# Patient Record
Sex: FEMALE | Race: BLACK OR AFRICAN AMERICAN | NOT HISPANIC OR LATINO
[De-identification: names, ages, dates, MRNs, and addresses within clinical notes are randomized per-mention and may not be internally consistent; named-entity substitution may affect disease eponyms.]

---

## 2017-01-24 ENCOUNTER — APPOINTMENT (OUTPATIENT)
Dept: INFECTIOUS DISEASE | Facility: CLINIC | Age: 47
End: 2017-01-24

## 2017-01-24 ENCOUNTER — EMERGENCY (EMERGENCY)
Facility: HOSPITAL | Age: 47
LOS: 1 days | Discharge: LEFT BEFORE DISCHARGE | End: 2017-01-24
Admitting: EMERGENCY MEDICINE

## 2017-01-24 ENCOUNTER — LABORATORY RESULT (OUTPATIENT)
Age: 47
End: 2017-01-24

## 2017-01-24 VITALS
HEIGHT: 71 IN | WEIGHT: 199 LBS | BODY MASS INDEX: 27.86 KG/M2 | HEART RATE: 116 BPM | TEMPERATURE: 98.1 F | SYSTOLIC BLOOD PRESSURE: 121 MMHG | DIASTOLIC BLOOD PRESSURE: 74 MMHG | OXYGEN SATURATION: 98 % | RESPIRATION RATE: 16 BRPM

## 2017-01-24 VITALS
RESPIRATION RATE: 18 BRPM | SYSTOLIC BLOOD PRESSURE: 136 MMHG | OXYGEN SATURATION: 99 % | HEART RATE: 92 BPM | DIASTOLIC BLOOD PRESSURE: 77 MMHG | TEMPERATURE: 99 F

## 2017-01-24 VITALS
RESPIRATION RATE: 16 BRPM | SYSTOLIC BLOOD PRESSURE: 143 MMHG | OXYGEN SATURATION: 98 % | HEART RATE: 91 BPM | DIASTOLIC BLOOD PRESSURE: 91 MMHG | TEMPERATURE: 98 F

## 2017-01-26 LAB
25(OH)D3 SERPL-MCNC: 15.2 NG/ML
ADJUSTED MITOGEN: 6.63 IU/ML
ADJUSTED TB AG: 0 IU/ML
ALBUMIN SERPL ELPH-MCNC: 3.5 G/DL
ALP BLD-CCNC: 327 U/L
ALT SERPL-CCNC: 11 U/L
ANION GAP SERPL CALC-SCNC: 13 MMOL/L
APPEARANCE: ABNORMAL
AST SERPL-CCNC: 14 U/L
BACTERIA: NEGATIVE
BASOPHILS # BLD AUTO: 0.04 K/UL
BASOPHILS NFR BLD AUTO: 0.8 %
BILIRUB SERPL-MCNC: 0.3 MG/DL
BILIRUBIN URINE: NEGATIVE
BLOOD URINE: ABNORMAL
BUN SERPL-MCNC: 12 MG/DL
C TRACH DNA SPEC QL NAA+PROBE: NORMAL
C TRACH RRNA SPEC QL NAA+PROBE: NORMAL
CALCIUM SERPL-MCNC: 9.2 MG/DL
CD3 CELLS # BLD: 1122 /UL
CD3 CELLS NFR BLD: 90 %
CD3+CD4+ CELLS # BLD: 174 /UL
CD3+CD4+ CELLS NFR BLD: 14 %
CD3+CD4+ CELLS/CD3+CD8+ CLL SPEC: 0.2 RATIO
CD3+CD8+ CELLS # SPEC: 863 /UL
CD3+CD8+ CELLS NFR BLD: 69 %
CHLORIDE SERPL-SCNC: 94 MMOL/L
CHOLEST SERPL-MCNC: 246 MG/DL
CHOLEST/HDLC SERPL: 6.3 RATIO
CO2 SERPL-SCNC: 22 MMOL/L
COLOR: YELLOW
CREAT SERPL-MCNC: 1.35 MG/DL
EOSINOPHIL # BLD AUTO: 0.39 K/UL
EOSINOPHIL NFR BLD AUTO: 8 %
GLUCOSE QUALITATIVE U: 1000 MG/DL
GLUCOSE SERPL-MCNC: 435 MG/DL
HAV IGG+IGM SER QL: NONREACTIVE
HBA1C MFR BLD HPLC: 11.5 %
HBV SURFACE AB SER QL: REACTIVE
HBV SURFACE AG SER QL: NONREACTIVE
HCT VFR BLD CALC: 32 %
HDLC SERPL-MCNC: 39 MG/DL
HGB BLD-MCNC: 9.2 G/DL
HIV1 RNA # SERPL NAA+PROBE: 892 COPIES/ML
HYALINE CASTS: 4 /LPF
IMM GRANULOCYTES NFR BLD AUTO: 0.2 %
KETONES URINE: NEGATIVE
LDLC SERPL CALC-MCNC: 150 MG/DL
LEUKOCYTE ESTERASE URINE: ABNORMAL
LYMPHOCYTES # BLD AUTO: 1.28 K/UL
LYMPHOCYTES NFR BLD AUTO: 26.4 %
M TB IFN-G BLD-IMP: NEGATIVE
MAN DIFF?: NORMAL
MCHC RBC-ENTMCNC: 20.9 PG
MCHC RBC-ENTMCNC: 28.8 GM/DL
MCV RBC AUTO: 72.7 FL
MICROSCOPIC-UA: NORMAL
MONOCYTES # BLD AUTO: 0.34 K/UL
MONOCYTES NFR BLD AUTO: 7 %
N GONORRHOEA DNA SPEC QL NAA+PROBE: NORMAL
N GONORRHOEA RRNA SPEC QL NAA+PROBE: NORMAL
NEUTROPHILS # BLD AUTO: 2.79 K/UL
NEUTROPHILS NFR BLD AUTO: 57.6 %
NITRITE URINE: NEGATIVE
PH URINE: 5.5
PLATELET # BLD AUTO: 307 K/UL
POTASSIUM SERPL-SCNC: 4.1 MMOL/L
PROT SERPL-MCNC: 8.7 G/DL
PROTEIN URINE: 30 MG/DL
QUANTIFERON GOLD NIL: 0.06 IU/ML
RBC # BLD: 4.4 M/UL
RBC # FLD: 18 %
RED BLOOD CELLS URINE: 3 /HPF
SODIUM SERPL-SCNC: 129 MMOL/L
SOURCE AMPLIFICATION: NORMAL
SPECIFIC GRAVITY URINE: 1.03
SQUAMOUS EPITHELIAL CELLS: 4 /HPF
T PALLIDUM AB SER QL IA: NEGATIVE
TRIGL SERPL-MCNC: 284 MG/DL
UROBILINOGEN URINE: NORMAL MG/DL
VIRAL LOAD LOG: 2.95 LG10COP/ML
WBC # FLD AUTO: 4.85 K/UL
WHITE BLOOD CELLS URINE: 82 /HPF

## 2017-01-27 ENCOUNTER — RX RENEWAL (OUTPATIENT)
Age: 47
End: 2017-01-27

## 2017-01-27 LAB
ABACAVIR ISLT GENOTYP: NORMAL
ATAZANAVIR+RITONAVIR ISLT GENOTYP: NORMAL
DARUNAVIR+RITONAVIR ISLT GENOTYP: NORMAL
DIDANOSINE ISLT GENOTYP: NORMAL
EFAVIRENZ ISLT GENOTYP: NORMAL
EMTRICITABINE ISLT GENOTYP: NORMAL
ETRAVIRINE ISLT GENOTYP: NORMAL
FOSAMPRENAVIR+RITONAVIR ISLT GENOTYP: NORMAL
HIV NNRTI GENE MUT DET ISLT: NORMAL
HIV NRTI GENE MUT DET ISLT: NORMAL
HIV PI GENE MUT DET ISLT: NORMAL
HIV RT GENE MUT DET ISLT: NORMAL
INDINAVIR+RITONAVIR ISLT GENOTYP: NORMAL
LAMIVUDINE ISLT GENOTYP: NORMAL
LOPINAVIR+RITONAVIR ISLT GENOTYP: NORMAL
NELFINAVIR ISLT GENOTYP: NORMAL
NEVIRAPINE ISLT GENOTYP: NORMAL
RILPIVIRINE ISLT GENOTYP: NORMAL
SAQUINAVIR+RITONAVIR ISLT GENOTYP: NORMAL
STAVUDINE ISLT GENOTYP: NORMAL
TENOFOVIR ISLT GENOTYP: NORMAL
TIPRANAVIR+RITONAVIR ISLT GENOTYP: NORMAL
ZIDOVUDINE ISLT GENOTYP: NORMAL

## 2017-01-29 ENCOUNTER — EMERGENCY (EMERGENCY)
Facility: HOSPITAL | Age: 47
LOS: 1 days | Discharge: HOME | End: 2017-01-29
Attending: EMERGENCY MEDICINE | Admitting: EMERGENCY MEDICINE
Payer: COMMERCIAL

## 2017-01-29 VITALS
SYSTOLIC BLOOD PRESSURE: 134 MMHG | HEART RATE: 81 BPM | OXYGEN SATURATION: 98 % | DIASTOLIC BLOOD PRESSURE: 88 MMHG | RESPIRATION RATE: 16 BRPM

## 2017-01-29 VITALS
SYSTOLIC BLOOD PRESSURE: 143 MMHG | RESPIRATION RATE: 16 BRPM | DIASTOLIC BLOOD PRESSURE: 89 MMHG | HEIGHT: 71 IN | TEMPERATURE: 98 F | OXYGEN SATURATION: 100 % | WEIGHT: 199.96 LBS | HEART RATE: 104 BPM

## 2017-01-29 LAB
ALBUMIN SERPL ELPH-MCNC: 3.8 G/DL — SIGNIFICANT CHANGE UP (ref 3.3–5)
ALP SERPL-CCNC: 339 U/L — HIGH (ref 40–120)
ALT FLD-CCNC: 9 U/L — SIGNIFICANT CHANGE UP (ref 4–33)
APPEARANCE UR: CLEAR — SIGNIFICANT CHANGE UP
AST SERPL-CCNC: 18 U/L — SIGNIFICANT CHANGE UP (ref 4–32)
B-OH-BUTYR SERPL-SCNC: 0.2 MMOL/L — SIGNIFICANT CHANGE UP (ref 0–0.4)
BACTERIA # UR AUTO: SIGNIFICANT CHANGE UP
BASE EXCESS BLDV CALC-SCNC: -0.5 MMOL/L — SIGNIFICANT CHANGE UP
BASOPHILS # BLD AUTO: 0.04 K/UL — SIGNIFICANT CHANGE UP (ref 0–0.2)
BASOPHILS NFR BLD AUTO: 0.7 % — SIGNIFICANT CHANGE UP (ref 0–2)
BILIRUB SERPL-MCNC: 0.3 MG/DL — SIGNIFICANT CHANGE UP (ref 0.2–1.2)
BILIRUB UR-MCNC: NEGATIVE — SIGNIFICANT CHANGE UP
BLOOD GAS VENOUS - CREATININE: 1.18 MG/DL — SIGNIFICANT CHANGE UP (ref 0.5–1.3)
BLOOD UR QL VISUAL: NEGATIVE — SIGNIFICANT CHANGE UP
BUN SERPL-MCNC: 12 MG/DL — SIGNIFICANT CHANGE UP (ref 7–23)
CALCIUM SERPL-MCNC: 9.6 MG/DL — SIGNIFICANT CHANGE UP (ref 8.4–10.5)
CHLORIDE BLDV-SCNC: 97 MMOL/L — SIGNIFICANT CHANGE UP (ref 96–108)
CHLORIDE SERPL-SCNC: 92 MMOL/L — LOW (ref 98–107)
CO2 SERPL-SCNC: 20 MMOL/L — LOW (ref 22–31)
COLOR SPEC: SIGNIFICANT CHANGE UP
CREAT SERPL-MCNC: 1.31 MG/DL — HIGH (ref 0.5–1.3)
EOSINOPHIL # BLD AUTO: 0.48 K/UL — SIGNIFICANT CHANGE UP (ref 0–0.5)
EOSINOPHIL NFR BLD AUTO: 8.9 % — HIGH (ref 0–6)
GAS PNL BLDV: 130 MMOL/L — LOW (ref 136–146)
GLUCOSE BLDV-MCNC: 484 — CRITICAL HIGH (ref 70–99)
GLUCOSE SERPL-MCNC: 517 MG/DL — CRITICAL HIGH (ref 70–99)
GLUCOSE UR-MCNC: >1000 — SIGNIFICANT CHANGE UP
HBA1C BLD-MCNC: 10.9 % — HIGH (ref 4–5.6)
HCO3 BLDV-SCNC: 23 MMOL/L — SIGNIFICANT CHANGE UP (ref 20–27)
HCT VFR BLD CALC: 31.2 % — LOW (ref 34.5–45)
HCT VFR BLDV CALC: 29.2 % — LOW (ref 34.5–45)
HGB BLD-MCNC: 9.3 G/DL — LOW (ref 11.5–15.5)
HGB BLDV-MCNC: 9.4 G/DL — LOW (ref 11.5–15.5)
IMM GRANULOCYTES NFR BLD AUTO: 0.2 % — SIGNIFICANT CHANGE UP (ref 0–1.5)
KETONES UR-MCNC: NEGATIVE — SIGNIFICANT CHANGE UP
LACTATE BLDV-MCNC: 2.5 MMOL/L — HIGH (ref 0.5–2)
LEUKOCYTE ESTERASE UR-ACNC: HIGH
LYMPHOCYTES # BLD AUTO: 1.96 K/UL — SIGNIFICANT CHANGE UP (ref 1–3.3)
LYMPHOCYTES # BLD AUTO: 36.2 % — SIGNIFICANT CHANGE UP (ref 13–44)
MCHC RBC-ENTMCNC: 21.1 PG — LOW (ref 27–34)
MCHC RBC-ENTMCNC: 29.8 % — LOW (ref 32–36)
MCV RBC AUTO: 70.9 FL — LOW (ref 80–100)
MONOCYTES # BLD AUTO: 0.47 K/UL — SIGNIFICANT CHANGE UP (ref 0–0.9)
MONOCYTES NFR BLD AUTO: 8.7 % — SIGNIFICANT CHANGE UP (ref 2–14)
MUCOUS THREADS # UR AUTO: SIGNIFICANT CHANGE UP
NEUTROPHILS # BLD AUTO: 2.46 K/UL — SIGNIFICANT CHANGE UP (ref 1.8–7.4)
NEUTROPHILS NFR BLD AUTO: 45.3 % — SIGNIFICANT CHANGE UP (ref 43–77)
NITRITE UR-MCNC: NEGATIVE — SIGNIFICANT CHANGE UP
PCO2 BLDV: 45 MMHG — SIGNIFICANT CHANGE UP (ref 41–51)
PH BLDV: 7.35 PH — SIGNIFICANT CHANGE UP (ref 7.32–7.43)
PH UR: 5.5 — SIGNIFICANT CHANGE UP (ref 4.6–8)
PLATELET # BLD AUTO: 275 K/UL — SIGNIFICANT CHANGE UP (ref 150–400)
PMV BLD: SIGNIFICANT CHANGE UP FL (ref 7–13)
PO2 BLDV: 36 MMHG — SIGNIFICANT CHANGE UP (ref 35–40)
POTASSIUM BLDV-SCNC: 4.2 MMOL/L — SIGNIFICANT CHANGE UP (ref 3.4–4.5)
POTASSIUM SERPL-MCNC: 4.2 MMOL/L — SIGNIFICANT CHANGE UP (ref 3.5–5.3)
POTASSIUM SERPL-SCNC: 4.2 MMOL/L — SIGNIFICANT CHANGE UP (ref 3.5–5.3)
PROT SERPL-MCNC: 9.3 G/DL — HIGH (ref 6–8.3)
PROT UR-MCNC: 30 — HIGH
RBC # BLD: 4.4 M/UL — SIGNIFICANT CHANGE UP (ref 3.8–5.2)
RBC # FLD: 17.6 % — HIGH (ref 10.3–14.5)
RBC CASTS # UR COMP ASSIST: SIGNIFICANT CHANGE UP (ref 0–?)
SAO2 % BLDV: 57.8 % — LOW (ref 60–85)
SODIUM SERPL-SCNC: 130 MMOL/L — LOW (ref 135–145)
SP GR SPEC: 1.02 — SIGNIFICANT CHANGE UP (ref 1–1.03)
SQUAMOUS # UR AUTO: SIGNIFICANT CHANGE UP
UROBILINOGEN FLD QL: NORMAL E.U. — SIGNIFICANT CHANGE UP (ref 0.1–0.2)
WBC # BLD: 5.42 K/UL — SIGNIFICANT CHANGE UP (ref 3.8–10.5)
WBC # FLD AUTO: 5.42 K/UL — SIGNIFICANT CHANGE UP (ref 3.8–10.5)
WBC CLUMPS #/AREA URNS HPF: PRESENT — HIGH (ref 0–?)
WBC UR QL: SIGNIFICANT CHANGE UP (ref 0–?)

## 2017-01-29 PROCEDURE — 99284 EMERGENCY DEPT VISIT MOD MDM: CPT | Mod: 25

## 2017-01-29 RX ORDER — NITROFURANTOIN MACROCRYSTAL 50 MG
1 CAPSULE ORAL
Qty: 14 | Refills: 0
Start: 2017-01-29 | End: 2017-02-05

## 2017-01-29 RX ORDER — NITROFURANTOIN MACROCRYSTAL 50 MG
100 CAPSULE ORAL ONCE
Qty: 0 | Refills: 0 | Status: COMPLETED | OUTPATIENT
Start: 2017-01-29 | End: 2017-01-29

## 2017-01-29 RX ORDER — INSULIN LISPRO 100/ML
8 VIAL (ML) SUBCUTANEOUS ONCE
Qty: 0 | Refills: 0 | Status: COMPLETED | OUTPATIENT
Start: 2017-01-29 | End: 2017-01-29

## 2017-01-29 RX ORDER — SODIUM CHLORIDE 9 MG/ML
1000 INJECTION INTRAMUSCULAR; INTRAVENOUS; SUBCUTANEOUS ONCE
Qty: 0 | Refills: 0 | Status: COMPLETED | OUTPATIENT
Start: 2017-01-29 | End: 2017-01-29

## 2017-01-29 RX ADMIN — Medication 100 MILLIGRAM(S): at 07:44

## 2017-01-29 RX ADMIN — SODIUM CHLORIDE 1000 MILLILITER(S): 9 INJECTION INTRAMUSCULAR; INTRAVENOUS; SUBCUTANEOUS at 06:01

## 2017-01-29 RX ADMIN — Medication 8 UNIT(S): at 07:04

## 2017-01-29 RX ADMIN — SODIUM CHLORIDE 1000 MILLILITER(S): 9 INJECTION INTRAMUSCULAR; INTRAVENOUS; SUBCUTANEOUS at 05:28

## 2017-01-29 NOTE — ED PROVIDER NOTE - PROGRESS NOTE DETAILS
Called [facility] to set up f/u appt for pt, they will call her during the week with an appointment. Hyperglycemia treated with fluids, insulin. Called [facility] to set up f/u appt for pt, they will call her during the week with an appointment. Improved FS after IVF. Discussed results, need for f/u with patient who expresses understanding and is amenable to plan.

## 2017-01-29 NOTE — ED ADULT TRIAGE NOTE - CHIEF COMPLAINT QUOTE
Pt walk in c/o Frontal Headache,pressure Blurry Vision Frequent Urination Dry mouth, Thirsty, Always Hungry. Pt reports Dx with DM in 2012 on Insulin, Stopped it in 2013 without MD advised when feeling better.

## 2017-01-29 NOTE — ED PROVIDER NOTE - ATTENDING CONTRIBUTION TO CARE
agree with resident note  47 yr old female with hx of IDDM who self stopped her insulin years ago presents with polyuria and polydipsia. Denies fever, abdominal pain, cough, infectious symptoms.  Tried to make appt with PMD but was told next appt was in April.    PE: well appearing; VSS: CTAB/L; s1 s 2 no m/r/g abd soft/NT/ND ext: no edema

## 2017-01-29 NOTE — ED PROVIDER NOTE - OBJECTIVE STATEMENT
47F h/o DM presenting with polyuria. Notes polyuria, polydipsia, blurry vision, persistent hunger for past 3 months. Pt had similar symptoms in 2012 and was diagnosed with diabetes, prescribed insulin but stopped taking it after one year because she felt better. Denies pain, F, CP/SOB, N/V/D.

## 2017-01-29 NOTE — ED ADULT NURSE NOTE - OBJECTIVE STATEMENT
Pt received in #18, aaox3 with c/o blurry vision, increased thirst, increased urinary frequency and "it feels like when I was first diagnosed with diabetes". Pt states that she was formally dx'ed in 2012 but weaned herself off of her medications in 2013. No known complications or symptoms until "a few months ago". Pt unable to follow up with her MD as next available appt is in April.

## 2017-02-13 ENCOUNTER — APPOINTMENT (OUTPATIENT)
Dept: ENDOCRINOLOGY | Facility: CLINIC | Age: 47
End: 2017-02-13

## 2017-02-13 RX ORDER — METFORMIN HYDROCHLORIDE 500 MG/1
500 TABLET, COATED ORAL
Qty: 30 | Refills: 0 | Status: DISCONTINUED | COMMUNITY
Start: 2017-01-26 | End: 2017-02-13

## 2017-02-15 ENCOUNTER — APPOINTMENT (OUTPATIENT)
Dept: OBGYN | Facility: CLINIC | Age: 47
End: 2017-02-15

## 2017-02-22 ENCOUNTER — MEDICATION RENEWAL (OUTPATIENT)
Age: 47
End: 2017-02-22

## 2017-02-24 ENCOUNTER — APPOINTMENT (OUTPATIENT)
Dept: INFECTIOUS DISEASE | Facility: CLINIC | Age: 47
End: 2017-02-24

## 2017-03-06 RX ORDER — PEN NEEDLE, DIABETIC 29 G X1/2"
32G X 4 MM NEEDLE, DISPOSABLE MISCELLANEOUS
Qty: 1 | Refills: 1 | Status: ACTIVE | COMMUNITY
Start: 2017-02-13

## 2017-03-22 ENCOUNTER — LABORATORY RESULT (OUTPATIENT)
Age: 47
End: 2017-03-22

## 2017-03-22 ENCOUNTER — APPOINTMENT (OUTPATIENT)
Dept: OBGYN | Facility: CLINIC | Age: 47
End: 2017-03-22

## 2017-03-22 VITALS
DIASTOLIC BLOOD PRESSURE: 70 MMHG | HEIGHT: 71 IN | SYSTOLIC BLOOD PRESSURE: 120 MMHG | BODY MASS INDEX: 27.86 KG/M2 | WEIGHT: 199 LBS

## 2017-03-22 DIAGNOSIS — N90.9 NONINFLAMMATORY DISORDER OF VULVA AND PERINEUM, UNSPECIFIED: ICD-10-CM

## 2017-03-23 LAB
CANDIDA VAG CYTO: NOT DETECTED
G VAGINALIS+PREV SP MTYP VAG QL MICRO: NOT DETECTED
T VAGINALIS VAG QL WET PREP: NOT DETECTED

## 2017-03-24 LAB — HPV HIGH+LOW RISK DNA PNL CVX: POSITIVE

## 2017-03-29 LAB — CYTOLOGY CVX/VAG DOC THIN PREP: NORMAL

## 2017-04-05 ENCOUNTER — APPOINTMENT (OUTPATIENT)
Dept: OBGYN | Facility: CLINIC | Age: 47
End: 2017-04-05

## 2017-04-05 VITALS
HEIGHT: 71 IN | WEIGHT: 199 LBS | SYSTOLIC BLOOD PRESSURE: 112 MMHG | DIASTOLIC BLOOD PRESSURE: 72 MMHG | BODY MASS INDEX: 27.86 KG/M2

## 2017-04-18 LAB — CORE LAB BIOPSY: NORMAL

## 2017-05-01 ENCOUNTER — APPOINTMENT (OUTPATIENT)
Dept: INFECTIOUS DISEASE | Facility: CLINIC | Age: 47
End: 2017-05-01

## 2017-05-01 ENCOUNTER — APPOINTMENT (OUTPATIENT)
Dept: ENDOCRINOLOGY | Facility: CLINIC | Age: 47
End: 2017-05-01

## 2017-05-01 VITALS
WEIGHT: 205 LBS | SYSTOLIC BLOOD PRESSURE: 120 MMHG | DIASTOLIC BLOOD PRESSURE: 80 MMHG | HEIGHT: 71 IN | HEART RATE: 90 BPM | OXYGEN SATURATION: 98 % | BODY MASS INDEX: 28.7 KG/M2

## 2017-05-01 VITALS
TEMPERATURE: 97.9 F | WEIGHT: 205 LBS | SYSTOLIC BLOOD PRESSURE: 128 MMHG | BODY MASS INDEX: 28.7 KG/M2 | HEART RATE: 90 BPM | DIASTOLIC BLOOD PRESSURE: 88 MMHG | HEIGHT: 71 IN | OXYGEN SATURATION: 97 %

## 2017-05-01 LAB
GLUCOSE BLDC GLUCOMTR-MCNC: 200
HBA1C MFR BLD HPLC: 7.8

## 2017-05-01 RX ORDER — GLIMEPIRIDE 2 MG/1
2 TABLET ORAL DAILY
Qty: 30 | Refills: 3 | Status: DISCONTINUED | COMMUNITY
Start: 2017-02-13 | End: 2017-05-01

## 2017-05-02 LAB
ALBUMIN SERPL ELPH-MCNC: 3.1 G/DL
ALP BLD-CCNC: 232 U/L
ALT SERPL-CCNC: 17 U/L
ANION GAP SERPL CALC-SCNC: 13 MMOL/L
AST SERPL-CCNC: 27 U/L
BASOPHILS # BLD AUTO: 0.02 K/UL
BASOPHILS NFR BLD AUTO: 0.5 %
BILIRUB SERPL-MCNC: 0.2 MG/DL
BUN SERPL-MCNC: 14 MG/DL
CALCIUM SERPL-MCNC: 8.8 MG/DL
CD3 CELLS # BLD: 1030 /UL
CD3 CELLS NFR BLD: 88 %
CD3+CD4+ CELLS # BLD: 126 /UL
CD3+CD4+ CELLS NFR BLD: 11 %
CD3+CD4+ CELLS/CD3+CD8+ CLL SPEC: 0.15 RATIO
CD3+CD8+ CELLS # SPEC: 815 /UL
CD3+CD8+ CELLS NFR BLD: 69 %
CHLORIDE SERPL-SCNC: 102 MMOL/L
CHOLEST SERPL-MCNC: 211 MG/DL
CHOLEST/HDLC SERPL: 6.4 RATIO
CO2 SERPL-SCNC: 21 MMOL/L
CREAT SERPL-MCNC: 1.13 MG/DL
EOSINOPHIL # BLD AUTO: 0.59 K/UL
EOSINOPHIL NFR BLD AUTO: 15.1 %
GLUCOSE SERPL-MCNC: 174 MG/DL
HBA1C MFR BLD HPLC: 8.1 %
HCT VFR BLD CALC: 28.5 %
HDLC SERPL-MCNC: 33 MG/DL
HGB BLD-MCNC: 8.4 G/DL
IMM GRANULOCYTES NFR BLD AUTO: 0 %
LDLC SERPL CALC-MCNC: 110 MG/DL
LYMPHOCYTES # BLD AUTO: 1.34 K/UL
LYMPHOCYTES NFR BLD AUTO: 34.4 %
MAN DIFF?: NORMAL
MCHC RBC-ENTMCNC: 22.3 PG
MCHC RBC-ENTMCNC: 29.5 GM/DL
MCV RBC AUTO: 75.8 FL
MONOCYTES # BLD AUTO: 0.32 K/UL
MONOCYTES NFR BLD AUTO: 8.2 %
NEUTROPHILS # BLD AUTO: 1.63 K/UL
NEUTROPHILS NFR BLD AUTO: 41.8 %
PLATELET # BLD AUTO: 281 K/UL
POTASSIUM SERPL-SCNC: 4.7 MMOL/L
PROT SERPL-MCNC: 8.9 G/DL
RBC # BLD: 3.76 M/UL
RBC # FLD: 19.4 %
SODIUM SERPL-SCNC: 136 MMOL/L
TRIGL SERPL-MCNC: 341 MG/DL
WBC # FLD AUTO: 3.9 K/UL

## 2017-05-04 LAB
HIV1 RNA # SERPL NAA+PROBE: 1848 COPIES/ML
VIRAL LOAD LOG: 3.27 LG10COP/ML

## 2017-07-21 ENCOUNTER — APPOINTMENT (OUTPATIENT)
Dept: INFECTIOUS DISEASE | Facility: CLINIC | Age: 47
End: 2017-07-21

## 2017-07-21 ENCOUNTER — LABORATORY RESULT (OUTPATIENT)
Age: 47
End: 2017-07-21

## 2017-07-21 VITALS
HEART RATE: 95 BPM | WEIGHT: 209 LBS | OXYGEN SATURATION: 96 % | SYSTOLIC BLOOD PRESSURE: 123 MMHG | RESPIRATION RATE: 16 BRPM | TEMPERATURE: 98.2 F | BODY MASS INDEX: 29.26 KG/M2 | DIASTOLIC BLOOD PRESSURE: 80 MMHG | HEIGHT: 71 IN

## 2017-07-21 DIAGNOSIS — R87.9 UNSPECIFIED ABNORMAL FINDING IN SPECIMENS FROM FEMALE GENITAL ORGANS: ICD-10-CM

## 2017-07-21 LAB
ALBUMIN SERPL ELPH-MCNC: 3.5 G/DL
ALP BLD-CCNC: 300 U/L
ALT SERPL-CCNC: 9 U/L
ANION GAP SERPL CALC-SCNC: 15 MMOL/L
APPEARANCE: CLEAR
AST SERPL-CCNC: 18 U/L
BACTERIA: NEGATIVE
BILIRUB SERPL-MCNC: 0.2 MG/DL
BILIRUBIN URINE: NEGATIVE
BLOOD URINE: ABNORMAL
BUN SERPL-MCNC: 19 MG/DL
CALCIUM SERPL-MCNC: 9.3 MG/DL
CHLORIDE SERPL-SCNC: 98 MMOL/L
CO2 SERPL-SCNC: 20 MMOL/L
COLOR: YELLOW
CREAT SERPL-MCNC: 1.66 MG/DL
GLUCOSE QUALITATIVE U: 1000 MG/DL
GLUCOSE SERPL-MCNC: 305 MG/DL
HBA1C MFR BLD HPLC: 9.1 %
KETONES URINE: NEGATIVE
LEUKOCYTE ESTERASE URINE: NEGATIVE
MICROSCOPIC-UA: NORMAL
NITRITE URINE: NEGATIVE
PH URINE: 6
POTASSIUM SERPL-SCNC: 4.6 MMOL/L
PROT SERPL-MCNC: 9.6 G/DL
PROTEIN URINE: 30 MG/DL
RED BLOOD CELLS URINE: 0 /HPF
SODIUM SERPL-SCNC: 133 MMOL/L
SPECIFIC GRAVITY URINE: 1.02
SQUAMOUS EPITHELIAL CELLS: 2 /HPF
UROBILINOGEN URINE: NORMAL MG/DL
WHITE BLOOD CELLS URINE: 2 /HPF

## 2017-07-21 RX ORDER — ADHESIVE TAPE 3"X 2.3 YD
50 MCG TAPE, NON-MEDICATED TOPICAL
Qty: 30 | Refills: 6 | Status: COMPLETED | COMMUNITY
Start: 2017-05-01 | End: 2017-07-21

## 2017-07-21 RX ORDER — TERCONAZOLE 4 MG/G
0.4 CREAM VAGINAL
Qty: 1 | Refills: 3 | Status: COMPLETED | COMMUNITY
Start: 2017-03-22 | End: 2017-07-21

## 2017-07-24 LAB
25(OH)D3 SERPL-MCNC: 16.6 NG/ML
BASOPHILS # BLD AUTO: 0 K/UL
BASOPHILS NFR BLD AUTO: 0 %
C TRACH RRNA SPEC QL NAA+PROBE: NORMAL
CD3 CELLS # BLD: 1119 /UL
CD3 CELLS NFR BLD: 82 %
CD3+CD4+ CELLS # BLD: 161 /UL
CD3+CD4+ CELLS NFR BLD: 12 %
CD3+CD4+ CELLS/CD3+CD8+ CLL SPEC: 0.18 RATIO
CD3+CD8+ CELLS # SPEC: 882 /UL
CD3+CD8+ CELLS NFR BLD: 64 %
EOSINOPHIL # BLD AUTO: 1.92 K/UL
EOSINOPHIL NFR BLD AUTO: 35.3 %
HBV SURFACE AB SER QL: REACTIVE
HCT VFR BLD CALC: 29.7 %
HGB BLD-MCNC: 9 G/DL
HIV1 RNA # SERPL NAA+PROBE: <30 COPIES/ML
HIV1 RNA # SERPL NAA+PROBE: ABNORMAL
LYMPHOCYTES # BLD AUTO: 1.01 K/UL
LYMPHOCYTES NFR BLD AUTO: 18.6 %
MAN DIFF?: NORMAL
MCHC RBC-ENTMCNC: 22.6 PG
MCHC RBC-ENTMCNC: 30.3 GM/DL
MCV RBC AUTO: 74.4 FL
MONOCYTES # BLD AUTO: 0.16 K/UL
MONOCYTES NFR BLD AUTO: 2.9 %
N GONORRHOEA RRNA SPEC QL NAA+PROBE: NORMAL
NEUTROPHILS # BLD AUTO: 2.35 K/UL
NEUTROPHILS NFR BLD AUTO: 43.2 %
PLATELET # BLD AUTO: 321 K/UL
RBC # BLD: 3.99 M/UL
RBC # FLD: 19.3 %
SOURCE AMPLIFICATION: NORMAL
T PALLIDUM AB SER QL IA: NEGATIVE
VIRAL LOAD INTERP: NORMAL
VIRAL LOAD LOG: <1.47 LG COP/ML
WBC # FLD AUTO: 5.44 K/UL

## 2017-07-26 LAB
ADJUSTED MITOGEN: 4.02 IU/ML
ADJUSTED TB AG: 0 IU/ML
M TB IFN-G BLD-IMP: NEGATIVE
QUANTIFERON GOLD NIL: 0.02 IU/ML

## 2017-08-14 ENCOUNTER — APPOINTMENT (OUTPATIENT)
Dept: ENDOCRINOLOGY | Facility: CLINIC | Age: 47
End: 2017-08-14

## 2017-08-15 ENCOUNTER — RX RENEWAL (OUTPATIENT)
Age: 47
End: 2017-08-15

## 2017-09-11 ENCOUNTER — APPOINTMENT (OUTPATIENT)
Dept: ENDOCRINOLOGY | Facility: CLINIC | Age: 47
End: 2017-09-11
Payer: COMMERCIAL

## 2017-09-11 VITALS
BODY MASS INDEX: 29.26 KG/M2 | SYSTOLIC BLOOD PRESSURE: 120 MMHG | HEART RATE: 89 BPM | DIASTOLIC BLOOD PRESSURE: 80 MMHG | WEIGHT: 209 LBS | OXYGEN SATURATION: 95 % | HEIGHT: 71 IN

## 2017-09-11 PROCEDURE — 99215 OFFICE O/P EST HI 40 MIN: CPT

## 2017-09-13 LAB
25(OH)D3 SERPL-MCNC: 19.5 NG/ML
ALBUMIN SERPL ELPH-MCNC: 3.8 G/DL
ALP BLD-CCNC: 307 U/L
ALT SERPL-CCNC: 6 U/L
ANION GAP SERPL CALC-SCNC: 15 MMOL/L
AST SERPL-CCNC: 12 U/L
BILIRUB SERPL-MCNC: 0.3 MG/DL
BUN SERPL-MCNC: 16 MG/DL
CALCIUM SERPL-MCNC: 9.5 MG/DL
CHLORIDE SERPL-SCNC: 100 MMOL/L
CHOLEST SERPL-MCNC: 260 MG/DL
CHOLEST/HDLC SERPL: 5.7 RATIO
CO2 SERPL-SCNC: 22 MMOL/L
CREAT SERPL-MCNC: 1.4 MG/DL
CREAT SPEC-SCNC: 124 MG/DL
GLUCOSE SERPL-MCNC: 167 MG/DL
HBA1C MFR BLD HPLC: 8.2 %
HDLC SERPL-MCNC: 46 MG/DL
LDLC SERPL CALC-MCNC: 172 MG/DL
MICROALBUMIN 24H UR DL<=1MG/L-MCNC: 75.7 MG/DL
MICROALBUMIN/CREAT 24H UR-RTO: 610 MG/G
POTASSIUM SERPL-SCNC: 4.7 MMOL/L
PROT SERPL-MCNC: 9 G/DL
SODIUM SERPL-SCNC: 137 MMOL/L
T4 FREE SERPL-MCNC: 1 NG/DL
TRIGL SERPL-MCNC: 211 MG/DL
TSH SERPL-ACNC: 1.08 UIU/ML

## 2017-09-21 ENCOUNTER — APPOINTMENT (OUTPATIENT)
Dept: ENDOCRINOLOGY | Facility: CLINIC | Age: 47
End: 2017-09-21

## 2017-10-24 ENCOUNTER — LABORATORY RESULT (OUTPATIENT)
Age: 47
End: 2017-10-24

## 2017-10-24 ENCOUNTER — APPOINTMENT (OUTPATIENT)
Dept: INFECTIOUS DISEASE | Facility: CLINIC | Age: 47
End: 2017-10-24
Payer: COMMERCIAL

## 2017-10-24 VITALS
OXYGEN SATURATION: 98 % | WEIGHT: 212 LBS | TEMPERATURE: 97.5 F | HEIGHT: 71 IN | DIASTOLIC BLOOD PRESSURE: 83 MMHG | HEART RATE: 89 BPM | BODY MASS INDEX: 29.68 KG/M2 | SYSTOLIC BLOOD PRESSURE: 120 MMHG | RESPIRATION RATE: 18 BRPM

## 2017-10-24 PROCEDURE — 99214 OFFICE O/P EST MOD 30 MIN: CPT

## 2017-10-25 LAB
25(OH)D3 SERPL-MCNC: 15.6 NG/ML
AFP-TM SERPL-MCNC: 4.1 NG/ML
ALBUMIN SERPL ELPH-MCNC: 3.7 G/DL
ALP BLD-CCNC: 276 U/L
ALT SERPL-CCNC: 8 U/L
ANION GAP SERPL CALC-SCNC: 14 MMOL/L
APPEARANCE: CLEAR
AST SERPL-CCNC: 14 U/L
BACTERIA: NEGATIVE
BASOPHILS # BLD AUTO: 0.06 K/UL
BASOPHILS NFR BLD AUTO: 1.1 %
BILIRUB SERPL-MCNC: 0.4 MG/DL
BILIRUBIN URINE: NEGATIVE
BLOOD URINE: ABNORMAL
BUN SERPL-MCNC: 17 MG/DL
C TRACH RRNA SPEC QL NAA+PROBE: NOT DETECTED
CALCIUM SERPL-MCNC: 9.7 MG/DL
CD3 CELLS # BLD: 1156 /UL
CD3 CELLS NFR BLD: 85 %
CD3+CD4+ CELLS # BLD: 166 /UL
CD3+CD4+ CELLS NFR BLD: 12 %
CD3+CD4+ CELLS/CD3+CD8+ CLL SPEC: 0.18 RATIO
CD3+CD8+ CELLS # SPEC: 936 /UL
CD3+CD8+ CELLS NFR BLD: 69 %
CHLORIDE SERPL-SCNC: 101 MMOL/L
CO2 SERPL-SCNC: 21 MMOL/L
COLOR: YELLOW
CREAT SERPL-MCNC: 1.52 MG/DL
EOSINOPHIL # BLD AUTO: 0.63 K/UL
EOSINOPHIL NFR BLD AUTO: 11.8 %
GLUCOSE QUALITATIVE U: NEGATIVE MG/DL
GLUCOSE SERPL-MCNC: 207 MG/DL
HAV IGG+IGM SER QL: NONREACTIVE
HBV SURFACE AG SER QL: NONREACTIVE
HCT VFR BLD CALC: 31.6 %
HCV AB SER QL: NONREACTIVE
HCV S/CO RATIO: 0.23 S/CO
HGB BLD-MCNC: 9.2 G/DL
HIV1 RNA # SERPL NAA+PROBE: ABNORMAL
HIV1 RNA # SERPL NAA+PROBE: ABNORMAL COPIES/ML
IMM GRANULOCYTES NFR BLD AUTO: 0 %
KETONES URINE: NEGATIVE
LEUKOCYTE ESTERASE URINE: ABNORMAL
LYMPHOCYTES # BLD AUTO: 1.25 K/UL
LYMPHOCYTES NFR BLD AUTO: 23.4 %
MAN DIFF?: NORMAL
MCHC RBC-ENTMCNC: 21.3 PG
MCHC RBC-ENTMCNC: 29.1 GM/DL
MCV RBC AUTO: 73.3 FL
MICROSCOPIC-UA: NORMAL
MONOCYTES # BLD AUTO: 0.28 K/UL
MONOCYTES NFR BLD AUTO: 5.2 %
N GONORRHOEA RRNA SPEC QL NAA+PROBE: NOT DETECTED
NEUTROPHILS # BLD AUTO: 3.12 K/UL
NEUTROPHILS NFR BLD AUTO: 58.5 %
NITRITE URINE: NEGATIVE
PH URINE: 5.5
PLATELET # BLD AUTO: 333 K/UL
POTASSIUM SERPL-SCNC: 4.8 MMOL/L
PROT SERPL-MCNC: 8.9 G/DL
PROTEIN URINE: 100 MG/DL
RBC # BLD: 4.31 M/UL
RBC # FLD: 18.8 %
RED BLOOD CELLS URINE: 192 /HPF
SODIUM SERPL-SCNC: 136 MMOL/L
SOURCE AMPLIFICATION: NORMAL
SPECIFIC GRAVITY URINE: 1.02
SQUAMOUS EPITHELIAL CELLS: 3 /HPF
T PALLIDUM AB SER QL IA: NEGATIVE
UROBILINOGEN URINE: NEGATIVE MG/DL
VIRAL LOAD INTERP: NORMAL
VIRAL LOAD LOG: ABNORMAL LG COP/ML
WBC # FLD AUTO: 5.34 K/UL
WHITE BLOOD CELLS URINE: 8 /HPF

## 2017-10-26 LAB
ADJUSTED MITOGEN: 8.42 IU/ML
ADJUSTED TB AG: 0 IU/ML
M TB IFN-G BLD-IMP: NEGATIVE
QUANTIFERON GOLD NIL: 0.02 IU/ML

## 2017-11-06 ENCOUNTER — RX RENEWAL (OUTPATIENT)
Age: 47
End: 2017-11-06

## 2018-01-08 ENCOUNTER — APPOINTMENT (OUTPATIENT)
Dept: ENDOCRINOLOGY | Facility: CLINIC | Age: 48
End: 2018-01-08
Payer: COMMERCIAL

## 2018-01-08 ENCOUNTER — RESULT REVIEW (OUTPATIENT)
Age: 48
End: 2018-01-08

## 2018-01-08 VITALS
DIASTOLIC BLOOD PRESSURE: 84 MMHG | BODY MASS INDEX: 29.4 KG/M2 | WEIGHT: 210 LBS | SYSTOLIC BLOOD PRESSURE: 132 MMHG | OXYGEN SATURATION: 96 % | HEART RATE: 113 BPM | HEIGHT: 71 IN

## 2018-01-08 LAB
GLUCOSE BLDC GLUCOMTR-MCNC: 348
HBA1C MFR BLD HPLC: 10.4

## 2018-01-08 PROCEDURE — 99215 OFFICE O/P EST HI 40 MIN: CPT

## 2018-01-08 RX ORDER — GLIMEPIRIDE 2 MG/1
2 TABLET ORAL DAILY
Qty: 30 | Refills: 6 | Status: DISCONTINUED | COMMUNITY
Start: 2017-05-01 | End: 2018-01-08

## 2018-01-22 ENCOUNTER — APPOINTMENT (OUTPATIENT)
Dept: INFECTIOUS DISEASE | Facility: CLINIC | Age: 48
End: 2018-01-22
Payer: COMMERCIAL

## 2018-01-22 ENCOUNTER — LABORATORY RESULT (OUTPATIENT)
Age: 48
End: 2018-01-22

## 2018-01-22 VITALS
BODY MASS INDEX: 30.38 KG/M2 | WEIGHT: 217 LBS | OXYGEN SATURATION: 99 % | HEART RATE: 98 BPM | DIASTOLIC BLOOD PRESSURE: 85 MMHG | HEIGHT: 71 IN | SYSTOLIC BLOOD PRESSURE: 137 MMHG | TEMPERATURE: 97.6 F

## 2018-01-22 DIAGNOSIS — Z86.19 PERSONAL HISTORY OF OTHER INFECTIOUS AND PARASITIC DISEASES: ICD-10-CM

## 2018-01-22 PROCEDURE — 99214 OFFICE O/P EST MOD 30 MIN: CPT

## 2018-01-24 LAB
25(OH)D3 SERPL-MCNC: 18.2 NG/ML
ALBUMIN SERPL ELPH-MCNC: 3.6 G/DL
ALP BLD-CCNC: 265 U/L
ALT SERPL-CCNC: 5 U/L
ANION GAP SERPL CALC-SCNC: 14 MMOL/L
APPEARANCE: ABNORMAL
AST SERPL-CCNC: 17 U/L
BACTERIA: NEGATIVE
BASOPHILS # BLD AUTO: 0.04 K/UL
BASOPHILS NFR BLD AUTO: 0.9 %
BILIRUB SERPL-MCNC: 0.2 MG/DL
BILIRUBIN URINE: NEGATIVE
BLOOD URINE: ABNORMAL
BUN SERPL-MCNC: 14 MG/DL
C TRACH RRNA SPEC QL NAA+PROBE: NOT DETECTED
CALCIUM SERPL-MCNC: 9.7 MG/DL
CD3 CELLS # BLD: 1487 /UL
CD3 CELLS NFR BLD: 84 %
CD3+CD4+ CELLS # BLD: 285 /UL
CD3+CD4+ CELLS NFR BLD: 16 %
CD3+CD4+ CELLS/CD3+CD8+ CLL SPEC: 0.25 RATIO
CD3+CD8+ CELLS # SPEC: 1119 /UL
CD3+CD8+ CELLS NFR BLD: 63 %
CHLORIDE SERPL-SCNC: 101 MMOL/L
CO2 SERPL-SCNC: 21 MMOL/L
COLOR: YELLOW
CREAT SERPL-MCNC: 1.47 MG/DL
EOSINOPHIL # BLD AUTO: 0.86 K/UL
EOSINOPHIL NFR BLD AUTO: 18.4
GLUCOSE QUALITATIVE U: 1000 MG/DL
GLUCOSE SERPL-MCNC: 281 MG/DL
HCT VFR BLD CALC: 32 %
HGB BLD-MCNC: 8.9 G/DL
HIV1 RNA # SERPL NAA+PROBE: ABNORMAL
HIV1 RNA # SERPL NAA+PROBE: ABNORMAL COPIES/ML
HYALINE CASTS: 0 /LPF
KETONES URINE: NEGATIVE
LEUKOCYTE ESTERASE URINE: ABNORMAL
LYMPHOCYTES # BLD AUTO: 1.34 K/UL
LYMPHOCYTES NFR BLD AUTO: 28.9 %
MAN DIFF?: NORMAL
MCHC RBC-ENTMCNC: 21.9 PG
MCHC RBC-ENTMCNC: 27.8 GM/DL
MCV RBC AUTO: 78.6 FL
MICROSCOPIC-UA: NORMAL
MONOCYTES # BLD AUTO: 0.12 K/UL
MONOCYTES NFR BLD AUTO: 2.6 %
N GONORRHOEA RRNA SPEC QL NAA+PROBE: NOT DETECTED
NEUTROPHILS # BLD AUTO: 2.2 K/UL
NEUTROPHILS NFR BLD AUTO: 47.4 %
NITRITE URINE: NEGATIVE
PH URINE: 5
PLATELET # BLD AUTO: 338 K/UL
POTASSIUM SERPL-SCNC: 4.5 MMOL/L
PROT SERPL-MCNC: 8.6 G/DL
PROTEIN URINE: 100 MG/DL
RBC # BLD: 4.07 M/UL
RBC # FLD: 20.9 %
RED BLOOD CELLS URINE: 20 /HPF
SODIUM SERPL-SCNC: 136 MMOL/L
SOURCE AMPLIFICATION: NORMAL
SPECIFIC GRAVITY URINE: 1.02
SQUAMOUS EPITHELIAL CELLS: 1 /HPF
T PALLIDUM AB SER QL IA: NEGATIVE
UROBILINOGEN URINE: NEGATIVE MG/DL
VIRAL LOAD INTERP: NORMAL
VIRAL LOAD LOG: ABNORMAL LG COP/ML
WBC # FLD AUTO: 4.65 K/UL
WHITE BLOOD CELLS URINE: 304 /HPF

## 2018-02-02 ENCOUNTER — APPOINTMENT (OUTPATIENT)
Dept: ENDOCRINOLOGY | Facility: CLINIC | Age: 48
End: 2018-02-02

## 2018-03-27 ENCOUNTER — RX RENEWAL (OUTPATIENT)
Age: 48
End: 2018-03-27

## 2018-04-11 ENCOUNTER — APPOINTMENT (OUTPATIENT)
Dept: OBGYN | Facility: CLINIC | Age: 48
End: 2018-04-11
Payer: COMMERCIAL

## 2018-04-11 VITALS
HEIGHT: 71 IN | WEIGHT: 214 LBS | DIASTOLIC BLOOD PRESSURE: 84 MMHG | SYSTOLIC BLOOD PRESSURE: 133 MMHG | BODY MASS INDEX: 29.96 KG/M2

## 2018-04-11 DIAGNOSIS — Z01.419 ENCOUNTER FOR GYNECOLOGICAL EXAMINATION (GENERAL) (ROUTINE) W/OUT ABNORMAL FINDINGS: ICD-10-CM

## 2018-04-11 PROCEDURE — 99214 OFFICE O/P EST MOD 30 MIN: CPT

## 2018-04-11 RX ORDER — CEPHALEXIN 500 MG/1
500 CAPSULE ORAL
Qty: 14 | Refills: 0 | Status: COMPLETED | COMMUNITY
Start: 2018-01-22 | End: 2018-04-11

## 2018-04-12 LAB — HPV HIGH+LOW RISK DNA PNL CVX: NOT DETECTED

## 2018-04-13 LAB
CANDIDA VAG CYTO: NOT DETECTED
G VAGINALIS+PREV SP MTYP VAG QL MICRO: DETECTED
T VAGINALIS VAG QL WET PREP: NOT DETECTED

## 2018-04-16 ENCOUNTER — OTHER (OUTPATIENT)
Age: 48
End: 2018-04-16

## 2018-04-16 LAB — CYTOLOGY CVX/VAG DOC THIN PREP: NORMAL

## 2018-04-23 ENCOUNTER — RX RENEWAL (OUTPATIENT)
Age: 48
End: 2018-04-23

## 2018-04-24 ENCOUNTER — APPOINTMENT (OUTPATIENT)
Dept: INFECTIOUS DISEASE | Facility: CLINIC | Age: 48
End: 2018-04-24

## 2018-05-15 ENCOUNTER — APPOINTMENT (OUTPATIENT)
Dept: OBGYN | Facility: CLINIC | Age: 48
End: 2018-05-15
Payer: COMMERCIAL

## 2018-05-15 ENCOUNTER — LABORATORY RESULT (OUTPATIENT)
Age: 48
End: 2018-05-15

## 2018-05-15 VITALS — SYSTOLIC BLOOD PRESSURE: 110 MMHG | DIASTOLIC BLOOD PRESSURE: 70 MMHG | HEIGHT: 71 IN

## 2018-05-15 DIAGNOSIS — Z87.898 PERSONAL HISTORY OF OTHER SPECIFIED CONDITIONS: ICD-10-CM

## 2018-05-15 DIAGNOSIS — Z87.448 PERSONAL HISTORY OF OTHER DISEASES OF URINARY SYSTEM: ICD-10-CM

## 2018-05-15 DIAGNOSIS — N89.8 OTHER SPECIFIED NONINFLAMMATORY DISORDERS OF VAGINA: ICD-10-CM

## 2018-05-15 DIAGNOSIS — R39.198 OTHER DIFFICULTIES WITH MICTURITION: ICD-10-CM

## 2018-05-15 PROCEDURE — 99214 OFFICE O/P EST MOD 30 MIN: CPT

## 2018-05-15 RX ORDER — FLUCONAZOLE 150 MG/1
150 TABLET ORAL
Qty: 1 | Refills: 1 | Status: COMPLETED | COMMUNITY
Start: 2018-04-13 | End: 2018-05-15

## 2018-05-15 RX ORDER — DOCOSANOL 100 MG/G
10 CREAM TOPICAL
Qty: 2 | Refills: 5 | Status: COMPLETED | COMMUNITY
Start: 2018-04-11 | End: 2018-05-15

## 2018-05-17 ENCOUNTER — FORM ENCOUNTER (OUTPATIENT)
Age: 48
End: 2018-05-17

## 2018-05-18 ENCOUNTER — APPOINTMENT (OUTPATIENT)
Dept: MAMMOGRAPHY | Facility: IMAGING CENTER | Age: 48
End: 2018-05-18
Payer: COMMERCIAL

## 2018-05-18 ENCOUNTER — APPOINTMENT (OUTPATIENT)
Dept: ULTRASOUND IMAGING | Facility: IMAGING CENTER | Age: 48
End: 2018-05-18
Payer: COMMERCIAL

## 2018-05-18 ENCOUNTER — OUTPATIENT (OUTPATIENT)
Dept: OUTPATIENT SERVICES | Facility: HOSPITAL | Age: 48
LOS: 1 days | End: 2018-05-18
Payer: COMMERCIAL

## 2018-05-18 DIAGNOSIS — Z01.419 ENCOUNTER FOR GYNECOLOGICAL EXAMINATION (GENERAL) (ROUTINE) WITHOUT ABNORMAL FINDINGS: ICD-10-CM

## 2018-05-18 PROCEDURE — 77063 BREAST TOMOSYNTHESIS BI: CPT | Mod: 26

## 2018-05-18 PROCEDURE — 76642 ULTRASOUND BREAST LIMITED: CPT | Mod: 26,50

## 2018-05-18 PROCEDURE — 77067 SCR MAMMO BI INCL CAD: CPT | Mod: 26,59

## 2018-05-18 PROCEDURE — 77066 DX MAMMO INCL CAD BI: CPT | Mod: 26,GG

## 2018-05-18 PROCEDURE — 77066 DX MAMMO INCL CAD BI: CPT

## 2018-05-18 PROCEDURE — 76642 ULTRASOUND BREAST LIMITED: CPT

## 2018-05-18 PROCEDURE — 77067 SCR MAMMO BI INCL CAD: CPT

## 2018-05-18 PROCEDURE — 77063 BREAST TOMOSYNTHESIS BI: CPT

## 2018-05-22 LAB — HHV SPEC CULT: ABNORMAL

## 2018-06-12 ENCOUNTER — APPOINTMENT (OUTPATIENT)
Dept: ENDOCRINOLOGY | Facility: CLINIC | Age: 48
End: 2018-06-12
Payer: COMMERCIAL

## 2018-06-12 VITALS
WEIGHT: 218 LBS | BODY MASS INDEX: 30.52 KG/M2 | OXYGEN SATURATION: 97 % | HEART RATE: 110 BPM | DIASTOLIC BLOOD PRESSURE: 70 MMHG | HEIGHT: 71 IN | SYSTOLIC BLOOD PRESSURE: 110 MMHG

## 2018-06-12 LAB
GLUCOSE BLDC GLUCOMTR-MCNC: 188
HBA1C MFR BLD HPLC: 8

## 2018-06-12 PROCEDURE — 82962 GLUCOSE BLOOD TEST: CPT

## 2018-06-12 PROCEDURE — 99214 OFFICE O/P EST MOD 30 MIN: CPT | Mod: 25

## 2018-06-12 PROCEDURE — 83036 HEMOGLOBIN GLYCOSYLATED A1C: CPT | Mod: QW

## 2018-06-13 LAB
25(OH)D3 SERPL-MCNC: 17.5 NG/ML
ALBUMIN SERPL ELPH-MCNC: 3.7 G/DL
ALP BLD-CCNC: 304 U/L
ALT SERPL-CCNC: 10 U/L
ANION GAP SERPL CALC-SCNC: 17 MMOL/L
AST SERPL-CCNC: 14 U/L
BILIRUB SERPL-MCNC: 1.7 MG/DL
BUN SERPL-MCNC: 12 MG/DL
CALCIUM SERPL-MCNC: 9.6 MG/DL
CHLORIDE SERPL-SCNC: 98 MMOL/L
CHOLEST SERPL-MCNC: 256 MG/DL
CHOLEST/HDLC SERPL: 5.7 RATIO
CO2 SERPL-SCNC: 22 MMOL/L
CREAT SERPL-MCNC: 1.41 MG/DL
CREAT SPEC-SCNC: 170 MG/DL
GLUCOSE SERPL-MCNC: 189 MG/DL
HBA1C MFR BLD HPLC: 8.3 %
HDLC SERPL-MCNC: 45 MG/DL
LDLC SERPL CALC-MCNC: 153 MG/DL
MICROALBUMIN 24H UR DL<=1MG/L-MCNC: 69.7 MG/DL
MICROALBUMIN/CREAT 24H UR-RTO: 410 MG/G
POTASSIUM SERPL-SCNC: 4.2 MMOL/L
PROT SERPL-MCNC: 8.8 G/DL
SODIUM SERPL-SCNC: 137 MMOL/L
TRIGL SERPL-MCNC: 289 MG/DL

## 2018-06-15 ENCOUNTER — APPOINTMENT (OUTPATIENT)
Dept: INFECTIOUS DISEASE | Facility: CLINIC | Age: 48
End: 2018-06-15
Payer: COMMERCIAL

## 2018-06-15 VITALS
HEART RATE: 102 BPM | HEIGHT: 71 IN | DIASTOLIC BLOOD PRESSURE: 83 MMHG | WEIGHT: 216 LBS | OXYGEN SATURATION: 99 % | SYSTOLIC BLOOD PRESSURE: 135 MMHG | TEMPERATURE: 97.5 F | BODY MASS INDEX: 30.24 KG/M2

## 2018-06-15 LAB
ALBUMIN SERPL ELPH-MCNC: 3.7 G/DL
ALP BLD-CCNC: 265 U/L
ALT SERPL-CCNC: 7 U/L
ANION GAP SERPL CALC-SCNC: 14 MMOL/L
AST SERPL-CCNC: 15 U/L
BASOPHILS # BLD AUTO: 0.08 K/UL
BASOPHILS NFR BLD AUTO: 1.7 %
BILIRUB SERPL-MCNC: 0.7 MG/DL
BUN SERPL-MCNC: 13 MG/DL
CALCIUM SERPL-MCNC: 9.4 MG/DL
CHLORIDE SERPL-SCNC: 100 MMOL/L
CHOLEST SERPL-MCNC: 259 MG/DL
CHOLEST/HDLC SERPL: 5.1 RATIO
CO2 SERPL-SCNC: 22 MMOL/L
CREAT SERPL-MCNC: 1.57 MG/DL
EOSINOPHIL # BLD AUTO: 0.58 K/UL
EOSINOPHIL NFR BLD AUTO: 12.6 %
GLUCOSE SERPL-MCNC: 216 MG/DL
HCT VFR BLD CALC: 30.3 %
HDLC SERPL-MCNC: 51 MG/DL
HGB BLD-MCNC: 8.9 G/DL
IMM GRANULOCYTES NFR BLD AUTO: 0 %
LDLC SERPL CALC-MCNC: 166 MG/DL
LYMPHOCYTES # BLD AUTO: 1.54 K/UL
LYMPHOCYTES NFR BLD AUTO: 33.6 %
MAN DIFF?: NORMAL
MCHC RBC-ENTMCNC: 22.4 PG
MCHC RBC-ENTMCNC: 29.4 GM/DL
MCV RBC AUTO: 76.3 FL
MONOCYTES # BLD AUTO: 0.34 K/UL
MONOCYTES NFR BLD AUTO: 7.4 %
NEUTROPHILS # BLD AUTO: 2.05 K/UL
NEUTROPHILS NFR BLD AUTO: 44.7 %
PLATELET # BLD AUTO: 297 K/UL
POTASSIUM SERPL-SCNC: 4.5 MMOL/L
PROT SERPL-MCNC: 9 G/DL
RBC # BLD: 3.97 M/UL
RBC # FLD: 18.8 %
SODIUM SERPL-SCNC: 136 MMOL/L
TRIGL SERPL-MCNC: 209 MG/DL
WBC # FLD AUTO: 4.59 K/UL

## 2018-06-15 PROCEDURE — 99214 OFFICE O/P EST MOD 30 MIN: CPT

## 2018-06-18 LAB
25(OH)D3 SERPL-MCNC: 15.2 NG/ML
APPEARANCE: CLEAR
BACTERIA: NEGATIVE
BILIRUBIN URINE: NEGATIVE
BLOOD URINE: NEGATIVE
C TRACH RRNA SPEC QL NAA+PROBE: NOT DETECTED
CD3 CELLS # BLD: 1254 /UL
CD3 CELLS NFR BLD: 83 %
CD3+CD4+ CELLS # BLD: 206 /UL
CD3+CD4+ CELLS NFR BLD: 14 %
CD3+CD4+ CELLS/CD3+CD8+ CLL SPEC: 0.21 RATIO
CD3+CD8+ CELLS # SPEC: 979 /UL
CD3+CD8+ CELLS NFR BLD: 65 %
COLOR: YELLOW
GLUCOSE QUALITATIVE U: 100 MG/DL
HBV SURFACE AG SER QL: NONREACTIVE
HCV AB SER QL: NONREACTIVE
HCV S/CO RATIO: 0.23 S/CO
HIV1 RNA # SERPL NAA+PROBE: NORMAL
HIV1 RNA # SERPL NAA+PROBE: NORMAL COPIES/ML
HYALINE CASTS: 4 /LPF
KETONES URINE: NEGATIVE
LEUKOCYTE ESTERASE URINE: ABNORMAL
MICROSCOPIC-UA: NORMAL
N GONORRHOEA RRNA SPEC QL NAA+PROBE: NOT DETECTED
NITRITE URINE: NEGATIVE
PH URINE: 6
PROTEIN URINE: 100 MG/DL
RED BLOOD CELLS URINE: 2 /HPF
SOURCE AMPLIFICATION: NORMAL
SPECIFIC GRAVITY URINE: 1.02
SQUAMOUS EPITHELIAL CELLS: 1 /HPF
T PALLIDUM AB SER QL IA: NEGATIVE
TSH SERPL-ACNC: 1.19 UIU/ML
UROBILINOGEN URINE: NEGATIVE MG/DL
VIRAL LOAD INTERP: NORMAL
VIRAL LOAD LOG: NORMAL LG COP/ML
WHITE BLOOD CELLS URINE: 223 /HPF

## 2018-06-20 ENCOUNTER — RESULT REVIEW (OUTPATIENT)
Age: 48
End: 2018-06-20

## 2018-06-20 DIAGNOSIS — R92.1 MAMMOGRAPHIC CALCIFICATION FOUND ON DIAGNOSTIC IMAGING OF BREAST: ICD-10-CM

## 2018-07-02 ENCOUNTER — APPOINTMENT (OUTPATIENT)
Dept: INTERNAL MEDICINE | Facility: CLINIC | Age: 48
End: 2018-07-02

## 2018-07-06 ENCOUNTER — APPOINTMENT (OUTPATIENT)
Dept: UROGYNECOLOGY | Facility: CLINIC | Age: 48
End: 2018-07-06
Payer: COMMERCIAL

## 2018-07-06 ENCOUNTER — RESULT REVIEW (OUTPATIENT)
Age: 48
End: 2018-07-06

## 2018-07-06 VITALS
BODY MASS INDEX: 30.52 KG/M2 | HEIGHT: 71 IN | DIASTOLIC BLOOD PRESSURE: 80 MMHG | WEIGHT: 218 LBS | HEART RATE: 72 BPM | SYSTOLIC BLOOD PRESSURE: 117 MMHG

## 2018-07-06 DIAGNOSIS — N39.46 MIXED INCONTINENCE: ICD-10-CM

## 2018-07-06 DIAGNOSIS — R35.0 FREQUENCY OF MICTURITION: ICD-10-CM

## 2018-07-06 DIAGNOSIS — N32.81 OVERACTIVE BLADDER: ICD-10-CM

## 2018-07-06 LAB
BILIRUB UR QL STRIP: NORMAL
CLARITY UR: NORMAL
COLLECTION METHOD: NORMAL
GLUCOSE UR-MCNC: NORMAL
HCG UR QL: 0.2 EU/DL
HGB UR QL STRIP.AUTO: NORMAL
KETONES UR-MCNC: NORMAL
LEUKOCYTE ESTERASE UR QL STRIP: NORMAL
NITRITE UR QL STRIP: NORMAL
PH UR STRIP: 5.5
PROT UR STRIP-MCNC: 300
SP GR UR STRIP: 1.02

## 2018-07-06 PROCEDURE — 99214 OFFICE O/P EST MOD 30 MIN: CPT | Mod: 25

## 2018-07-06 PROCEDURE — 81003 URINALYSIS AUTO W/O SCOPE: CPT | Mod: QW

## 2018-07-06 PROCEDURE — 51701 INSERT BLADDER CATHETER: CPT

## 2018-07-06 PROCEDURE — 99204 OFFICE O/P NEW MOD 45 MIN: CPT | Mod: 25

## 2018-07-06 RX ORDER — ERGOCALCIFEROL 1.25 MG/1
1.25 MG CAPSULE, LIQUID FILLED ORAL
Qty: 6 | Refills: 0 | Status: DISCONTINUED | COMMUNITY
Start: 2017-09-13 | End: 2018-07-06

## 2018-07-06 RX ORDER — PRAVASTATIN SODIUM 20 MG/1
20 TABLET ORAL
Qty: 30 | Refills: 6 | Status: DISCONTINUED | COMMUNITY
Start: 2017-09-13 | End: 2018-07-06

## 2018-07-09 ENCOUNTER — RESULT REVIEW (OUTPATIENT)
Age: 48
End: 2018-07-09

## 2018-07-09 DIAGNOSIS — R82.99 OTHER ABNORMAL FINDINGS IN URINE: ICD-10-CM

## 2018-07-09 LAB — BACTERIA UR CULT: NORMAL

## 2018-07-10 LAB
APPEARANCE: CLEAR
BACTERIA: NEGATIVE
BILIRUBIN URINE: NEGATIVE
BLOOD URINE: ABNORMAL
COLOR: YELLOW
GLUCOSE QUALITATIVE U: NEGATIVE MG/DL
HHV SPEC CULT: NORMAL
HYALINE CASTS: 6 /LPF
KETONES URINE: NEGATIVE
LEUKOCYTE ESTERASE URINE: ABNORMAL
MICROSCOPIC-UA: NORMAL
NITRITE URINE: NEGATIVE
PH URINE: 5.5
PROTEIN URINE: 300 MG/DL
RED BLOOD CELLS URINE: 12 /HPF
SPECIFIC GRAVITY URINE: 1.03
SQUAMOUS EPITHELIAL CELLS: 0 /HPF
UROBILINOGEN URINE: NEGATIVE MG/DL
WHITE BLOOD CELLS URINE: 109 /HPF

## 2018-08-03 ENCOUNTER — APPOINTMENT (OUTPATIENT)
Dept: UROGYNECOLOGY | Facility: CLINIC | Age: 48
End: 2018-08-03

## 2018-08-23 ENCOUNTER — APPOINTMENT (OUTPATIENT)
Dept: NEUROLOGY | Facility: CLINIC | Age: 48
End: 2018-08-23
Payer: COMMERCIAL

## 2018-08-23 VITALS
HEIGHT: 71 IN | SYSTOLIC BLOOD PRESSURE: 123 MMHG | BODY MASS INDEX: 29.96 KG/M2 | DIASTOLIC BLOOD PRESSURE: 81 MMHG | HEART RATE: 91 BPM | WEIGHT: 214 LBS

## 2018-08-23 PROCEDURE — 99244 OFF/OP CNSLTJ NEW/EST MOD 40: CPT

## 2018-08-27 ENCOUNTER — RX RENEWAL (OUTPATIENT)
Age: 48
End: 2018-08-27

## 2018-09-06 ENCOUNTER — OTHER (OUTPATIENT)
Age: 48
End: 2018-09-06

## 2018-09-06 ENCOUNTER — APPOINTMENT (OUTPATIENT)
Dept: OBGYN | Facility: CLINIC | Age: 48
End: 2018-09-06
Payer: COMMERCIAL

## 2018-09-06 VITALS
BODY MASS INDEX: 29.4 KG/M2 | WEIGHT: 210 LBS | DIASTOLIC BLOOD PRESSURE: 87 MMHG | SYSTOLIC BLOOD PRESSURE: 132 MMHG | HEIGHT: 71 IN | HEART RATE: 103 BPM

## 2018-09-06 DIAGNOSIS — N39.45 CONTINUOUS LEAKAGE: ICD-10-CM

## 2018-09-06 DIAGNOSIS — Z16.35 RESISTANCE TO MULTIPLE ANTIMICROBIAL DRUGS: ICD-10-CM

## 2018-09-06 PROCEDURE — 99213 OFFICE O/P EST LOW 20 MIN: CPT

## 2018-09-12 PROBLEM — Z16.35: Status: ACTIVE | Noted: 2018-09-06

## 2018-09-12 PROBLEM — N39.45 CONTINUOUS LEAKAGE OF URINE: Status: RESOLVED | Noted: 2018-06-15 | Resolved: 2018-09-12

## 2018-09-13 ENCOUNTER — RX RENEWAL (OUTPATIENT)
Age: 48
End: 2018-09-13

## 2018-09-17 ENCOUNTER — RX RENEWAL (OUTPATIENT)
Age: 48
End: 2018-09-17

## 2018-09-19 ENCOUNTER — APPOINTMENT (OUTPATIENT)
Dept: INFECTIOUS DISEASE | Facility: CLINIC | Age: 48
End: 2018-09-19
Payer: COMMERCIAL

## 2018-09-19 VITALS
WEIGHT: 212 LBS | TEMPERATURE: 97.9 F | SYSTOLIC BLOOD PRESSURE: 128 MMHG | HEART RATE: 82 BPM | RESPIRATION RATE: 18 BRPM | OXYGEN SATURATION: 99 % | BODY MASS INDEX: 29.57 KG/M2 | DIASTOLIC BLOOD PRESSURE: 82 MMHG

## 2018-09-19 LAB
BASOPHILS # BLD AUTO: 0.03 K/UL
BASOPHILS NFR BLD AUTO: 0.7 %
EOSINOPHIL # BLD AUTO: 0.3 K/UL
EOSINOPHIL NFR BLD AUTO: 7.4 %
HCT VFR BLD CALC: 32.2 %
HGB BLD-MCNC: 9.7 G/DL
IMM GRANULOCYTES NFR BLD AUTO: 0 %
LYMPHOCYTES # BLD AUTO: 1.6 K/UL
LYMPHOCYTES NFR BLD AUTO: 39.6 %
MAN DIFF?: NORMAL
MCHC RBC-ENTMCNC: 22.1 PG
MCHC RBC-ENTMCNC: 30.1 GM/DL
MCV RBC AUTO: 73.5 FL
MONOCYTES # BLD AUTO: 0.21 K/UL
MONOCYTES NFR BLD AUTO: 5.2 %
NEUTROPHILS # BLD AUTO: 1.9 K/UL
NEUTROPHILS NFR BLD AUTO: 47.1 %
PLATELET # BLD AUTO: 282 K/UL
RBC # BLD: 4.38 M/UL
RBC # FLD: 19.9 %
WBC # FLD AUTO: 4.04 K/UL

## 2018-09-19 PROCEDURE — 99214 OFFICE O/P EST MOD 30 MIN: CPT

## 2018-09-19 RX ORDER — DOLUTEGRAVIR SODIUM 50 MG/1
50 TABLET, FILM COATED ORAL
Qty: 30 | Refills: 1 | Status: COMPLETED | COMMUNITY
Start: 2017-05-01 | End: 2018-09-19

## 2018-09-19 RX ORDER — EMTRICITABINE AND TENOFOVIR DISOPROXIL FUMARATE 200; 300 MG/1; MG/1
200-300 TABLET, FILM COATED ORAL
Refills: 0 | Status: COMPLETED | COMMUNITY
End: 2018-09-19

## 2018-09-20 LAB
25(OH)D3 SERPL-MCNC: 15.9 NG/ML
25(OH)D3 SERPL-MCNC: 16.2 NG/ML
ALBUMIN SERPL ELPH-MCNC: 3.7 G/DL
ALP BLD-CCNC: 306 U/L
ALT SERPL-CCNC: 10 U/L
ANION GAP SERPL CALC-SCNC: 15 MMOL/L
APPEARANCE: ABNORMAL
AST SERPL-CCNC: 20 U/L
BACTERIA: NEGATIVE
BILIRUB SERPL-MCNC: 0.4 MG/DL
BILIRUBIN URINE: NEGATIVE
BLOOD URINE: ABNORMAL
BUN SERPL-MCNC: 13 MG/DL
CALCIUM SERPL-MCNC: 9.3 MG/DL
CD3 CELLS # BLD: 1237 /UL
CD3 CELLS NFR BLD: 87 %
CD3+CD4+ CELLS # BLD: 219 /UL
CD3+CD4+ CELLS NFR BLD: 15 %
CD3+CD4+ CELLS/CD3+CD8+ CLL SPEC: 0.24 RATIO
CD3+CD8+ CELLS # SPEC: 898 /UL
CD3+CD8+ CELLS NFR BLD: 63 %
CHLORIDE SERPL-SCNC: 100 MMOL/L
CHOLEST SERPL-MCNC: 235 MG/DL
CHOLEST/HDLC SERPL: 6.7 RATIO
CO2 SERPL-SCNC: 22 MMOL/L
COLOR: YELLOW
CREAT SERPL-MCNC: 1.19 MG/DL
GLUCOSE QUALITATIVE U: 100 MG/DL
GLUCOSE SERPL-MCNC: 156 MG/DL
HBA1C MFR BLD HPLC: 13.5 %
HDLC SERPL-MCNC: 35 MG/DL
HIV1 RNA # SERPL NAA+PROBE: ABNORMAL
HIV1 RNA # SERPL NAA+PROBE: NORMAL
HYALINE CASTS: 2 /LPF
KETONES URINE: NEGATIVE
LDLC SERPL CALC-MCNC: 163 MG/DL
LEUKOCYTE ESTERASE URINE: ABNORMAL
MICROSCOPIC-UA: NORMAL
NITRITE URINE: NEGATIVE
PH URINE: 5.5
POTASSIUM SERPL-SCNC: 3.7 MMOL/L
PROT SERPL-MCNC: 9.3 G/DL
PROTEIN URINE: 300 MG/DL
RED BLOOD CELLS URINE: 8 /HPF
SODIUM SERPL-SCNC: 137 MMOL/L
SPECIFIC GRAVITY URINE: 1.02
SQUAMOUS EPITHELIAL CELLS: 1 /HPF
T PALLIDUM AB SER QL IA: NEGATIVE
TRIGL SERPL-MCNC: 186 MG/DL
UROBILINOGEN URINE: NEGATIVE MG/DL
VIRAL LOAD INTERP: NORMAL
VIRAL LOAD LOG: 3.81
VIT B12 SERPL-MCNC: 824 PG/ML
WHITE BLOOD CELLS URINE: 202 /HPF

## 2018-09-26 ENCOUNTER — RX RENEWAL (OUTPATIENT)
Age: 48
End: 2018-09-26

## 2018-10-06 ENCOUNTER — FORM ENCOUNTER (OUTPATIENT)
Age: 48
End: 2018-10-06

## 2018-10-07 ENCOUNTER — OUTPATIENT (OUTPATIENT)
Dept: OUTPATIENT SERVICES | Facility: HOSPITAL | Age: 48
LOS: 1 days | End: 2018-10-07
Payer: COMMERCIAL

## 2018-10-07 ENCOUNTER — APPOINTMENT (OUTPATIENT)
Dept: CT IMAGING | Facility: IMAGING CENTER | Age: 48
End: 2018-10-07
Payer: COMMERCIAL

## 2018-10-07 DIAGNOSIS — M79.652 PAIN IN LEFT THIGH: ICD-10-CM

## 2018-10-07 PROCEDURE — 72193 CT PELVIS W/DYE: CPT | Mod: 26

## 2018-10-07 PROCEDURE — 72193 CT PELVIS W/DYE: CPT

## 2018-10-15 ENCOUNTER — APPOINTMENT (OUTPATIENT)
Dept: ENDOCRINOLOGY | Facility: CLINIC | Age: 48
End: 2018-10-15
Payer: COMMERCIAL

## 2018-10-15 VITALS
BODY MASS INDEX: 29.4 KG/M2 | HEART RATE: 80 BPM | SYSTOLIC BLOOD PRESSURE: 110 MMHG | DIASTOLIC BLOOD PRESSURE: 80 MMHG | OXYGEN SATURATION: 99 % | WEIGHT: 210 LBS | HEIGHT: 71 IN

## 2018-10-15 PROCEDURE — 99214 OFFICE O/P EST MOD 30 MIN: CPT

## 2018-11-28 ENCOUNTER — APPOINTMENT (OUTPATIENT)
Dept: ENDOCRINOLOGY | Facility: CLINIC | Age: 48
End: 2018-11-28

## 2018-12-11 RX ORDER — LANCETS
EACH MISCELLANEOUS
Qty: 4 | Refills: 3 | Status: DISCONTINUED | COMMUNITY
Start: 2017-02-13 | End: 2018-12-11

## 2018-12-11 RX ORDER — BLOOD SUGAR DIAGNOSTIC
STRIP MISCELLANEOUS 4 TIMES DAILY
Qty: 4 | Refills: 3 | Status: DISCONTINUED | COMMUNITY
Start: 2017-02-13 | End: 2018-12-11

## 2019-01-22 RX ORDER — INSULIN DETEMIR 100 [IU]/ML
100 INJECTION, SOLUTION SUBCUTANEOUS
Qty: 2 | Refills: 3 | Status: DISCONTINUED | COMMUNITY
Start: 2017-02-13 | End: 2019-01-22

## 2019-01-25 ENCOUNTER — APPOINTMENT (OUTPATIENT)
Dept: INFECTIOUS DISEASE | Facility: CLINIC | Age: 49
End: 2019-01-25
Payer: COMMERCIAL

## 2019-01-25 VITALS
BODY MASS INDEX: 30.8 KG/M2 | DIASTOLIC BLOOD PRESSURE: 85 MMHG | OXYGEN SATURATION: 100 % | TEMPERATURE: 97 F | SYSTOLIC BLOOD PRESSURE: 134 MMHG | HEART RATE: 77 BPM | HEIGHT: 71 IN | WEIGHT: 220 LBS

## 2019-01-25 DIAGNOSIS — D72.828 OTHER ELEVATED WHITE BLOOD CELL COUNT: ICD-10-CM

## 2019-01-25 PROCEDURE — 99214 OFFICE O/P EST MOD 30 MIN: CPT

## 2019-01-25 RX ORDER — DOLUTEGRAVIR SODIUM AND RILPIVIRINE HYDROCHLORIDE 50; 25 MG/1; MG/1
50-25 TABLET, FILM COATED ORAL
Qty: 30 | Refills: 3 | Status: COMPLETED | COMMUNITY
Start: 2018-09-19 | End: 2019-01-25

## 2019-01-28 PROBLEM — D72.828 OTHER ELEVATED WHITE BLOOD CELL (WBC) COUNT: Status: ACTIVE | Noted: 2019-01-28

## 2019-01-28 LAB
25(OH)D3 SERPL-MCNC: 10.9 NG/ML
APPEARANCE: ABNORMAL
BACTERIA: NEGATIVE
BILIRUBIN URINE: NEGATIVE
BLOOD URINE: ABNORMAL
C TRACH RRNA SPEC QL NAA+PROBE: NOT DETECTED
CD3 CELLS # BLD: 1493 /UL
CD3 CELLS NFR BLD: 82 %
CD3+CD4+ CELLS # BLD: 304 /UL
CD3+CD4+ CELLS NFR BLD: 17 %
CD3+CD4+ CELLS/CD3+CD8+ CLL SPEC: 0.28 RATIO
CD3+CD8+ CELLS # SPEC: 1080 /UL
CD3+CD8+ CELLS NFR BLD: 59 %
CHOLEST SERPL-MCNC: 286 MG/DL
CHOLEST/HDLC SERPL: 6.5 RATIO
COLOR: YELLOW
GLUCOSE QUALITATIVE U: NEGATIVE MG/DL
HBA1C MFR BLD HPLC: 8.6 %
HDLC SERPL-MCNC: 44 MG/DL
HIV1 RNA # SERPL NAA+PROBE: ABNORMAL
HIV1 RNA # SERPL NAA+PROBE: ABNORMAL COPIES/ML
HYALINE CASTS: 8 /LPF
KETONES URINE: NEGATIVE
LDLC SERPL CALC-MCNC: 191 MG/DL
LEUKOCYTE ESTERASE URINE: ABNORMAL
MICROSCOPIC-UA: NORMAL
N GONORRHOEA RRNA SPEC QL NAA+PROBE: NOT DETECTED
NITRITE URINE: NEGATIVE
PH URINE: 6
PROTEIN URINE: 300 MG/DL
RED BLOOD CELLS URINE: 11 /HPF
SOURCE AMPLIFICATION: NORMAL
SPECIFIC GRAVITY URINE: 1.02
SQUAMOUS EPITHELIAL CELLS: 0 /HPF
T PALLIDUM AB SER QL IA: NEGATIVE
TRIGL SERPL-MCNC: 256 MG/DL
TSH SERPL-ACNC: 0.87 UIU/ML
UROBILINOGEN URINE: NEGATIVE MG/DL
VIRAL LOAD INTERP: NORMAL
VIRAL LOAD LOG: ABNORMAL LG COP/ML
WHITE BLOOD CELLS URINE: 138 /HPF

## 2019-01-28 NOTE — HISTORY OF PRESENT ILLNESS
[FreeTextEntry1] : History of Present Illness\par Reason For Visit\par TIEN SHARPE is a 48 year old female being seen for a follow-up visit. Continue  Biktarvy, All labs today see in three months. Needs pain management for left quad pain. \par  Pt stopped tivicay and evotaz we thenl started on Biktarvy . Recent mammogram going back in October, 2018. . \par  \par History of Present Illness\par Reason For Visit\par . Pt recently [facility] by [facility] on [address] for burning left thigh pain. . needs liver ultrasound andf all liver tests...see in 3 months. Pt was off insulin a year ago and was only diet controlled. Then seen in ER for elevated glucose...now once again under endo care and taking levemir 30 daily and amaryl 2mg daily. also taking levemir 30 units daily, and glimepiride 2 mg daily. and bactrim . and vit D3 2000units daily and atorvastatin and enalapril 5. \par \par \par Sexual History: The patient is sexually active. \par  \par Active Problems\par Abnormal vaginal fluids (792.9) (R87.9)\par AIDS (042) (B20)\par Diabetes mellitus (250.00) (E11.9)\par G6PD deficiency (282.2) (D55.0)\par Genital herpes (054.10) (A60.00)\par Herpes simplex type 2 infection (054.9) (B00.9)\par Hypercholesterolemia (272.0) (E78.0)\par Increased frequency of urination (788.41) (R35.0)\par Influenza vaccine refused (V64.06) (Z28.21)\par Microscopic hematuria (599.72) (R31.2)\par Nephropathy (583.9) (N28.9)\par Proteinuria (791.0) (R80.9)\par Skin tag (701.9) (L91.8)\par Unaware of passing urine (788.69) (R39.19)\par Urgency of urination (788.63) (R39.15)\par Urinary incontinence (788.30) (R32)\par Urine leukocytes (791.7) (R82.99)\par Vaginal odor (625.8) (N89.8)\par Visit for gynecologic examination (V72.31) (Z01.419)\par Vitamin d deficiency (268.9) (E55.9)\par Vulvar mass (625.8) (N90.9)\par \par Past Medical History\par History of Essential hypertriglyceridemia (272.1) (E78.1)\par History of diabetes with ketoacidosis (V12.29) (Z86.39)\par History of diarrhea (V12.79) (Z87.898)\par History of fracture of radius (V15.51) (Z87.81)\par History of upper respiratory infection (V12.09) (Z87.09)\par History of urinary tract infection (V13.02) (Z87.440)\par History of HPV in female (079.4) (A63.0)\par History of Increased blood pressure (not hypertension) (796.2) (R03.0)\par History of Lesion of vulva (624.8) (N90.89)\par History of Visit for dental examination (V72.2) (Z01.20)\par History of Visit for eye and vision exam (V72.0) (Z01.00)\par History of Yeast vaginitis (112.1) (B37.3)\par \par Surgical History\par History of Abdominoplasty\par History of Breast Surgery Enlargement Procedure\par History of Oviductal Surgery Tubal Occlusion By Device\par History of Tubal Ligation After Vaginal Delivery (Same Hospitalization)\par \par Family History\par Family history of diabetes mellitus (V18.0) (Z83.3) : Grandmother\par No pertinent family history : Mother, Father\par \par Social History\par  ancestry\par Denied: History of Currently sexually active\par Currently working\par HIV Risk Factors: Heterosexual contact\par Housing Details: House\par Never a smoker\par Never smoker\par No alcohol use\par No alcohol use\par No drug use\par No drug use\par Parent\par Single\par Travels by personal car\par \par Current Meds\par \par Vitamin D (Ergocalciferol) 67869 UNIT Oral Capsule; TAKE 1 CAPSULE PO ONCE A\par WEEK;\par \par NKA\par \par

## 2019-01-28 NOTE — ASSESSMENT
[Treatment Education] : treatment education [Treatment Adherence] : treatment adherence [Drug Interactions / Side Effects] : drug interactions/side effects [HIV Education] : HIV Education [Anticipatory Guidance] : anticipatory guidance [FreeTextEntry1] : TIEN SHARPE is a 48 year old female being seen for a follow-up visit. Continue  Biktarvy, All labs today see in three months. Needs pain management for left quad pain.

## 2019-01-29 LAB
M TB IFN-G BLD-IMP: NEGATIVE
QUANTIFERON TB PLUS MITOGEN MINUS NIL: 5.79 IU/ML
QUANTIFERON TB PLUS NIL: 0.02 IU/ML
QUANTIFERON TB PLUS TB1 MINUS NIL: 0 IU/ML
QUANTIFERON TB PLUS TB2 MINUS NIL: 0 IU/ML

## 2019-01-30 ENCOUNTER — APPOINTMENT (OUTPATIENT)
Dept: INFECTIOUS DISEASE | Facility: CLINIC | Age: 49
End: 2019-01-30
Payer: COMMERCIAL

## 2019-01-30 VITALS
OXYGEN SATURATION: 99 % | TEMPERATURE: 98 F | SYSTOLIC BLOOD PRESSURE: 127 MMHG | DIASTOLIC BLOOD PRESSURE: 88 MMHG | HEART RATE: 85 BPM | HEIGHT: 71 IN | WEIGHT: 224 LBS | BODY MASS INDEX: 31.36 KG/M2

## 2019-01-30 PROCEDURE — 99205 OFFICE O/P NEW HI 60 MIN: CPT

## 2019-01-30 NOTE — ASSESSMENT
[FreeTextEntry1] : 49F with HIV (dx 7-8 years ago) and DM (dx 2017) here with L thigh pain\par \par #LEFT THIGH PAIN\par - etiology is unclear at this time; prior neurological workup negative\par - need to touch base with rheumatogist at [address] [facility] to discuss prior workup\par - advised her to use less tylenol (goal < 4g of extra strength tylenol/day)-\par - will try trial of diclofenac 50mg TID x 7 days, alternating between 8 hours with tylenol as above if needed\par - can also try flexeril for muscle cramps if no relief with diclofenax\par - check SCr with next blood draw (2/15 at endocrine clinic)\par - PT referral made\par - consider acupuncture\par \par #DM\par - patient to see Dr. Gerardo in mid-February; ? if trial off insulin for few days would be warranted to assess if pain improves; patient believes pain is linked to insulin, which would be atypical\par \par #ENCOUNTER FOR PALLIATIVE CARE\par - f/u in 1 month\par \par 50 minutes spent in patient encounter

## 2019-01-30 NOTE — PHYSICAL EXAM
[General Appearance - Alert] : alert [Sclera] : the sclera and conjunctiva were normal [Outer Ear] : the ears and nose were normal in appearance [Examination Of The Oral Cavity] : the lips and gums were normal [] : no respiratory distress [Auscultation Breath Sounds / Voice Sounds] : lungs were clear to auscultation bilaterally [Heart Sounds] : normal S1 and S2 [Bowel Sounds] : normal bowel sounds [Skin Color & Pigmentation] : normal skin color and pigmentation [FreeTextEntry1] : altered gait due to pain

## 2019-01-30 NOTE — HISTORY OF PRESENT ILLNESS
[FreeTextEntry1] : 49F with PMH of DM (diagnosed 2018) and HIV (diagnosed 7-8 years ago) presenting with L thigh pain. States pain started when she started insulin for aforementioned DM. Pain started as a soreness, which was mangeable with OTC NSAIDs (Aleve) and heating packs, and later with tylenol. To rule out medication effects, her HAART and insulin regimen was changed around 9/2018, and she states the pain worsened since that time. The pain ranges from soreness to sharp/piercing pain to a crampy pain that she described like a "melquiades horse" in nature. \par \par The pain is limiting her ability to ambulate, although she walks and functions as a  without issue, she cannot exercise or walk for long distances without exacerbating her pain. She has only been taking around 6g of tylenol daily (2000mg per dose), and is seeking further advice. \par \par She reports seeing a neurologist who ruled out any neurological causes, and a rheumatologist at [address] [place] [address]; no records for the latter on allscrips. CT report available does not show any etiology for her pain. It's unclear if this is long-term effects from prior HAART medication (evotaz).

## 2019-02-19 ENCOUNTER — APPOINTMENT (OUTPATIENT)
Dept: ENDOCRINOLOGY | Facility: CLINIC | Age: 49
End: 2019-02-19

## 2019-02-28 ENCOUNTER — APPOINTMENT (OUTPATIENT)
Dept: OBGYN | Facility: CLINIC | Age: 49
End: 2019-02-28

## 2019-03-06 ENCOUNTER — APPOINTMENT (OUTPATIENT)
Dept: INFECTIOUS DISEASE | Facility: CLINIC | Age: 49
End: 2019-03-06

## 2019-04-01 ENCOUNTER — RX RENEWAL (OUTPATIENT)
Age: 49
End: 2019-04-01

## 2019-04-22 ENCOUNTER — RX RENEWAL (OUTPATIENT)
Age: 49
End: 2019-04-22

## 2019-04-24 ENCOUNTER — RX RENEWAL (OUTPATIENT)
Age: 49
End: 2019-04-24

## 2019-04-26 ENCOUNTER — APPOINTMENT (OUTPATIENT)
Dept: INFECTIOUS DISEASE | Facility: CLINIC | Age: 49
End: 2019-04-26

## 2019-04-30 ENCOUNTER — APPOINTMENT (OUTPATIENT)
Dept: OBGYN | Facility: CLINIC | Age: 49
End: 2019-04-30
Payer: COMMERCIAL

## 2019-04-30 VITALS
DIASTOLIC BLOOD PRESSURE: 74 MMHG | HEIGHT: 71 IN | WEIGHT: 224 LBS | SYSTOLIC BLOOD PRESSURE: 130 MMHG | BODY MASS INDEX: 31.36 KG/M2

## 2019-04-30 DIAGNOSIS — B96.89 ACUTE VAGINITIS: ICD-10-CM

## 2019-04-30 DIAGNOSIS — N76.0 ACUTE VAGINITIS: ICD-10-CM

## 2019-04-30 PROCEDURE — 99214 OFFICE O/P EST MOD 30 MIN: CPT

## 2019-05-02 NOTE — PHYSICAL EXAM
[Awake] : awake [Alert] : alert [Acute Distress] : no acute distress [Oriented x3] : oriented to person, place, and time [Labia Majora Erythema] : no erythema of the labia majora [Normal] : uterus [No Bleeding] : there was no active vaginal bleeding [Motion Tenderness] : there was no cervical motion tenderness [Uterine Adnexae] : were not tender and not enlarged [FreeTextEntry5] : supficial errosion on cervix

## 2019-05-23 ENCOUNTER — RX RENEWAL (OUTPATIENT)
Age: 49
End: 2019-05-23

## 2019-05-29 ENCOUNTER — APPOINTMENT (OUTPATIENT)
Dept: INFECTIOUS DISEASE | Facility: CLINIC | Age: 49
End: 2019-05-29

## 2019-06-27 ENCOUNTER — RX RENEWAL (OUTPATIENT)
Age: 49
End: 2019-06-27

## 2019-07-10 ENCOUNTER — APPOINTMENT (OUTPATIENT)
Dept: INFECTIOUS DISEASE | Facility: CLINIC | Age: 49
End: 2019-07-10
Payer: COMMERCIAL

## 2019-07-10 ENCOUNTER — LABORATORY RESULT (OUTPATIENT)
Age: 49
End: 2019-07-10

## 2019-07-10 VITALS
TEMPERATURE: 98.3 F | DIASTOLIC BLOOD PRESSURE: 81 MMHG | OXYGEN SATURATION: 98 % | BODY MASS INDEX: 31.36 KG/M2 | HEIGHT: 71 IN | HEART RATE: 71 BPM | WEIGHT: 224 LBS | RESPIRATION RATE: 14 BRPM | SYSTOLIC BLOOD PRESSURE: 126 MMHG

## 2019-07-10 PROCEDURE — 99213 OFFICE O/P EST LOW 20 MIN: CPT

## 2019-07-10 PROCEDURE — 99214 OFFICE O/P EST MOD 30 MIN: CPT

## 2019-07-10 NOTE — ASSESSMENT
[FreeTextEntry1] : 49F with HIV (dx 7-8 years ago) and DM (dx 2017) here with L thigh pain\par \par #LEFT THIGH PAIN\par - continues to have pain, but diclofenac is helpful\par - will renew diclofenac\par - had LLE swelling over last month with prolonged standing, not painful\par - no significant relief with PT thus far, new referral given\par - no limit in overall functional ability at work\par - referral to rheumatology given new swelling, unclear etiology\par \par #DM\par - states glucose is under better control\par \par #ENCOUNTER FOR PALLIATIVE CARE\par - RTC in 3 months or sooner if needed\par \par 15 minutes on follow-up visit

## 2019-07-10 NOTE — PHYSICAL EXAM
[General Appearance - In No Acute Distress] : in no acute distress [General Appearance - Alert] : alert [Sclera] : the sclera and conjunctiva were normal [PERRL With Normal Accommodation] : pupils were equal in size, round, reactive to light [Outer Ear] : the ears and nose were normal in appearance [Extraocular Movements] : extraocular movements were intact [Oropharynx] : the oropharynx was normal with no thrush [Neck Appearance] : the appearance of the neck was normal [Jugular Venous Distention Increased] : there was no jugular-venous distention [Thyroid Diffuse Enlargement] : the thyroid was not enlarged [Neck Cervical Mass (___cm)] : no neck mass was observed [Auscultation Breath Sounds / Voice Sounds] : lungs were clear to auscultation bilaterally [Heart Sounds Gallop] : no gallops [Heart Rate And Rhythm] : heart rate was normal and rhythm regular [Heart Sounds] : normal S1 and S2 [Full Pulse] : the pedal pulses are present [Heart Sounds Pericardial Friction Rub] : no pericardial rub [Murmurs] : no murmurs [Edema] : there was no peripheral edema [Abdomen Soft] : soft [Bowel Sounds] : normal bowel sounds [Abdomen Mass (___ Cm)] : no abdominal mass palpated [Abdomen Tenderness] : non-tender [No Palpable Adenopathy] : no palpable adenopathy [Costovertebral Angle Tenderness] : no CVA tenderness [Musculoskeletal - Swelling] : no joint swelling [Nail Clubbing] : no clubbing  or cyanosis of the fingernails [Skin Color & Pigmentation] : normal skin color and pigmentation [Motor Tone] : muscle strength and tone were normal [] : no rash [Oriented To Time, Place, And Person] : oriented to person, place, and time [Affect] : the affect was normal

## 2019-07-10 NOTE — HISTORY OF PRESENT ILLNESS
[FreeTextEntry1] : 49F with HIV (dx 7-8 years ago) and DM (dx 2017) here with L thigh pain\par \par \par #LEFT THIGH PAIN\par - continues to have pain, but diclofenac is helpful\par - had LLE swelling over last month with prolonged standing, not painful\par - no significant relief with PT\par - no limit in overall functional ability at work\par \par #DM\par - states glucose is under better control\par \par ISTOP [id number]\par

## 2019-07-10 NOTE — PHYSICAL EXAM
[General Appearance - Alert] : alert [General Appearance - In No Acute Distress] : in no acute distress [General Appearance - Well Developed] : well developed [General Appearance - Well Nourished] : well nourished [Sclera] : the sclera and conjunctiva were normal [Examination Of The Oral Cavity] : the lips and gums were normal [Neck Appearance] : the appearance of the neck was normal [Respiration, Rhythm And Depth] : normal respiratory rhythm and effort [Auscultation Breath Sounds / Voice Sounds] : lungs were clear to auscultation bilaterally [Heart Rate And Rhythm] : heart rate was normal and rhythm regular [Heart Sounds] : normal S1 and S2 [Bowel Sounds] : normal bowel sounds [Abdomen Tenderness] : non-tender [Abdomen Soft] : soft [Abnormal Walk] : normal gait [Skin Color & Pigmentation] : normal skin color and pigmentation

## 2019-07-10 NOTE — HISTORY OF PRESENT ILLNESS
[FreeTextEntry1] : History of Present Illness\par Reason For Visit\par 7/10/2019----TIEN SHARPE is a 49 year old female being seen for a follow-up visit. Continue Biktarvy, All labs today see in three months. Needs pain management for left quad pain. \par  Pt stopped tivicay and evotaz we then started on Biktarvy . Recent mammogram going back in October, 2018. Seen by GYN in April 2019. \par  \par History of Present Illness\par Reason For Visit\par . Pt recently seen by [facility] on [address] for burning left thigh pain. . needs liver ultrasound andf all liver tests...see in 3 months. Pt was off insulin a year ago and was only diet controlled. Then seen in ER for elevated glucose...now once again under endo care and taking levemir 30 daily and amaryl 2mg daily. also taking levemir 30 units daily, and glimepiride 2 mg daily. and bactrim . and vit D3 2000units daily and atorvastatin and enalapril 5. \par \par \par Sexual History: The patient is sexually active. \par  \par Active Problems\par Abnormal vaginal fluids (792.9) (R87.9)\par AIDS (042) (B20)\par Diabetes mellitus (250.00) (E11.9)\par G6PD deficiency (282.2) (D55.0)\par Genital herpes (054.10) (A60.00)\par Herpes simplex type 2 infection (054.9) (B00.9)\par Hypercholesterolemia (272.0) (E78.0)\par Increased frequency of urination (788.41) (R35.0)\par Influenza vaccine refused (V64.06) (Z28.21)\par Microscopic hematuria (599.72) (R31.2)\par Nephropathy (583.9) (N28.9)\par Proteinuria (791.0) (R80.9)\par Skin tag (701.9) (L91.8)\par Unaware of passing urine (788.69) (R39.19)\par Urgency of urination (788.63) (R39.15)\par Urinary incontinence (788.30) (R32)\par Urine leukocytes (791.7) (R82.99)\par Vaginal odor (625.8) (N89.8)\par Visit for gynecologic examination (V72.31) (Z01.419)\par Vitamin d deficiency (268.9) (E55.9)\par Vulvar mass (625.8) (N90.9)\par \par Past Medical History\par History of Essential hypertriglyceridemia (272.1) (E78.1)\par History of diabetes with ketoacidosis (V12.29) (Z86.39)\par History of diarrhea (V12.79) (Z87.898)\par History of fracture of radius (V15.51) (Z87.81)\par History of upper respiratory infection (V12.09) (Z87.09)\par History of urinary tract infection (V13.02) (Z87.440)\par History of HPV in female (079.4) (A63.0)\par History of Increased blood pressure (not hypertension) (796.2) (R03.0)\par History of Lesion of vulva (624.8) (N90.89)\par History of Visit for dental examination (V72.2) (Z01.20)\par History of Visit for eye and vision exam (V72.0) (Z01.00)\par History of Yeast vaginitis (112.1) (B37.3)\par \par Surgical History\par History of Abdominoplasty\par History of Breast Surgery Enlargement Procedure\par History of Oviductal Surgery Tubal Occlusion By Device\par History of Tubal Ligation After Vaginal Delivery (Same Hospitalization)\par \par Family History\par Family history of diabetes mellitus (V18.0) (Z83.3) : Grandmother\par No pertinent family history : Mother, Father\par \par Social History\par  ancestry\par Denied: History of Currently sexually active\par Currently working\par HIV Risk Factors: Heterosexual contact\par Housing Details: House\par Never a smoker\par Never smoker\par No alcohol use\par No alcohol use\par No drug use\par No drug use\par Parent\par Single\par Travels by personal car\par \par Current Meds\par \par Vitamin D (Ergocalciferol) 61369 UNIT Oral Capsule; TAKE 1 CAPSULE PO ONCE A\par WEEK;\par \par NKA\par

## 2019-07-10 NOTE — ASSESSMENT
[FreeTextEntry1] : 7/10/2019----TIEN SHARPE is a 49 year old female being seen for a follow-up visit. Continue Biktarvy, All labs today see in three months. Needs pain management for left quad pain. \par  Pt stopped tivicay and evotaz we then started on Biktarvy . Recent mammogram going back in October, 2018. Seen by GYN in April 2019.  [Treatment Education] : treatment education [Treatment Adherence] : treatment adherence [Drug Interactions / Side Effects] : drug interactions/side effects [HIV Education] : HIV Education [Anticipatory Guidance] : anticipatory guidance

## 2019-07-11 ENCOUNTER — RX RENEWAL (OUTPATIENT)
Age: 49
End: 2019-07-11

## 2019-07-12 LAB
25(OH)D3 SERPL-MCNC: 10.5 NG/ML
ALBUMIN SERPL ELPH-MCNC: 3.9 G/DL
ALP BLD-CCNC: 303 U/L
ALT SERPL-CCNC: 12 U/L
ANION GAP SERPL CALC-SCNC: 13 MMOL/L
APPEARANCE: ABNORMAL
AST SERPL-CCNC: 15 U/L
BACTERIA: NEGATIVE
BASOPHILS # BLD AUTO: 0.09 K/UL
BASOPHILS NFR BLD AUTO: 1.8 %
BILIRUB SERPL-MCNC: 0.3 MG/DL
BILIRUBIN URINE: NEGATIVE
BLOOD URINE: ABNORMAL
BUN SERPL-MCNC: 16 MG/DL
C TRACH RRNA SPEC QL NAA+PROBE: NOT DETECTED
CALCIUM SERPL-MCNC: 9.4 MG/DL
CD3 CELLS # BLD: 1545 /UL
CD3 CELLS NFR BLD: 84 %
CD3+CD4+ CELLS # BLD: 291 /UL
CD3+CD4+ CELLS NFR BLD: 16 %
CD3+CD4+ CELLS/CD3+CD8+ CLL SPEC: 0.26 RATIO
CD3+CD8+ CELLS # SPEC: 1133 /UL
CD3+CD8+ CELLS NFR BLD: 62 %
CHLORIDE SERPL-SCNC: 105 MMOL/L
CHOLEST SERPL-MCNC: 257 MG/DL
CHOLEST/HDLC SERPL: 5.4 RATIO
CO2 SERPL-SCNC: 21 MMOL/L
COLOR: NORMAL
CREAT SERPL-MCNC: 1.37 MG/DL
EOSINOPHIL # BLD AUTO: 0.77 K/UL
EOSINOPHIL NFR BLD AUTO: 15.4 %
ESTIMATED AVERAGE GLUCOSE: 200 MG/DL
GLUCOSE QUALITATIVE U: NEGATIVE
GLUCOSE SERPL-MCNC: 158 MG/DL
HBA1C MFR BLD HPLC: 8.6 %
HCT VFR BLD CALC: 34.7 %
HDLC SERPL-MCNC: 48 MG/DL
HEPATITIS A IGG ANTIBODY: REACTIVE
HGB BLD-MCNC: 10 G/DL
HIV1 RNA # SERPL NAA+PROBE: 50
HIV1 RNA # SERPL NAA+PROBE: ABNORMAL
HYALINE CASTS: 0 /LPF
IMM GRANULOCYTES NFR BLD AUTO: 0.2 %
KETONES URINE: NEGATIVE
LDLC SERPL CALC-MCNC: 172 MG/DL
LEUKOCYTE ESTERASE URINE: ABNORMAL
LYMPHOCYTES # BLD AUTO: 2.05 K/UL
LYMPHOCYTES NFR BLD AUTO: 41.1 %
MAN DIFF?: NORMAL
MCHC RBC-ENTMCNC: 23.9 PG
MCHC RBC-ENTMCNC: 28.8 GM/DL
MCV RBC AUTO: 83 FL
MEV IGG FLD QL IA: 33.6 AU/ML
MEV IGG+IGM SER-IMP: POSITIVE
MICROSCOPIC-UA: NORMAL
MONOCYTES # BLD AUTO: 0.31 K/UL
MONOCYTES NFR BLD AUTO: 6.2 %
N GONORRHOEA RRNA SPEC QL NAA+PROBE: NOT DETECTED
NEUTROPHILS # BLD AUTO: 1.76 K/UL
NEUTROPHILS NFR BLD AUTO: 35.3 %
NITRITE URINE: NEGATIVE
PH URINE: 6
PLATELET # BLD AUTO: 332 K/UL
POTASSIUM SERPL-SCNC: 4.2 MMOL/L
PROT SERPL-MCNC: 8.9 G/DL
PROTEIN URINE: ABNORMAL
RBC # BLD: 4.18 M/UL
RBC # FLD: 19.3 %
RED BLOOD CELLS URINE: 4 /HPF
RUBV IGG FLD-ACNC: 1.4 INDEX
RUBV IGG SER-IMP: POSITIVE
SODIUM SERPL-SCNC: 139 MMOL/L
SOURCE AMPLIFICATION: NORMAL
SPECIFIC GRAVITY URINE: 1.02
SQUAMOUS EPITHELIAL CELLS: 1 /HPF
T PALLIDUM AB SER QL IA: NEGATIVE
TRIGL SERPL-MCNC: 184 MG/DL
UROBILINOGEN URINE: NORMAL
VIRAL LOAD INTERP: NORMAL
VIRAL LOAD LOG: 1.7
WBC # FLD AUTO: 4.99 K/UL
WHITE BLOOD CELLS URINE: 306 /HPF

## 2019-07-15 LAB
M TB IFN-G BLD-IMP: NEGATIVE
MUV IGM SER QL IA: <0.8 AU
QUANTIFERON TB PLUS MITOGEN MINUS NIL: 9.83 IU/ML
QUANTIFERON TB PLUS NIL: 0.01 IU/ML
QUANTIFERON TB PLUS TB1 MINUS NIL: 0 IU/ML
QUANTIFERON TB PLUS TB2 MINUS NIL: 0 IU/ML

## 2019-07-24 ENCOUNTER — OUTPATIENT (OUTPATIENT)
Dept: OUTPATIENT SERVICES | Facility: HOSPITAL | Age: 49
LOS: 1 days | End: 2019-07-24
Payer: COMMERCIAL

## 2019-07-24 ENCOUNTER — APPOINTMENT (OUTPATIENT)
Dept: ULTRASOUND IMAGING | Facility: CLINIC | Age: 49
End: 2019-07-24
Payer: COMMERCIAL

## 2019-07-24 DIAGNOSIS — R94.5 ABNORMAL RESULTS OF LIVER FUNCTION STUDIES: ICD-10-CM

## 2019-07-24 PROCEDURE — 76700 US EXAM ABDOM COMPLETE: CPT | Mod: 26

## 2019-07-24 PROCEDURE — 76700 US EXAM ABDOM COMPLETE: CPT

## 2019-07-31 ENCOUNTER — RX RENEWAL (OUTPATIENT)
Age: 49
End: 2019-07-31

## 2019-08-05 ENCOUNTER — APPOINTMENT (OUTPATIENT)
Dept: ENDOCRINOLOGY | Facility: CLINIC | Age: 49
End: 2019-08-05
Payer: COMMERCIAL

## 2019-08-05 VITALS
HEIGHT: 71 IN | SYSTOLIC BLOOD PRESSURE: 140 MMHG | BODY MASS INDEX: 30.8 KG/M2 | HEART RATE: 95 BPM | DIASTOLIC BLOOD PRESSURE: 90 MMHG | OXYGEN SATURATION: 98 % | WEIGHT: 220 LBS

## 2019-08-05 PROCEDURE — 99214 OFFICE O/P EST MOD 30 MIN: CPT

## 2019-08-05 RX ORDER — FLASH GLUCOSE SCANNING READER
EACH MISCELLANEOUS
Qty: 1 | Refills: 0 | Status: ACTIVE | COMMUNITY
Start: 2019-08-05 | End: 1900-01-01

## 2019-08-05 NOTE — REVIEW OF SYSTEMS
[Fatigue] : no fatigue [Decreased Appetite] : appetite not decreased [Chest Pain] : no chest pain [Palpitations] : no palpitations [Heart Rate Is Slow] : the heart rate was not slow [Heart Rate Is Fast] : the heart rate was not fast [Joint Pain] : no joint pain [Joint Stiffness] : no joint stiffness [Polydipsia] : no polydipsia [Galactorrhea] : no galactorrhea  [Cold Intolerance] : cold tolerant [Heat Intolerance] : heat tolerant [Negative] : ENT [All other systems negative] : All other systems negative [de-identified] : Mosquito bites with scarring/hyperpigmentation

## 2019-08-05 NOTE — HISTORY OF PRESENT ILLNESS
[FreeTextEntry1] : 49 year old woman with HIV and uncontrolled DM2 complicated by CKD, HTN, HLD, vitamin D deficiency\par Last visit October 2018, loss to follow up in the interval\par Initially diagnosed in 2010, during a hospitalization. Started on insulin at that time. The patient was on basal/bolus insulin for a year and had made major modifications and self-titrated off. She presented with poly's to [facility] 7 months ago, with blurry vision. \par The patient was experiencing pressure and pain in her leg and saw neurologist who stated it could be from HIV medicine. She stopped the HIV medicine and also stopped the Levemir. As soon as insulin is restarted she feels pain again. Patient re-started levemir due to symptoms of polyuria but still felt uncomfortable.  Since the last visit, we discussed safe injection technique and regimen was changed to Lantus 20 and Novoog 12 units TID with meals\par Last dilated eye exam: 1 year ago, missed her appointment for this year, will call to reschedule\par Night-shift work, .\par Has a working meter and sugars always over 200's. Fasting/basal AM numbers are in the 150-160. After eating, FS will shoot to 400's.  Patient checks 4-6 times a day.\par Breakfast: oatmeal, grits, mostly skips meals\par 4-5 PM Lunch: skips\par Snacks: , honey wheat pretzels\par Dinner: salads, sandwiches, home cooked meals\par Denies hypoglycemia

## 2019-08-05 NOTE — ASSESSMENT
[FreeTextEntry1] : Patient is a 48 yo woman with type 2 diabetes and HTN\par \par 1. T2DM\par -patient states adherence to lantus/novolog, trying to eat healthier. A1c improved from > 13% to 8.6% in July. Target A1c 7%\par -Increase lantus to 25 units dailiy and increase novolog to 15 units TID with meals\par -can consider Trulicity if there are no contraindications. Reached out to ID, NP to discuss. Risks of pancreatitis discussed with the patient. No family hx of MEN/MTC\par -discussed importance of proper eating\par -microalbumin/creatinine today\par -monofilament testing done today \par \par 2. HTN\par -BP goal < 140/90\par \par 3. HLD\par -LDL goal < 70\par -was on pravastatin [Carbohydrate Consistent Diet] : carbohydrate consistent diet [Diabetes Foot Care] : diabetes foot care [Long Term Vascular Complications] : long term vascular complications of diabetes [Importance of Diet and Exercise] : importance of diet and exercise to improve glycemic control, achieve weight loss and improve cardiovascular health [Self Monitoring of Blood Glucose] : self monitoring of blood glucose [Insulin Self-Administration] : insulin self-administration [Retinopathy Screening] : Patient was referred to ophthalmology for retinopathy screening [Injection Technique, Storage, Sharps Disposal] : injection technique, storage, and sharps disposal

## 2019-08-05 NOTE — PHYSICAL EXAM
[No Acute Distress] : no acute distress [Alert] : alert [Well Nourished] : well nourished [Well Developed] : well developed [EOMI] : extra ocular movement intact [No Proptosis] : no proptosis [Normal Hearing] : hearing was normal [No Respiratory Distress] : no respiratory distress [Normal Rate and Effort] : normal respiratory rhythm and effort [No Accessory Muscle Use] : no accessory muscle use [Clear to Auscultation] : lungs were clear to auscultation bilaterally [Normal Rate] : heart rate was normal  [Normal S1, S2] : normal S1 and S2 [Normal Bowel Sounds] : normal bowel sounds [Not Tender] : non-tender [No Joint Swelling] : no joint swelling seen [Soft] : abdomen soft [Foot Ulcers] : no foot ulcers [Right Foot Was Examined] : right foot ~C was examined [Left Foot Was Examined] : left foot ~C was examined [Normal] : normal [2+] : 2+ in the dorsalis pedis [#4 Diminished] : number 4 was normal [#1 Diminished] : number 1 was normal [#2 Diminished] : number 2 was normal [#3 Diminished] : number 3 was normal [#5 Diminished] : number 5 was normal [#6 Diminished] : number 6 was normal [#7 Diminished] : number 7 was normal [#8 Diminished] : number 8 was normal [#9 Diminished] : number 9 was diminished [#10 Diminished] : number 10 was normal [No Motor Deficits] : the motor exam was normal [Normal Insight/Judgement] : insight and judgment were intact [Normal Affect] : the affect was normal [Normal Mood] : the mood was normal

## 2019-08-09 ENCOUNTER — RX RENEWAL (OUTPATIENT)
Age: 49
End: 2019-08-09

## 2019-08-12 LAB
CREAT SPEC-SCNC: 92 MG/DL
MICROALBUMIN 24H UR DL<=1MG/L-MCNC: 57.4 MG/DL
MICROALBUMIN/CREAT 24H UR-RTO: 621 MG/G

## 2019-09-04 ENCOUNTER — APPOINTMENT (OUTPATIENT)
Dept: VASCULAR SURGERY | Facility: CLINIC | Age: 49
End: 2019-09-04

## 2019-09-23 ENCOUNTER — APPOINTMENT (OUTPATIENT)
Dept: INTERNAL MEDICINE | Facility: CLINIC | Age: 49
End: 2019-09-23

## 2019-10-04 ENCOUNTER — APPOINTMENT (OUTPATIENT)
Dept: OBGYN | Facility: CLINIC | Age: 49
End: 2019-10-04
Payer: COMMERCIAL

## 2019-10-04 VITALS
BODY MASS INDEX: 30.66 KG/M2 | HEIGHT: 71 IN | DIASTOLIC BLOOD PRESSURE: 85 MMHG | SYSTOLIC BLOOD PRESSURE: 148 MMHG | WEIGHT: 219 LBS

## 2019-10-04 DIAGNOSIS — B00.9 HERPESVIRAL INFECTION, UNSPECIFIED: ICD-10-CM

## 2019-10-04 LAB
BILIRUB UR QL STRIP: NORMAL
GLUCOSE UR-MCNC: NORMAL
HCG UR QL: 0.2 EU/DL
HGB UR QL STRIP.AUTO: NORMAL
KETONES UR-MCNC: NORMAL
LEUKOCYTE ESTERASE UR QL STRIP: NORMAL
NITRITE UR QL STRIP: NORMAL
PH UR STRIP: 6.5
PROT UR STRIP-MCNC: 300
SP GR UR STRIP: 1.02

## 2019-10-04 PROCEDURE — 99214 OFFICE O/P EST MOD 30 MIN: CPT

## 2019-10-04 PROCEDURE — 81003 URINALYSIS AUTO W/O SCOPE: CPT | Mod: QW

## 2019-10-07 NOTE — PHYSICAL EXAM
[Awake] : awake [Alert] : alert [Acute Distress] : no acute distress [Soft] : soft [Tender] : non tender [Oriented x3] : oriented to person, place, and time [Normal] : vagina [No Bleeding] : there was no active vaginal bleeding [de-identified] : see pick

## 2019-10-08 ENCOUNTER — APPOINTMENT (OUTPATIENT)
Dept: NEPHROLOGY | Facility: CLINIC | Age: 49
End: 2019-10-08

## 2019-10-17 ENCOUNTER — NON-APPOINTMENT (OUTPATIENT)
Age: 49
End: 2019-10-17

## 2019-10-17 ENCOUNTER — APPOINTMENT (OUTPATIENT)
Dept: INTERNAL MEDICINE | Facility: CLINIC | Age: 49
End: 2019-10-17
Payer: COMMERCIAL

## 2019-10-17 ENCOUNTER — LABORATORY RESULT (OUTPATIENT)
Age: 49
End: 2019-10-17

## 2019-10-17 VITALS
OXYGEN SATURATION: 98 % | WEIGHT: 219 LBS | BODY MASS INDEX: 30.66 KG/M2 | HEART RATE: 94 BPM | DIASTOLIC BLOOD PRESSURE: 92 MMHG | HEIGHT: 71 IN | RESPIRATION RATE: 16 BRPM | TEMPERATURE: 98.3 F | SYSTOLIC BLOOD PRESSURE: 154 MMHG

## 2019-10-17 DIAGNOSIS — Z86.39 PERSONAL HISTORY OF OTHER ENDOCRINE, NUTRITIONAL AND METABOLIC DISEASE: ICD-10-CM

## 2019-10-17 DIAGNOSIS — R94.31 ABNORMAL ELECTROCARDIOGRAM [ECG] [EKG]: ICD-10-CM

## 2019-10-17 DIAGNOSIS — Z78.9 OTHER SPECIFIED HEALTH STATUS: ICD-10-CM

## 2019-10-17 DIAGNOSIS — R92.2 INCONCLUSIVE MAMMOGRAM: ICD-10-CM

## 2019-10-17 DIAGNOSIS — L08.9 LOCAL INFECTION OF THE SKIN AND SUBCUTANEOUS TISSUE, UNSPECIFIED: ICD-10-CM

## 2019-10-17 DIAGNOSIS — R94.5 ABNORMAL RESULTS OF LIVER FUNCTION STUDIES: ICD-10-CM

## 2019-10-17 DIAGNOSIS — M79.89 OTHER SPECIFIED SOFT TISSUE DISORDERS: ICD-10-CM

## 2019-10-17 PROCEDURE — G0444 DEPRESSION SCREEN ANNUAL: CPT

## 2019-10-17 PROCEDURE — 99213 OFFICE O/P EST LOW 20 MIN: CPT | Mod: 25

## 2019-10-17 PROCEDURE — 93000 ELECTROCARDIOGRAM COMPLETE: CPT

## 2019-10-17 PROCEDURE — 36415 COLL VENOUS BLD VENIPUNCTURE: CPT

## 2019-10-17 PROCEDURE — 99386 PREV VISIT NEW AGE 40-64: CPT | Mod: 25

## 2019-10-17 NOTE — PAST MEDICAL HISTORY
[Definite ___ (Date)] : the last menstrual period was [unfilled] [Regular Cycle Intervals] : have been regular [Living ___] : Living: [unfilled] [Total Preg ___] : G[unfilled] [Dysmenorrhea] : dysmenorrhea [AB Induced ___] : elective abortions: [unfilled]  [AB Spont ___] : miscarriages: [unfilled]

## 2019-10-20 PROBLEM — Z78.9 RARELY CONSUMES ALCOHOL: Status: ACTIVE | Noted: 2019-10-20

## 2019-10-20 PROBLEM — R94.5 ELEVATED LIVER FUNCTION TESTS: Status: RESOLVED | Noted: 2019-07-11 | Resolved: 2019-10-20

## 2019-10-20 PROBLEM — L08.9 SKIN INFECTION: Status: RESOLVED | Noted: 2018-01-22 | Resolved: 2019-10-20

## 2019-10-20 PROBLEM — M79.89 LEFT LEG SWELLING: Status: RESOLVED | Noted: 2019-07-10 | Resolved: 2019-10-20

## 2019-10-20 NOTE — HEALTH RISK ASSESSMENT
[Patient reported mammogram was normal] : Patient reported mammogram was normal [Patient reported PAP Smear was normal] : Patient reported PAP Smear was normal [Patient declined colonoscopy] : Patient declined colonoscopy [Smoke Detector] : smoke detector [Carbon Monoxide Detector] : carbon monoxide detector [Seat Belt] :  uses seat belt [3] : 1) Little interest or pleasure doing things for nearly every day (3) [0] : 2) Feeling down, depressed, or hopeless: Not at all (0) [TQX8Ccyte] : 3 [BJW1Msgdi] : 15, mod severe [Guns at Home] : no guns at home [Sunscreen] : does not use sunscreen [MammogramDate] : 05/18 [PapSmearDate] : 04/18 [ColonoscopyComments] : no hx prior, willing FIT [HepatitisCDate] : 06/18 [HepatitisCComments] : negative

## 2019-10-20 NOTE — PHYSICAL EXAM
[No Acute Distress] : no acute distress [Well-Appearing] : well-appearing [Normal Sclera/Conjunctiva] : normal sclera/conjunctiva [PERRL] : pupils equal round and reactive to light [EOMI] : extraocular movements intact [Normal Outer Ear/Nose] : the outer ears and nose were normal in appearance [Normal Oropharynx] : the oropharynx was normal [Normal TMs] : both tympanic membranes were normal [Normal Nasal Mucosa] : the nasal mucosa was normal [No Lymphadenopathy] : no lymphadenopathy [Supple] : supple [No Respiratory Distress] : no respiratory distress  [Clear to Auscultation] : lungs were clear to auscultation bilaterally [Normal Rate] : normal rate  [Regular Rhythm] : with a regular rhythm [Normal S1, S2] : normal S1 and S2 [No Murmur] : no murmur heard [No Varicosities] : no varicosities [Pedal Pulses Present] : the pedal pulses are present [Soft] : abdomen soft [Non Tender] : non-tender [No HSM] : no HSM [Normal Supraclavicular Nodes] : no supraclavicular lymphadenopathy [Normal Posterior Cervical Nodes] : no posterior cervical lymphadenopathy [Normal Anterior Cervical Nodes] : no anterior cervical lymphadenopathy [No CVA Tenderness] : no CVA  tenderness [No Spinal Tenderness] : no spinal tenderness [No Joint Swelling] : no joint swelling [No Rash] : no rash [No Focal Deficits] : no focal deficits [Deep Tendon Reflexes (DTR)] : deep tendon reflexes were 2+ and symmetric [Normal Affect] : the affect was normal [Alert and Oriented x3] : oriented to person, place, and time [de-identified] : enlarged right thyroid gland, no focal nodule or tenderness [de-identified] : b/l LE 1+ pitting edema (L>R) to mid shin, no palpable cord, negative Desirae's [de-identified] : left hip: FROM with mild pain at right thigh reported; left thigh: no focal tenderness, rash or muscle atrophy [de-identified] : scattered freckles [de-identified] : antalgic gait

## 2019-10-20 NOTE — REVIEW OF SYSTEMS
[Negative] : Neurological [Fever] : no fever [Chills] : no chills [Chest Pain] : no chest pain [Palpitations] : no palpitations [Leg Claudication] : no leg claudication [Shortness Of Breath] : no shortness of breath [Cough] : no cough [Dysuria] : no dysuria [Dizziness] : no dizziness [FreeTextEntry9] : see HPI [de-identified] : see HPI

## 2019-10-20 NOTE — HISTORY OF PRESENT ILLNESS
[Health Maintenance] : health maintenance [Father] : father [Mother] : mother [Working Full Time] : working full time [Unmarried] : unmarried [Occupation ___] : occupation: [unfilled] [Never Smoked Cigarettes] : has never smoked cigarettes [Rare Use] : rare alcohol use [Never] : has never used illicit drugs [Fair] : fair [Reg. Dental Visits] : She has regular dental visits [Premenopausal] : the patient is premenopausal [FreeTextEntry1] : Needs new PMD, last CPE "many yrs ago"\par  [Binge Drinking] : denies binge drinking [Patient Concern] : no personal concern about alcohol use [Family Concern] : no family concern about alcohol use [Vision Problems] : She denies vision problems [Hearing Loss] : She denies hearing loss [de-identified] : adult daughter [de-identified] : declines flu shot, hx Tdap > 10 yrs- declines, hx PVX 4/13, no hx prevnar- declines, no hx HPV series- declines, hx hep B series [de-identified] : \par Feeling well.\par Last ate > 6 hrs ago.\par \par c/o L-leg pain-mainly at upper thigh, onset x 4 yrs, denies hx fall or trauma; getting worse x 1 yr\par -gen sore sensation, focal; worse with activity\par -gets LLE swelling (buttock to ankle) with prolonged standing or walking.  Denies paresthesias or rash.\par -hx neuro eval 2018, advised CT scan- done and unremarkable and told not neurological per pt.\par -hx rheum eval 2017, states had xray and labs- told all nl.  Thinks told "inflammation in thigh".  \par -Reports hx [facility] eval 8/19 for worsened pain and LLE swelling- states had xray and US of leg- told nl and no blood clots, given muscle relaxer and naproxen.  States took both x 1 week, felt didn't help.  \par -Using advil 4 tabs 2-3x/d and helps significantly (last taken > 12 hrs ago).  \par -feels related to insulin use (all types)- states has stopped it on/off in past with all pain resolved with doing so\par -following with palliative care for pain management- last seen 7/19, advised cont diclofenac per note (per pt getting naproxen) and referred to rheumatology- states never got referral.  F/U pending.\par \par DM, thyroid nodule (per US 2012- Pt unsure why done, denies hx FNA)-\par -on novolog 15 TID and Lantus 25 qd- taking x 1 yr\par -followed by endo, has f/u 1/20\par -planned to start Trulicity soon, to  today\par -hx nl dilated exam with opth 2018- plans to f/u soon, has own\par -hx podiatry, last 8/18- plans to f/u.  Denies foot complaints.\par -denies neck or throat complaints.\par \par HIV- dx'd 4 yrs ago on routine testing per pt\par -followed by ID, has f/u 11/19\par -Biktarvy > 1 yr, adherent\par -denies hx PCP or ppx\par -not sexually active, no partners in past year; active with men only; always uses condoms\par \par hx genital HSV, BV- \par -followed closely by GYN\par -on Valtrex, MetroGel and lidocaine cream, states given Diflucan to use if needed (not used currently) per last visit 10/19\par \par CKD-\par -awaiting renal appt pending per ID\par -denies urinary complaints.\par \par HLD-\par -off medication x 2 yrs per pt, thinks stopped as better control.  Denies SEs with Rx (per records hx pravastatin). \par -has low fat diet on/off\par -exercise: walks 2x/wk x 1 hr- done w/o CP/sob.\par -works nights as , so diet erratic at times\par \par hx anemia, hx heavy menses-\par -no hx iron or blood transfusion\par \par Reports hx depression 2/2 HSV and chronic leg pain- \par -denies HI/SI\par -declines BH\par \par Reports nl appetite and BMs\par Reports stable wt in past year\par

## 2019-10-22 DIAGNOSIS — Z86.39 PERSONAL HISTORY OF OTHER ENDOCRINE, NUTRITIONAL AND METABOLIC DISEASE: ICD-10-CM

## 2019-10-22 DIAGNOSIS — E56.9 VITAMIN DEFICIENCY, UNSPECIFIED: ICD-10-CM

## 2019-10-22 LAB
25(OH)D3 SERPL-MCNC: 17.2 NG/ML
ALBUMIN SERPL ELPH-MCNC: 3.8 G/DL
ALP BLD-CCNC: 312 U/L
ALT SERPL-CCNC: 16 U/L
ANION GAP SERPL CALC-SCNC: 12 MMOL/L
APPEARANCE: CLEAR
AST SERPL-CCNC: 20 U/L
BACTERIA: NEGATIVE
BASOPHILS # BLD AUTO: 0.07 K/UL
BASOPHILS NFR BLD AUTO: 1.5 %
BILIRUB SERPL-MCNC: 0.4 MG/DL
BILIRUBIN URINE: NEGATIVE
BLOOD URINE: NORMAL
BUN SERPL-MCNC: 15 MG/DL
CALCIUM SERPL-MCNC: 9.6 MG/DL
CHLORIDE SERPL-SCNC: 101 MMOL/L
CHOLEST SERPL-MCNC: 276 MG/DL
CHOLEST/HDLC SERPL: 6 RATIO
CK SERPL-CCNC: 182 U/L
CO2 SERPL-SCNC: 23 MMOL/L
COLOR: NORMAL
CREAT SERPL-MCNC: 1.18 MG/DL
CREAT SPEC-SCNC: 54 MG/DL
CREAT/PROT UR: 2.1 RATIO
EOSINOPHIL # BLD AUTO: 0.7 K/UL
EOSINOPHIL NFR BLD AUTO: 15.1 %
FERRITIN SERPL-MCNC: 9 NG/ML
FOLATE SERPL-MCNC: 14 NG/ML
GLUCOSE QUALITATIVE U: ABNORMAL
GLUCOSE SERPL-MCNC: 173 MG/DL
HCT VFR BLD CALC: 33.2 %
HDLC SERPL-MCNC: 46 MG/DL
HGB BLD-MCNC: 9.6 G/DL
HYALINE CASTS: 2 /LPF
IMM GRANULOCYTES NFR BLD AUTO: 0.2 %
IRON SATN MFR SERPL: 7 %
IRON SERPL-MCNC: 24 UG/DL
KETONES URINE: NEGATIVE
LDLC SERPL CALC-MCNC: 180 MG/DL
LEUKOCYTE ESTERASE URINE: ABNORMAL
LYMPHOCYTES # BLD AUTO: 1.77 K/UL
LYMPHOCYTES NFR BLD AUTO: 38.2 %
MAN DIFF?: NORMAL
MCHC RBC-ENTMCNC: 23.8 PG
MCHC RBC-ENTMCNC: 28.9 GM/DL
MCV RBC AUTO: 82.2 FL
MICROSCOPIC-UA: NORMAL
MONOCYTES # BLD AUTO: 0.4 K/UL
MONOCYTES NFR BLD AUTO: 8.6 %
NEUTROPHILS # BLD AUTO: 1.68 K/UL
NEUTROPHILS NFR BLD AUTO: 36.4 %
NITRITE URINE: NEGATIVE
PH URINE: 6.5
PLATELET # BLD AUTO: 314 K/UL
POTASSIUM SERPL-SCNC: 4.2 MMOL/L
PROT SERPL-MCNC: 9.7 G/DL
PROT UR-MCNC: 113 MG/DL
PROTEIN URINE: ABNORMAL
RBC # BLD: 4.04 M/UL
RBC # FLD: 19.4 %
RED BLOOD CELLS URINE: 8 /HPF
SODIUM SERPL-SCNC: 136 MMOL/L
SPECIFIC GRAVITY URINE: 1.01
SQUAMOUS EPITHELIAL CELLS: 1 /HPF
TIBC SERPL-MCNC: 343 UG/DL
TRIGL SERPL-MCNC: 248 MG/DL
TSH SERPL-ACNC: 1.18 UIU/ML
UIBC SERPL-MCNC: 319 UG/DL
UROBILINOGEN URINE: NORMAL
VIT B12 SERPL-MCNC: 759 PG/ML
WBC # FLD AUTO: 4.63 K/UL
WHITE BLOOD CELLS URINE: 20 /HPF

## 2019-10-24 ENCOUNTER — APPOINTMENT (OUTPATIENT)
Dept: ORTHOPEDIC SURGERY | Facility: CLINIC | Age: 49
End: 2019-10-24
Payer: COMMERCIAL

## 2019-10-24 VITALS — BODY MASS INDEX: 30.8 KG/M2 | HEIGHT: 71 IN | WEIGHT: 220 LBS

## 2019-10-24 DIAGNOSIS — M47.816 SPONDYLOSIS W/OUT MYELOPATHY OR RADICULOPATHY, LUMBAR REGION: ICD-10-CM

## 2019-10-24 DIAGNOSIS — M48.061 SPINAL STENOSIS, LUMBAR REGION WITHOUT NEUROGENIC CLAUDICATION: ICD-10-CM

## 2019-10-24 PROCEDURE — 72170 X-RAY EXAM OF PELVIS: CPT

## 2019-10-24 PROCEDURE — 72100 X-RAY EXAM L-S SPINE 2/3 VWS: CPT

## 2019-10-24 PROCEDURE — 99204 OFFICE O/P NEW MOD 45 MIN: CPT

## 2019-10-25 ENCOUNTER — RESULT REVIEW (OUTPATIENT)
Age: 49
End: 2019-10-25

## 2019-10-25 LAB — HEMOCCULT STL QL IA: NEGATIVE

## 2019-11-06 ENCOUNTER — APPOINTMENT (OUTPATIENT)
Dept: VASCULAR SURGERY | Facility: CLINIC | Age: 49
End: 2019-11-06
Payer: COMMERCIAL

## 2019-11-06 VITALS
BODY MASS INDEX: 30.8 KG/M2 | HEART RATE: 112 BPM | HEIGHT: 71 IN | WEIGHT: 220 LBS | SYSTOLIC BLOOD PRESSURE: 127 MMHG | DIASTOLIC BLOOD PRESSURE: 67 MMHG

## 2019-11-06 DIAGNOSIS — M25.473 EFFUSION, UNSPECIFIED ANKLE: ICD-10-CM

## 2019-11-06 PROCEDURE — 93978 VASCULAR STUDY: CPT

## 2019-11-06 PROCEDURE — 93970 EXTREMITY STUDY: CPT

## 2019-11-06 PROCEDURE — 99242 OFF/OP CONSLTJ NEW/EST SF 20: CPT

## 2019-11-06 NOTE — CONSULT LETTER
[Dear  ___] : Dear  [unfilled], [Consult Letter:] : I had the pleasure of evaluating your patient, [unfilled]. [Please see my note below.] : Please see my note below. [Sincerely,] : Sincerely, [FreeTextEntry3] : Kaylynn Devries PA-C\par [facility]\par

## 2019-11-06 NOTE — PHYSICAL EXAM
[2+] : left 2+ [Ankle Swelling (On Exam)] : present [Ankle Swelling Bilaterally] : bilaterally  [Ankle Swelling On The Right] : mild [No Rash or Lesion] : No rash or lesion [Alert] : alert [Calm] : calm [JVD] : no jugular venous distention  [Varicose Veins Of Lower Extremities] : not present [] : not present [Skin Ulcer] : no ulcer [de-identified] : appears well  [de-identified] : no calf tenderness \par motor intact b/l lower extremities, muscle strength 5/5 with dorsi and plantar flexion\par  [de-identified] : sensation intact b/l lower extremities

## 2019-11-06 NOTE — ASSESSMENT
[FreeTextEntry1] : 48 yo female with a history of hiv, dm, hld presents for evaluation of left lower extremity pain with swelling that has been present for about 1 year but has been worsening over the past few months on exam pt with palpable pulses bilaterally.  \par venous duplex shows no evidence of dvt or insufficency \par iliac veins were visualized were patent without any evidence of compression\par at this time would recommend light compression stockings\par pt advised to follow up with pcp to address other causes of edema and pain

## 2019-11-06 NOTE — HISTORY OF PRESENT ILLNESS
[FreeTextEntry1] : 48 yo female with a history of hiv, dm, hld presents for evaluation of left lower extremity pain with swelling that has been present for about 1 year but has been worsening over the past few months.  pt states that she initially started to have pain in the left thigh only now over the past few months pain is from the ankle to thigh.  she states that she can only walk about 1 block prior to experiencing this pain.  the leg is tender and needs to be untouched otherwise the pain interferes with her sleep.  when she is on her feet for prolonged periods of time pt has noted left lower extremity edema.  \par pt denies any history of dvt, ulcers or lower extremity wounds.  pt states that elevation helps with her symptoms. \par pt also has noted a correlation with the insulin injections and the pain.  pt states that she only injects at the proximal thigh

## 2019-11-11 ENCOUNTER — APPOINTMENT (OUTPATIENT)
Dept: INFECTIOUS DISEASE | Facility: CLINIC | Age: 49
End: 2019-11-11

## 2019-11-21 ENCOUNTER — LABORATORY RESULT (OUTPATIENT)
Age: 49
End: 2019-11-21

## 2019-11-21 ENCOUNTER — APPOINTMENT (OUTPATIENT)
Dept: RHEUMATOLOGY | Facility: CLINIC | Age: 49
End: 2019-11-21
Payer: COMMERCIAL

## 2019-11-21 VITALS
BODY MASS INDEX: 31.22 KG/M2 | HEIGHT: 71 IN | WEIGHT: 223 LBS | TEMPERATURE: 98 F | OXYGEN SATURATION: 99 % | HEART RATE: 102 BPM | DIASTOLIC BLOOD PRESSURE: 79 MMHG | SYSTOLIC BLOOD PRESSURE: 130 MMHG

## 2019-11-21 DIAGNOSIS — M79.652 PAIN IN LEFT THIGH: ICD-10-CM

## 2019-11-21 DIAGNOSIS — D75.A GLUCOSE-6-PHOSPHATE DEHYDROGENASE: ICD-10-CM

## 2019-11-21 PROCEDURE — 99204 OFFICE O/P NEW MOD 45 MIN: CPT

## 2019-11-21 PROCEDURE — 99214 OFFICE O/P EST MOD 30 MIN: CPT

## 2019-11-21 RX ORDER — DICLOFENAC SODIUM 75 MG/1
75 TABLET, DELAYED RELEASE ORAL
Qty: 60 | Refills: 3 | Status: DISCONTINUED | COMMUNITY
Start: 2019-10-24 | End: 2019-11-21

## 2019-11-21 RX ORDER — DICLOFENAC SODIUM 50 MG/1
50 TABLET, DELAYED RELEASE ORAL
Qty: 15 | Refills: 0 | Status: DISCONTINUED | COMMUNITY
Start: 2019-01-30 | End: 2019-11-21

## 2019-11-26 ENCOUNTER — APPOINTMENT (OUTPATIENT)
Dept: CARDIOLOGY | Facility: CLINIC | Age: 49
End: 2019-11-26
Payer: COMMERCIAL

## 2019-11-26 VITALS
TEMPERATURE: 98.5 F | WEIGHT: 223 LBS | HEIGHT: 71 IN | OXYGEN SATURATION: 97 % | SYSTOLIC BLOOD PRESSURE: 122 MMHG | DIASTOLIC BLOOD PRESSURE: 85 MMHG | BODY MASS INDEX: 31.22 KG/M2 | HEART RATE: 86 BPM

## 2019-11-26 DIAGNOSIS — Z13.6 ENCOUNTER FOR SCREENING FOR CARDIOVASCULAR DISORDERS: ICD-10-CM

## 2019-11-26 DIAGNOSIS — R79.89 OTHER SPECIFIED ABNORMAL FINDINGS OF BLOOD CHEMISTRY: ICD-10-CM

## 2019-11-26 DIAGNOSIS — R60.0 LOCALIZED EDEMA: ICD-10-CM

## 2019-11-26 PROCEDURE — 93000 ELECTROCARDIOGRAM COMPLETE: CPT

## 2019-11-26 PROCEDURE — 99214 OFFICE O/P EST MOD 30 MIN: CPT

## 2019-12-02 ENCOUNTER — APPOINTMENT (OUTPATIENT)
Dept: NEPHROLOGY | Facility: CLINIC | Age: 49
End: 2019-12-02
Payer: COMMERCIAL

## 2019-12-02 ENCOUNTER — LABORATORY RESULT (OUTPATIENT)
Age: 49
End: 2019-12-02

## 2019-12-02 VITALS
HEIGHT: 71 IN | WEIGHT: 220.46 LBS | SYSTOLIC BLOOD PRESSURE: 132 MMHG | OXYGEN SATURATION: 97 % | DIASTOLIC BLOOD PRESSURE: 84 MMHG | BODY MASS INDEX: 30.86 KG/M2 | HEART RATE: 96 BPM

## 2019-12-02 VITALS — SYSTOLIC BLOOD PRESSURE: 122 MMHG | DIASTOLIC BLOOD PRESSURE: 84 MMHG

## 2019-12-02 DIAGNOSIS — N18.3 CHRONIC KIDNEY DISEASE, STAGE 3 (MODERATE): ICD-10-CM

## 2019-12-02 DIAGNOSIS — N25.0 RENAL OSTEODYSTROPHY: ICD-10-CM

## 2019-12-02 DIAGNOSIS — Z51.81 ENCOUNTER FOR THERAPEUTIC DRUG LVL MONITORING: ICD-10-CM

## 2019-12-02 DIAGNOSIS — M79.89 OTHER SPECIFIED SOFT TISSUE DISORDERS: ICD-10-CM

## 2019-12-02 DIAGNOSIS — R80.9 PROTEINURIA, UNSPECIFIED: ICD-10-CM

## 2019-12-02 PROCEDURE — 99205 OFFICE O/P NEW HI 60 MIN: CPT

## 2019-12-03 ENCOUNTER — APPOINTMENT (OUTPATIENT)
Dept: CARDIOLOGY | Facility: CLINIC | Age: 49
End: 2019-12-03

## 2019-12-03 ENCOUNTER — LABORATORY RESULT (OUTPATIENT)
Age: 49
End: 2019-12-03

## 2019-12-03 PROBLEM — N25.0 RENAL BONE DISEASE: Status: ACTIVE | Noted: 2019-12-03

## 2019-12-03 PROBLEM — Z51.81 MEDICATION MONITORING ENCOUNTER: Status: ACTIVE | Noted: 2019-12-03

## 2019-12-03 LAB
ACE BLD-CCNC: 42 U/L
ALBUMIN MFR SERPL ELPH: 39.6 %
ALBUMIN SERPL ELPH-MCNC: 3.5 G/DL
ALBUMIN SERPL ELPH-MCNC: 3.6 G/DL
ALBUMIN SERPL-MCNC: 3.6 G/DL
ALBUMIN/GLOB SERPL: 0.7 RATIO
ALDOLASE SERPL-CCNC: 5.1 U/L
ALP BLD-CCNC: 302 U/L
ALP BONE SERPL-MCNC: 118 MCG/L
ALPHA1 GLOB MFR SERPL ELPH: 3.1 %
ALPHA1 GLOB SERPL ELPH-MCNC: 0.3 G/DL
ALPHA2 GLOB MFR SERPL ELPH: 9.2 %
ALPHA2 GLOB SERPL ELPH-MCNC: 0.8 G/DL
ALT SERPL-CCNC: 14 U/L
ANA PAT FLD IF-IMP: ABNORMAL
ANA SER IF-ACNC: ABNORMAL
ANION GAP SERPL CALC-SCNC: 12 MMOL/L
ANION GAP SERPL CALC-SCNC: 12 MMOL/L
APPEARANCE: CLEAR
AST SERPL-CCNC: 15 U/L
B-GLOBULIN MFR SERPL ELPH: 12.5 %
B-GLOBULIN SERPL ELPH-MCNC: 1.1 G/DL
BASOPHILS # BLD AUTO: 0.08 K/UL
BASOPHILS NFR BLD AUTO: 1.6 %
BILIRUB SERPL-MCNC: 0.3 MG/DL
BILIRUBIN URINE: NEGATIVE
BLOOD URINE: NEGATIVE
BUN SERPL-MCNC: 16 MG/DL
BUN SERPL-MCNC: 18 MG/DL
CALCIUM SERPL-MCNC: 9.5 MG/DL
CALCIUM SERPL-MCNC: 9.6 MG/DL
CHLORIDE SERPL-SCNC: 101 MMOL/L
CHLORIDE SERPL-SCNC: 102 MMOL/L
CK SERPL-CCNC: 104 U/L
CO2 SERPL-SCNC: 23 MMOL/L
CO2 SERPL-SCNC: 24 MMOL/L
COLOR: NORMAL
CREAT SERPL-MCNC: 1.47 MG/DL
CREAT SERPL-MCNC: 1.59 MG/DL
CREAT SPEC-SCNC: 101 MG/DL
CREAT/PROT UR: 1.3 RATIO
CRP SERPL-MCNC: 0.19 MG/DL
DEPRECATED KAPPA LC FREE/LAMBDA SER: 5.1 RATIO
DIRECT COOMBS: ABNORMAL
DSDNA AB SER-ACNC: <12 IU/ML
ENA RNP AB SER IA-ACNC: <0.2 AL
ENA SM AB SER IA-ACNC: <0.2 AL
ENA SS-A AB SER IA-ACNC: <0.2 AL
ENA SS-B AB SER IA-ACNC: <0.2 AL
EOSINOPHIL # BLD AUTO: 1.01 K/UL
EOSINOPHIL NFR BLD AUTO: 19.8 %
ERYTHROCYTE [SEDIMENTATION RATE] IN BLOOD BY WESTERGREN METHOD: 120 MM/HR
G6PD SER-CCNC: 8.5 U/G HGB
GAMMA GLOB FLD ELPH-MCNC: 3.2 G/DL
GAMMA GLOB MFR SERPL ELPH: 35.6 %
GLUCOSE QUALITATIVE U: NEGATIVE
GLUCOSE SERPL-MCNC: 238 MG/DL
GLUCOSE SERPL-MCNC: 241 MG/DL
HAPTOGLOB SERPL-MCNC: 251 MG/DL
HBV CORE IGG+IGM SER QL: REACTIVE
HBV SURFACE AB SER QL: REACTIVE
HBV SURFACE AG SER QL: NONREACTIVE
HCT VFR BLD CALC: 34.6 %
HCV AB SER QL: NONREACTIVE
HCV S/CO RATIO: 0.19 S/CO
HGB A MFR BLD: 97.8 %
HGB A2 MFR BLD: 2.2 %
HGB BLD-MCNC: 10.2 G/DL
HGB FRACT BLD-IMP: NORMAL
IGA SER QL IEP: 397 MG/DL
IGG SER QL IEP: 3536 MG/DL
IGM SER QL IEP: 111 MG/DL
IMM GRANULOCYTES NFR BLD AUTO: 0.2 %
INTERPRETATION SERPL IEP-IMP: NORMAL
IRON SATN MFR SERPL: 9 %
IRON SERPL-MCNC: 30 UG/DL
KAPPA LC CSF-MCNC: 5.31 MG/DL
KAPPA LC SERPL-MCNC: 27.1 MG/DL
KETONES URINE: NEGATIVE
LEUKOCYTE ESTERASE URINE: ABNORMAL
LYMPHOCYTES # BLD AUTO: 1.41 K/UL
LYMPHOCYTES NFR BLD AUTO: 27.6 %
M PROTEIN MFR SERPL ELPH: NORMAL
M PROTEIN SPEC IFE-MCNC: NORMAL
MAN DIFF?: NORMAL
MCHC RBC-ENTMCNC: 24.9 PG
MCHC RBC-ENTMCNC: 29.5 GM/DL
MCV RBC AUTO: 84.6 FL
MONOCLON BAND OBS SERPL: NORMAL
MONOCYTES # BLD AUTO: 0.3 K/UL
MONOCYTES NFR BLD AUTO: 5.9 %
MYOGLOBIN SERPL-MCNC: 58 NG/ML
NEUTROPHILS # BLD AUTO: 2.29 K/UL
NEUTROPHILS NFR BLD AUTO: 44.9 %
NITRITE URINE: NEGATIVE
PH URINE: 6
PHOSPHATE SERPL-MCNC: 4.6 MG/DL
PLATELET # BLD AUTO: 357 K/UL
POTASSIUM SERPL-SCNC: 4.3 MMOL/L
POTASSIUM SERPL-SCNC: 4.7 MMOL/L
PROT SERPL-MCNC: 8.9 G/DL
PROT SERPL-MCNC: 9 G/DL
PROT SERPL-MCNC: 9 G/DL
PROT UR-MCNC: 130 MG/DL
PROTEIN URINE: ABNORMAL
RBC # BLD: 4.09 M/UL
RBC # FLD: 20.4 %
SODIUM SERPL-SCNC: 136 MMOL/L
SODIUM SERPL-SCNC: 138 MMOL/L
SPECIFIC GRAVITY URINE: 1.01
TIBC SERPL-MCNC: 329 UG/DL
UIBC SERPL-MCNC: 299 UG/DL
UROBILINOGEN URINE: NORMAL
WBC # FLD AUTO: 5.1 K/UL

## 2019-12-03 NOTE — PHYSICAL EXAM
[General Appearance - Well Nourished] : well nourished [General Appearance - In No Acute Distress] : in no acute distress [General Appearance - Alert] : alert [Outer Ear] : the ears and nose were normal in appearance [Sclera] : the sclera and conjunctiva were normal [Neck Appearance] : the appearance of the neck was normal [Respiration, Rhythm And Depth] : normal respiratory rhythm and effort [] : no respiratory distress [Auscultation Breath Sounds / Voice Sounds] : lungs were clear to auscultation bilaterally [Heart Rate And Rhythm] : heart rate was normal and rhythm regular [Heart Sounds] : normal S1 and S2 [Bowel Sounds] : normal bowel sounds [Heart Sounds Pericardial Friction Rub] : no pericardial rub [Abdomen Soft] : soft [Abdomen Tenderness] : non-tender [Abnormal Walk] : normal gait [Musculoskeletal - Swelling] : no joint swelling seen [No Focal Deficits] : no focal deficits [Impaired Insight] : insight and judgment were intact [Affect] : the affect was normal [Mood] : the mood was normal [FreeTextEntry1] : Dark pigmented small wound on the shin.

## 2019-12-03 NOTE — HISTORY OF PRESENT ILLNESS
[FreeTextEntry1] : Ms. TIEN SHARPE is a 49 year-old woman who has a PMH of HIV on Biktarvy, HTN, and uncontrolled diabetes is here [facility] for the management of proteinuria and elevated serum creatinine.\par \par Based on previous blood work, Ms Sharpe has had serum creatinine 1.3-1.5 since 2017, and urinalyses showed pyuria and microscopic hematuria. Urine prot:creatinine ratio was 2.1 in October 2019,  but improved to 1.3 in Nov 2019. Since July 2019, she has been having left LE swelling and thigh pain and she is taking Advil and Tylenol several times a week. She is currently getting work up for the pain. CT scan pelvis showed bilateral inguinal lymphadenopathy. CT abdomen showed normal kidney sizes with no evidence of obstructive uropathy. Paraprotein work up showed elevated IgG Kappa light chains-27, elevated Lambda-5.3, and an elevated K:L ratio of 5.1.\par \par Currently, she denies having nausea/vomiting/metallic taste in Her mouth. She has a good appetite. She denies hematuria, frothy urine or dysuria. She denies shortness of breath, chest pain, headache, edema. No hand tremors. She denies NSAID use or herbal supplements.\par \par \par HTN:Diagnosed several weeks ago. \par She was prescribed HCTZ 25MG, but she had not started taking them because her medication was just delivered yesterday. \par \par DM: Diagnosed since 2009. \par Her DM has not been well-controlled, with her latest HbA1C ~8%.  She is currently on insulin. \par \par HIV:Diagnosed in 2017. \par Adherent to Biktarvy. CD4 controlled and viral load undetectable. \par \par

## 2019-12-03 NOTE — REVIEW OF SYSTEMS
[Incontinence] : incontinence [As Noted in HPI] : as noted in HPI [Skin Lesions] : skin lesion [Negative] : Psychiatric [Pelvic Pain] : no pelvic pain [Dysuria] : no dysuria [Arthralgias] : no arthralgias [Joint Pain] : no joint pain [de-identified] : slow-healing wounds for several months (on the shins and arms). [FreeTextEntry9] : +left thigh pain

## 2019-12-03 NOTE — CONSULT LETTER
[Dear  ___] : Dear  [unfilled], [Please see my note below.] : Please see my note below. [Consult Letter:] : I had the pleasure of evaluating your patient, [unfilled]. [Consult Closing:] : Thank you very much for allowing me to participate in the care of this patient.  If you have any questions, please do not hesitate to contact me. [Sincerely,] : Sincerely, [FreeTextEntry3] : Jackeline Rangel MD\par Nephrology\par

## 2019-12-03 NOTE — ASSESSMENT
[FreeTextEntry1] : \par 1. Chronic kidney disease stage 3. Patients eGFR was around 30-40. The etiology of CKD is unclear, but possibly secondary to glomerulopathy in view of proteinuria, pyuria and hematuria. Further workup showed: normal DsDNA normal SSA, DYLAN - positive, HIV viral load controlled, Hep C Ab - non-reactive, Hep B Suface Ab and Core Ab reactive (now immune). We will plan for a kidney biopsy. We will need CBC, Coags 30 days prior to the kidney biopsy. She is advised to avoid NSAIDs.\par \par 2. Proteinuria - UPCR ranging from 1.8 - 2.1. Paraproteinemia workup showed that she has elevated IgG Kappa light chains 27, Lambda 5.3, with an elevated K:L ratio of 5.1. It is possible that her CKD is caused by IgG Kappa. We will plan for a kidney biopsy. I will refer patient to hematology. \par \par 3. HTN - Goal BP for patient is < 130/80.  She said that her HCTZ had just arrived, so she has not started taking HCTZ. Since her office blood pressure is 122/84 today, I recommended that she hold HCTZ for now. \par \par 4. Anemia - She is iron deficient. SHe has had heavy menses. Currently unable to start oral iron supplement due to interaction with ART. We will consider IV iron. \par \par 5. Renal bone disease - phos level is mildly elevated to 4.6. This is likely secondary to CKD. Since phoslevel is <5.5, phos binder is not indicated at the moment. \par \par 6. Medication monitoring encounter. Medication dose reviewed. \par \par Patient is recommended to follow up in 6 weeks. \par \par

## 2019-12-06 LAB
C3 SERPL-MCNC: 136 MG/DL
C4 SERPL-MCNC: 21 MG/DL
MPO AB + PR3 PNL SER: NORMAL

## 2019-12-11 LAB
APTT BLD: 29.4 SEC
INR PPP: 1.01 RATIO
PT BLD: 11.6 SEC

## 2019-12-12 ENCOUNTER — RESULT REVIEW (OUTPATIENT)
Age: 49
End: 2019-12-12

## 2019-12-12 ENCOUNTER — APPOINTMENT (OUTPATIENT)
Dept: ULTRASOUND IMAGING | Facility: HOSPITAL | Age: 49
End: 2019-12-12

## 2019-12-12 ENCOUNTER — APPOINTMENT (OUTPATIENT)
Dept: INTERNAL MEDICINE | Facility: CLINIC | Age: 49
End: 2019-12-12

## 2019-12-12 ENCOUNTER — OUTPATIENT (OUTPATIENT)
Dept: OUTPATIENT SERVICES | Facility: HOSPITAL | Age: 49
LOS: 1 days | End: 2019-12-12
Payer: COMMERCIAL

## 2019-12-12 DIAGNOSIS — R80.9 PROTEINURIA, UNSPECIFIED: ICD-10-CM

## 2019-12-12 PROCEDURE — 88312 SPECIAL STAINS GROUP 1: CPT

## 2019-12-12 PROCEDURE — 88305 TISSUE EXAM BY PATHOLOGIST: CPT | Mod: 26

## 2019-12-12 PROCEDURE — 88350 IMFLUOR EA ADDL 1ANTB STN PX: CPT

## 2019-12-12 PROCEDURE — 88346 IMFLUOR 1ST 1ANTB STAIN PX: CPT | Mod: 26

## 2019-12-12 PROCEDURE — 50200 RENAL BIOPSY PERQ: CPT | Mod: LT

## 2019-12-12 PROCEDURE — 88313 SPECIAL STAINS GROUP 2: CPT

## 2019-12-12 PROCEDURE — 88305 TISSUE EXAM BY PATHOLOGIST: CPT

## 2019-12-12 PROCEDURE — 76942 ECHO GUIDE FOR BIOPSY: CPT | Mod: 26

## 2019-12-12 PROCEDURE — 88348 ELECTRON MICROSCOPY DX: CPT

## 2019-12-12 PROCEDURE — 88346 IMFLUOR 1ST 1ANTB STAIN PX: CPT

## 2019-12-12 PROCEDURE — 50200 RENAL BIOPSY PERQ: CPT

## 2019-12-12 PROCEDURE — 88350 IMFLUOR EA ADDL 1ANTB STN PX: CPT | Mod: 26

## 2019-12-12 PROCEDURE — 76942 ECHO GUIDE FOR BIOPSY: CPT

## 2019-12-12 PROCEDURE — 88348 ELECTRON MICROSCOPY DX: CPT | Mod: 26

## 2019-12-12 PROCEDURE — 88313 SPECIAL STAINS GROUP 2: CPT | Mod: 26

## 2019-12-12 PROCEDURE — 88312 SPECIAL STAINS GROUP 1: CPT | Mod: 26

## 2019-12-16 ENCOUNTER — FORM ENCOUNTER (OUTPATIENT)
Age: 49
End: 2019-12-16

## 2019-12-17 ENCOUNTER — FORM ENCOUNTER (OUTPATIENT)
Age: 49
End: 2019-12-17

## 2019-12-17 ENCOUNTER — APPOINTMENT (OUTPATIENT)
Dept: ULTRASOUND IMAGING | Facility: IMAGING CENTER | Age: 49
End: 2019-12-17
Payer: COMMERCIAL

## 2019-12-17 ENCOUNTER — OUTPATIENT (OUTPATIENT)
Dept: OUTPATIENT SERVICES | Facility: HOSPITAL | Age: 49
LOS: 1 days | End: 2019-12-17
Payer: COMMERCIAL

## 2019-12-17 ENCOUNTER — APPOINTMENT (OUTPATIENT)
Dept: MRI IMAGING | Facility: IMAGING CENTER | Age: 49
End: 2019-12-17
Payer: COMMERCIAL

## 2019-12-17 DIAGNOSIS — M79.605 PAIN IN LEFT LEG: ICD-10-CM

## 2019-12-17 DIAGNOSIS — E04.1 NONTOXIC SINGLE THYROID NODULE: ICD-10-CM

## 2019-12-17 PROCEDURE — 73721 MRI JNT OF LWR EXTRE W/O DYE: CPT | Mod: 26,LT

## 2019-12-17 PROCEDURE — 73721 MRI JNT OF LWR EXTRE W/O DYE: CPT

## 2019-12-17 PROCEDURE — 76536 US EXAM OF HEAD AND NECK: CPT

## 2019-12-18 PROCEDURE — 76536 US EXAM OF HEAD AND NECK: CPT | Mod: 26

## 2020-01-07 ENCOUNTER — APPOINTMENT (OUTPATIENT)
Dept: ENDOCRINOLOGY | Facility: CLINIC | Age: 50
End: 2020-01-07

## 2020-01-08 ENCOUNTER — APPOINTMENT (OUTPATIENT)
Dept: HEPATOLOGY | Facility: CLINIC | Age: 50
End: 2020-01-08
Payer: COMMERCIAL

## 2020-01-08 ENCOUNTER — OUTPATIENT (OUTPATIENT)
Dept: OUTPATIENT SERVICES | Facility: HOSPITAL | Age: 50
LOS: 1 days | Discharge: HOME | End: 2020-01-08

## 2020-01-08 VITALS
RESPIRATION RATE: 16 BRPM | TEMPERATURE: 98.2 F | WEIGHT: 220 LBS | DIASTOLIC BLOOD PRESSURE: 85 MMHG | HEIGHT: 71 IN | SYSTOLIC BLOOD PRESSURE: 156 MMHG | BODY MASS INDEX: 30.8 KG/M2 | HEART RATE: 84 BPM

## 2020-01-08 DIAGNOSIS — D64.9 ANEMIA, UNSPECIFIED: ICD-10-CM

## 2020-01-08 DIAGNOSIS — M79.605 PAIN IN LEFT LEG: ICD-10-CM

## 2020-01-08 PROCEDURE — 99205 OFFICE O/P NEW HI 60 MIN: CPT

## 2020-01-09 ENCOUNTER — APPOINTMENT (OUTPATIENT)
Dept: OBGYN | Facility: CLINIC | Age: 50
End: 2020-01-09
Payer: COMMERCIAL

## 2020-01-09 ENCOUNTER — APPOINTMENT (OUTPATIENT)
Dept: ENDOCRINOLOGY | Facility: CLINIC | Age: 50
End: 2020-01-09
Payer: COMMERCIAL

## 2020-01-09 ENCOUNTER — RX RENEWAL (OUTPATIENT)
Age: 50
End: 2020-01-09

## 2020-01-09 VITALS
SYSTOLIC BLOOD PRESSURE: 120 MMHG | WEIGHT: 220 LBS | BODY MASS INDEX: 30.8 KG/M2 | DIASTOLIC BLOOD PRESSURE: 80 MMHG | HEIGHT: 71 IN

## 2020-01-09 PROBLEM — M79.605 LEFT LEG PAIN: Status: ACTIVE | Noted: 2019-10-17

## 2020-01-09 LAB
A1AT SERPL-MCNC: 120 MG/DL
ALBUMIN SERPL ELPH-MCNC: 3.5 G/DL
ALP BLD-CCNC: 316 U/L
ALT SERPL-CCNC: 11 U/L
ANION GAP SERPL CALC-SCNC: 11 MMOL/L
AST SERPL-CCNC: 11 U/L
BASOPHILS # BLD AUTO: 0.09 K/UL
BASOPHILS NFR BLD AUTO: 1.7 %
BILIRUB SERPL-MCNC: 0.3 MG/DL
BUN SERPL-MCNC: 14 MG/DL
CALCIUM SERPL-MCNC: 9.2 MG/DL
CERULOPLASMIN SERPL-MCNC: 26 MG/DL
CHLORIDE SERPL-SCNC: 104 MMOL/L
CO2 SERPL-SCNC: 22 MMOL/L
CREAT SERPL-MCNC: 1.58 MG/DL
EOSINOPHIL # BLD AUTO: 0.73 K/UL
EOSINOPHIL NFR BLD AUTO: 13.7 %
FERRITIN SERPL-MCNC: 8 NG/ML
GLUCOSE SERPL-MCNC: 107 MG/DL
HCT VFR BLD CALC: 34.9 %
HEPATITIS A IGG ANTIBODY: REACTIVE
HGB BLD-MCNC: 10.1 G/DL
IMM GRANULOCYTES NFR BLD AUTO: 0.2 %
INR PPP: 1 RATIO
IRON SATN MFR SERPL: 7 %
IRON SERPL-MCNC: 23 UG/DL
LKM AB SER QL IF: <20.1 UNITS
LYMPHOCYTES # BLD AUTO: 1.71 K/UL
LYMPHOCYTES NFR BLD AUTO: 32.1 %
MAN DIFF?: NORMAL
MCHC RBC-ENTMCNC: 24.9 PG
MCHC RBC-ENTMCNC: 28.9 GM/DL
MCV RBC AUTO: 86.2 FL
MITOCHONDRIA AB SER IF-ACNC: NORMAL
MONOCYTES # BLD AUTO: 0.38 K/UL
MONOCYTES NFR BLD AUTO: 7.1 %
NEUTROPHILS # BLD AUTO: 2.41 K/UL
NEUTROPHILS NFR BLD AUTO: 45.2 %
PLATELET # BLD AUTO: 291 K/UL
POTASSIUM SERPL-SCNC: 4 MMOL/L
PROT SERPL-MCNC: 8.5 G/DL
PT BLD: 11.4 SEC
RBC # BLD: 4.05 M/UL
RBC # FLD: 18.5 %
SMOOTH MUSCLE AB SER QL IF: ABNORMAL
SODIUM SERPL-SCNC: 137 MMOL/L
TIBC SERPL-MCNC: 313 UG/DL
UIBC SERPL-MCNC: 290 UG/DL
WBC # FLD AUTO: 5.33 K/UL

## 2020-01-09 PROCEDURE — 99212 OFFICE O/P EST SF 10 MIN: CPT | Mod: 25

## 2020-01-09 PROCEDURE — 99214 OFFICE O/P EST MOD 30 MIN: CPT

## 2020-01-09 PROCEDURE — 95249 CONT GLUC MNTR PT PROV EQP: CPT

## 2020-01-09 NOTE — PHYSICAL EXAM
[Awake] : awake [Alert] : alert [Soft] : soft [Oriented x3] : oriented to person, place, and time [Normal] : cervix [Acute Distress] : no acute distress [Tender] : non tender [Vulvitis] : vulvitis [Vulvar Scarring] : vulvar scarring [Scant] : there was scant vaginal bleeding

## 2020-01-09 NOTE — COUNSELING
[Vitamins/Supplements] : vitamins/supplements [Medication Management] : medication management [Vulvar Hygiene] : vulvar hygiene

## 2020-01-10 LAB
ANA PAT FLD IF-IMP: ABNORMAL
ANA SER IF-ACNC: ABNORMAL
HPV HIGH+LOW RISK DNA PNL CVX: NOT DETECTED
SOLUBLE LIVER IGG SER IA-ACNC: < 20.1 UNITS

## 2020-01-10 RX ORDER — INSULIN ASPART 100 [IU]/ML
100 INJECTION, SOLUTION INTRAVENOUS; SUBCUTANEOUS
Qty: 1 | Refills: 5 | Status: COMPLETED | COMMUNITY
Start: 2018-01-08 | End: 2020-01-10

## 2020-01-13 ENCOUNTER — RX RENEWAL (OUTPATIENT)
Age: 50
End: 2020-01-13

## 2020-01-16 LAB
CYTOLOGY CVX/VAG DOC THIN PREP: NORMAL
TTG IGG SER IA-ACNC: 3.6 U/ML
TTG IGG SER IA-ACNC: NEGATIVE

## 2020-01-21 LAB
A1AT PHENOTYP SERPL-IMP: NORMAL BANDS
A1AT SERPL-MCNC: 123 MG/DL
LYSOSOMAL ACID LIPASE INTERPRETATION: NORMAL
LYSOSOMAL ACID LIPASE: 243 CD:384473389

## 2020-01-23 ENCOUNTER — APPOINTMENT (OUTPATIENT)
Dept: OBGYN | Facility: CLINIC | Age: 50
End: 2020-01-23

## 2020-02-04 ENCOUNTER — FORM ENCOUNTER (OUTPATIENT)
Age: 50
End: 2020-02-04

## 2020-02-04 ENCOUNTER — OUTPATIENT (OUTPATIENT)
Dept: OUTPATIENT SERVICES | Facility: HOSPITAL | Age: 50
LOS: 1 days | Discharge: HOME | End: 2020-02-04

## 2020-02-04 ENCOUNTER — APPOINTMENT (OUTPATIENT)
Dept: HEPATOLOGY | Facility: CLINIC | Age: 50
End: 2020-02-04
Payer: COMMERCIAL

## 2020-02-04 VITALS
TEMPERATURE: 97.9 F | HEART RATE: 85 BPM | RESPIRATION RATE: 16 BRPM | HEIGHT: 71 IN | DIASTOLIC BLOOD PRESSURE: 70 MMHG | SYSTOLIC BLOOD PRESSURE: 148 MMHG | BODY MASS INDEX: 31.08 KG/M2 | WEIGHT: 222 LBS

## 2020-02-04 DIAGNOSIS — D64.9 ANEMIA, UNSPECIFIED: ICD-10-CM

## 2020-02-04 PROCEDURE — 99214 OFFICE O/P EST MOD 30 MIN: CPT

## 2020-02-05 ENCOUNTER — OUTPATIENT (OUTPATIENT)
Dept: OUTPATIENT SERVICES | Facility: HOSPITAL | Age: 50
LOS: 1 days | End: 2020-02-05
Payer: COMMERCIAL

## 2020-02-05 ENCOUNTER — APPOINTMENT (OUTPATIENT)
Dept: ULTRASOUND IMAGING | Facility: IMAGING CENTER | Age: 50
End: 2020-02-05
Payer: COMMERCIAL

## 2020-02-05 ENCOUNTER — APPOINTMENT (OUTPATIENT)
Dept: MAMMOGRAPHY | Facility: IMAGING CENTER | Age: 50
End: 2020-02-05
Payer: COMMERCIAL

## 2020-02-05 DIAGNOSIS — Z00.00 ENCOUNTER FOR GENERAL ADULT MEDICAL EXAMINATION WITHOUT ABNORMAL FINDINGS: ICD-10-CM

## 2020-02-05 DIAGNOSIS — R92.2 INCONCLUSIVE MAMMOGRAM: ICD-10-CM

## 2020-02-05 PROCEDURE — 76641 ULTRASOUND BREAST COMPLETE: CPT | Mod: 26,50

## 2020-02-05 PROCEDURE — G0279: CPT | Mod: 26

## 2020-02-05 PROCEDURE — 77066 DX MAMMO INCL CAD BI: CPT | Mod: 26

## 2020-02-05 PROCEDURE — 76641 ULTRASOUND BREAST COMPLETE: CPT

## 2020-02-05 PROCEDURE — G0279: CPT

## 2020-02-05 PROCEDURE — 77066 DX MAMMO INCL CAD BI: CPT

## 2020-02-06 DIAGNOSIS — R92.8 OTHER ABNORMAL AND INCONCLUSIVE FINDINGS ON DIAGNOSTIC IMAGING OF BREAST: ICD-10-CM

## 2020-02-08 PROBLEM — D64.9 ANEMIA: Status: ACTIVE | Noted: 2019-10-17

## 2020-02-24 ENCOUNTER — APPOINTMENT (OUTPATIENT)
Dept: INFECTIOUS DISEASE | Facility: CLINIC | Age: 50
End: 2020-02-24
Payer: COMMERCIAL

## 2020-02-24 VITALS
BODY MASS INDEX: 31.22 KG/M2 | TEMPERATURE: 98.3 F | RESPIRATION RATE: 16 BRPM | SYSTOLIC BLOOD PRESSURE: 138 MMHG | DIASTOLIC BLOOD PRESSURE: 88 MMHG | OXYGEN SATURATION: 98 % | HEART RATE: 86 BPM | HEIGHT: 71 IN | WEIGHT: 223 LBS

## 2020-02-24 DIAGNOSIS — Z00.00 ENCOUNTER FOR GENERAL ADULT MEDICAL EXAMINATION W/OUT ABNORMAL FINDINGS: ICD-10-CM

## 2020-02-24 DIAGNOSIS — M16.0 BILATERAL PRIMARY OSTEOARTHRITIS OF HIP: ICD-10-CM

## 2020-02-24 LAB
BASOPHILS # BLD AUTO: 0.08 K/UL
BASOPHILS NFR BLD AUTO: 1.6 %
CD3 CELLS # BLD: 1280 /UL
CD3 CELLS NFR BLD: 82 %
CD3+CD4+ CELLS # BLD: 313 /UL
CD3+CD4+ CELLS NFR BLD: 20 %
CD3+CD4+ CELLS/CD3+CD8+ CLL SPEC: 0.37 RATIO
CD3+CD8+ CELLS # SPEC: 848 /UL
CD3+CD8+ CELLS NFR BLD: 54 %
EOSINOPHIL # BLD AUTO: 0.63 K/UL
EOSINOPHIL NFR BLD AUTO: 12.4 %
HCT VFR BLD CALC: 34 %
HGB BLD-MCNC: 10.3 G/DL
IMM GRANULOCYTES NFR BLD AUTO: 0.2 %
LYMPHOCYTES # BLD AUTO: 1.87 K/UL
LYMPHOCYTES NFR BLD AUTO: 36.9 %
MAN DIFF?: NORMAL
MCHC RBC-ENTMCNC: 26.1 PG
MCHC RBC-ENTMCNC: 30.3 GM/DL
MCV RBC AUTO: 86.3 FL
MONOCYTES # BLD AUTO: 0.39 K/UL
MONOCYTES NFR BLD AUTO: 7.7 %
NEUTROPHILS # BLD AUTO: 2.09 K/UL
NEUTROPHILS NFR BLD AUTO: 41.2 %
PLATELET # BLD AUTO: 293 K/UL
RBC # BLD: 3.94 M/UL
RBC # FLD: 17.3 %
WBC # FLD AUTO: 5.07 K/UL

## 2020-02-24 PROCEDURE — 99214 OFFICE O/P EST MOD 30 MIN: CPT

## 2020-02-24 NOTE — HISTORY OF PRESENT ILLNESS
[FreeTextEntry1] : History of Present Illness\par Reason For Visit\par 2/24/2020----TIEN SHARPE is a 50 year old female being seen for a follow-up visit. Continue Biktarvy, All labs today see in three months. Needs pain management for left quad pain. \par  Pt stopped tivicay and evotaz we then started on Biktarvy . Recent mammogram going back in October, 2018. Seen by GYN in April 2019. switch valtex to famcyclovir for breakout and send to derm. . \par  \par History of Present Illness\par Reason For Visit\par . Pt recently seen by [facility] on [address] for burning left thigh pain. . needs liver ultrasound andf all liver tests...see in 3 months. Pt was off insulin a year ago and was only diet controlled. Then seen in ER for elevated glucose...now once again under endo care and taking levemir 30 daily and amaryl 2mg daily. also taking levemir 30 units daily, and glimepiride 2 mg daily. and bactrim . and vit D3 2000units daily and atorvastatin and enalapril 5. \par \par \par Sexual History: The patient is sexually active. \par  \par Active Problems\par Abnormal vaginal fluids (792.9) (R87.9)\par AIDS (042) (B20)\par Diabetes mellitus (250.00) (E11.9)\par G6PD deficiency (282.2) (D55.0)\par Genital herpes (054.10) (A60.00)\par Herpes simplex type 2 infection (054.9) (B00.9)\par Hypercholesterolemia (272.0) (E78.0)\par Increased frequency of urination (788.41) (R35.0)\par Influenza vaccine refused (V64.06) (Z28.21)\par Microscopic hematuria (599.72) (R31.2)\par Nephropathy (583.9) (N28.9)\par Proteinuria (791.0) (R80.9)\par Skin tag (701.9) (L91.8)\par Unaware of passing urine (788.69) (R39.19)\par Urgency of urination (788.63) (R39.15)\par Urinary incontinence (788.30) (R32)\par Urine leukocytes (791.7) (R82.99)\par Vaginal odor (625.8) (N89.8)\par Visit for gynecologic examination (V72.31) (Z01.419)\par Vitamin d deficiency (268.9) (E55.9)\par Vulvar mass (625.8) (N90.9)\par \par Past Medical History\par History of Essential hypertriglyceridemia (272.1) (E78.1)\par History of diabetes with ketoacidosis (V12.29) (Z86.39)\par History of diarrhea (V12.79) (Z87.898)\par History of fracture of radius (V15.51) (Z87.81)\par History of upper respiratory infection (V12.09) (Z87.09)\par History of urinary tract infection (V13.02) (Z87.440)\par History of HPV in female (079.4) (A63.0)\par History of Increased blood pressure (not hypertension) (796.2) (R03.0)\par History of Lesion of vulva (624.8) (N90.89)\par History of Visit for dental examination (V72.2) (Z01.20)\par History of Visit for eye and vision exam (V72.0) (Z01.00)\par History of Yeast vaginitis (112.1) (B37.3)\par \par Surgical History\par History of Abdominoplasty\par History of Breast Surgery Enlargement Procedure\par History of Oviductal Surgery Tubal Occlusion By Device\par History of Tubal Ligation After Vaginal Delivery (Same Hospitalization)\par \par Family History\par Family history of diabetes mellitus (V18.0) (Z83.3) : Grandmother\par No pertinent family history : Mother, Father\par \par Social History\par  ancestry\par Denied: History of Currently sexually active\par Currently working\par HIV Risk Factors: Heterosexual contact\par Housing Details: House\par Never a smoker\par Never smoker\par No alcohol use\par No alcohol use\par No drug use\par No drug use\par Parent\par Single\par Travels by personal car\par \par Current Meds\par \par Vitamin D (Ergocalciferol) 11598 UNIT Oral Capsule; TAKE 1 CAPSULE PO ONCE A\par WEEK;\par \par NKA\par

## 2020-02-24 NOTE — ASSESSMENT
[Treatment Education] : treatment education [HIV Education] : HIV Education [Drug Interactions / Side Effects] : drug interactions/side effects [Treatment Adherence] : treatment adherence [Anticipatory Guidance] : anticipatory guidance [FreeTextEntry1] : 2/24/2020----TIEN SHARPE is a 50 year old female being seen for a follow-up visit. Continue Biktarvy, All labs today see in three months. Needs pain management for left quad pain. \par  Pt stopped tivicay and evotaz we then started on Biktarvy . Recent mammogram going back in October, 2018. Seen by GYN in April 2019. switch valtex to Acyclovir for breakout.  yes

## 2020-02-24 NOTE — PHYSICAL EXAM
[General Appearance - Alert] : alert [General Appearance - In No Acute Distress] : in no acute distress [Sclera] : the sclera and conjunctiva were normal [PERRL With Normal Accommodation] : pupils were equal in size, round, reactive to light [Extraocular Movements] : extraocular movements were intact [Outer Ear] : the ears and nose were normal in appearance [Oropharynx] : the oropharynx was normal with no thrush [Neck Appearance] : the appearance of the neck was normal [Neck Cervical Mass (___cm)] : no neck mass was observed [Jugular Venous Distention Increased] : there was no jugular-venous distention [Thyroid Diffuse Enlargement] : the thyroid was not enlarged [Auscultation Breath Sounds / Voice Sounds] : lungs were clear to auscultation bilaterally [Heart Rate And Rhythm] : heart rate was normal and rhythm regular [Heart Sounds] : normal S1 and S2 [Heart Sounds Gallop] : no gallops [Murmurs] : no murmurs [Heart Sounds Pericardial Friction Rub] : no pericardial rub [Full Pulse] : the pedal pulses are present [Edema] : there was no peripheral edema [Bowel Sounds] : normal bowel sounds [Abdomen Soft] : soft [Abdomen Tenderness] : non-tender [Abdomen Mass (___ Cm)] : no abdominal mass palpated [Costovertebral Angle Tenderness] : no CVA tenderness [No Palpable Adenopathy] : no palpable adenopathy [Nail Clubbing] : no clubbing  or cyanosis of the fingernails [Musculoskeletal - Swelling] : no joint swelling [Motor Tone] : muscle strength and tone were normal [Skin Color & Pigmentation] : normal skin color and pigmentation [] : no rash [Oriented To Time, Place, And Person] : oriented to person, place, and time [Affect] : the affect was normal

## 2020-02-25 DIAGNOSIS — N39.0 URINARY TRACT INFECTION, SITE NOT SPECIFIED: ICD-10-CM

## 2020-03-05 LAB
25(OH)D3 SERPL-MCNC: 8.5 NG/ML
ALBUMIN SERPL ELPH-MCNC: 3.9 G/DL
ALP BLD-CCNC: 346 U/L
ALT SERPL-CCNC: 8 U/L
ANION GAP SERPL CALC-SCNC: 14 MMOL/L
APPEARANCE: ABNORMAL
APPEARANCE: CLEAR
AST SERPL-CCNC: 12 U/L
BACTERIA UR CULT: NORMAL
BACTERIA: NEGATIVE
BACTERIA: NEGATIVE
BILIRUB SERPL-MCNC: 0.2 MG/DL
BILIRUBIN URINE: NEGATIVE
BILIRUBIN URINE: NEGATIVE
BLOOD URINE: ABNORMAL
BLOOD URINE: NEGATIVE
BUN SERPL-MCNC: 18 MG/DL
C TRACH RRNA SPEC QL NAA+PROBE: NOT DETECTED
CALCIUM SERPL-MCNC: 9.6 MG/DL
CHLORIDE SERPL-SCNC: 102 MMOL/L
CHOLEST SERPL-MCNC: 299 MG/DL
CHOLEST/HDLC SERPL: 6.2 RATIO
CO2 SERPL-SCNC: 21 MMOL/L
COLOR: NORMAL
COLOR: YELLOW
CREAT SERPL-MCNC: 1.47 MG/DL
ESTIMATED AVERAGE GLUCOSE: 223 MG/DL
GLUCOSE QUALITATIVE U: ABNORMAL
GLUCOSE QUALITATIVE U: ABNORMAL
GLUCOSE SERPL-MCNC: 252 MG/DL
HBA1C MFR BLD HPLC: 9.4 %
HCV AB SER QL: NONREACTIVE
HCV S/CO RATIO: 0.38 S/CO
HDLC SERPL-MCNC: 48 MG/DL
HIV1 RNA # SERPL NAA+PROBE: 46
HIV1 RNA # SERPL NAA+PROBE: ABNORMAL
HYALINE CASTS: 0 /LPF
HYALINE CASTS: 2 /LPF
KETONES URINE: NEGATIVE
KETONES URINE: NEGATIVE
LDLC SERPL CALC-MCNC: 185 MG/DL
LEUKOCYTE ESTERASE URINE: ABNORMAL
LEUKOCYTE ESTERASE URINE: ABNORMAL
M TB IFN-G BLD-IMP: NEGATIVE
MICROSCOPIC-UA: NORMAL
MICROSCOPIC-UA: NORMAL
N GONORRHOEA RRNA SPEC QL NAA+PROBE: NOT DETECTED
NITRITE URINE: NEGATIVE
NITRITE URINE: NEGATIVE
PH URINE: 6
PH URINE: 6
POTASSIUM SERPL-SCNC: 4.7 MMOL/L
PROT SERPL-MCNC: 9.1 G/DL
PROTEIN URINE: ABNORMAL
PROTEIN URINE: ABNORMAL
QUANTIFERON TB PLUS MITOGEN MINUS NIL: 9.32 IU/ML
QUANTIFERON TB PLUS NIL: 0.02 IU/ML
QUANTIFERON TB PLUS TB1 MINUS NIL: 0 IU/ML
QUANTIFERON TB PLUS TB2 MINUS NIL: 0 IU/ML
RED BLOOD CELLS URINE: 2 /HPF
RED BLOOD CELLS URINE: 503 /HPF
SODIUM SERPL-SCNC: 137 MMOL/L
SOURCE AMPLIFICATION: NORMAL
SPECIFIC GRAVITY URINE: 1.01
SPECIFIC GRAVITY URINE: 1.02
SQUAMOUS EPITHELIAL CELLS: 3 /HPF
SQUAMOUS EPITHELIAL CELLS: 4 /HPF
T PALLIDUM AB SER QL IA: NEGATIVE
TRIGL SERPL-MCNC: 327 MG/DL
UROBILINOGEN URINE: NORMAL
UROBILINOGEN URINE: NORMAL
VIRAL LOAD INTERP: NORMAL
VIRAL LOAD LOG: 1.67
WHITE BLOOD CELLS URINE: 348 /HPF
WHITE BLOOD CELLS URINE: 62 /HPF

## 2020-03-11 ENCOUNTER — FORM ENCOUNTER (OUTPATIENT)
Age: 50
End: 2020-03-11

## 2020-03-11 LAB
HIV GENOSURE ARCHIVE 1: NORMAL
HIV1 PROVIR DNA RT + PR + IN MUT DET SEQ: NORMAL
HIV1 PROVIRAL DNA GENTYP BLD MC NAR: NORMAL

## 2020-03-12 ENCOUNTER — APPOINTMENT (OUTPATIENT)
Dept: MAMMOGRAPHY | Facility: IMAGING CENTER | Age: 50
End: 2020-03-12
Payer: COMMERCIAL

## 2020-03-12 ENCOUNTER — OUTPATIENT (OUTPATIENT)
Dept: OUTPATIENT SERVICES | Facility: HOSPITAL | Age: 50
LOS: 1 days | End: 2020-03-12
Payer: COMMERCIAL

## 2020-03-12 ENCOUNTER — RESULT REVIEW (OUTPATIENT)
Age: 50
End: 2020-03-12

## 2020-03-12 DIAGNOSIS — Z00.8 ENCOUNTER FOR OTHER GENERAL EXAMINATION: ICD-10-CM

## 2020-03-12 PROCEDURE — 88305 TISSUE EXAM BY PATHOLOGIST: CPT | Mod: 26

## 2020-03-12 PROCEDURE — A4648: CPT

## 2020-03-12 PROCEDURE — 19081 BX BREAST 1ST LESION STRTCTC: CPT | Mod: RT

## 2020-03-12 PROCEDURE — 77065 DX MAMMO INCL CAD UNI: CPT | Mod: 26,RT

## 2020-03-12 PROCEDURE — 88305 TISSUE EXAM BY PATHOLOGIST: CPT

## 2020-03-12 PROCEDURE — 77065 DX MAMMO INCL CAD UNI: CPT

## 2020-03-12 PROCEDURE — 19081 BX BREAST 1ST LESION STRTCTC: CPT

## 2020-03-13 ENCOUNTER — APPOINTMENT (OUTPATIENT)
Dept: ORTHOPEDIC SURGERY | Facility: CLINIC | Age: 50
End: 2020-03-13
Payer: COMMERCIAL

## 2020-03-13 VITALS
SYSTOLIC BLOOD PRESSURE: 122 MMHG | HEIGHT: 71 IN | WEIGHT: 225 LBS | BODY MASS INDEX: 31.5 KG/M2 | DIASTOLIC BLOOD PRESSURE: 76 MMHG | HEART RATE: 109 BPM

## 2020-03-13 DIAGNOSIS — M21.052: ICD-10-CM

## 2020-03-13 DIAGNOSIS — M16.12 UNILATERAL PRIMARY OSTEOARTHRITIS, LEFT HIP: ICD-10-CM

## 2020-03-13 DIAGNOSIS — G89.29 PAIN IN LEFT HIP: ICD-10-CM

## 2020-03-13 DIAGNOSIS — M25.552 PAIN IN LEFT HIP: ICD-10-CM

## 2020-03-13 PROCEDURE — 99213 OFFICE O/P EST LOW 20 MIN: CPT

## 2020-03-13 PROCEDURE — 99205 OFFICE O/P NEW HI 60 MIN: CPT

## 2020-03-13 PROCEDURE — 73502 X-RAY EXAM HIP UNI 2-3 VIEWS: CPT | Mod: LT

## 2020-03-16 LAB — SURGICAL PATHOLOGY STUDY: SIGNIFICANT CHANGE UP

## 2020-03-17 ENCOUNTER — APPOINTMENT (OUTPATIENT)
Dept: INTERNAL MEDICINE | Facility: CLINIC | Age: 50
End: 2020-03-17

## 2020-03-20 ENCOUNTER — APPOINTMENT (OUTPATIENT)
Dept: MRI IMAGING | Facility: IMAGING CENTER | Age: 50
End: 2020-03-20

## 2020-03-23 ENCOUNTER — RX RENEWAL (OUTPATIENT)
Age: 50
End: 2020-03-23

## 2020-03-26 ENCOUNTER — APPOINTMENT (OUTPATIENT)
Dept: RHEUMATOLOGY | Facility: CLINIC | Age: 50
End: 2020-03-26

## 2020-03-30 ENCOUNTER — APPOINTMENT (OUTPATIENT)
Dept: HEPATOLOGY | Facility: CLINIC | Age: 50
End: 2020-03-30

## 2020-04-03 ENCOUNTER — RX RENEWAL (OUTPATIENT)
Age: 50
End: 2020-04-03

## 2020-04-08 ENCOUNTER — EMERGENCY (EMERGENCY)
Facility: HOSPITAL | Age: 50
LOS: 1 days | Discharge: HOME | End: 2020-04-08
Attending: EMERGENCY MEDICINE | Admitting: EMERGENCY MEDICINE
Payer: COMMERCIAL

## 2020-04-08 VITALS
RESPIRATION RATE: 16 BRPM | SYSTOLIC BLOOD PRESSURE: 125 MMHG | HEART RATE: 74 BPM | DIASTOLIC BLOOD PRESSURE: 89 MMHG | OXYGEN SATURATION: 100 %

## 2020-04-08 VITALS
HEART RATE: 102 BPM | OXYGEN SATURATION: 100 % | RESPIRATION RATE: 18 BRPM | SYSTOLIC BLOOD PRESSURE: 148 MMHG | DIASTOLIC BLOOD PRESSURE: 85 MMHG | TEMPERATURE: 100 F

## 2020-04-08 LAB
ALBUMIN SERPL ELPH-MCNC: 3.8 G/DL — SIGNIFICANT CHANGE UP (ref 3.3–5)
ALP SERPL-CCNC: 315 U/L — HIGH (ref 40–120)
ALT FLD-CCNC: 8 U/L — SIGNIFICANT CHANGE UP (ref 4–33)
ANION GAP SERPL CALC-SCNC: 13 MMO/L — SIGNIFICANT CHANGE UP (ref 7–14)
APPEARANCE UR: SIGNIFICANT CHANGE UP
AST SERPL-CCNC: 17 U/L — SIGNIFICANT CHANGE UP (ref 4–32)
BACTERIA # UR AUTO: SIGNIFICANT CHANGE UP
BASOPHILS # BLD AUTO: 0.08 K/UL — SIGNIFICANT CHANGE UP (ref 0–0.2)
BASOPHILS NFR BLD AUTO: 1.2 % — SIGNIFICANT CHANGE UP (ref 0–2)
BILIRUB SERPL-MCNC: 0.4 MG/DL — SIGNIFICANT CHANGE UP (ref 0.2–1.2)
BILIRUB UR-MCNC: NEGATIVE — SIGNIFICANT CHANGE UP
BLOOD UR QL VISUAL: NEGATIVE — SIGNIFICANT CHANGE UP
BUN SERPL-MCNC: 23 MG/DL — SIGNIFICANT CHANGE UP (ref 7–23)
CALCIUM SERPL-MCNC: 10 MG/DL — SIGNIFICANT CHANGE UP (ref 8.4–10.5)
CHLORIDE SERPL-SCNC: 100 MMOL/L — SIGNIFICANT CHANGE UP (ref 98–107)
CO2 SERPL-SCNC: 22 MMOL/L — SIGNIFICANT CHANGE UP (ref 22–31)
COLOR SPEC: COLORLESS — SIGNIFICANT CHANGE UP
CREAT SERPL-MCNC: 1.59 MG/DL — HIGH (ref 0.5–1.3)
EOSINOPHIL # BLD AUTO: 0.38 K/UL — SIGNIFICANT CHANGE UP (ref 0–0.5)
EOSINOPHIL NFR BLD AUTO: 5.8 % — SIGNIFICANT CHANGE UP (ref 0–6)
EPI CELLS # UR: SIGNIFICANT CHANGE UP
GLUCOSE SERPL-MCNC: 296 MG/DL — HIGH (ref 70–99)
GLUCOSE UR-MCNC: 300 — HIGH
HCT VFR BLD CALC: 34.4 % — LOW (ref 34.5–45)
HGB BLD-MCNC: 10.4 G/DL — LOW (ref 11.5–15.5)
IMM GRANULOCYTES NFR BLD AUTO: 0.2 % — SIGNIFICANT CHANGE UP (ref 0–1.5)
KETONES UR-MCNC: NEGATIVE — SIGNIFICANT CHANGE UP
LEUKOCYTE ESTERASE UR-ACNC: HIGH
LYMPHOCYTES # BLD AUTO: 1.85 K/UL — SIGNIFICANT CHANGE UP (ref 1–3.3)
LYMPHOCYTES # BLD AUTO: 28.1 % — SIGNIFICANT CHANGE UP (ref 13–44)
MCHC RBC-ENTMCNC: 25.9 PG — LOW (ref 27–34)
MCHC RBC-ENTMCNC: 30.2 % — LOW (ref 32–36)
MCV RBC AUTO: 85.6 FL — SIGNIFICANT CHANGE UP (ref 80–100)
MONOCYTES # BLD AUTO: 0.35 K/UL — SIGNIFICANT CHANGE UP (ref 0–0.9)
MONOCYTES NFR BLD AUTO: 5.3 % — SIGNIFICANT CHANGE UP (ref 2–14)
NEUTROPHILS # BLD AUTO: 3.92 K/UL — SIGNIFICANT CHANGE UP (ref 1.8–7.4)
NEUTROPHILS NFR BLD AUTO: 59.4 % — SIGNIFICANT CHANGE UP (ref 43–77)
NITRITE UR-MCNC: NEGATIVE — SIGNIFICANT CHANGE UP
NRBC # FLD: 0 K/UL — SIGNIFICANT CHANGE UP (ref 0–0)
PH UR: 6.5 — SIGNIFICANT CHANGE UP (ref 5–8)
PLATELET # BLD AUTO: 345 K/UL — SIGNIFICANT CHANGE UP (ref 150–400)
PMV BLD: 11.3 FL — SIGNIFICANT CHANGE UP (ref 7–13)
POTASSIUM SERPL-MCNC: 4.4 MMOL/L — SIGNIFICANT CHANGE UP (ref 3.5–5.3)
POTASSIUM SERPL-SCNC: 4.4 MMOL/L — SIGNIFICANT CHANGE UP (ref 3.5–5.3)
PROT SERPL-MCNC: 9.7 G/DL — HIGH (ref 6–8.3)
PROT UR-MCNC: 300 — HIGH
RBC # BLD: 4.02 M/UL — SIGNIFICANT CHANGE UP (ref 3.8–5.2)
RBC # FLD: 17.2 % — HIGH (ref 10.3–14.5)
SODIUM SERPL-SCNC: 135 MMOL/L — SIGNIFICANT CHANGE UP (ref 135–145)
SP GR SPEC: 1.02 — SIGNIFICANT CHANGE UP (ref 1–1.04)
UROBILINOGEN FLD QL: NORMAL — SIGNIFICANT CHANGE UP
WBC # BLD: 6.59 K/UL — SIGNIFICANT CHANGE UP (ref 3.8–10.5)
WBC # FLD AUTO: 6.59 K/UL — SIGNIFICANT CHANGE UP (ref 3.8–10.5)
WBC UR QL: SIGNIFICANT CHANGE UP (ref 0–?)

## 2020-04-08 PROCEDURE — 76770 US EXAM ABDO BACK WALL COMP: CPT | Mod: 26

## 2020-04-08 PROCEDURE — 99284 EMERGENCY DEPT VISIT MOD MDM: CPT

## 2020-04-08 RX ORDER — SODIUM CHLORIDE 9 MG/ML
1000 INJECTION INTRAMUSCULAR; INTRAVENOUS; SUBCUTANEOUS ONCE
Refills: 0 | Status: COMPLETED | OUTPATIENT
Start: 2020-04-08 | End: 2020-04-08

## 2020-04-08 RX ORDER — AZTREONAM 2 G
1 VIAL (EA) INJECTION
Qty: 28 | Refills: 0
Start: 2020-04-08 | End: 2020-04-21

## 2020-04-08 RX ORDER — MORPHINE SULFATE 50 MG/1
4 CAPSULE, EXTENDED RELEASE ORAL ONCE
Refills: 0 | Status: DISCONTINUED | OUTPATIENT
Start: 2020-04-08 | End: 2020-04-08

## 2020-04-08 RX ORDER — CEFTRIAXONE 500 MG/1
1000 INJECTION, POWDER, FOR SOLUTION INTRAMUSCULAR; INTRAVENOUS ONCE
Refills: 0 | Status: COMPLETED | OUTPATIENT
Start: 2020-04-08 | End: 2020-04-08

## 2020-04-08 RX ORDER — ACETAMINOPHEN 500 MG
650 TABLET ORAL ONCE
Refills: 0 | Status: DISCONTINUED | OUTPATIENT
Start: 2020-04-08 | End: 2020-04-08

## 2020-04-08 RX ORDER — FLUCONAZOLE 150 MG/1
200 TABLET ORAL ONCE
Refills: 0 | Status: COMPLETED | OUTPATIENT
Start: 2020-04-08 | End: 2020-04-08

## 2020-04-08 RX ORDER — ACETAMINOPHEN 500 MG
650 TABLET ORAL ONCE
Refills: 0 | Status: COMPLETED | OUTPATIENT
Start: 2020-04-08 | End: 2020-04-08

## 2020-04-08 RX ADMIN — CEFTRIAXONE 100 MILLIGRAM(S): 500 INJECTION, POWDER, FOR SOLUTION INTRAMUSCULAR; INTRAVENOUS at 14:02

## 2020-04-08 RX ADMIN — MORPHINE SULFATE 4 MILLIGRAM(S): 50 CAPSULE, EXTENDED RELEASE ORAL at 14:02

## 2020-04-08 RX ADMIN — SODIUM CHLORIDE 1000 MILLILITER(S): 9 INJECTION INTRAMUSCULAR; INTRAVENOUS; SUBCUTANEOUS at 14:02

## 2020-04-08 RX ADMIN — Medication 650 MILLIGRAM(S): at 13:53

## 2020-04-08 RX ADMIN — FLUCONAZOLE 200 MILLIGRAM(S): 150 TABLET ORAL at 14:02

## 2020-04-08 NOTE — ED ADULT TRIAGE NOTE - CHIEF COMPLAINT QUOTE
Ambulatory reports has frequent UTIs, has been having burning and hesitancy for over a week and today is unable to void at all. Denies cough, SOb. PMH of diabetes

## 2020-04-08 NOTE — ED PROVIDER NOTE - PATIENT PORTAL LINK FT
You can access the FollowMyHealth Patient Portal offered by [facility] by registering at the following website: [email or web address]. By joining [facility]’s FollowMyHealth portal, you will also be able to view your health information using other applications (apps) compatible with our system.

## 2020-04-08 NOTE — ED PROVIDER NOTE - OBJECTIVE STATEMENT
51 y/o F w/ PMH DM and frequent UTIs, presents to the ED c/o dysuria, increased urinary frequency and urgency for 1 week. In addition, pt states she in unable to urinate since this morning. Last void at 6AM. No prior history of urinary retention. No prior history of urologic procedures.

## 2020-04-08 NOTE — ED PROVIDER NOTE - PROGRESS NOTE DETAILS
MD CHO:  Per urology, unable to pass jaramillo, however, pt able to void some urine during attempt.  Request bladder scan, if no longer retaining, will not require jaramillo; however, if urinary retention present they will re-attempt jaramillo placement. Timo: The patient is in no acute distress, post void residual volume < 70cc, patient requesting discharge. Plan to discharge patient with outpt and strict return precautions.

## 2020-04-08 NOTE — ED PROVIDER NOTE - CARE PLAN
Principal Discharge DX:	Pyelonephritis  Secondary Diagnosis:	Urinary retention  Secondary Diagnosis:	Vulvovaginal candidiasis

## 2020-04-08 NOTE — ED PROVIDER NOTE - CLINICAL SUMMARY MEDICAL DECISION MAKING FREE TEXT BOX
49 y/o F w/ history of frequent UTIs p/w urinary symptoms and urinary retention this morning. Likely vulvo vaginitis secondary to candida.   Pt mildly tachycardic and low grade temperature. Will treat as pyelonephritis. Two nurses attempted to place Kiran but unable, due to edema. Will obtain CBC, CMP, UA, urine culture, give Diflucan, Rocephin, pain medications, IV fluids, antipyretics, and contact urology for Kiran placement. 51 y/o F w/ history of frequent UTIs p/w urinary symptoms, right cva tenderness, and urinary retention this morning. Likely vulvo vaginitis secondary to candida with uti secondary to urinary stasis.  Pt mildly tachycardic and low grade temperature. Will treat as pyelonephritis. Two nurses attempted to place Kiran but unable, due to edema. Will obtain CBC, CMP, UA, urine culture, give Diflucan, Rocephin, pain medications, IV fluids, antipyretics, and contact urology for Kiran placement.

## 2020-04-08 NOTE — ED PROVIDER NOTE - GENITOURINARY VULVA
Significant vulvo vaginal edema and erythema w/ creamy white discharge. Significant vulvo vaginal edema and erythema w/ creamy white discharge; chaperone JHOANA Spivey

## 2020-04-09 LAB
CULTURE RESULTS: SIGNIFICANT CHANGE UP
SPECIMEN SOURCE: SIGNIFICANT CHANGE UP

## 2020-04-20 ENCOUNTER — APPOINTMENT (OUTPATIENT)
Dept: DERMATOLOGY | Facility: CLINIC | Age: 50
End: 2020-04-20

## 2020-04-28 DIAGNOSIS — R33.9 RETENTION OF URINE, UNSPECIFIED: ICD-10-CM

## 2020-04-29 ENCOUNTER — APPOINTMENT (OUTPATIENT)
Dept: ENDOCRINOLOGY | Facility: CLINIC | Age: 50
End: 2020-04-29
Payer: COMMERCIAL

## 2020-04-29 VITALS
DIASTOLIC BLOOD PRESSURE: 70 MMHG | WEIGHT: 223 LBS | HEIGHT: 71 IN | OXYGEN SATURATION: 98 % | TEMPERATURE: 98.8 F | HEART RATE: 81 BPM | BODY MASS INDEX: 31.22 KG/M2 | SYSTOLIC BLOOD PRESSURE: 124 MMHG

## 2020-04-29 PROCEDURE — 99214 OFFICE O/P EST MOD 30 MIN: CPT | Mod: 25

## 2020-04-29 PROCEDURE — 95251 CONT GLUC MNTR ANALYSIS I&R: CPT

## 2020-04-29 NOTE — HISTORY OF PRESENT ILLNESS
[FreeTextEntry1] : 50 year old woman with HIV and uncontrolled DM2 complicated by CKD, HTN, HLD, vitamin D deficiency\par Last seen by Dr. Gerardo 8/2019.\par \par Initially diagnosed in 2010, during a hospitalization. Started on insulin at that time. The patient was on basal/bolus insulin for a year and had made major modifications and self-titrated off. She then presented with poly's to [facility], with blurry vision. \par \par At the last visit she was on Lantus 25 and Humaoog 15 units TID with meals. 2 mths ago she self d/candelaria the lantus. She has been taking the Humalog only once a day, w/ bkfst. She was Rx Trulicity but never started it.\par \par She checks her glucose w/ the katy. Katy download shows variable hyperglycemia throughout the day.\par \par Last dilated eye exam: 1 year ago, missed her appointment for this year, will call to reschedule\par \par Typical diet: Since 3/2020 she has changed her diet.\par Breakfast: oatmeal, eggs, tea w/ cream\par Lunch: salad w/ lean protein\par Dinner: salad w/ protein\par Snacks: she has 1/2 a sleeve of Ritz crackers w/ cheese, a PB sandwich\par She drinks tea only.\par \par Exercise is limited 2/2 hip pain. She was supposed to have hip surgery 4/2020 but it was postponed due to COVID-19 pandemic.\par \par She works as a . She does the night shift. She plans to retire 7/2020.

## 2020-04-29 NOTE — PHYSICAL EXAM
[Alert] : alert [Well Nourished] : well nourished [No Acute Distress] : no acute distress [No Proptosis] : no proptosis [Normal Hearing] : hearing was normal [Normal Lips/Gums] : the lips and gums were normal [Normal Oropharynx] : the oropharynx was normal [No LAD] : no lymphadenopathy [Thyroid Not Enlarged] : the thyroid was not enlarged [No Respiratory Distress] : no respiratory distress [No Accessory Muscle Use] : no accessory muscle use [Clear to Auscultation] : lungs were clear to auscultation bilaterally [Normal to Percussion] : lungs were normal to percussion [Normal S1, S2] : normal S1 and S2 [Normal Rate] : heart rate was normal [Regular Rhythm] : with a regular rhythm [No Edema] : no peripheral edema [Normal Bowel Sounds] : normal bowel sounds [Not Tender] : non-tender [Soft] : abdomen soft [No CVA Tenderness] : no ~M costovertebral angle tenderness [Normal Gait] : normal gait [No Involuntary Movements] : no involuntary movements were seen [No Rash] : no rash [Right Foot Was Examined] : right foot ~C was examined [Left Foot Was Examined] : left foot ~C was examined [Normal] : normal [Full ROM] : with full range of motion [No Tremors] : no tremors [Oriented x3] : oriented to person, place, and time [Normal Affect] : the affect was normal [Diminished Throughout Both Feet] : normal tactile sensation with monofilament testing throughout both feet

## 2020-04-29 NOTE — ASSESSMENT
[FreeTextEntry1] : Patient is a 49 yo woman with type 2 diabetes and HTN\par \par 1. T2DM\par -patient not adherent to lantus/novolog, trying to eat healthier instead. A1c 9.4 2/2020. Target A1c 7%\par -Will restart Lantus 20u qhs as well as Trulicity 0.75mg sq qweek. Will increase to 1.5mg qwk in 2 weeks if tolerated. Risks of pancreatitis discussed with the patient. No family hx of MEN/MTC\par -discussed importance of proper eating\par -+ microalbumin/creatinine 8/2019, will recheck today. she was referred to renal at previous visit.\par -normal diabetic foot exam done today.\par -Due for optho f/u.\par \par 2. HTN\par -BP goal < 140/90\par \par 3. HLD\par -LDL goal < 70\par -was on pravastatin, will readdress statin use at next visit\par \par RTC 3 mths w/ Dr. Gerardo

## 2020-05-05 LAB
CREAT SPEC-SCNC: 213 MG/DL
ESTIMATED AVERAGE GLUCOSE: 194 MG/DL
HBA1C MFR BLD HPLC: 8.4 %
MICROALBUMIN 24H UR DL<=1MG/L-MCNC: 84.9 MG/DL
MICROALBUMIN/CREAT 24H UR-RTO: 398 MG/G

## 2020-05-27 ENCOUNTER — APPOINTMENT (OUTPATIENT)
Dept: INFECTIOUS DISEASE | Facility: CLINIC | Age: 50
End: 2020-05-27
Payer: COMMERCIAL

## 2020-05-27 VITALS
HEART RATE: 103 BPM | WEIGHT: 217 LBS | BODY MASS INDEX: 30.38 KG/M2 | HEIGHT: 71 IN | DIASTOLIC BLOOD PRESSURE: 80 MMHG | SYSTOLIC BLOOD PRESSURE: 127 MMHG | TEMPERATURE: 98.4 F | OXYGEN SATURATION: 99 % | RESPIRATION RATE: 17 BRPM

## 2020-05-27 LAB
25(OH)D3 SERPL-MCNC: 16.2 NG/ML
ALBUMIN SERPL ELPH-MCNC: 3.7 G/DL
ALP BLD-CCNC: 321 U/L
ALT SERPL-CCNC: 11 U/L
ANION GAP SERPL CALC-SCNC: 11 MMOL/L
AST SERPL-CCNC: 15 U/L
BASOPHILS # BLD AUTO: 0.07 K/UL
BASOPHILS NFR BLD AUTO: 1.2 %
BILIRUB SERPL-MCNC: 0.3 MG/DL
BUN SERPL-MCNC: 12 MG/DL
CALCIUM SERPL-MCNC: 9.5 MG/DL
CD3 CELLS # BLD: 1108 /UL
CD3 CELLS NFR BLD: 87 %
CD3+CD4+ CELLS # BLD: 259 /UL
CD3+CD4+ CELLS NFR BLD: 20 %
CD3+CD4+ CELLS/CD3+CD8+ CLL SPEC: 0.34 RATIO
CD3+CD8+ CELLS # SPEC: 762 /UL
CD3+CD8+ CELLS NFR BLD: 60 %
CHLORIDE SERPL-SCNC: 103 MMOL/L
CHOLEST SERPL-MCNC: 311 MG/DL
CHOLEST/HDLC SERPL: 6.9 RATIO
CO2 SERPL-SCNC: 24 MMOL/L
CREAT SERPL-MCNC: 1.61 MG/DL
EOSINOPHIL # BLD AUTO: 0.42 K/UL
EOSINOPHIL NFR BLD AUTO: 7.4 %
GLUCOSE SERPL-MCNC: 217 MG/DL
HCT VFR BLD CALC: 36.3 %
HDLC SERPL-MCNC: 45 MG/DL
HGB BLD-MCNC: 10.5 G/DL
IMM GRANULOCYTES NFR BLD AUTO: 0.4 %
LDLC SERPL CALC-MCNC: 223 MG/DL
LYMPHOCYTES # BLD AUTO: 1.46 K/UL
LYMPHOCYTES NFR BLD AUTO: 25.6 %
MAN DIFF?: NORMAL
MCHC RBC-ENTMCNC: 25.9 PG
MCHC RBC-ENTMCNC: 28.9 GM/DL
MCV RBC AUTO: 89.6 FL
MONOCYTES # BLD AUTO: 0.37 K/UL
MONOCYTES NFR BLD AUTO: 6.5 %
NEUTROPHILS # BLD AUTO: 3.37 K/UL
NEUTROPHILS NFR BLD AUTO: 58.9 %
PLATELET # BLD AUTO: 335 K/UL
POTASSIUM SERPL-SCNC: 4.8 MMOL/L
PROT SERPL-MCNC: 8.6 G/DL
RBC # BLD: 4.05 M/UL
RBC # FLD: 16.5 %
SODIUM SERPL-SCNC: 138 MMOL/L
TRIGL SERPL-MCNC: 216 MG/DL
TSH SERPL-ACNC: 0.99 UIU/ML
WBC # FLD AUTO: 5.71 K/UL

## 2020-05-27 PROCEDURE — 99214 OFFICE O/P EST MOD 30 MIN: CPT

## 2020-05-27 NOTE — PHYSICAL EXAM
[General Appearance - Alert] : alert [General Appearance - In No Acute Distress] : in no acute distress [Sclera] : the sclera and conjunctiva were normal [PERRL With Normal Accommodation] : pupils were equal in size, round, reactive to light [Extraocular Movements] : extraocular movements were intact [Outer Ear] : the ears and nose were normal in appearance [Oropharynx] : the oropharynx was normal with no thrush [Neck Appearance] : the appearance of the neck was normal [Neck Cervical Mass (___cm)] : no neck mass was observed [Jugular Venous Distention Increased] : there was no jugular-venous distention [Thyroid Diffuse Enlargement] : the thyroid was not enlarged [Heart Sounds] : normal S1 and S2 [Auscultation Breath Sounds / Voice Sounds] : lungs were clear to auscultation bilaterally [Heart Rate And Rhythm] : heart rate was normal and rhythm regular [Murmurs] : no murmurs [Heart Sounds Gallop] : no gallops [Heart Sounds Pericardial Friction Rub] : no pericardial rub [Full Pulse] : the pedal pulses are present [Bowel Sounds] : normal bowel sounds [Edema] : there was no peripheral edema [Abdomen Soft] : soft [Abdomen Mass (___ Cm)] : no abdominal mass palpated [Abdomen Tenderness] : non-tender [No Palpable Adenopathy] : no palpable adenopathy [Costovertebral Angle Tenderness] : no CVA tenderness [Nail Clubbing] : no clubbing  or cyanosis of the fingernails [Motor Tone] : muscle strength and tone were normal [Musculoskeletal - Swelling] : no joint swelling [Skin Color & Pigmentation] : normal skin color and pigmentation [] : no rash [Oriented To Time, Place, And Person] : oriented to person, place, and time [Affect] : the affect was normal

## 2020-05-27 NOTE — ASSESSMENT
[Treatment Education] : treatment education [FreeTextEntry1] : plan: \par  Needs GYN today , please speak with Jess Harrison NP\par continue Biktarvy and famcyclovir\par labs today see in 4 months\par please schedule a nutrition appt with Sandie [Drug Interactions / Side Effects] : drug interactions/side effects [Treatment Adherence] : treatment adherence [Anticipatory Guidance] : anticipatory guidance [HIV Education] : HIV Education

## 2020-05-27 NOTE — HISTORY OF PRESENT ILLNESS
[FreeTextEntry1] : \par Reason For Visit\par 5/27/2020----TIEN SHARPE is a 50 year old female being seen for a follow-up visit. Continue Biktarvy, All labs today see in three months. Needs pain management for left quad pain. \par  Pt stopped tivicay and evotaz we then started on Biktarvy . Recent mammogram going back in October and biopsy, 2018. Seen by GYN in April 2019. switch valtex to famcyclovir for breakout and send to derm.\par plan: \par  Needs GYN today , please speak with Jess Harrison NP\par continue Biktarvy and famcyclovir\par labs today see in 4 months\par please schedule a nutrition appt with Sandie\par . \par  \par History of Present Illness\par Reason For Visit\par . Pt recently seen by [facility] on [address] for burning left thigh pain. . needs liver ultrasound andf all liver tests...see in 3 months. Pt was off insulin a year ago and was only diet controlled. Then seen in ER for elevated glucose...now once again under endo care and taking levemir 30 daily and amaryl 2mg daily. also taking levemir 30 units daily, and glimepiride 2 mg daily. and bactrim . and vit D3 2000units daily and atorvastatin and enalapril 5. \par \par \par Sexual History: The patient is sexually active. \par  \par Active Problems\par Abnormal vaginal fluids (792.9) (R87.9)\par AIDS (042) (B20)\par Diabetes mellitus (250.00) (E11.9)\par G6PD deficiency (282.2) (D55.0)\par Genital herpes (054.10) (A60.00)\par Herpes simplex type 2 infection (054.9) (B00.9)\par Hypercholesterolemia (272.0) (E78.0)\par Increased frequency of urination (788.41) (R35.0)\par Influenza vaccine refused (V64.06) (Z28.21)\par Microscopic hematuria (599.72) (R31.2)\par Nephropathy (583.9) (N28.9)\par Proteinuria (791.0) (R80.9)\par Skin tag (701.9) (L91.8)\par Unaware of passing urine (788.69) (R39.19)\par Urgency of urination (788.63) (R39.15)\par Urinary incontinence (788.30) (R32)\par Urine leukocytes (791.7) (R82.99)\par Vaginal odor (625.8) (N89.8)\par Visit for gynecologic examination (V72.31) (Z01.419)\par Vitamin d deficiency (268.9) (E55.9)\par Vulvar mass (625.8) (N90.9)\par \par Past Medical History\par History of Essential hypertriglyceridemia (272.1) (E78.1)\par History of diabetes with ketoacidosis (V12.29) (Z86.39)\par History of diarrhea (V12.79) (Z87.898)\par History of fracture of radius (V15.51) (Z87.81)\par History of upper respiratory infection (V12.09) (Z87.09)\par History of urinary tract infection (V13.02) (Z87.440)\par History of HPV in female (079.4) (A63.0)\par History of Increased blood pressure (not hypertension) (796.2) (R03.0)\par History of Lesion of vulva (624.8) (N90.89)\par History of Visit for dental examination (V72.2) (Z01.20)\par History of Visit for eye and vision exam (V72.0) (Z01.00)\par History of Yeast vaginitis (112.1) (B37.3)\par \par Surgical History\par History of Abdominoplasty\par History of Breast Surgery Enlargement Procedure\par History of Oviductal Surgery Tubal Occlusion By Device\par History of Tubal Ligation After Vaginal Delivery (Same Hospitalization)\par \par Family History\par Family history of diabetes mellitus (V18.0) (Z83.3) : Grandmother\par No pertinent family history : Mother, Father\par \par Social History\par  ancestry\par Denied: History of Currently sexually active\par Currently working\par HIV Risk Factors: Heterosexual contact\par Housing Details: House\par Never a smoker\par Never smoker\par No alcohol use\par No alcohol use\par No drug use\par No drug use\par Parent\par Single\par Travels by personal car\par \par Current Meds\par \par Vitamin D (Ergocalciferol) 61503 UNIT Oral Capsule; TAKE 1 CAPSULE PO ONCE A\par WEEK;\par \par NKA\par \par  \par Active Problems\par Abnormal EKG (794.31) (R94.31)\par Abnormal mammogram (793.80) (R92.8)\par AIDS (042) (B20)\par Anemia (285.9) (D64.9)\par Ankle edema (719.07) (M25.473)\par Bacterial vaginosis (616.10,041.9) (N76.0,B96.89)\par Bilateral leg edema (782.3) (R60.0)\par Breast calcification, right (793.89) (R92.1)\par Cervical high risk HPV (human papillomavirus) test positive (795.05) (R87.810)\par Chronic kidney disease, stage 3 (585.3) (N18.3)\par Dense breasts (793.82) (R92.2)\par Diabetes type 2, uncontrolled (250.02) (E11.65)\par Drug (multiple) resistant infection (V09.91) (Z16.35)\par Elevated LFTs (790.6) (R94.5)\par Elevated liver enzymes (790.5) (R74.8)\par Elevated serum creatinine (790.99) (R79.89)\par G6PD deficiency (282.2) (D75.A)\par Genital herpes (054.10) (A60.00)\par Herpes simplex type 2 infection (054.9) (B00.9)\par Hypercholesterolemia (272.0) (E78.00)\par Hypertension (401.9) (I10)\par Increased frequency of urination (788.41) (R35.0)\par Influenza vaccine refused (V64.06) (Z28.21)\par Left leg pain (729.5) (M79.605)\par Leg swelling (729.81) (M79.89)\par Lesion of vulva (624.8) (N90.89)\par Lumbar spondylosis (721.3) (M47.816)\par Lumbar stenosis (724.02) (M48.061)\par Medication monitoring encounter (V58.83) (Z51.81)\par Microscopic hematuria (599.72) (R31.29)\par Nephropathy (583.9) (N28.9)\par OAB (overactive bladder) (596.51) (N32.81)\par Other elevated white blood cell (WBC) count (288.69) (D72.828)\par Pain of left thigh (729.5) (M79.652)\par Primary osteoarthritis of both hips (715.15) (M16.0)\par Proteinuria (791.0) (R80.9)\par Renal bone disease (588.0) (N25.0)\par Screening for cardiovascular condition (V81.2) (Z13.6)\par Skin tag (701.9) (L91.8)\par Thyroid nodule (241.0) (E04.1)\par Urge and stress incontinence (788.33) (N39.46)\par Urine leukocytes (791.7) (R82.998)\par Visit for gynecologic examination (V72.31) (Z01.419)\par Vitamin deficiency (269.2) (E56.9)\par Vulvar ulceration (616.50) (N76.6)\par Well female exam with routine gynecological exam (V72.31) (Z01.419)\par Yeast vaginitis (112.1) (B37.3)\par \par Past Medical History\par History of Abnormal vaginal fluids (792.9) (R87.9)\par History of Continuous leakage of urine (788.37) (N39.45)\par History of Elevated liver function tests (790.6) (R94.5)\par History of Essential hypertriglyceridemia (272.1) (E78.1)\par History of diabetes mellitus (V12.29) (Z86.39)\par History of diabetes with ketoacidosis (V12.29) (Z86.39)\par History of diarrhea (V12.79) (Z87.898)\par History of fracture of radius (V15.51) (Z87.81)\par History of upper respiratory infection (V12.09) (Z87.09)\par History of urinary incontinence (V13.09) (Z87.898)\par History of urinary tract infection (V13.02) (Z87.440)\par History of urinary urgency (V13.00) (Z87.448)\par History of vitamin D deficiency (V12.1) (Z86.39)\par History of HPV in female (079.4) (B97.7)\par History of Increased blood pressure (not hypertension) (796.2) (R03.0)\par History of Left leg swelling (729.81) (M79.89)\par Normal delivery (650) (O80)\par History of Skin infection (686.9) (L08.9)\par History of Unaware of passing urine (788.69) (R39.198)\par History of Vaginal odor (625.8) (N89.8)\par History of Visit for dental examination (V72.2) (Z01.20)\par History of Visit for eye and vision exam (V72.0) (Z01.00)\par Visit for gynecologic examination (V72.31) (Z01.419)\par History of Vulvar mass (625.8) (N90.89)\par \par Current Meds\par Alcohol Pads 70 % Pad; USE FOR TESTING  UP TO 4 TIMES A DAY\par Atorvastatin Calcium 10 MG Oral Tablet; TAKE 1 TABLET AT BEDTIME\par BD Pen Needle Mini U/F 31G X 5 MM; uses one daily\par BD Pen Needle Sandra U/F 32G X 4 MM; daily\par Biktarvy -25 MG Oral Tablet; TAKE ONE TABLET BY MOUTH DAILY\par Blood Glucose Monitor System w/Device Kit; USE AS DIRECTED\par Clindamycin HCl - 300 MG Oral Capsule; TAKE 1 CAPSULE Every twelve hours\par Contour Blood Glucose Test Strips; USE AS DIRECTED\par Fluconazole 150 MG Oral Tablet; Take 1 tablet 1 time, may repeat in 3 days x 1 if\par symptoms not resolved\par FreeStyle Katy 14 Day [place] Device; USE AS DIRECTED\par FreeStyle Katy 14 Day Sensor; change every 14 days\par Glucose Monitoring Strips; use strips for monitoring of blood sugar up to 4 times a day\par MDD:4 times\par HumaLOG KwikPen 100 UNIT/ML Subcutaneous Solution Pen-injector; Use 15 units ac\par tid\par hydroCHLOROthiazide 12.5 MG Oral Tablet; TAKE 1 TABLET DAILY\par hydroCHLOROthiazide 25 MG Oral Tablet; TAKE 1 TABLET DAILY AS DIRECTED\par Ibuprofen 600 MG Oral Tablet; 1 tablet twice daily\par Imiquimod 5 % External Cream; APPLY TO AFFECTED AREA THREE TIMES A WEEK\par UNTIL LESIONS RESOLVE\par Lancets Thin; TEST 3 TO 4 TIMES DAILY\par Lantus SoloStar 100 UNIT/ML Subcutaneous Solution Pen-injector; 25 units SC qhs\par Lidocaine HCl - 3 % External Cream; USE TOPICALLY AS DIRECTED\par NovoLOG FlexPen 100 UNIT/ML Subcutaneous Solution Pen-injector; Subcutaneously\par inject 10 units before breakfast, lunch, & dinner\par OneTouch Ultra 2 w/Device Kit; USE AS DIRECTED\par OneTouch Ultra Blue In Vitro Strip; TEST 4 TIMES A DAY\par OneTouch UltraSoft Lancets; use as directed to check BG\par Trulicity 0.75 MG/0.5ML Subcutaneous Solution Pen-injector; Inject one pen weekly, on\par the same day each week, subcutaneously\par Ursodiol 500 MG Oral Tablet; TAKE 1 TABLET TWICE DAILY\par valACYclovir HCl - 500 MG Oral Tablet; TAKE ONE TABLET BY MOUTH TWICE A DAY FOR\par SEVEN DAYS AS NEEDED FOR OUTBREAKS\par Vitamin D (Ergocalciferol) 1.25 MG (99830 UT) Oral Capsule; TAKE 1 CAPSULE WEEKLY\par Vitamin D3 25 MCG (1000 UT) Oral Capsule; TAKE ONE CAPSULE DAILY AS DIRECTED\par \par Allergies\par No Known Drug Allergies\par No Known Environmental Allergies\par No Known Food Allergies\par \par Review of Systems\par \par Constitutional, Eyes, ENT, Cardiovascular, Respiratory, Gastrointestinal, Genitourinary, Musculoskeletal, Integumentary, Neurological, Psychiatric and Heme/Lymph are otherwise negative. \par  \par

## 2020-05-28 ENCOUNTER — APPOINTMENT (OUTPATIENT)
Dept: INFECTIOUS DISEASE | Facility: CLINIC | Age: 50
End: 2020-05-28
Payer: COMMERCIAL

## 2020-05-28 VITALS
DIASTOLIC BLOOD PRESSURE: 85 MMHG | OXYGEN SATURATION: 97 % | WEIGHT: 217 LBS | HEIGHT: 71 IN | HEART RATE: 98 BPM | BODY MASS INDEX: 30.38 KG/M2 | TEMPERATURE: 98.7 F | SYSTOLIC BLOOD PRESSURE: 135 MMHG

## 2020-05-28 LAB
APPEARANCE: ABNORMAL
BACTERIA: NEGATIVE
BILIRUBIN URINE: NEGATIVE
BLOOD URINE: ABNORMAL
C TRACH RRNA SPEC QL NAA+PROBE: NOT DETECTED
COLOR: YELLOW
CYSTATIN C SERPL-MCNC: 1.59 MG/L
GFR/BSA.PRED SERPLBLD CYS-BASED-ARV: 41 ML/MIN
GLUCOSE QUALITATIVE U: NEGATIVE
HIV1 RNA # SERPL NAA+PROBE: ABNORMAL
HIV1 RNA # SERPL NAA+PROBE: ABNORMAL COPIES/ML
HYALINE CASTS: 2 /LPF
KETONES URINE: NEGATIVE
LEUKOCYTE ESTERASE URINE: ABNORMAL
MICROSCOPIC-UA: NORMAL
N GONORRHOEA RRNA SPEC QL NAA+PROBE: NOT DETECTED
NITRITE URINE: NEGATIVE
PH URINE: 6.5
PROTEIN URINE: ABNORMAL
RED BLOOD CELLS URINE: 12 /HPF
SOURCE AMPLIFICATION: NORMAL
SPECIFIC GRAVITY URINE: 1.02
SQUAMOUS EPITHELIAL CELLS: 1 /HPF
T PALLIDUM AB SER QL IA: NEGATIVE
UROBILINOGEN URINE: NORMAL
VIRAL LOAD INTERP: NORMAL
VIRAL LOAD LOG: ABNORMAL LG COP/ML
WHITE BLOOD CELLS URINE: 259 /HPF

## 2020-05-28 PROCEDURE — 99213 OFFICE O/P EST LOW 20 MIN: CPT

## 2020-05-28 NOTE — HISTORY OF PRESENT ILLNESS
[Good] : being in good health [Itching] : itching [de-identified] : 5/27/2020 [Lesion] : lesion [Soreness] : soreness [Lt Vulva] : left vulva [Rt Vulva] : right vulva [Discharge] : no discharge [8/10] : is 8/10 in severity [___ Months] : started [unfilled] months ago [Vagina] : vagina [Skin Ulcerations] : skin ulceration(s) [Skin Color Change] : skin color change [Vaginal Odor Foul] : vaginal odor not foul [Hx HSV] : herpes simplex [Menses] : worsened by menses [V/V Cream] : improved by V/V cream [Sexually Active] : The patient is not sexually active

## 2020-06-04 ENCOUNTER — RX RENEWAL (OUTPATIENT)
Age: 50
End: 2020-06-04

## 2020-06-11 ENCOUNTER — OUTPATIENT (OUTPATIENT)
Dept: OUTPATIENT SERVICES | Facility: HOSPITAL | Age: 50
LOS: 1 days | Discharge: HOME | End: 2020-06-11

## 2020-06-11 DIAGNOSIS — D64.9 ANEMIA, UNSPECIFIED: ICD-10-CM

## 2020-06-16 ENCOUNTER — APPOINTMENT (OUTPATIENT)
Dept: HEMATOLOGY ONCOLOGY | Facility: CLINIC | Age: 50
End: 2020-06-16

## 2020-06-16 ENCOUNTER — APPOINTMENT (OUTPATIENT)
Dept: INTERNAL MEDICINE | Facility: CLINIC | Age: 50
End: 2020-06-16

## 2020-06-16 ENCOUNTER — OUTPATIENT (OUTPATIENT)
Dept: OUTPATIENT SERVICES | Facility: HOSPITAL | Age: 50
LOS: 1 days | End: 2020-06-16
Payer: COMMERCIAL

## 2020-06-16 VITALS
RESPIRATION RATE: 20 BRPM | OXYGEN SATURATION: 97 % | WEIGHT: 220.02 LBS | SYSTOLIC BLOOD PRESSURE: 134 MMHG | HEIGHT: 71 IN | DIASTOLIC BLOOD PRESSURE: 84 MMHG | HEART RATE: 92 BPM | TEMPERATURE: 99 F

## 2020-06-16 DIAGNOSIS — Z01.818 ENCOUNTER FOR OTHER PREPROCEDURAL EXAMINATION: ICD-10-CM

## 2020-06-16 DIAGNOSIS — Z29.9 ENCOUNTER FOR PROPHYLACTIC MEASURES, UNSPECIFIED: ICD-10-CM

## 2020-06-16 DIAGNOSIS — M16.12 UNILATERAL PRIMARY OSTEOARTHRITIS, LEFT HIP: ICD-10-CM

## 2020-06-16 DIAGNOSIS — E11.9 TYPE 2 DIABETES MELLITUS WITHOUT COMPLICATIONS: ICD-10-CM

## 2020-06-16 DIAGNOSIS — Z98.82 BREAST IMPLANT STATUS: Chronic | ICD-10-CM

## 2020-06-16 LAB
A1C WITH ESTIMATED AVERAGE GLUCOSE RESULT: 8.9 % — HIGH (ref 4–5.6)
BLD GP AB SCN SERPL QL: NEGATIVE — SIGNIFICANT CHANGE UP
ESTIMATED AVERAGE GLUCOSE: 209 MG/DL — HIGH (ref 68–114)
MRSA PCR RESULT.: SIGNIFICANT CHANGE UP
RH IG SCN BLD-IMP: POSITIVE — SIGNIFICANT CHANGE UP
S AUREUS DNA NOSE QL NAA+PROBE: SIGNIFICANT CHANGE UP

## 2020-06-16 PROCEDURE — 87641 MR-STAPH DNA AMP PROBE: CPT

## 2020-06-16 PROCEDURE — 86900 BLOOD TYPING SEROLOGIC ABO: CPT

## 2020-06-16 PROCEDURE — 86901 BLOOD TYPING SEROLOGIC RH(D): CPT

## 2020-06-16 PROCEDURE — 83036 HEMOGLOBIN GLYCOSYLATED A1C: CPT

## 2020-06-16 PROCEDURE — G0463: CPT

## 2020-06-16 PROCEDURE — 87640 STAPH A DNA AMP PROBE: CPT

## 2020-06-16 PROCEDURE — 86850 RBC ANTIBODY SCREEN: CPT

## 2020-06-16 RX ORDER — CEFAZOLIN SODIUM 1 G
2000 VIAL (EA) INJECTION ONCE
Refills: 0 | Status: DISCONTINUED | OUTPATIENT
Start: 2020-06-23 | End: 2020-06-23

## 2020-06-16 NOTE — H&P PST ADULT - NSICDXPASTMEDICALHX_GEN_ALL_CORE_FT
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

## 2020-06-16 NOTE — H&P PST ADULT - NSICDXPROBLEM_GEN_ALL_CORE_FT
PROBLEM DIAGNOSES  Problem: T2DM (type 2 diabetes mellitus)  Assessment and Plan: check fingerstick preop    Problem: Need for prophylactic measure  Assessment and Plan: The Caprini score indicates that this patient is at high risk for a VTE event (score 6 or greater). Surgical patients in this group will benefit from both pharmacologic prophylaxis and intermittent compression devices.  The surgical team will determine the balance between VTE risk and bleeding risk, and other clinical considerations      Problem: Unilateral primary osteoarthritis, left hip  Assessment and Plan: left total hip replacment

## 2020-06-16 NOTE — H&P PST ADULT - HISTORY OF PRESENT ILLNESS
51 yo 51 yo female with AIDS(5 yrs), T2DM (wearing CGM),  c/o constant pain in left leg for over one year, now to have left hip replacement. 49 yo female with AIDS(5 yrs), T2DM (wearing CGM),  c/o constant pain in left leg for over one year, now to have left hip replacement.      Pt to scheduled COVID swab via corporate site.  info given to patient.

## 2020-06-16 NOTE — H&P PST ADULT - ASSESSMENT
THELMAI VTE 2.0 SCORE [CLOT updated 2019]    AGE RELATED RISK FACTORS                                                       MOBILITY RELATED FACTORS  x ] Age 41-60 years                                            (1 Point)                    [ ] Bed rest                                                        (1 Point)  [ ] Age: 61-74 years                                           (2 Points)                  [ ] Plaster cast                                                   (2 Points)  [ ] Age= 75 years                                              (3 Points)                    [ ] Bed bound for more than 72 hours                 (2 Points)    DISEASE RELATED RISK FACTORS                                               GENDER SPECIFIC FACTORS  [ ] Edema in the lower extremities                       (1 Point)              [ ] Pregnancy                                                     (1 Point)  [ ] Varicose veins                                               (1 Point)                     [ ] Post-partum < 6 weeks                                   (1 Point)             [ x] BMI > 25 Kg/m2                                            (1 Point)                     [ ] Hormonal therapy  or oral contraception          (1 Point)                 [ ] Sepsis (in the previous month)                        (1 Point)               [ ] History of pregnancy complications                 (1 point)  [ ] Pneumonia or serious lung disease                                               [ ] Unexplained or recurrent                     (1 Point)           (in the previous month)                               (1 Point)  [ ] Abnormal pulmonary function test                     (1 Point)                 SURGERY RELATED RISK FACTORS  [ ] Acute myocardial infarction                              (1 Point)               [ ]  Section                                             (1 Point)  [ ] Congestive heart failure (in the previous month)  (1 Point)      [ ] Minor surgery                                                  (1 Point)   [ ] Inflammatory bowel disease                             (1 Point)               [ ] Arthroscopic surgery                                        (2 Points)  [ ] Central venous access                                      (2 Points)                [x ] General surgery lasting more than 45 minutes (2 points)  [ ] Malignancy- Present or previous                   (2 Points)                [x ] Elective arthroplasty                                         (5 points)    [ ] Stroke (in the previous month)                          (5 Points)                                                                                                                                                           HEMATOLOGY RELATED FACTORS                                                 TRAUMA RELATED RISK FACTORS  [ ] Prior episodes of VTE                                     (3 Points)                [ ] Fracture of the hip, pelvis, or leg                       (5 Points)  [ ] Positive family history for VTE                         (3 Points)             [ ] Acute spinal cord injury (in the previous month)  (5 Points)  [ ] Prothrombin 70532 A                                     (3 Points)               [ ] Paralysis  (less than 1 month)                             (5 Points)  [ ] Factor V Leiden                                             (3 Points)                  [ ] Multiple Trauma within 1 month                        (5 Points)  [ ] Lupus anticoagulants                                     (3 Points)                                                           [ ] Anticardiolipin antibodies                               (3 Points)                                                       [ ] High homocysteine in the blood                      (3 Points)                                             [ ] Other congenital or acquired thrombophilia      (3 Points)                                                [ ] Heparin induced thrombocytopenia                  (3 Points)                                     Total Score [       9   ]

## 2020-06-17 ENCOUNTER — APPOINTMENT (OUTPATIENT)
Dept: INTERNAL MEDICINE | Facility: CLINIC | Age: 50
End: 2020-06-17

## 2020-06-18 ENCOUNTER — APPOINTMENT (OUTPATIENT)
Dept: OBGYN | Facility: CLINIC | Age: 50
End: 2020-06-18
Payer: COMMERCIAL

## 2020-06-18 VITALS
SYSTOLIC BLOOD PRESSURE: 137 MMHG | HEIGHT: 71 IN | WEIGHT: 215.5 LBS | DIASTOLIC BLOOD PRESSURE: 87 MMHG | BODY MASS INDEX: 30.17 KG/M2

## 2020-06-18 DIAGNOSIS — N76.6 ULCERATION OF VULVA: ICD-10-CM

## 2020-06-18 PROCEDURE — 99213 OFFICE O/P EST LOW 20 MIN: CPT

## 2020-06-20 PROBLEM — E11.9 TYPE 2 DIABETES MELLITUS WITHOUT COMPLICATIONS: Chronic | Status: ACTIVE | Noted: 2017-01-29

## 2020-06-22 ENCOUNTER — TRANSCRIPTION ENCOUNTER (OUTPATIENT)
Age: 50
End: 2020-06-22

## 2020-06-22 ENCOUNTER — APPOINTMENT (OUTPATIENT)
Dept: DISASTER EMERGENCY | Facility: CLINIC | Age: 50
End: 2020-06-22

## 2020-06-22 DIAGNOSIS — Z01.818 ENCOUNTER FOR OTHER PREPROCEDURAL EXAMINATION: ICD-10-CM

## 2020-06-23 ENCOUNTER — RESULT REVIEW (OUTPATIENT)
Age: 50
End: 2020-06-23

## 2020-06-23 ENCOUNTER — INPATIENT (INPATIENT)
Facility: HOSPITAL | Age: 50
LOS: 1 days | Discharge: HOME | DRG: 470 | End: 2020-06-25
Attending: ORTHOPAEDIC SURGERY | Admitting: ORTHOPAEDIC SURGERY
Payer: COMMERCIAL

## 2020-06-23 ENCOUNTER — APPOINTMENT (OUTPATIENT)
Dept: ORTHOPEDIC SURGERY | Facility: HOSPITAL | Age: 50
End: 2020-06-23

## 2020-06-23 VITALS
RESPIRATION RATE: 16 BRPM | SYSTOLIC BLOOD PRESSURE: 143 MMHG | HEIGHT: 71 IN | OXYGEN SATURATION: 97 % | HEART RATE: 103 BPM | DIASTOLIC BLOOD PRESSURE: 87 MMHG | WEIGHT: 220.02 LBS | TEMPERATURE: 99 F

## 2020-06-23 DIAGNOSIS — Z98.82 BREAST IMPLANT STATUS: Chronic | ICD-10-CM

## 2020-06-23 DIAGNOSIS — M16.12 UNILATERAL PRIMARY OSTEOARTHRITIS, LEFT HIP: ICD-10-CM

## 2020-06-23 LAB
GLUCOSE BLDC GLUCOMTR-MCNC: 249 MG/DL — HIGH (ref 70–99)
GLUCOSE BLDC GLUCOMTR-MCNC: 292 MG/DL — HIGH (ref 70–99)
GLUCOSE BLDC GLUCOMTR-MCNC: 337 MG/DL — HIGH (ref 70–99)
GLUCOSE BLDC GLUCOMTR-MCNC: 387 MG/DL — HIGH (ref 70–99)
RH IG SCN BLD-IMP: POSITIVE — SIGNIFICANT CHANGE UP
SARS-COV-2 N GENE NPH QL NAA+PROBE: NOT DETECTED

## 2020-06-23 PROCEDURE — 72170 X-RAY EXAM OF PELVIS: CPT | Mod: 26

## 2020-06-23 PROCEDURE — 88311 DECALCIFY TISSUE: CPT | Mod: 26

## 2020-06-23 PROCEDURE — 88342 IMHCHEM/IMCYTCHM 1ST ANTB: CPT | Mod: 26

## 2020-06-23 PROCEDURE — 27130 TOTAL HIP ARTHROPLASTY: CPT | Mod: LT

## 2020-06-23 PROCEDURE — 88305 TISSUE EXAM BY PATHOLOGIST: CPT | Mod: 26

## 2020-06-23 RX ORDER — DEXTROSE 50 % IN WATER 50 %
25 SYRINGE (ML) INTRAVENOUS ONCE
Refills: 0 | Status: DISCONTINUED | OUTPATIENT
Start: 2020-06-23 | End: 2020-06-25

## 2020-06-23 RX ORDER — BICTEGRAVIR SODIUM, EMTRICITABINE, AND TENOFOVIR ALAFENAMIDE FUMARATE 30; 120; 15 MG/1; MG/1; MG/1
1 TABLET ORAL DAILY
Refills: 0 | Status: DISCONTINUED | OUTPATIENT
Start: 2020-06-23 | End: 2020-06-25

## 2020-06-23 RX ORDER — CELECOXIB 200 MG/1
200 CAPSULE ORAL ONCE
Refills: 0 | Status: COMPLETED | OUTPATIENT
Start: 2020-06-23 | End: 2020-06-23

## 2020-06-23 RX ORDER — SODIUM CHLORIDE 9 MG/ML
1000 INJECTION INTRAMUSCULAR; INTRAVENOUS; SUBCUTANEOUS
Refills: 0 | Status: DISCONTINUED | OUTPATIENT
Start: 2020-06-23 | End: 2020-06-25

## 2020-06-23 RX ORDER — DEXTROSE 50 % IN WATER 50 %
12.5 SYRINGE (ML) INTRAVENOUS ONCE
Refills: 0 | Status: DISCONTINUED | OUTPATIENT
Start: 2020-06-23 | End: 2020-06-25

## 2020-06-23 RX ORDER — TRAMADOL HYDROCHLORIDE 50 MG/1
50 TABLET ORAL EVERY 8 HOURS
Refills: 0 | Status: DISCONTINUED | OUTPATIENT
Start: 2020-06-23 | End: 2020-06-25

## 2020-06-23 RX ORDER — TRAMADOL HYDROCHLORIDE 50 MG/1
50 TABLET ORAL ONCE
Refills: 0 | Status: DISCONTINUED | OUTPATIENT
Start: 2020-06-23 | End: 2020-06-23

## 2020-06-23 RX ORDER — FENTANYL CITRATE 50 UG/ML
25 INJECTION INTRAVENOUS
Refills: 0 | Status: DISCONTINUED | OUTPATIENT
Start: 2020-06-23 | End: 2020-06-23

## 2020-06-23 RX ORDER — KETOROLAC TROMETHAMINE 30 MG/ML
15 SYRINGE (ML) INJECTION EVERY 8 HOURS
Refills: 0 | Status: COMPLETED | OUTPATIENT
Start: 2020-06-23 | End: 2020-06-25

## 2020-06-23 RX ORDER — ACETAMINOPHEN 500 MG
1000 TABLET ORAL ONCE
Refills: 0 | Status: DISCONTINUED | OUTPATIENT
Start: 2020-06-23 | End: 2020-06-25

## 2020-06-23 RX ORDER — ONDANSETRON 8 MG/1
4 TABLET, FILM COATED ORAL EVERY 6 HOURS
Refills: 0 | Status: DISCONTINUED | OUTPATIENT
Start: 2020-06-23 | End: 2020-06-25

## 2020-06-23 RX ORDER — OXYCODONE HYDROCHLORIDE 5 MG/1
5 TABLET ORAL EVERY 4 HOURS
Refills: 0 | Status: DISCONTINUED | OUTPATIENT
Start: 2020-06-23 | End: 2020-06-25

## 2020-06-23 RX ORDER — SODIUM CHLORIDE 9 MG/ML
3 INJECTION INTRAMUSCULAR; INTRAVENOUS; SUBCUTANEOUS EVERY 8 HOURS
Refills: 0 | Status: DISCONTINUED | OUTPATIENT
Start: 2020-06-23 | End: 2020-06-23

## 2020-06-23 RX ORDER — MORPHINE SULFATE 50 MG/1
4 CAPSULE, EXTENDED RELEASE ORAL
Refills: 0 | Status: DISCONTINUED | OUTPATIENT
Start: 2020-06-23 | End: 2020-06-23

## 2020-06-23 RX ORDER — LIDOCAINE HCL 20 MG/ML
0.2 VIAL (ML) INJECTION ONCE
Refills: 0 | Status: DISCONTINUED | OUTPATIENT
Start: 2020-06-23 | End: 2020-06-23

## 2020-06-23 RX ORDER — PANTOPRAZOLE SODIUM 20 MG/1
40 TABLET, DELAYED RELEASE ORAL ONCE
Refills: 0 | Status: COMPLETED | OUTPATIENT
Start: 2020-06-23 | End: 2020-06-23

## 2020-06-23 RX ORDER — PANTOPRAZOLE SODIUM 20 MG/1
40 TABLET, DELAYED RELEASE ORAL
Refills: 0 | Status: DISCONTINUED | OUTPATIENT
Start: 2020-06-23 | End: 2020-06-25

## 2020-06-23 RX ORDER — SODIUM CHLORIDE 9 MG/ML
1000 INJECTION, SOLUTION INTRAVENOUS
Refills: 0 | Status: DISCONTINUED | OUTPATIENT
Start: 2020-06-23 | End: 2020-06-25

## 2020-06-23 RX ORDER — SENNA PLUS 8.6 MG/1
2 TABLET ORAL AT BEDTIME
Refills: 0 | Status: DISCONTINUED | OUTPATIENT
Start: 2020-06-23 | End: 2020-06-25

## 2020-06-23 RX ORDER — HYDROMORPHONE HYDROCHLORIDE 2 MG/ML
0.5 INJECTION INTRAMUSCULAR; INTRAVENOUS; SUBCUTANEOUS EVERY 4 HOURS
Refills: 0 | Status: DISCONTINUED | OUTPATIENT
Start: 2020-06-23 | End: 2020-06-25

## 2020-06-23 RX ORDER — SODIUM CHLORIDE 9 MG/ML
1000 INJECTION, SOLUTION INTRAVENOUS
Refills: 0 | Status: DISCONTINUED | OUTPATIENT
Start: 2020-06-23 | End: 2020-06-23

## 2020-06-23 RX ORDER — GLUCAGON INJECTION, SOLUTION 0.5 MG/.1ML
1 INJECTION, SOLUTION SUBCUTANEOUS ONCE
Refills: 0 | Status: DISCONTINUED | OUTPATIENT
Start: 2020-06-23 | End: 2020-06-25

## 2020-06-23 RX ORDER — SODIUM CHLORIDE 9 MG/ML
500 INJECTION INTRAMUSCULAR; INTRAVENOUS; SUBCUTANEOUS ONCE
Refills: 0 | Status: COMPLETED | OUTPATIENT
Start: 2020-06-24 | End: 2020-06-24

## 2020-06-23 RX ORDER — INSULIN GLARGINE 100 [IU]/ML
20 INJECTION, SOLUTION SUBCUTANEOUS AT BEDTIME
Refills: 0 | Status: DISCONTINUED | OUTPATIENT
Start: 2020-06-23 | End: 2020-06-25

## 2020-06-23 RX ORDER — MAGNESIUM HYDROXIDE 400 MG/1
30 TABLET, CHEWABLE ORAL DAILY
Refills: 0 | Status: DISCONTINUED | OUTPATIENT
Start: 2020-06-23 | End: 2020-06-25

## 2020-06-23 RX ORDER — INSULIN GLARGINE 100 [IU]/ML
10 INJECTION, SOLUTION SUBCUTANEOUS AT BEDTIME
Refills: 0 | Status: DISCONTINUED | OUTPATIENT
Start: 2020-06-23 | End: 2020-06-23

## 2020-06-23 RX ORDER — SODIUM CHLORIDE 9 MG/ML
500 INJECTION INTRAMUSCULAR; INTRAVENOUS; SUBCUTANEOUS ONCE
Refills: 0 | Status: COMPLETED | OUTPATIENT
Start: 2020-06-23 | End: 2020-06-23

## 2020-06-23 RX ORDER — GABAPENTIN 400 MG/1
300 CAPSULE ORAL DAILY
Refills: 0 | Status: DISCONTINUED | OUTPATIENT
Start: 2020-06-23 | End: 2020-06-23

## 2020-06-23 RX ORDER — FLUCONAZOLE 150 MG/1
150 TABLET ORAL ONCE
Refills: 0 | Status: COMPLETED | OUTPATIENT
Start: 2020-06-24 | End: 2020-06-23

## 2020-06-23 RX ORDER — ACETAMINOPHEN 500 MG
650 TABLET ORAL EVERY 6 HOURS
Refills: 0 | Status: DISCONTINUED | OUTPATIENT
Start: 2020-06-23 | End: 2020-06-25

## 2020-06-23 RX ORDER — DEXTROSE 50 % IN WATER 50 %
15 SYRINGE (ML) INTRAVENOUS ONCE
Refills: 0 | Status: DISCONTINUED | OUTPATIENT
Start: 2020-06-23 | End: 2020-06-25

## 2020-06-23 RX ORDER — POLYETHYLENE GLYCOL 3350 17 G/17G
17 POWDER, FOR SOLUTION ORAL DAILY
Refills: 0 | Status: DISCONTINUED | OUTPATIENT
Start: 2020-06-23 | End: 2020-06-25

## 2020-06-23 RX ORDER — ONDANSETRON 8 MG/1
4 TABLET, FILM COATED ORAL ONCE
Refills: 0 | Status: DISCONTINUED | OUTPATIENT
Start: 2020-06-23 | End: 2020-06-23

## 2020-06-23 RX ORDER — OXYCODONE HYDROCHLORIDE 5 MG/1
10 TABLET ORAL EVERY 4 HOURS
Refills: 0 | Status: DISCONTINUED | OUTPATIENT
Start: 2020-06-23 | End: 2020-06-25

## 2020-06-23 RX ORDER — INSULIN LISPRO 100/ML
VIAL (ML) SUBCUTANEOUS
Refills: 0 | Status: DISCONTINUED | OUTPATIENT
Start: 2020-06-23 | End: 2020-06-25

## 2020-06-23 RX ORDER — INSULIN LISPRO 100/ML
10 VIAL (ML) SUBCUTANEOUS
Refills: 0 | Status: DISCONTINUED | OUTPATIENT
Start: 2020-06-23 | End: 2020-06-25

## 2020-06-23 RX ORDER — CEFAZOLIN SODIUM 1 G
2000 VIAL (EA) INJECTION EVERY 8 HOURS
Refills: 0 | Status: COMPLETED | OUTPATIENT
Start: 2020-06-23 | End: 2020-06-24

## 2020-06-23 RX ORDER — ASPIRIN/CALCIUM CARB/MAGNESIUM 324 MG
325 TABLET ORAL
Refills: 0 | Status: DISCONTINUED | OUTPATIENT
Start: 2020-06-23 | End: 2020-06-25

## 2020-06-23 RX ORDER — INSULIN LISPRO 100/ML
VIAL (ML) SUBCUTANEOUS AT BEDTIME
Refills: 0 | Status: DISCONTINUED | OUTPATIENT
Start: 2020-06-23 | End: 2020-06-25

## 2020-06-23 RX ADMIN — CELECOXIB 200 MILLIGRAM(S): 200 CAPSULE ORAL at 10:37

## 2020-06-23 RX ADMIN — Medication 2: at 23:07

## 2020-06-23 RX ADMIN — SODIUM CHLORIDE 85 MILLILITER(S): 9 INJECTION INTRAMUSCULAR; INTRAVENOUS; SUBCUTANEOUS at 23:06

## 2020-06-23 RX ADMIN — SODIUM CHLORIDE 1000 MILLILITER(S): 9 INJECTION INTRAMUSCULAR; INTRAVENOUS; SUBCUTANEOUS at 17:43

## 2020-06-23 RX ADMIN — BICTEGRAVIR SODIUM, EMTRICITABINE, AND TENOFOVIR ALAFENAMIDE FUMARATE 1 TABLET(S): 30; 120; 15 TABLET ORAL at 23:06

## 2020-06-23 RX ADMIN — FLUCONAZOLE 150 MILLIGRAM(S): 150 TABLET ORAL at 23:07

## 2020-06-23 RX ADMIN — INSULIN GLARGINE 20 UNIT(S): 100 INJECTION, SOLUTION SUBCUTANEOUS at 23:07

## 2020-06-23 RX ADMIN — Medication 3: at 18:24

## 2020-06-23 RX ADMIN — Medication 15 MILLIGRAM(S): at 23:07

## 2020-06-23 RX ADMIN — Medication 100 MILLIGRAM(S): at 21:01

## 2020-06-23 RX ADMIN — TRAMADOL HYDROCHLORIDE 50 MILLIGRAM(S): 50 TABLET ORAL at 21:03

## 2020-06-23 RX ADMIN — Medication 325 MILLIGRAM(S): at 21:01

## 2020-06-23 RX ADMIN — Medication 10 UNIT(S): at 21:23

## 2020-06-23 RX ADMIN — PANTOPRAZOLE SODIUM 40 MILLIGRAM(S): 20 TABLET, DELAYED RELEASE ORAL at 10:37

## 2020-06-23 RX ADMIN — Medication 5: at 21:23

## 2020-06-23 RX ADMIN — SODIUM CHLORIDE 1000 MILLILITER(S): 9 INJECTION INTRAMUSCULAR; INTRAVENOUS; SUBCUTANEOUS at 20:11

## 2020-06-23 RX ADMIN — SODIUM CHLORIDE 135 MILLILITER(S): 9 INJECTION, SOLUTION INTRAVENOUS at 18:28

## 2020-06-23 RX ADMIN — Medication 650 MILLIGRAM(S): at 21:02

## 2020-06-23 RX ADMIN — GABAPENTIN 300 MILLIGRAM(S): 400 CAPSULE ORAL at 10:38

## 2020-06-23 RX ADMIN — TRAMADOL HYDROCHLORIDE 50 MILLIGRAM(S): 50 TABLET ORAL at 12:16

## 2020-06-23 NOTE — BRIEF OPERATIVE NOTE - NSICDXBRIEFPROCEDURE_GEN_ALL_CORE_FT
PROCEDURES:  Open reduction and internal fixation (ORIF) of left hip with C-arm fluoroscopic guidance 23-Jun-2020 17:59:12  Suzan Mcclendon  Total left hip arthroplasty 23-Jun-2020 17:59:03  Suzan Mcclendon

## 2020-06-23 NOTE — PRE-ANESTHESIA EVALUATION ADULT - NSANTHOSAYNRD_GEN_A_CORE
No. MADI screening performed.  STOP BANG Legend: 0-2 = LOW Risk; 3-4 = INTERMEDIATE Risk; 5-8 = HIGH Risk

## 2020-06-23 NOTE — PRE-OP CHECKLIST - HAND OFF
SUBJECTIVE:                                                    Jessica Jordan is a 66 year old female who presents to clinic today for the following health issues:      Depression Followup    Status since last visit: Worsened    See PHQ-9 for current symptoms.  Other associated symptoms: None    Complicating factors:   Significant life event:  Yes-  Father passes away   Current substance abuse:  None  Anxiety or Panic symptoms:  Yes-  Possible anxiety    PHQ-9  English PHQ-9   Any Language            Amount of exercise or physical activity: None    Problems taking medications regularly: No    Medication side effects: none    Diet: low salt and low fat/cholesterol    Patient was previously treated for depression with prozac for many years.  She stopped meds 2 years ago and then noticed a worsening depression since her father  in .  She also notes a lot of family stress related to settling her father's estate and work stress as well.  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  Patient denies thoughts of suicide or self harm.    Problem list and histories reviewed & adjusted, as indicated.  Additional history: as documented    Patient Active Problem List   Diagnosis     Moderate recurrent major depression (H)     Vitamin D deficiency     Family history of ovarian cancer     Iron deficiency anemia due to chronic blood loss     Osteopenia     Past Surgical History:   Procedure Laterality Date     APPENDECTOMY       BREAST SURGERY      Bilateral breast implants     HYSTERECTOMY, PAP NO LONGER INDICATED         Social History   Substance Use Topics     Smoking status: Never Smoker     Smokeless tobacco: Not on file     Alcohol use Yes      Comment: once a week     History reviewed. No pertinent family history.      Current Outpatient Prescriptions   Medication Sig Dispense Refill     FLUoxetine (PROZAC) 10 MG capsule Take 1 capsule  (10 mg) by mouth daily For 1 week, then increase to 20 mg daily. 60 capsule 1     cyanocolbalamin (VITAMIN  B-12) 500 MCG tablet Take 500 mcg by mouth daily       Multiple Vitamins-Minerals (MULTIVITAMIN ADULT PO) Take by mouth daily       calcium carbonate (OS-FRANKI 500 MG [place]. [place]) 500 MG tablet Take 500 mg by mouth 2 times daily       Biotin 7500 MCG TABS        omega 3 1000 MG CAPS Take 2 g by mouth       Cholecalciferol (VITAMIN D3 PO) Take by mouth daily       Allergies   Allergen Reactions     Codeine      Penicillins      BP Readings from Last 3 Encounters:   05/15/17 120/72   03/06/17 124/80   10/27/15 119/73    Wt Readings from Last 3 Encounters:   05/15/17 138 lb 9.6 oz (62.9 kg)   03/06/17 134 lb (60.8 kg)   10/27/15 144 lb 12.8 oz (65.7 kg)                  Labs reviewed in EPIC    Reviewed and updated as needed this visit by clinical staff       Reviewed and updated as needed this visit by Provider         ROS:  Constitutional, HEENT, cardiovascular, pulmonary, gi and gu systems are negative, except as otherwise noted.    OBJECTIVE:                                                    /72 (BP Location: Right arm, Cuff Size: Adult Regular)  Pulse 76  Temp 97.9  F (36.6  C) (Oral)  Wt 138 lb 9.6 oz (62.9 kg)  LMP 10/27/1992  SpO2 100%  Breastfeeding? No  BMI 22.37 kg/m2  Body mass index is 22.37 kg/(m^2).  GENERAL: healthy, alert and no distress  RESP: lungs clear to auscultation - no rales, rhonchi or wheezes  CV: regular rate and rhythm, normal S1 S2, no S3 or S4, no murmur, click or rub, no peripheral edema and peripheral pulses strong  MS: no gross musculoskeletal defects noted, no edema  PSYCH: mentation appears normal, affect flat, judgement and insight intact and appearance well groomed    Diagnostic Test Results:  none      ASSESSMENT/PLAN:                                                        1. Moderate recurrent major depression (H)    - FLUoxetine (PROZAC) 10 MG capsule; Take 1  capsule (10 mg) by mouth daily For 1 week, then increase to 20 mg daily.  Dispense: 60 capsule; Refill: 1  - MENTAL HEALTH REFERRAL    FUTURE APPOINTMENTS:       - Follow-up visit in 1 month.    MAGALIS Mcneil [facility]     yes

## 2020-06-23 NOTE — CHART NOTE - NSCHARTNOTEFT_GEN_A_CORE
Resting without complaints.  No Chest Pain, SOB, N/V.    T(C): 36 (06-23-20 @ 17:05), Max: 37 (06-23-20 @ 10:44)  HR: 78 (06-23-20 @ 18:00) (72 - 103)  BP: 128/75 (06-23-20 @ 18:00) (126/64 - 143/87)  RR: 16 (06-23-20 @ 18:00) (16 - 16)  SpO2: 100% (06-23-20 @ 18:00) (97% - 100%)      Exam:  Alert and [place], No Acute Distress  Card: +S1/S2, RRR  Pulm: CTAB  Laterality: L Hip  Dressing: Aquacel c/d/i reinforced with a 4x4 and tegaderm proximally.  Calves soft, non-tender bilaterally  +PF/DF/EHL/FHL  SILT  + DP pulse    Xray: S/P L Total Hip Arthroplasty, cerclage reinforcement on proximal femur noted.  Prosthesis and hardware in place and intact.             A/P: 50y Female S/p  L Total Hip Arthroplasty Anterior Approach. VSS. NAD  -PT/OT-TTWB LLE, Anterior Hip Precautions  -F/U AM Labs  -IS  -DVT PPx: ASA 325mg BID and SCDs  -Pain Control  -Continue Current Tx  -Dispo planning PACU to Floor    Konstantin Evans PA-C  Orthopedic Surgery Team  Team Pager #[phone]

## 2020-06-23 NOTE — PATIENT PROFILE ADULT - DISASTER - FALL HARM RISK TYPE OF ASSESSMENT
Subjective:      Alan Mcrae is a 18 y.o. male here with patient. Patient brought in for Well Child (self, appetite/BM-wnl , no work/school)      History of Present Illness:  HPI  Pt here for well visit  Went to health  Unit last week and had cheek swabbed for sti. No hiv or syphilis per patient  Has had multiple episodes of unprotected sexual intercourse lately. -sometimes uses a condom  No recent hx of trauma.  Eating well.  No concerns regarding hearing or vision    Sleeping well. No problems with urination or bowel movements  No mental health concerns at this time  No depression concerns  No mention of tobacco use  Has questions of marijuana use but denies smoking marijuana  Review of Systems   Constitutional: Negative.    HENT: Negative.    Eyes: Negative.    Respiratory: Negative.    Cardiovascular: Negative.    Gastrointestinal: Negative.    Endocrine: Negative.    Genitourinary: Negative.    Musculoskeletal: Negative.    Skin: Negative.    Allergic/Immunologic: Negative.    Neurological: Negative.    Hematological: Negative.    Psychiatric/Behavioral: Negative.    All other systems reviewed and are negative.      Objective:     Physical Exam   Constitutional: He appears well-developed.   HENT:   Head: Normocephalic.   Right Ear: External ear normal.   Left Ear: External ear normal.   Nose: Nose normal.   Tm's normal bilaterally   Eyes: Pupils are equal, round, and reactive to light.   Neck: Normal range of motion.   Cardiovascular: Normal rate, regular rhythm and normal heart sounds.    Pulmonary/Chest: Effort normal and breath sounds normal.   Abdominal: Soft. No hernia.   Genitourinary:   Genitourinary Comments:   No hernia     Musculoskeletal: Normal range of motion.   No scoliosis   Neurological: He is alert.   Skin: Skin is warm and dry.   Psychiatric: His behavior is normal.       Assessment:        1. Well adult exam         Plan:       Alan was seen today for well child.    Diagnoses and all  orders for this visit:    Well adult exam  -     C. trachomatis/N. gonorrhoeae by AMP DNA Urine  -     CBC auto differential; Future  -     Comprehensive metabolic panel; Future  -     Lipid panel; Future        Discussed normal growth chart and proper nutrition for age.  Also discussed immunization schedule  Have discussed appropriate preventive issues for age  rtc prn  Have discussed untoward effects of tobacco smoke and marijuana use  Have discussed safe sex practices-wear a condom for all sexual intercourse  Await above results  Call [phone] which is patient's phone with results     admission

## 2020-06-24 ENCOUNTER — TRANSCRIPTION ENCOUNTER (OUTPATIENT)
Age: 50
End: 2020-06-24

## 2020-06-24 LAB
ANION GAP SERPL CALC-SCNC: 12 MMOL/L — SIGNIFICANT CHANGE UP (ref 5–17)
BUN SERPL-MCNC: 22 MG/DL — SIGNIFICANT CHANGE UP (ref 7–23)
CALCIUM SERPL-MCNC: 8.5 MG/DL — SIGNIFICANT CHANGE UP (ref 8.4–10.5)
CHLORIDE SERPL-SCNC: 102 MMOL/L — SIGNIFICANT CHANGE UP (ref 96–108)
CO2 SERPL-SCNC: 21 MMOL/L — LOW (ref 22–31)
CREAT SERPL-MCNC: 1.51 MG/DL — HIGH (ref 0.5–1.3)
GLUCOSE BLDC GLUCOMTR-MCNC: 223 MG/DL — HIGH (ref 70–99)
GLUCOSE BLDC GLUCOMTR-MCNC: 233 MG/DL — HIGH (ref 70–99)
GLUCOSE BLDC GLUCOMTR-MCNC: 292 MG/DL — HIGH (ref 70–99)
GLUCOSE BLDC GLUCOMTR-MCNC: 296 MG/DL — HIGH (ref 70–99)
GLUCOSE SERPL-MCNC: 265 MG/DL — HIGH (ref 70–99)
HCT VFR BLD CALC: 26.8 % — LOW (ref 34.5–45)
HGB BLD-MCNC: 7.9 G/DL — LOW (ref 11.5–15.5)
MCHC RBC-ENTMCNC: 25.7 PG — LOW (ref 27–34)
MCHC RBC-ENTMCNC: 29.5 GM/DL — LOW (ref 32–36)
MCV RBC AUTO: 87.3 FL — SIGNIFICANT CHANGE UP (ref 80–100)
NRBC # BLD: 0 /100 WBCS — SIGNIFICANT CHANGE UP (ref 0–0)
PLATELET # BLD AUTO: 278 K/UL — SIGNIFICANT CHANGE UP (ref 150–400)
POTASSIUM SERPL-MCNC: 4.6 MMOL/L — SIGNIFICANT CHANGE UP (ref 3.5–5.3)
POTASSIUM SERPL-SCNC: 4.6 MMOL/L — SIGNIFICANT CHANGE UP (ref 3.5–5.3)
RBC # BLD: 3.07 M/UL — LOW (ref 3.8–5.2)
RBC # FLD: 16.6 % — HIGH (ref 10.3–14.5)
SODIUM SERPL-SCNC: 135 MMOL/L — SIGNIFICANT CHANGE UP (ref 135–145)
WBC # BLD: 8.72 K/UL — SIGNIFICANT CHANGE UP (ref 3.8–10.5)
WBC # FLD AUTO: 8.72 K/UL — SIGNIFICANT CHANGE UP (ref 3.8–10.5)

## 2020-06-24 RX ORDER — ACETAMINOPHEN 500 MG
1000 TABLET ORAL ONCE
Refills: 0 | Status: COMPLETED | OUTPATIENT
Start: 2020-06-24 | End: 2020-06-24

## 2020-06-24 RX ADMIN — PANTOPRAZOLE SODIUM 40 MILLIGRAM(S): 20 TABLET, DELAYED RELEASE ORAL at 05:58

## 2020-06-24 RX ADMIN — Medication 2: at 13:02

## 2020-06-24 RX ADMIN — Medication 15 MILLIGRAM(S): at 08:14

## 2020-06-24 RX ADMIN — Medication 15 MILLIGRAM(S): at 22:20

## 2020-06-24 RX ADMIN — ONDANSETRON 4 MILLIGRAM(S): 8 TABLET, FILM COATED ORAL at 11:53

## 2020-06-24 RX ADMIN — TRAMADOL HYDROCHLORIDE 50 MILLIGRAM(S): 50 TABLET ORAL at 22:21

## 2020-06-24 RX ADMIN — Medication 3: at 09:24

## 2020-06-24 RX ADMIN — BICTEGRAVIR SODIUM, EMTRICITABINE, AND TENOFOVIR ALAFENAMIDE FUMARATE 1 TABLET(S): 30; 120; 15 TABLET ORAL at 17:40

## 2020-06-24 RX ADMIN — SODIUM CHLORIDE 85 MILLILITER(S): 9 INJECTION INTRAMUSCULAR; INTRAVENOUS; SUBCUTANEOUS at 13:03

## 2020-06-24 RX ADMIN — Medication 1 TABLET(S): at 17:41

## 2020-06-24 RX ADMIN — OXYCODONE HYDROCHLORIDE 10 MILLIGRAM(S): 5 TABLET ORAL at 08:14

## 2020-06-24 RX ADMIN — Medication 325 MILLIGRAM(S): at 09:28

## 2020-06-24 RX ADMIN — SODIUM CHLORIDE 1000 MILLILITER(S): 9 INJECTION INTRAMUSCULAR; INTRAVENOUS; SUBCUTANEOUS at 05:58

## 2020-06-24 RX ADMIN — Medication 400 MILLIGRAM(S): at 06:37

## 2020-06-24 RX ADMIN — Medication 10 UNIT(S): at 09:25

## 2020-06-24 RX ADMIN — HYDROMORPHONE HYDROCHLORIDE 0.5 MILLIGRAM(S): 2 INJECTION INTRAMUSCULAR; INTRAVENOUS; SUBCUTANEOUS at 03:20

## 2020-06-24 RX ADMIN — TRAMADOL HYDROCHLORIDE 50 MILLIGRAM(S): 50 TABLET ORAL at 06:49

## 2020-06-24 RX ADMIN — Medication 10 UNIT(S): at 18:05

## 2020-06-24 RX ADMIN — Medication 10 UNIT(S): at 13:03

## 2020-06-24 RX ADMIN — Medication 650 MILLIGRAM(S): at 17:41

## 2020-06-24 RX ADMIN — Medication 2: at 18:04

## 2020-06-24 RX ADMIN — SODIUM CHLORIDE 85 MILLILITER(S): 9 INJECTION INTRAMUSCULAR; INTRAVENOUS; SUBCUTANEOUS at 09:29

## 2020-06-24 RX ADMIN — Medication 1: at 22:19

## 2020-06-24 RX ADMIN — INSULIN GLARGINE 20 UNIT(S): 100 INJECTION, SOLUTION SUBCUTANEOUS at 22:20

## 2020-06-24 RX ADMIN — Medication 15 MILLIGRAM(S): at 16:00

## 2020-06-24 RX ADMIN — Medication 100 MILLIGRAM(S): at 05:58

## 2020-06-24 RX ADMIN — POLYETHYLENE GLYCOL 3350 17 GRAM(S): 17 POWDER, FOR SOLUTION ORAL at 17:41

## 2020-06-24 RX ADMIN — Medication 325 MILLIGRAM(S): at 22:21

## 2020-06-24 NOTE — PHYSICAL THERAPY INITIAL EVALUATION ADULT - CRITERIA FOR SKILLED THERAPEUTIC INTERVENTIONS
anticipated discharge recommendation/anticipated equipment needs at discharge/functional limitations in following categories/impairments found/rehab potential/therapy frequency

## 2020-06-24 NOTE — PHYSICAL THERAPY INITIAL EVALUATION ADULT - PLANNED THERAPY INTERVENTIONS, PT EVAL
gait training/GOAL: Pt will be able to negotiate 14 steps independently in 2 weeks./balance training/bed mobility training

## 2020-06-24 NOTE — OCCUPATIONAL THERAPY INITIAL EVALUATION ADULT - ANTICIPATED DISCHARGE DISPOSITION, OT EVAL
Home with assist from family for functional mobility and ADL/IADL performance as needed/home w/ level of assist

## 2020-06-24 NOTE — OCCUPATIONAL THERAPY INITIAL EVALUATION ADULT - PERTINENT HX OF CURRENT PROBLEM, REHAB EVAL
51yo F with AIDS(5 yrs), T2DM (wearing CGM), c/o constant pain in left leg for over one year, now to have left hip replacement.

## 2020-06-24 NOTE — PROGRESS NOTE ADULT - ASSESSMENT
50F sp an ORIF and L TOIBN    Neuro: Pain control  Resp: IS  GI: Regular diet, bowel reg  MSK: TTWB, PT/OT  Heme: DVT PPX  Dispo: home today vs tomorrow

## 2020-06-24 NOTE — OCCUPATIONAL THERAPY INITIAL EVALUATION ADULT - DIAGNOSIS, OT EVAL
Pt currently presents with decreased balance, flexibility and strength limiting independence with ADLs and functional mobility.

## 2020-06-24 NOTE — OCCUPATIONAL THERAPY INITIAL EVALUATION ADULT - TRANSFER TRAINING, PT EVAL
Goal: Pt will perform sit to stand, bed <> chair, toilet, tub, shower and car transfers independently within 2 weeks

## 2020-06-24 NOTE — OCCUPATIONAL THERAPY INITIAL EVALUATION ADULT - ADL RETRAINING, OT EVAL
Goal: Pt will perform LB dressing independently using compensatory dressing technique within 2 weeks. Goal: Pt will perform bathing independently with appropriate DME within 2 weeks

## 2020-06-24 NOTE — PHYSICAL THERAPY INITIAL EVALUATION ADULT - PERTINENT HX OF CURRENT PROBLEM, REHAB EVAL
Pt is a 50F with PMH of AIDS(5 yrs), T2DM (wearing CGM),  c/o constant pain in left leg for over one year. Now s/p L hip ORIF and L TOBIN; LLE TTWB

## 2020-06-24 NOTE — PHYSICAL THERAPY INITIAL EVALUATION ADULT - ADDITIONAL COMMENTS
lives with daughter and parents in 2 story private home, 5 steps to enter with bilateral rails. Pt bedroom/ bathroom located on 2nd floor. ~14 steps to ascend with L rail up. tub shwer no grab bars, +detachable showerhead lives with daughter and parents in 2 story private home, 5 steps to enter with bilateral rails. Pt bedroom/ bathroom located on 2nd floor. ~14 steps to ascend with L rail up. tub shower no grab bars, +detachable showerhead. PTA pt was (I) with ADLs and functional mobility with no assistive device.

## 2020-06-24 NOTE — PROGRESS NOTE ADULT - SUBJECTIVE AND OBJECTIVE BOX
Ortho Progress Note    S: Patient seen and examined. No acute events overnight. Pain well controlled with current regimen. Denies lightheadedness/dizziness, CP/SOB. Tolerating diet.       O:  Physical Exam:  Gen: Laying in bed, NAD, alert and oriented.   Resp: Unlabored breathing  Ext: EHL/FHL/TA/Sol intact          + SILT DP/SP/MADISON/Sa/Tib          +DP, extremity WWP    Vital Signs Last 24 Hrs  T(C): 36.4 (24 Jun 2020 05:16), Max: 37 (23 Jun 2020 10:44)  T(F): 97.6 (24 Jun 2020 05:16), Max: 98.6 (23 Jun 2020 10:44)  HR: 72 (24 Jun 2020 05:16) (71 - 103)  BP: 102/65 (24 Jun 2020 05:16) (102/65 - 143/87)  BP(mean): 97 (23 Jun 2020 19:15) (90 - 104)  RR: 18 (24 Jun 2020 05:16) (16 - 18)  SpO2: 98% (24 Jun 2020 05:16) (97% - 100%)

## 2020-06-24 NOTE — DISCHARGE NOTE NURSING/CASE MANAGEMENT/SOCIAL WORK - PATIENT PORTAL LINK FT
You can access the FollowMyHealth Patient Portal offered by [facility] by registering at the following website: [email or web address]. By joining [employer]’s FollowMyHealth portal, you will also be able to view your health information using other applications (apps) compatible with our system.

## 2020-06-24 NOTE — OCCUPATIONAL THERAPY INITIAL EVALUATION ADULT - LIVES WITH, PROFILE
parents/children/As per pt report, lives with daughter and parents in 2 story private home, 5 steps to enter with bilateral rails. Pt bedroom/ bathroom located on 2nd floor. ~14 steps to ascend with L rail up. tub shwer no grab bars, +detachable showerhead

## 2020-06-25 ENCOUNTER — TRANSCRIPTION ENCOUNTER (OUTPATIENT)
Age: 50
End: 2020-06-25

## 2020-06-25 VITALS
HEART RATE: 86 BPM | OXYGEN SATURATION: 98 % | RESPIRATION RATE: 18 BRPM | TEMPERATURE: 99 F | SYSTOLIC BLOOD PRESSURE: 106 MMHG | DIASTOLIC BLOOD PRESSURE: 68 MMHG

## 2020-06-25 LAB — GLUCOSE BLDC GLUCOMTR-MCNC: 254 MG/DL — HIGH (ref 70–99)

## 2020-06-25 PROCEDURE — 97165 OT EVAL LOW COMPLEX 30 MIN: CPT

## 2020-06-25 PROCEDURE — 97116 GAIT TRAINING THERAPY: CPT

## 2020-06-25 PROCEDURE — 88305 TISSUE EXAM BY PATHOLOGIST: CPT

## 2020-06-25 PROCEDURE — C1889: CPT

## 2020-06-25 PROCEDURE — 76000 FLUOROSCOPY <1 HR PHYS/QHP: CPT

## 2020-06-25 PROCEDURE — 97530 THERAPEUTIC ACTIVITIES: CPT

## 2020-06-25 PROCEDURE — 88341 IMHCHEM/IMCYTCHM EA ADD ANTB: CPT

## 2020-06-25 PROCEDURE — 86900 BLOOD TYPING SEROLOGIC ABO: CPT

## 2020-06-25 PROCEDURE — 88311 DECALCIFY TISSUE: CPT

## 2020-06-25 PROCEDURE — 72170 X-RAY EXAM OF PELVIS: CPT

## 2020-06-25 PROCEDURE — C1776: CPT

## 2020-06-25 PROCEDURE — 97161 PT EVAL LOW COMPLEX 20 MIN: CPT

## 2020-06-25 PROCEDURE — C1713: CPT

## 2020-06-25 PROCEDURE — 82962 GLUCOSE BLOOD TEST: CPT

## 2020-06-25 PROCEDURE — 85027 COMPLETE CBC AUTOMATED: CPT

## 2020-06-25 PROCEDURE — 86901 BLOOD TYPING SEROLOGIC RH(D): CPT

## 2020-06-25 PROCEDURE — 97535 SELF CARE MNGMENT TRAINING: CPT

## 2020-06-25 PROCEDURE — 97110 THERAPEUTIC EXERCISES: CPT

## 2020-06-25 PROCEDURE — 80048 BASIC METABOLIC PNL TOTAL CA: CPT

## 2020-06-25 RX ORDER — FLUCONAZOLE 150 MG/1
1 TABLET ORAL
Qty: 0 | Refills: 0 | DISCHARGE

## 2020-06-25 RX ORDER — TRAMADOL HYDROCHLORIDE 50 MG/1
1 TABLET ORAL
Qty: 21 | Refills: 0
Start: 2020-06-25 | End: 2020-07-01

## 2020-06-25 RX ORDER — ASPIRIN/CALCIUM CARB/MAGNESIUM 324 MG
1 TABLET ORAL
Qty: 0 | Refills: 0 | DISCHARGE
Start: 2020-06-25

## 2020-06-25 RX ORDER — TRAMADOL HYDROCHLORIDE 50 MG/1
1 TABLET ORAL
Qty: 0 | Refills: 0 | DISCHARGE
Start: 2020-06-25

## 2020-06-25 RX ORDER — ASPIRIN/CALCIUM CARB/MAGNESIUM 324 MG
1 TABLET ORAL
Qty: 80 | Refills: 0
Start: 2020-06-25 | End: 2020-08-03

## 2020-06-25 RX ORDER — DICLOFENAC SODIUM 75 MG/1
1 TABLET, DELAYED RELEASE ORAL
Qty: 0 | Refills: 0 | DISCHARGE

## 2020-06-25 RX ORDER — OXYCODONE HYDROCHLORIDE 5 MG/1
1 TABLET ORAL
Qty: 42 | Refills: 0
Start: 2020-06-25 | End: 2020-07-01

## 2020-06-25 RX ORDER — LIDOCAINE 4 G/100G
1 CREAM TOPICAL
Qty: 0 | Refills: 0 | DISCHARGE

## 2020-06-25 RX ORDER — POLYETHYLENE GLYCOL 3350 17 G/17G
17 POWDER, FOR SOLUTION ORAL
Qty: 0 | Refills: 0 | DISCHARGE
Start: 2020-06-25

## 2020-06-25 RX ORDER — OXYCODONE HYDROCHLORIDE 5 MG/1
1 TABLET ORAL
Qty: 0 | Refills: 0 | DISCHARGE
Start: 2020-06-25

## 2020-06-25 RX ORDER — SENNA PLUS 8.6 MG/1
2 TABLET ORAL
Qty: 0 | Refills: 0 | DISCHARGE
Start: 2020-06-25

## 2020-06-25 RX ADMIN — Medication 15 MILLIGRAM(S): at 08:41

## 2020-06-25 RX ADMIN — Medication 650 MILLIGRAM(S): at 11:46

## 2020-06-25 RX ADMIN — POLYETHYLENE GLYCOL 3350 17 GRAM(S): 17 POWDER, FOR SOLUTION ORAL at 11:47

## 2020-06-25 RX ADMIN — Medication 10 UNIT(S): at 08:37

## 2020-06-25 RX ADMIN — BICTEGRAVIR SODIUM, EMTRICITABINE, AND TENOFOVIR ALAFENAMIDE FUMARATE 1 TABLET(S): 30; 120; 15 TABLET ORAL at 11:49

## 2020-06-25 RX ADMIN — Medication 3: at 08:37

## 2020-06-25 RX ADMIN — PANTOPRAZOLE SODIUM 40 MILLIGRAM(S): 20 TABLET, DELAYED RELEASE ORAL at 07:02

## 2020-06-25 RX ADMIN — Medication 325 MILLIGRAM(S): at 11:49

## 2020-06-25 RX ADMIN — Medication 650 MILLIGRAM(S): at 07:02

## 2020-06-25 RX ADMIN — SODIUM CHLORIDE 85 MILLILITER(S): 9 INJECTION INTRAMUSCULAR; INTRAVENOUS; SUBCUTANEOUS at 01:01

## 2020-06-25 NOTE — DISCHARGE NOTE PROVIDER - NSDCFUADDINST_GEN_ALL_CORE_FT
Keep aquacel dressing clean  F/U with Dr Diaz within 2 weeks.  Dressing will be removed at f/u visit.    Physical therapy for ambulation toe-touch weight bearing LLE.  PT recommended outpatient physical therapy.  Patient to obtain script from Dr Diaz.    Continue ecotrin 325mg po 2x/day x 6 weeks to prevent clots.

## 2020-06-25 NOTE — DISCHARGE NOTE PROVIDER - CARE PROVIDER_API CALL
Baldev Diaz  ORTHOPAEDIC SURGERY  [address]    [address]  [place] [address]  Phone: [phone]  Fax: [phone]  Follow Up Time:

## 2020-06-25 NOTE — DISCHARGE NOTE PROVIDER - NSDCCPTREATMENT_GEN_ALL_CORE_FT
PRINCIPAL PROCEDURE  Procedure: Total left hip arthroplasty  Findings and Treatment:       SECONDARY PROCEDURE  Procedure: Open reduction and internal fixation (ORIF) of left hip with C-arm fluoroscopic guidance  Findings and Treatment:

## 2020-06-25 NOTE — DISCHARGE NOTE PROVIDER - NSDCMRMEDTOKEN_GEN_ALL_CORE_FT
aspirin 325 mg oral delayed release tablet: 1 tab(s) orally 2 times a day x 6 weeks for prevention of clots  Biktarvy oral tablet: 1 tab(s) orally once a day  HumaLOG KwikPen 100 units/mL injectable solution: 10  injectable 3 times a day  oxyCODONE 5 mg oral tablet: 1 tab(s) orally every 4 hours, As needed, Moderate Pain (4 - 6)  polyethylene glycol 3350 oral powder for reconstitution: 17 gram(s) orally once a day  ROLLING WALKER: DX; AVN LEFT HIP  senna oral tablet: 2 tab(s) orally once a day (at bedtime), As needed, Constipation  traMADol 50 mg oral tablet: 1 tab(s) orally every 8 hours  Trulicity Pen 0.75 mg/0.5 mL subcutaneous solution: subcutaneous every 7 days aspirin 325 mg oral delayed release tablet: 1 tab(s) orally 2 times a day x 6 weeks for prevention of clots MDD:2  Biktarvy oral tablet: 1 tab(s) orally once a day  HumaLOG KwikPen 100 units/mL injectable solution: 10  injectable 3 times a day  oxyCODONE 5 mg oral tablet: 1 tab(s) orally every 4 hours, As needed, Moderate Pain (4 - 6)  oxyCODONE 5 mg oral tablet: 1 tab(s) orally every 4 hours, As needed, severe pain MDD:6  polyethylene glycol 3350 oral powder for reconstitution: 17 gram(s) orally once a day  ROLLING WALKER: DX; AVN LEFT HIP  senna oral tablet: 2 tab(s) orally once a day (at bedtime), As needed, Constipation  traMADol 50 mg oral tablet: 1 tab(s) orally every 8 hours as needed for moderate pain MDD:3  Trulicity Pen 0.75 mg/0.5 mL subcutaneous solution: subcutaneous every 7 days

## 2020-06-25 NOTE — PROGRESS NOTE ADULT - ASSESSMENT
Pt is a 49 yo F s/p L TOBIN and ORIF POD2 stable    Neuro: Pain control  Resp: IS  GI: Regular diet, bowel reg  MSK: TTWB  Heme: DVT PPX  Dispo: home today with home PT Pt is a 51 yo F s/p L TOBIN and ORIF POD2 stable    Neuro: Pain control  Resp: IS  GI: Regular diet, bowel reg  MSK: TTWB PT/OT  Heme: DVT PPX  Dispo: home today or tomorrow per PT recs

## 2020-06-25 NOTE — DISCHARGE NOTE PROVIDER - HOSPITAL COURSE
History of Present Illness        49 yo female with AIDS(5 yrs), T2DM (wearing CGM),  c/o constant pain in left leg for over one year, now to have left hip replacement.          Admitted 6/23/20 for elective surgery.  S/P Lt THR/ORIF.  Patient tolerated procedure well.  Physical therapy for ambulation Toe touch weight bearing LLE.  PT recommended home with outpatient PT.  Will discharge when cleared.

## 2020-06-25 NOTE — PROGRESS NOTE ADULT - SUBJECTIVE AND OBJECTIVE BOX
Ortho Progress Note    S: Pt is a 51 yo F s/p L TOBIN and ORIF POD2. Pt seen and examined in recliner. No acute overnight events. Pain well controlled with current regimen. Pt denies dizziness/lightheadedness. Pt denies SOB/CP. Pt tolerating diet well and walking with rolling walker.    O:  Physical Exam:  Neuro: Laying in recliner in NAD. A+O x3  Resp: Unlabored breathing  Ext: EHL/FHL/TA/Sol intact, +DP, extremities WWP    Vital Signs Last 24 Hrs  T(C): 36.9 (25 Jun 2020 04:58), Max: 37.1 (24 Jun 2020 08:45)  T(F): 98.4 (25 Jun 2020 04:58), Max: 98.7 (24 Jun 2020 08:45)  HR: 89 (25 Jun 2020 04:58) (55 - 93)  BP: 112/69 (25 Jun 2020 04:58) (96/59 - 126/75)  BP(mean): --  RR: 18 (25 Jun 2020 04:58) (16 - 18)  SpO2: 98% (25 Jun 2020 04:58) (98% - 100%) Ortho Progress Note    S: Pt is a 49 yo F s/p L TOBIN and ORIF POD2. Pt seen and examined in recliner. No acute overnight events. Pain well controlled with current regimen. Pt denies dizziness/lightheadedness. Pt denies SOB/CP. Pt tolerating diet well and walking with rolling walker.    O:  Physical Exam:  Neuro: Laying in recliner in NAD. A+O x3  Resp: Unlabored breathing  Ext: EHL/FHL/TA/Sol intact  	+ SILT DP/SP/MADISON/Sa/Tib  	+DP, extremities WWP    Vital Signs Last 24 Hrs  T(C): 36.9 (25 Jun 2020 04:58), Max: 37.1 (24 Jun 2020 08:45)  T(F): 98.4 (25 Jun 2020 04:58), Max: 98.7 (24 Jun 2020 08:45)  HR: 89 (25 Jun 2020 04:58) (55 - 93)  BP: 112/69 (25 Jun 2020 04:58) (96/59 - 126/75)  BP(mean): --  RR: 18 (25 Jun 2020 04:58) (16 - 18)  SpO2: 98% (25 Jun 2020 04:58) (98% - 100%)

## 2020-06-25 NOTE — DISCHARGE NOTE PROVIDER - NSDCFUSCHEDAPPT_GEN_ALL_CORE_FT
TIEN SHARPE ; 08/18/2020 ; [facility] [address] [facility] TIEN SHARPE ; 08/18/2020 ; NPP [facility] [address] [facility]

## 2020-06-25 NOTE — DISCHARGE NOTE PROVIDER - NSDCACTIVITY_GEN_ALL_CORE
Do not drive or operate machinery/Do not make important decisions/Stairs allowed/No heavy lifting/straining/Walking - Indoors allowed/Walking - Outdoors allowed/Showering allowed

## 2020-07-02 ENCOUNTER — APPOINTMENT (OUTPATIENT)
Dept: ORTHOPEDIC SURGERY | Facility: CLINIC | Age: 50
End: 2020-07-02
Payer: COMMERCIAL

## 2020-07-02 PROCEDURE — 73502 X-RAY EXAM HIP UNI 2-3 VIEWS: CPT | Mod: LT

## 2020-07-02 PROCEDURE — 99024 POSTOP FOLLOW-UP VISIT: CPT

## 2020-07-30 ENCOUNTER — APPOINTMENT (OUTPATIENT)
Dept: ORTHOPEDIC SURGERY | Facility: CLINIC | Age: 50
End: 2020-07-30
Payer: COMMERCIAL

## 2020-07-30 PROCEDURE — 99024 POSTOP FOLLOW-UP VISIT: CPT

## 2020-07-30 PROCEDURE — 73502 X-RAY EXAM HIP UNI 2-3 VIEWS: CPT | Mod: LT

## 2020-08-03 ENCOUNTER — RX RENEWAL (OUTPATIENT)
Age: 50
End: 2020-08-03

## 2020-08-11 NOTE — PHYSICAL THERAPY INITIAL EVALUATION ADULT - SITTING BALANCE: STATIC
-Recurrent falls due to spinal stenosis versus other versus some component of diabetic neuropathy vs hypoglycemia  -Orthostatics normal however this is after a day of giving patient fluids and monitoring  -On coumadin but subtherapeutic at 1.4  -Neurosurgery recommends holding home warfarin now and continue lovenox ppx until obtain MRI  -PT/OT to eval and treat.  PT to determine most safest assist device prior to discharge  -Social work consult for possible discharge disposition SNF.  Discuss home situation   good balance

## 2020-08-18 ENCOUNTER — NON-APPOINTMENT (OUTPATIENT)
Age: 50
End: 2020-08-18

## 2020-08-18 ENCOUNTER — APPOINTMENT (OUTPATIENT)
Dept: ENDOCRINOLOGY | Facility: CLINIC | Age: 50
End: 2020-08-18

## 2020-09-03 ENCOUNTER — RX RENEWAL (OUTPATIENT)
Age: 50
End: 2020-09-03

## 2020-09-05 ENCOUNTER — RX RENEWAL (OUTPATIENT)
Age: 50
End: 2020-09-05

## 2020-09-21 ENCOUNTER — FORM ENCOUNTER (OUTPATIENT)
Age: 50
End: 2020-09-21

## 2020-09-28 ENCOUNTER — APPOINTMENT (OUTPATIENT)
Dept: INFECTIOUS DISEASE | Facility: CLINIC | Age: 50
End: 2020-09-28

## 2020-10-01 NOTE — ED ADULT NURSE NOTE - FALLEN IN THE PAST
----- Message from Sharon Hsu sent at 10/1/2020  1:03 PM CDT -----  Regarding: request appt  Pt called to schedule appt for birth control ... call back : [phone] (home)      no

## 2020-10-02 ENCOUNTER — RX RENEWAL (OUTPATIENT)
Age: 50
End: 2020-10-02

## 2020-11-03 ENCOUNTER — RX RENEWAL (OUTPATIENT)
Age: 50
End: 2020-11-03

## 2020-11-03 ENCOUNTER — FORM ENCOUNTER (OUTPATIENT)
Age: 50
End: 2020-11-03

## 2020-11-04 ENCOUNTER — APPOINTMENT (OUTPATIENT)
Dept: INFECTIOUS DISEASE | Facility: CLINIC | Age: 50
End: 2020-11-04
Payer: COMMERCIAL

## 2020-11-04 ENCOUNTER — NON-APPOINTMENT (OUTPATIENT)
Age: 50
End: 2020-11-04

## 2020-11-04 ENCOUNTER — LABORATORY RESULT (OUTPATIENT)
Age: 50
End: 2020-11-04

## 2020-11-04 VITALS
HEART RATE: 88 BPM | DIASTOLIC BLOOD PRESSURE: 91 MMHG | WEIGHT: 218 LBS | HEIGHT: 71 IN | OXYGEN SATURATION: 99 % | SYSTOLIC BLOOD PRESSURE: 144 MMHG | BODY MASS INDEX: 30.52 KG/M2 | TEMPERATURE: 98.9 F

## 2020-11-04 LAB
25(OH)D3 SERPL-MCNC: 22.4 NG/ML
AFP-TM SERPL-MCNC: 4.1 NG/ML
CD3 CELLS # BLD: 889 /UL
CD3 CELLS NFR BLD: 86 %
CD3+CD4+ CELLS # BLD: 250 /UL
CD3+CD4+ CELLS NFR BLD: 24 %
CD3+CD4+ CELLS/CD3+CD8+ CLL SPEC: 0.43 RATIO
CD3+CD8+ CELLS # SPEC: 577 /UL
CD3+CD8+ CELLS NFR BLD: 55 %
HCG SERPL-MCNC: <1 MIU/ML
TSH SERPL-ACNC: 0.76 UIU/ML

## 2020-11-04 PROCEDURE — 99072 ADDL SUPL MATRL&STAF TM PHE: CPT

## 2020-11-04 PROCEDURE — 99214 OFFICE O/P EST MOD 30 MIN: CPT

## 2020-11-04 NOTE — HISTORY OF PRESENT ILLNESS
[FreeTextEntry1] : 11/04/2020----TIEN SHARPE is a 50 year old female being seen for a follow-up visit. Continue Biktarvy, \par Recent left hip replacement and doing well. \par She has a new PCP , Dr. Orozco\par  Pt stopped tivicay and evotaz we then started on Biktarvy . Recent mammogram going back in October and biopsy, 2018. Seen by GYN in April 2019. \par I switch valtex to famcyclovir for breakout.\par Continued concern over diabetes and elevated LFT's\par Declines flu vaccine for 2020\par plan: \par 1) continue Biktarvy and famcyclovir and well as diabetic medications\par 2) needs to see Hepatology clinic. Asnd needs abdominal ultrasound. \par 3) labs today see in 3 months\par \par .History of Present Illness\par Reason For Visit\par . [facility] [address] for burning left thigh pain. . needs liver ultrasound andf all liver tests...see in 3 months. Pt was off insulin a year ago and was only diet controlled. Then seen in ER for elevated glucose...now once again under endo care and taking levemir 30 daily and amaryl 2mg daily. also taking levemir 30 units daily, and glimepiride 2 mg daily. and bactrim . and vit D3 2000units daily and atorvastatin and enalapril 5. \par

## 2020-11-05 ENCOUNTER — APPOINTMENT (OUTPATIENT)
Dept: OBGYN | Facility: CLINIC | Age: 50
End: 2020-11-05
Payer: COMMERCIAL

## 2020-11-05 VITALS
SYSTOLIC BLOOD PRESSURE: 142 MMHG | BODY MASS INDEX: 30.52 KG/M2 | WEIGHT: 218 LBS | DIASTOLIC BLOOD PRESSURE: 90 MMHG | HEIGHT: 71 IN

## 2020-11-05 DIAGNOSIS — R39.9 UNSPECIFIED SYMPTOMS AND SIGNS INVOLVING THE GENITOURINARY SYSTEM: ICD-10-CM

## 2020-11-05 DIAGNOSIS — L29.2 PRURITUS VULVAE: ICD-10-CM

## 2020-11-05 PROCEDURE — 99213 OFFICE O/P EST LOW 20 MIN: CPT

## 2020-11-05 PROCEDURE — 99072 ADDL SUPL MATRL&STAF TM PHE: CPT

## 2020-11-05 NOTE — HISTORY OF PRESENT ILLNESS
[FreeTextEntry1] : 49yo P2 LMP no w here for UTI symtoms of pain and odor and chronic intermittent intch in setting of chronic intermittent  valtrex resistent  herpes and HIV\par \par no F/C\par no HA, CP, SOB

## 2020-11-05 NOTE — PHYSICAL EXAM
[Appropriately responsive] : appropriately responsive [No Acute Distress] : no acute distress [Oriented x3] : oriented x3

## 2020-11-05 NOTE — COUNSELING
[Nutrition/ Exercise/ Weight Management] : nutrition, exercise, weight management [Vitamins/Supplements] : vitamins/supplements [Bladder Hygiene] : bladder hygiene

## 2020-11-05 NOTE — PLAN
[FreeTextEntry1] : likely UTI based on  UA\par bactrim sent\par f/u  culture\par \par \par cream for itch

## 2020-11-06 LAB
ALBUMIN SERPL ELPH-MCNC: 4.1 G/DL
ALP BLD-CCNC: 341 U/L
ALT SERPL-CCNC: 9 U/L
AMYLASE/CREAT SERPL: 63 U/L
ANION GAP SERPL CALC-SCNC: 10 MMOL/L
APPEARANCE: CLEAR
AST SERPL-CCNC: 13 U/L
BACTERIA: ABNORMAL
BASOPHILS # BLD AUTO: 0.07 K/UL
BASOPHILS NFR BLD AUTO: 1.8 %
BILIRUB SERPL-MCNC: 0.4 MG/DL
BILIRUBIN URINE: NEGATIVE
BLOOD URINE: ABNORMAL
BUN SERPL-MCNC: 17 MG/DL
C TRACH RRNA SPEC QL NAA+PROBE: NOT DETECTED
CALCIUM SERPL-MCNC: 9.9 MG/DL
CHLORIDE SERPL-SCNC: 100 MMOL/L
CHOLEST SERPL-MCNC: 306 MG/DL
CO2 SERPL-SCNC: 23 MMOL/L
COLOR: NORMAL
CREAT SERPL-MCNC: 1.5 MG/DL
EOSINOPHIL # BLD AUTO: 0.41 K/UL
EOSINOPHIL NFR BLD AUTO: 10.7 %
ESTIMATED AVERAGE GLUCOSE: 235 MG/DL
GLUCOSE QUALITATIVE U: ABNORMAL
GLUCOSE SERPL-MCNC: 219 MG/DL
HBA1C MFR BLD HPLC: 9.8 %
HCT VFR BLD CALC: 37.6 %
HCV AB SER QL: NONREACTIVE
HCV S/CO RATIO: 0.14 S/CO
HDLC SERPL-MCNC: 48 MG/DL
HGB BLD-MCNC: 11 G/DL
HIV1 RNA # SERPL NAA+PROBE: NORMAL
HIV1 RNA # SERPL NAA+PROBE: NORMAL COPIES/ML
HYALINE CASTS: 1 /LPF
IMM GRANULOCYTES NFR BLD AUTO: 0.3 %
KETONES URINE: NEGATIVE
LDLC SERPL CALC-MCNC: 226 MG/DL
LEUKOCYTE ESTERASE URINE: ABNORMAL
LPL SERPL-CCNC: 37 U/L
LYMPHOCYTES # BLD AUTO: 1.22 K/UL
LYMPHOCYTES NFR BLD AUTO: 31.9 %
MAN DIFF?: NORMAL
MCHC RBC-ENTMCNC: 25.3 PG
MCHC RBC-ENTMCNC: 29.3 GM/DL
MCV RBC AUTO: 86.6 FL
MICROSCOPIC-UA: NORMAL
MONOCYTES # BLD AUTO: 0.21 K/UL
MONOCYTES NFR BLD AUTO: 5.5 %
N GONORRHOEA RRNA SPEC QL NAA+PROBE: NOT DETECTED
NEUTROPHILS # BLD AUTO: 1.91 K/UL
NEUTROPHILS NFR BLD AUTO: 49.8 %
NITRITE URINE: POSITIVE
NONHDLC SERPL-MCNC: 259 MG/DL
PH URINE: 6
PLATELET # BLD AUTO: 296 K/UL
POTASSIUM SERPL-SCNC: 5 MMOL/L
PROT SERPL-MCNC: 8.9 G/DL
PROTEIN URINE: ABNORMAL
RBC # BLD: 4.34 M/UL
RBC # FLD: 20.3 %
RED BLOOD CELLS URINE: 3 /HPF
SODIUM SERPL-SCNC: 133 MMOL/L
SOURCE AMPLIFICATION: NORMAL
SPECIFIC GRAVITY URINE: 1.02
SQUAMOUS EPITHELIAL CELLS: 0 /HPF
T PALLIDUM AB SER QL IA: NEGATIVE
TRIGL SERPL-MCNC: 166 MG/DL
UROBILINOGEN URINE: NORMAL
VIRAL LOAD INTERP: NORMAL
VIRAL LOAD LOG: NORMAL LG COP/ML
WBC # FLD AUTO: 3.83 K/UL
WHITE BLOOD CELLS URINE: 30 /HPF

## 2020-11-09 LAB
BILIRUB UR QL STRIP: NORMAL
GLUCOSE UR-MCNC: NORMAL
HCG UR QL: 0.2 EU/DL
HGB UR QL STRIP.AUTO: NORMAL
KETONES UR-MCNC: NORMAL
LEUKOCYTE ESTERASE UR QL STRIP: NORMAL
NITRITE UR QL STRIP: NORMAL
PH UR STRIP: 5.5
PROT UR STRIP-MCNC: NORMAL
SP GR UR STRIP: 1.02

## 2020-11-16 ENCOUNTER — NON-APPOINTMENT (OUTPATIENT)
Age: 50
End: 2020-11-16

## 2020-12-02 ENCOUNTER — RX RENEWAL (OUTPATIENT)
Age: 50
End: 2020-12-02

## 2020-12-03 RX ORDER — INSULIN ASPART 100 [IU]/ML
100 INJECTION, SOLUTION INTRAVENOUS; SUBCUTANEOUS
Qty: 15 | Refills: 4 | Status: COMPLETED | COMMUNITY
Start: 2020-01-09 | End: 2020-12-03

## 2020-12-07 ENCOUNTER — RX RENEWAL (OUTPATIENT)
Age: 50
End: 2020-12-07

## 2020-12-14 ENCOUNTER — APPOINTMENT (OUTPATIENT)
Dept: HEPATOLOGY | Facility: CLINIC | Age: 50
End: 2020-12-14

## 2020-12-21 PROBLEM — N76.0 BACTERIAL VAGINOSIS: Status: RESOLVED | Noted: 2019-03-19 | Resolved: 2020-12-21

## 2021-01-04 ENCOUNTER — RX RENEWAL (OUTPATIENT)
Age: 51
End: 2021-01-04

## 2021-01-05 ENCOUNTER — RX RENEWAL (OUTPATIENT)
Age: 51
End: 2021-01-05

## 2021-01-05 ENCOUNTER — APPOINTMENT (OUTPATIENT)
Dept: ENDOCRINOLOGY | Facility: CLINIC | Age: 51
End: 2021-01-05
Payer: COMMERCIAL

## 2021-01-05 VITALS
SYSTOLIC BLOOD PRESSURE: 122 MMHG | WEIGHT: 222 LBS | BODY MASS INDEX: 31.08 KG/M2 | HEIGHT: 71 IN | HEART RATE: 78 BPM | DIASTOLIC BLOOD PRESSURE: 72 MMHG | OXYGEN SATURATION: 98 % | TEMPERATURE: 97.4 F

## 2021-01-05 PROCEDURE — 99072 ADDL SUPL MATRL&STAF TM PHE: CPT

## 2021-01-05 PROCEDURE — 95251 CONT GLUC MNTR ANALYSIS I&R: CPT

## 2021-01-05 PROCEDURE — 99215 OFFICE O/P EST HI 40 MIN: CPT | Mod: 25

## 2021-01-05 RX ORDER — DULAGLUTIDE 0.75 MG/.5ML
0.75 INJECTION, SOLUTION SUBCUTANEOUS
Qty: 1 | Refills: 5 | Status: DISCONTINUED | COMMUNITY
Start: 2019-08-12 | End: 2021-01-05

## 2021-01-06 NOTE — ASSESSMENT
[Importance of Diet and Exercise] : importance of diet and exercise to improve glycemic control, achieve weight loss and improve cardiovascular health [Action and use of Insulin] : action and use of short and long-acting insulin [FreeTextEntry1] : 50 year old woman with uncontrolled DM2, HTN, HLD, vitamin D insufficiency, HIV\par \par 1. DM2, uncontrolled:\par - A1c 9.8% in 11/2020\par - adjust regimen as follows: increase Trulicity to 1.5 mg/week, adjust Humalog to 8 units before meals, and start Lantus 24 units qhs\par - recommended she try to take Humalog before meals or as she starts to eat, avoid after meal\par - she will continue to use Freestyle [place] to monitor BG\par - follows with optho, does not have retinopathy\par - has CKD 3 and proteinuria \par - c/w dietary changes\par \par 2. HTN: BP at goal, c/w HCTZ\par \par 3. HLD: increase Lipitor to 20 mg qhs, recheck lipid panel at next visit, suspect will need higher dose statin.\par \par 4. Vitamin D insufficiency: vitamin D 25 level was 22.4 in 11/2020, recommended she take at least 1000 units of vitamin D daily\par \par 5. Thyroid nodules: Images from ultrasound from 12/2019 personally reviewed, recommend FNA of right nodule. Will schedule in office FNA. TSH normal in 11/2020.\par \par Return visit in 3 months

## 2021-01-06 NOTE — PHYSICAL EXAM
[Alert] : alert [Well Nourished] : well nourished [Healthy Appearance] : healthy appearance [No Acute Distress] : no acute distress [Normal Sclera/Conjunctiva] : normal sclera/conjunctiva [No Proptosis] : no proptosis [Normal Outer Ear/Nose] : the ears and nose were normal in appearance [Normal Hearing] : hearing was normal [No Neck Mass] : no neck mass was observed [Supple] : the neck was supple [Thyroid Not Enlarged] : the thyroid was not enlarged [No Respiratory Distress] : no respiratory distress [No Accessory Muscle Use] : no accessory muscle use [Normal Rate and Effort] : normal respiratory rate and effort [Clear to Auscultation] : lungs were clear to auscultation bilaterally [Normal S1, S2] : normal S1 and S2 [No Murmurs] : no murmurs [Normal Rate] : heart rate was normal [No Edema] : no peripheral edema [Pedal Pulses Normal] : the pedal pulses are present [Normal Bowel Sounds] : normal bowel sounds [Not Tender] : non-tender [Not Distended] : not distended [Soft] : abdomen soft [Spine Straight] : spine straight [Normal Gait] : normal gait [No Clubbing, Cyanosis] : no clubbing  or cyanosis of the fingernails [No Involuntary Movements] : no involuntary movements were seen [Right Foot Was Examined] : right foot ~C was examined [Left Foot Was Examined] : left foot ~C was examined [2+] : 2+ in the dorsalis pedis [No Motor Deficits] : the motor exam was normal [No Tremors] : no tremors [Oriented x3] : oriented to person, place, and time [Normal Affect] : the affect was normal [Normal Insight/Judgement] : insight and judgment were intact [Normal Mood] : the mood was normal [Kyphosis] : no kyphosis present [Foot Ulcers] : no foot ulcers [Diminished Throughout Both Feet] : normal tactile sensation with monofilament testing throughout both feet [#1 Diminished] : number 1 was normal [#2 Diminished] : number 2 was normal [#3 Diminished] : number 3 was normal [#4 Diminished] : number 4 was normal [#5 Diminished] : number 5 was normal [#6 Diminished] : number 6 was normal [#7 Diminished] : number 7 was normal [#8 Diminished] : number 8 was normal [#9 Diminished] : number 9 was normal [#10 Diminished] : number 10 was normal

## 2021-01-06 NOTE — DATA REVIEWED
[FreeTextEntry1] : Freestyle Katy 12/23/2020-1/5/2021:\par \par BG in target range 16% of the time, BG high 36% of the time and very high 47% of the time. Hypoglycemia less than 1% of the time. Average  mg/dL.\par \par BG sometimes dips after taking Humalog for dinner, but BG generally high.

## 2021-01-06 NOTE — REVIEW OF SYSTEMS
[All other systems negative] : All other systems negative [Fever] : no fever [Chills] : no chills [Blurred Vision] : no blurred vision [Dysphagia] : no dysphagia [Neck Pain] : no neck pain [Dysphonia] : no dysphonia [Chest Pain] : no chest pain [Palpitations] : no palpitations [Lower Ext Edema] : no lower extremity edema [Shortness Of Breath] : no shortness of breath [Cough] : no cough [Nausea] : no nausea [Constipation] : no constipation [Abdominal Pain] : no abdominal pain [Vomiting] : no vomiting [Diarrhea] : no diarrhea [Polyuria] : no polyuria [Dysuria] : no dysuria [Difficulty Walking] : no difficulty walking [Poor Balance] : steady state balance [Polydipsia] : no polydipsia

## 2021-01-18 ENCOUNTER — RESULT CHARGE (OUTPATIENT)
Age: 51
End: 2021-01-18

## 2021-01-19 ENCOUNTER — APPOINTMENT (OUTPATIENT)
Dept: OBGYN | Facility: CLINIC | Age: 51
End: 2021-01-19
Payer: COMMERCIAL

## 2021-01-19 VITALS — TEMPERATURE: 96.8 F

## 2021-01-19 LAB
BILIRUB UR QL STRIP: NEGATIVE
CLARITY UR: CLEAR
COLLECTION METHOD: NORMAL
GLUCOSE UR-MCNC: 100
HCG UR QL: 0.2 EU/DL
HGB UR QL STRIP.AUTO: NORMAL
KETONES UR-MCNC: NEGATIVE
LEUKOCYTE ESTERASE UR QL STRIP: NORMAL
NITRITE UR QL STRIP: NEGATIVE
PH UR STRIP: 5
PROT UR STRIP-MCNC: 300
SP GR UR STRIP: 1.02

## 2021-01-19 PROCEDURE — 99072 ADDL SUPL MATRL&STAF TM PHE: CPT

## 2021-01-19 PROCEDURE — 81003 URINALYSIS AUTO W/O SCOPE: CPT | Mod: QW

## 2021-01-21 LAB — BACTERIA UR CULT: NORMAL

## 2021-02-02 ENCOUNTER — APPOINTMENT (OUTPATIENT)
Dept: INTERNAL MEDICINE | Facility: CLINIC | Age: 51
End: 2021-02-02

## 2021-03-13 NOTE — H&P PST ADULT - NS ABD PE RECTAL EXAM
Patient had disturbed interrupted sleep throughout the night  Patient slept better the second half of the night  Will continue to monitor safety and behaviors every 7 minutes  patient refused

## 2021-03-17 ENCOUNTER — APPOINTMENT (OUTPATIENT)
Dept: INFECTIOUS DISEASE | Facility: CLINIC | Age: 51
End: 2021-03-17
Payer: COMMERCIAL

## 2021-03-17 ENCOUNTER — LABORATORY RESULT (OUTPATIENT)
Age: 51
End: 2021-03-17

## 2021-03-17 ENCOUNTER — NON-APPOINTMENT (OUTPATIENT)
Age: 51
End: 2021-03-17

## 2021-03-17 VITALS
HEART RATE: 98 BPM | SYSTOLIC BLOOD PRESSURE: 125 MMHG | OXYGEN SATURATION: 96 % | BODY MASS INDEX: 30.8 KG/M2 | WEIGHT: 220 LBS | HEIGHT: 71 IN | DIASTOLIC BLOOD PRESSURE: 83 MMHG | TEMPERATURE: 99 F

## 2021-03-17 PROCEDURE — 99072 ADDL SUPL MATRL&STAF TM PHE: CPT

## 2021-03-17 PROCEDURE — 99214 OFFICE O/P EST MOD 30 MIN: CPT

## 2021-03-17 NOTE — HISTORY OF PRESENT ILLNESS
[FreeTextEntry1] : 3/17/2021----TIEN SHARPE is a 51 year old female being seen for a follow-up visit. \par Continue Biktarvy, May switch to Juluca due to renal function. \par Recent left hip replacement and doing well. No longer using cane\par She has a new PCP , Dr. Orozco\par  Pt stopped tivicay and evotaz we then started on Biktarvy . \par Recent mammogram going back in October 2020 and biopsy, 2018. Seen by GYN in Jan 2021. \par I switch valtex to famcyclovir for breakout.\par Continued concern over diabetes and elevated LFT's\par Declines flu vaccine for 2020\par plan 3/17/2021: \par 1) continue Biktarvy and famcyclovir and well as diabetic medications\par 2) needs to see Hepatology clinic. \par 3) labs today see in 3 months\par 4) check for UTI  and pt would like to hold off on colon cancer screening\par \par .History of Present Illness\par Reason For Visit\par . Pt [facility] on [address] for burning left thigh pain. . needs liver ultrasound andf all liver tests...see in 3 months. Pt was off insulin a year ago and was only diet controlled. Then seen in ER for elevated glucose...now once again under endo care and taking levemir 30 daily and amaryl 2mg daily. also taking levemir 30 units daily, and glimepiride 2 mg daily. and bactrim . and vit D3 2000units daily and atorvastatin and enalapril 5. \par

## 2021-03-18 ENCOUNTER — APPOINTMENT (OUTPATIENT)
Dept: ENDOCRINOLOGY | Facility: CLINIC | Age: 51
End: 2021-03-18
Payer: COMMERCIAL

## 2021-03-18 VITALS
OXYGEN SATURATION: 99 % | HEIGHT: 71 IN | WEIGHT: 222 LBS | DIASTOLIC BLOOD PRESSURE: 70 MMHG | HEART RATE: 100 BPM | BODY MASS INDEX: 31.08 KG/M2 | SYSTOLIC BLOOD PRESSURE: 106 MMHG | TEMPERATURE: 98.1 F

## 2021-03-18 LAB
25(OH)D3 SERPL-MCNC: 22.6 NG/ML
ALBUMIN SERPL ELPH-MCNC: 3.9 G/DL
ALP BLD-CCNC: 326 U/L
ALT SERPL-CCNC: 11 U/L
ANION GAP SERPL CALC-SCNC: 12 MMOL/L
APPEARANCE: ABNORMAL
AST SERPL-CCNC: 13 U/L
BASOPHILS # BLD AUTO: 0.06 K/UL
BASOPHILS NFR BLD AUTO: 1.7 %
BILIRUB SERPL-MCNC: 0.5 MG/DL
BILIRUBIN URINE: NEGATIVE
BLOOD URINE: NEGATIVE
BUN SERPL-MCNC: 12 MG/DL
C TRACH RRNA SPEC QL NAA+PROBE: NOT DETECTED
CALCIUM SERPL-MCNC: 9.7 MG/DL
CD3 CELLS # BLD: 923 /UL
CD3 CELLS NFR BLD: 86 %
CD3+CD4+ CELLS # BLD: 242 /UL
CD3+CD4+ CELLS NFR BLD: 23 %
CD3+CD4+ CELLS/CD3+CD8+ CLL SPEC: 0.41 RATIO
CD3+CD8+ CELLS # SPEC: 597 /UL
CD3+CD8+ CELLS NFR BLD: 56 %
CHLORIDE SERPL-SCNC: 103 MMOL/L
CO2 SERPL-SCNC: 24 MMOL/L
COLOR: YELLOW
CREAT SERPL-MCNC: 1.49 MG/DL
CYSTATIN C SERPL-MCNC: 1.74 MG/L
EOSINOPHIL # BLD AUTO: 0.67 K/UL
EOSINOPHIL NFR BLD AUTO: 19.5 %
ESTIMATED AVERAGE GLUCOSE: 169 MG/DL
GFR/BSA.PRED SERPLBLD CYS-BASED-ARV: 36 ML/MIN
GLUCOSE QUALITATIVE U: NEGATIVE
GLUCOSE SERPL-MCNC: 153 MG/DL
HBA1C MFR BLD HPLC: 7.5 %
HBV SURFACE AG SER QL: NONREACTIVE
HCG SERPL-MCNC: <1 MIU/ML
HCT VFR BLD CALC: 42.5 %
HCV AB SER QL: NONREACTIVE
HCV S/CO RATIO: 0.09 S/CO
HGB BLD-MCNC: 12.9 G/DL
HIV1 RNA # SERPL NAA+PROBE: ABNORMAL
HIV1 RNA # SERPL NAA+PROBE: ABNORMAL COPIES/ML
IMM GRANULOCYTES NFR BLD AUTO: 0.3 %
KETONES URINE: NEGATIVE
LEUKOCYTE ESTERASE URINE: ABNORMAL
LYMPHOCYTES # BLD AUTO: 1.12 K/UL
LYMPHOCYTES NFR BLD AUTO: 32.7 %
MAN DIFF?: NORMAL
MCHC RBC-ENTMCNC: 29.3 PG
MCHC RBC-ENTMCNC: 30.4 GM/DL
MCV RBC AUTO: 96.6 FL
MONOCYTES # BLD AUTO: 0.23 K/UL
MONOCYTES NFR BLD AUTO: 6.7 %
N GONORRHOEA RRNA SPEC QL NAA+PROBE: NOT DETECTED
NEUTROPHILS # BLD AUTO: 1.34 K/UL
NEUTROPHILS NFR BLD AUTO: 39.1 %
NITRITE URINE: NEGATIVE
PH URINE: 6
PLATELET # BLD AUTO: 261 K/UL
POTASSIUM SERPL-SCNC: 4.8 MMOL/L
PROT SERPL-MCNC: 8.6 G/DL
PROTEIN URINE: ABNORMAL
RBC # BLD: 4.4 M/UL
RBC # FLD: 18.5 %
SODIUM SERPL-SCNC: 139 MMOL/L
SOURCE AMPLIFICATION: NORMAL
SPECIFIC GRAVITY URINE: 1.02
T PALLIDUM AB SER QL IA: NEGATIVE
UROBILINOGEN URINE: NORMAL
VIRAL LOAD INTERP: NORMAL
VIRAL LOAD LOG: ABNORMAL LG COP/ML
WBC # FLD AUTO: 3.43 K/UL

## 2021-03-18 PROCEDURE — 99072 ADDL SUPL MATRL&STAF TM PHE: CPT

## 2021-03-18 PROCEDURE — 99213 OFFICE O/P EST LOW 20 MIN: CPT | Mod: 25

## 2021-03-18 PROCEDURE — 10005 FNA BX W/US GDN 1ST LES: CPT

## 2021-03-18 NOTE — ASSESSMENT
[FreeTextEntry1] : 51 year old female with history of right sided 2.3 cm thyroid nodule s/p FNA \par \par - Saved for affirma \par - Will be called back with results

## 2021-03-18 NOTE — HISTORY OF PRESENT ILLNESS
[FreeTextEntry1] : 51 year old female with history of HIV, DM Type 2 and thyroid nodules here for follow up\par Also with thyroid nodules, thyroid ultrasound in 12/2019 revealed the following: right 2.3 cm nodule, unchanged from prior as well as left 1.1 cm cystic nodule. She was supposed to go for FNA last year but did not do so, agreeable to FNA now. No history of prior FNA and not on thyroidal medications. TSH was 0.76 in 11/2020. \par \par No compressive neck symptoms \par No FH of thyroid cancer

## 2021-03-18 NOTE — PROCEDURE
[Fine Needle Aspiration] : Fine needle aspiration ~T ~C was performed. [Area of Mass: ______] : mass identified in the [unfilled] [Risks] : risks [Patient] : the patient [Benefits] : benefits [Lidocaine Cream] : lidocaine cream [1%] : 1% [Supine] : The patient was placed in the supine position with the neck extended as tolerated. [Alcohol] : with alcohol [25 gauge 1.5 inch] : A 25 gauge 1.5 inch needle was used [2 Passes] : 2 passes were made through the mass [Ultrasonic Guidance] : ultrasound guidance was employed [Sent to Histology] : The specimens were prepared in the usual manner and sent to histology. [Afirma] : Afirma [Tolerated Well] : the patient tolerated the procedure well [Vital Signs Stable] : the vital signs were stable [No Complications] : There were no complications [Instructions Given] : Handouts/patient instructions were given to patient [FreeTextEntry1] : 51 year old female with history of right sided 2.3 cm  thyroid nodule s/p FNA \par \par - Saved for affirma \par - Will be called back with results \par

## 2021-03-20 LAB — FNA, THYROID: NORMAL

## 2021-03-20 NOTE — ED PROVIDER NOTE - CPE EDP RESP NORM
Received bedside shift report from night shift RN. Assumed patient care. Patient is A+Ox3, disoriented to time. Patient is very sleepy at this time and is resting comfortably. No apparent needs at this time. Bed locked in the lowest position, call light within reach, patient does not call. Hourly rounding in place.    normal...

## 2021-03-26 ENCOUNTER — RX RENEWAL (OUTPATIENT)
Age: 51
End: 2021-03-26

## 2021-04-21 ENCOUNTER — FORM ENCOUNTER (OUTPATIENT)
Age: 51
End: 2021-04-21

## 2021-04-23 ENCOUNTER — APPOINTMENT (OUTPATIENT)
Dept: INFECTIOUS DISEASE | Facility: CLINIC | Age: 51
End: 2021-04-23

## 2021-04-23 ENCOUNTER — APPOINTMENT (OUTPATIENT)
Dept: HEPATOLOGY | Facility: CLINIC | Age: 51
End: 2021-04-23
Payer: COMMERCIAL

## 2021-04-23 ENCOUNTER — APPOINTMENT (OUTPATIENT)
Dept: OBGYN | Facility: CLINIC | Age: 51
End: 2021-04-23
Payer: COMMERCIAL

## 2021-04-23 VITALS
SYSTOLIC BLOOD PRESSURE: 142 MMHG | BODY MASS INDEX: 31.5 KG/M2 | DIASTOLIC BLOOD PRESSURE: 70 MMHG | WEIGHT: 225 LBS | HEIGHT: 71 IN

## 2021-04-23 VITALS
RESPIRATION RATE: 16 BRPM | BODY MASS INDEX: 31.08 KG/M2 | HEIGHT: 71 IN | HEART RATE: 103 BPM | TEMPERATURE: 97.7 F | DIASTOLIC BLOOD PRESSURE: 92 MMHG | SYSTOLIC BLOOD PRESSURE: 138 MMHG | WEIGHT: 222 LBS | OXYGEN SATURATION: 99 %

## 2021-04-23 DIAGNOSIS — R74.8 ABNORMAL LEVELS OF OTHER SERUM ENZYMES: ICD-10-CM

## 2021-04-23 DIAGNOSIS — E66.9 OBESITY, UNSPECIFIED: ICD-10-CM

## 2021-04-23 DIAGNOSIS — R76.8 OTHER SPECIFIED ABNORMAL IMMUNOLOGICAL FINDINGS IN SERUM: ICD-10-CM

## 2021-04-23 DIAGNOSIS — N90.89 OTHER SPECIFIED NONINFLAMMATORY DISORDERS OF VULVA AND PERINEUM: ICD-10-CM

## 2021-04-23 LAB
BILIRUB UR QL STRIP: NORMAL
GLUCOSE UR-MCNC: NORMAL
HCG UR QL: 0.2 EU/DL
HGB UR QL STRIP.AUTO: NORMAL
KETONES UR-MCNC: NORMAL
LEUKOCYTE ESTERASE UR QL STRIP: NORMAL
NITRITE UR QL STRIP: NORMAL
PH UR STRIP: 7
PROT UR STRIP-MCNC: NORMAL
SP GR UR STRIP: 1.02

## 2021-04-23 PROCEDURE — 99215 OFFICE O/P EST HI 40 MIN: CPT

## 2021-04-23 PROCEDURE — 99214 OFFICE O/P EST MOD 30 MIN: CPT

## 2021-04-23 PROCEDURE — 99072 ADDL SUPL MATRL&STAF TM PHE: CPT

## 2021-04-23 RX ORDER — IBUPROFEN 600 MG/1
600 TABLET, FILM COATED ORAL
Qty: 60 | Refills: 1 | Status: DISCONTINUED | COMMUNITY
Start: 2019-11-21 | End: 2021-04-23

## 2021-04-23 RX ORDER — NITROFURANTOIN (MONOHYDRATE/MACROCRYSTALS) 25; 75 MG/1; MG/1
100 CAPSULE ORAL
Qty: 14 | Refills: 0 | Status: DISCONTINUED | COMMUNITY
Start: 2021-01-19 | End: 2021-04-23

## 2021-04-23 RX ORDER — ERGOCALCIFEROL 1.25 MG/1
1.25 MG CAPSULE, LIQUID FILLED ORAL
Qty: 8 | Refills: 0 | Status: DISCONTINUED | COMMUNITY
Start: 2019-10-22 | End: 2021-04-23

## 2021-04-23 RX ORDER — HYDROCHLOROTHIAZIDE 12.5 MG/1
12.5 TABLET ORAL DAILY
Qty: 30 | Refills: 0 | Status: DISCONTINUED | COMMUNITY
Start: 2019-11-26 | End: 2021-04-23

## 2021-04-23 RX ORDER — CLINDAMYCIN HYDROCHLORIDE 300 MG/1
300 CAPSULE ORAL
Qty: 14 | Refills: 0 | Status: DISCONTINUED | COMMUNITY
Start: 2020-01-09 | End: 2021-04-23

## 2021-04-23 RX ORDER — OXYCODONE 10 MG/1
10 TABLET ORAL EVERY 8 HOURS
Qty: 45 | Refills: 0 | Status: DISCONTINUED | COMMUNITY
Start: 2020-05-28 | End: 2021-04-23

## 2021-04-23 RX ORDER — CIPROFLOXACIN HYDROCHLORIDE 250 MG/1
250 TABLET, FILM COATED ORAL
Qty: 6 | Refills: 0 | Status: DISCONTINUED | COMMUNITY
Start: 2020-06-30 | End: 2021-04-23

## 2021-04-23 RX ORDER — SULFAMETHOXAZOLE AND TRIMETHOPRIM 800; 160 MG/1; MG/1
800-160 TABLET ORAL
Qty: 14 | Refills: 0 | Status: DISCONTINUED | COMMUNITY
Start: 2020-04-28 | End: 2021-04-23

## 2021-04-23 RX ORDER — METRONIDAZOLE 500 MG/1
500 TABLET ORAL
Qty: 14 | Refills: 4 | Status: DISCONTINUED | COMMUNITY
Start: 2018-04-16 | End: 2021-04-23

## 2021-04-23 RX ORDER — HYDROCHLOROTHIAZIDE 25 MG/1
25 TABLET ORAL DAILY
Qty: 30 | Refills: 3 | Status: DISCONTINUED | COMMUNITY
Start: 2019-11-21 | End: 2021-04-23

## 2021-04-23 NOTE — PHYSICAL EXAM
[Appropriately responsive] : appropriately responsive [Oriented x3] : oriented x3 [Enlarged] : enlarged [FreeTextEntry2] : enlarged ~ 2x3.5 cm kincaid w multiole ulcerated tissue w swelling  and granulation tisshe, b/l labia minor large ulcerated tissue (on left moving onto left majora)

## 2021-04-23 NOTE — PLAN
[FreeTextEntry1] : b/l minora and clitoral lesions\par worse since stopped Imiquimod in February\par restart meds call if not improving as will need biopsy and or IV Imiquimod treatment\par \par pericare and supportive care\par \par r/o BV exacerbating  lesions\par \par s/o abx--test of cure

## 2021-04-23 NOTE — HISTORY OF PRESENT ILLNESS
[FreeTextEntry1] : 50yo P2  here for vulvar burnfing and ulceration PMH HIV, resistant vulvar herpes, DM2 and recent UTI/chronic kidney disease. \par Pt reports worsening vulvar ulcerations since ran out ot Immoquib cream for same\par \par no F/C\par + dysuria worse w tyelnol or NSAIDs, Azo for yeast infection help w itching\par OxyContin (for leg pain) and lidocaine helps\par \par \par retired x 1 year

## 2021-04-24 PROBLEM — E66.9 OBESITY: Status: ACTIVE | Noted: 2021-04-24

## 2021-04-24 PROBLEM — R76.8 ANA POSITIVE: Status: ACTIVE | Noted: 2021-04-24

## 2021-04-24 PROBLEM — R74.8 ELEVATED LIVER ENZYMES: Status: ACTIVE | Noted: 2017-10-24

## 2021-04-24 NOTE — HISTORY OF PRESENT ILLNESS
[Tattoo] : tattoo(s) [Needlestick Exposure] : no needlestick exposure [Infected Sexual Partner] : no infected sexual partner [IV Drug Use] : no IV drug use [Hemodialysis] : no hemodialysis [Transfusion before 1992] : no transfusion before 1992 [Transplant before 1992] : no transplant before 1992 [Alcohol Abuse] : no alcohol abuse [Autoimmune Disorder] : no autoimmune disorder [Household Contact to HBV] : no household contact to HBV [Cocaine Use] : no cocaine use [de-identified] : 52 yo Female with Hx of HIV infection (on Biktarvy since ~ 2015), following with Dr. Jacobs, G6PD deficiency, uncontrolled Type 2 DM (HbA1c 7.5), CKD stage 3, anemia, was referred for abnormal liver enzymes. \par She was noted to have ALP elevation since 2014, with normal transaminases, bilirubin and albumin. \par Bone specific ALP was 118 in 2019 (postmenopausal reference < 22), no GGT, 5`nucleotidase available. \par CT pelvis in 2018 showed prominent degenerative changes in hip and joint space narrowing.\par \par She has been seen by hepatology, Dr. Choi in 2/2020 and was started on Ursodiol, but she stopped > 1 year now. She has not done the MRCP recommended earlier.  [de-identified] : Retired [employer], tattoo > 15 years old; has DM; born in  [place]

## 2021-04-24 NOTE — REVIEW OF SYSTEMS
[Negative] : Psychiatric [Fever] : no fever [Chills] : no chills [Recent Weight Gain (___ Lbs)] : no recent weight gain [Recent Weight Loss (___ Lbs)] : no recent weight loss [Abdominal Pain] : no abdominal pain [Vomiting] : no vomiting [Constipation] : no constipation [Diarrhea] : no diarrhea [Heartburn] : no heartburn [Melena] : no melena [Dysuria] : no dysuria [Itching] : no itching [Easy Bleeding] : no tendency for easy bleeding [Easy Bruising] : no tendency for easy bruising [FreeTextEntry9] : LMP 3/2021 (irregular, approx every 2 months)

## 2021-04-24 NOTE — PHYSICAL EXAM
[Non-Tender] : non-tender [General Appearance - Alert] : alert [General Appearance - In No Acute Distress] : in no acute distress [General Appearance - Well Nourished] : well nourished [General Appearance - Well Developed] : well developed [Sclera] : the sclera and conjunctiva were normal [Oropharynx] : the oropharynx was normal [Neck Appearance] : the appearance of the neck was normal [] : no respiratory distress [Respiration, Rhythm And Depth] : normal respiratory rhythm and effort [Exaggerated Use Of Accessory Muscles For Inspiration] : no accessory muscle use [Auscultation Breath Sounds / Voice Sounds] : lungs were clear to auscultation bilaterally [Heart Rate And Rhythm] : heart rate was normal and rhythm regular [Edema] : there was no peripheral edema [Heart Sounds] : normal S1 and S2 [Obese] : obese [Normal] : normal [Soft, Nontender] : the abdomen was soft and nontender [No Mass] : no masses were palpated [None] : no CVA tenderness [Cervical Lymph Nodes Enlarged Posterior Bilaterally] : posterior cervical [Cervical Lymph Nodes Enlarged Anterior Bilaterally] : anterior cervical [Supraclavicular Lymph Nodes Enlarged Bilaterally] : supraclavicular [Axillary Lymph Nodes Enlarged Bilaterally] : axillary [Inguinal Lymph Nodes Enlarged Bilaterally] : inguinal [No CVA Tenderness] : no ~M costovertebral angle tenderness [No Spinal Tenderness] : no spinal tenderness [Abnormal Walk] : normal gait [Skin Color & Pigmentation] : normal skin color and pigmentation [Skin Turgor] : normal skin turgor [Impaired Insight] : insight and judgment were intact [Oriented To Time, Place, And Person] : oriented to person, place, and time [Affect] : the affect was normal [Mood] : the mood was normal [Scleral Icterus] : No Scleral Icterus [Spider Angioma] : No spider angioma(s) were observed [Abdominal  Ascites] : no ascites [Liver Palpable ___ Finger Breadths Below Costal Margin] : was not palpable below costal margin [Asterixis] : no asterixis observed [Depression] : no depression [Dilated Collat. Veins] : no collateral vein dilation [Striae] : no striae [Ibarra's] : a negative Ibarra's sign [Liver Tender To Palpation] : not tender [FreeTextEntry1] : Grossly intact

## 2021-04-24 NOTE — ASSESSMENT
[FreeTextEntry1] : 50 yo Female with Hx of HIV infection (on Biktarvy since ~ 2015), following with Dr. Jacobs, G6PD deficiency, uncontrolled Type 2 DM (HbA1c 7.5), CKD stage 3, anemia, was referred for abnormal liver enzymes. \par She was noted to have ALP elevation since 2014, with normal transaminases, bilirubin and albumin. \par Bone specific ALP was 118 in 2019 (postmenopausal reference < 22). \par \par Discussed differential diagnosis. \par Ordered blood work including ALP isoenzymes, bone spec ALP, GGT and 5 nucleotidase. Elevated bone spec ALP in 2019, suggests that at least some of the ALP is bone related, but cannot r/o liver disease.\par Has metabolic risk factors, thus NAFLD component is possibility too.  Ordered FibroScan. Discussed life style modifications, diet, and exercise as tolerated (limited ).\par Ordered US abd and then will order MRCP.\par Has Hx of pos DYLAN in past, that occasionally can be observed in NAFLD too, but cannot r/o AI disease, reordered AI workup.\par Depending on above results, if it shows liver origin as well, might need liver biopsy. \par Patient self discontinued Ursodiol, took only for few weeks.\par Biktarvy can cause liver test abnormalities (~11%), but usuaaly mild, transaminases, and patient already had liver test abnormalities prior Biktarvy, thus unlikely to be related.\par \par RTC 6 weeks\par

## 2021-04-27 ENCOUNTER — LABORATORY RESULT (OUTPATIENT)
Age: 51
End: 2021-04-27

## 2021-04-27 ENCOUNTER — APPOINTMENT (OUTPATIENT)
Dept: ENDOCRINOLOGY | Facility: CLINIC | Age: 51
End: 2021-04-27
Payer: COMMERCIAL

## 2021-04-27 VITALS
TEMPERATURE: 98 F | WEIGHT: 224 LBS | BODY MASS INDEX: 31.36 KG/M2 | DIASTOLIC BLOOD PRESSURE: 80 MMHG | HEART RATE: 102 BPM | HEIGHT: 71 IN | OXYGEN SATURATION: 98 % | SYSTOLIC BLOOD PRESSURE: 130 MMHG

## 2021-04-27 DIAGNOSIS — E55.9 VITAMIN D DEFICIENCY, UNSPECIFIED: ICD-10-CM

## 2021-04-27 LAB
BACTERIA UR CULT: ABNORMAL
CANDIDA VAG CYTO: NOT DETECTED
G VAGINALIS+PREV SP MTYP VAG QL MICRO: NOT DETECTED
T VAGINALIS VAG QL WET PREP: NOT DETECTED

## 2021-04-27 PROCEDURE — 99214 OFFICE O/P EST MOD 30 MIN: CPT | Mod: 25

## 2021-04-27 PROCEDURE — 99072 ADDL SUPL MATRL&STAF TM PHE: CPT

## 2021-04-27 PROCEDURE — 95251 CONT GLUC MNTR ANALYSIS I&R: CPT

## 2021-04-27 NOTE — REVIEW OF SYSTEMS
[All other systems negative] : All other systems negative [Fever] : no fever [Dysphagia] : no dysphagia [Chills] : no chills [Neck Pain] : no neck pain [Dysphonia] : no dysphonia [Chest Pain] : no chest pain [Palpitations] : no palpitations [Lower Ext Edema] : no lower extremity edema [Shortness Of Breath] : no shortness of breath [Cough] : no cough [Nausea] : no nausea [Constipation] : no constipation [Abdominal Pain] : no abdominal pain [Vomiting] : no vomiting [Diarrhea] : no diarrhea [Polyuria] : no polyuria [Difficulty Walking] : no difficulty walking [Poor Balance] : steady state balance [Polydipsia] : no polydipsia

## 2021-04-27 NOTE — DATA REVIEWED
[FreeTextEntry1] : Katy data from April 14 - April 27, 2021:\par In target range 28% of the time\par BG high 34% of the time\par BG very high 38%\par BG low/very low: 0%\par BG remains stable overnight, BG noted to spike with meals \par

## 2021-04-27 NOTE — PHYSICAL EXAM
[Alert] : alert [Well Nourished] : well nourished [Healthy Appearance] : healthy appearance [No Acute Distress] : no acute distress [Normal Sclera/Conjunctiva] : normal sclera/conjunctiva [No Proptosis] : no proptosis [Normal Outer Ear/Nose] : the ears and nose were normal in appearance [Normal Hearing] : hearing was normal [No Neck Mass] : no neck mass was observed [Supple] : the neck was supple [Thyroid Not Enlarged] : the thyroid was not enlarged [No Respiratory Distress] : no respiratory distress [No Accessory Muscle Use] : no accessory muscle use [Normal Rate and Effort] : normal respiratory rate and effort [Clear to Auscultation] : lungs were clear to auscultation bilaterally [Normal S1, S2] : normal S1 and S2 [Normal Rate] : heart rate was normal [Regular Rhythm] : with a regular rhythm [No Edema] : no peripheral edema [Normal Bowel Sounds] : normal bowel sounds [Not Tender] : non-tender [Not Distended] : not distended [Soft] : abdomen soft [Spine Straight] : spine straight [Normal Gait] : normal gait [No Clubbing, Cyanosis] : no clubbing  or cyanosis of the fingernails [No Involuntary Movements] : no involuntary movements were seen [Oriented x3] : oriented to person, place, and time [Normal Affect] : the affect was normal [Normal Insight/Judgement] : insight and judgment were intact [Normal Mood] : the mood was normal [Kyphosis] : no kyphosis present

## 2021-04-27 NOTE — ASSESSMENT
[Importance of Diet and Exercise] : importance of diet and exercise to improve glycemic control, achieve weight loss and improve cardiovascular health [Retinopathy Screening] : Patient was referred to ophthalmology for retinopathy screening [FreeTextEntry1] : 51year old woman with uncontrolled DM2, HTN, HLD, vitamin D insufficiency, thyroid nodules, HIV\par \par 1. DM2, uncontrolled:\par - A1c 7.5% in 3/2021, improved from prior\par - Katy (CGM) data for the last 2 weeks reviewed with patient - BG in range only 28% of the time, otherwise high. Suspect recent hyperglycemia related to juice intake with meals - recommended she take 10 units of Admelog with meals, but if able to eliminate juice/sugary drinks from her meals, can reduce Admelog back to 8 units\par - c/w Trulicity to 1.5 mg/week and c/w Lantus 24 units qhs\par - she will continue to use Freestyle Katy to monitor BG\par - recommended optho follow up for screening for retinopathy\par - has CKD 3 and proteinuria, check urine microalbumin/creatinine now\par - c/w dietary changes\par \par 2. HTN: BP at goal, c/w HCTZ\par \par 3. HLD: c/w Lipitor 20 mg qhs, will recheck lipid panel at next visit \par \par 4. Vitamin D insufficiency: c/w vitamin D 1000 units daily \par \par 5. Thyroid nodules: s/p FNA of 2.3 cm right nodule, benign. Will repeat thyroid ultrasound at next visit \par \par Return visit in 3 months.

## 2021-04-27 NOTE — HISTORY OF PRESENT ILLNESS
[FreeTextEntry1] : chief complaint: DM2\par \par   Patient is a 51 year old woman with HIV and uncontrolled DM2 complicated by CKD, HTN, HLD, vitamin D deficiency. A1c 7.5% last week. She has been retired for 10 months now and has been able to follow a diet and has been taking her medications consistently. DM2 was initially diagnosed in 2010, during a hospitalization. Started on insulin at that time. The patient was on basal/bolus insulin for a year and had made major modifications and self-titrated off. Now on Trulicity 1.5 mg/week (adherent). Also on Humalog 8 units before meals as well as Lantus 24 units. She uses the Freestyle Katy to check her BG and Katy data today reveals the following: BG in target range 28% of the time, BG high 34% of the time and very high 38% of the time. No hypoglycemia. Her A1c had improved  to 7.5% last month, however in the last few weeks, she has noted that her BG has been rising. She has been having vitamin C with rafi tea several days and was wondering if that was spiking her BG. However, she brought the vitamin C packet to the office today and it only contains 8 grams of carbs. She has also recently been drinking cranberry juice with all of her meals recently, as she has been having issues with UTIs. \par \par   Last dilated eye exam was about 2 years ago, no known retinopathy. No sx of neuropathy. She has CKD 3 and urine microalbumin/creatinine was elevated in 5/2020. Had hip surgery in the summer of 2020.   For HTN, she takes HCTZ 25 mg daily. For HLD, she takes Lipitor 20 mg daily, dose was increased as her LDL was 226 in 11/2020.   Also with vitamin D insufficiency, takes multivitamin daily as well as Vitamin D 1000 units daily.   \par \par Also with thyroid nodules, thyroid ultrasound in 12/2019 revealed the following: right 2.3 cm nodule, unchanged from prior as well as left 1.1 cm cystic nodule. She went for FNA in 3/2021, results were benign. Not on thyroidal medications. TSH was 0.76 in 11/2020.  No neck complaints.

## 2021-04-28 DIAGNOSIS — R76.8 OTHER SPECIFIED ABNORMAL IMMUNOLOGICAL FINDINGS IN SERUM: ICD-10-CM

## 2021-04-28 LAB
ALBUMIN SERPL ELPH-MCNC: 3.8 G/DL
ALP BLD-CCNC: 306 U/L
ALT SERPL-CCNC: 10 U/L
ANION GAP SERPL CALC-SCNC: 10 MMOL/L
AST SERPL-CCNC: 12 U/L
BASOPHILS # BLD AUTO: 0.07 K/UL
BASOPHILS NFR BLD AUTO: 1.6 %
BILIRUB DIRECT SERPL-MCNC: 0.1 MG/DL
BILIRUB INDIRECT SERPL-MCNC: 0.4 MG/DL
BILIRUB SERPL-MCNC: 0.6 MG/DL
BUN SERPL-MCNC: 19 MG/DL
CALCIUM SERPL-MCNC: 9.3 MG/DL
CHLORIDE SERPL-SCNC: 103 MMOL/L
CO2 SERPL-SCNC: 22 MMOL/L
CREAT SERPL-MCNC: 1.38 MG/DL
EOSINOPHIL # BLD AUTO: 0.39 K/UL
EOSINOPHIL NFR BLD AUTO: 9 %
GGT SERPL-CCNC: 15 U/L
GLUCOSE SERPL-MCNC: 259 MG/DL
HBV CORE IGG+IGM SER QL: REACTIVE
HBV E AB SER QL: REACTIVE
HBV E AG SER QL: NONREACTIVE
HBV SURFACE AB SER QL: REACTIVE
HBV SURFACE AG SER QL: NONREACTIVE
HCT VFR BLD CALC: 38.9 %
HGB BLD-MCNC: 12.2 G/DL
IMM GRANULOCYTES NFR BLD AUTO: 0.2 %
INR PPP: 1.09 RATIO
LYMPHOCYTES # BLD AUTO: 1.12 K/UL
LYMPHOCYTES NFR BLD AUTO: 26 %
MAN DIFF?: NORMAL
MCHC RBC-ENTMCNC: 29.8 PG
MCHC RBC-ENTMCNC: 31.4 GM/DL
MCV RBC AUTO: 94.9 FL
MONOCYTES # BLD AUTO: 0.33 K/UL
MONOCYTES NFR BLD AUTO: 7.7 %
NEUTROPHILS # BLD AUTO: 2.39 K/UL
NEUTROPHILS NFR BLD AUTO: 55.5 %
PLATELET # BLD AUTO: 273 K/UL
POTASSIUM SERPL-SCNC: 4.7 MMOL/L
PROT SERPL-MCNC: 8.1 G/DL
PT BLD: 12.8 SEC
RBC # BLD: 4.1 M/UL
RBC # FLD: 15.9 %
SODIUM SERPL-SCNC: 135 MMOL/L
WBC # FLD AUTO: 4.31 K/UL

## 2021-04-29 LAB
CREAT SPEC-SCNC: 71 MG/DL
MICROALBUMIN 24H UR DL<=1MG/L-MCNC: 23.9 MG/DL
MICROALBUMIN/CREAT 24H UR-RTO: 335 MG/G

## 2021-05-03 ENCOUNTER — APPOINTMENT (OUTPATIENT)
Dept: HEPATOLOGY | Facility: CLINIC | Age: 51
End: 2021-05-03

## 2021-05-24 LAB
5NT SERPL-CCNC: 4 IU/L
ALP BLD-CCNC: 315 IU/L
ALP BONE CFR SERPL: 72 %
ALP BONE SERPL-MCNC: 100 UG/L
ALP INTEST CFR SERPL: 6 %
ALP LIVER CFR SERPL: 22 %
ANA PAT FLD IF-IMP: ABNORMAL
ANA SER IF-ACNC: ABNORMAL
DEPRECATED KAPPA LC FREE/LAMBDA SER: 3.45 RATIO
HBV DNA # SERPL NAA+PROBE: NOT DETECTED IU/ML
HEPB DNA PCR LOG: NOT DETECTED LOG10IU/ML
IGA SER QL IEP: 344 MG/DL
IGG SER QL IEP: 2812 MG/DL
IGG4 SER-MCNC: 209 MG/DL
IGM SER QL IEP: 88 MG/DL
KAPPA LC CSF-MCNC: 4.24 MG/DL
KAPPA LC SERPL-MCNC: 14.64 MG/DL
MITOCHONDRIA AB SER IF-ACNC: NORMAL
SMOOTH MUSCLE AB SER QL IF: NORMAL

## 2021-05-26 ENCOUNTER — NON-APPOINTMENT (OUTPATIENT)
Age: 51
End: 2021-05-26

## 2021-05-27 ENCOUNTER — NON-APPOINTMENT (OUTPATIENT)
Age: 51
End: 2021-05-27

## 2021-05-27 ENCOUNTER — INPATIENT (INPATIENT)
Facility: HOSPITAL | Age: 51
LOS: 19 days | Discharge: HOME | DRG: 975 | End: 2021-06-16
Attending: HOSPITALIST | Admitting: OBSTETRICS & GYNECOLOGY
Payer: COMMERCIAL

## 2021-05-27 VITALS
RESPIRATION RATE: 20 BRPM | SYSTOLIC BLOOD PRESSURE: 157 MMHG | OXYGEN SATURATION: 99 % | HEART RATE: 105 BPM | HEIGHT: 71 IN | DIASTOLIC BLOOD PRESSURE: 97 MMHG | WEIGHT: 225.09 LBS | TEMPERATURE: 98 F

## 2021-05-27 DIAGNOSIS — N90.89 OTHER SPECIFIED NONINFLAMMATORY DISORDERS OF VULVA AND PERINEUM: ICD-10-CM

## 2021-05-27 DIAGNOSIS — Z98.82 BREAST IMPLANT STATUS: Chronic | ICD-10-CM

## 2021-05-27 LAB
4/8 RATIO: 0.35 RATIO — LOW (ref 0.9–3.6)
A1C WITH ESTIMATED AVERAGE GLUCOSE RESULT: 9 % — HIGH (ref 4–5.6)
ABS CD8: 905 /UL — HIGH (ref 142–740)
ALBUMIN SERPL ELPH-MCNC: 3.8 G/DL — SIGNIFICANT CHANGE UP (ref 3.3–5)
ALP SERPL-CCNC: 336 U/L — HIGH (ref 40–120)
ALT FLD-CCNC: 11 U/L — SIGNIFICANT CHANGE UP (ref 10–45)
ANION GAP SERPL CALC-SCNC: 13 MMOL/L — SIGNIFICANT CHANGE UP (ref 5–17)
APPEARANCE UR: CLEAR — SIGNIFICANT CHANGE UP
AST SERPL-CCNC: 14 U/L — SIGNIFICANT CHANGE UP (ref 10–40)
BACTERIA # UR AUTO: NEGATIVE — SIGNIFICANT CHANGE UP
BASE EXCESS BLDV CALC-SCNC: 0.6 MMOL/L — SIGNIFICANT CHANGE UP (ref -2–2)
BASOPHILS # BLD AUTO: 0.05 K/UL — SIGNIFICANT CHANGE UP (ref 0–0.2)
BASOPHILS NFR BLD AUTO: 0.8 % — SIGNIFICANT CHANGE UP (ref 0–2)
BILIRUB SERPL-MCNC: 0.4 MG/DL — SIGNIFICANT CHANGE UP (ref 0.2–1.2)
BILIRUB UR-MCNC: NEGATIVE — SIGNIFICANT CHANGE UP
BUN SERPL-MCNC: 16 MG/DL — SIGNIFICANT CHANGE UP (ref 7–23)
CA-I SERPL-SCNC: 1.27 MMOL/L — SIGNIFICANT CHANGE UP (ref 1.12–1.3)
CALCIUM SERPL-MCNC: 9.8 MG/DL — SIGNIFICANT CHANGE UP (ref 8.4–10.5)
CD3 BLASTS SPEC-ACNC: 1323 /UL — SIGNIFICANT CHANGE UP (ref 672–1870)
CD3 BLASTS SPEC-ACNC: 88 % — HIGH (ref 59–83)
CD4 %: 21 % — LOW (ref 30–62)
CD8 %: 60 % — HIGH (ref 12–36)
CHLORIDE BLDV-SCNC: 109 MMOL/L — HIGH (ref 96–108)
CHLORIDE SERPL-SCNC: 104 MMOL/L — SIGNIFICANT CHANGE UP (ref 96–108)
CO2 BLDV-SCNC: 27 MMOL/L — SIGNIFICANT CHANGE UP (ref 22–30)
CO2 SERPL-SCNC: 20 MMOL/L — LOW (ref 22–31)
COLOR SPEC: SIGNIFICANT CHANGE UP
CREAT SERPL-MCNC: 1.5 MG/DL — HIGH (ref 0.5–1.3)
DIFF PNL FLD: ABNORMAL
EOSINOPHIL # BLD AUTO: 0.42 K/UL — SIGNIFICANT CHANGE UP (ref 0–0.5)
EOSINOPHIL NFR BLD AUTO: 6.8 % — HIGH (ref 0–6)
EPI CELLS # UR: 0 /HPF — SIGNIFICANT CHANGE UP
ESTIMATED AVERAGE GLUCOSE: 212 MG/DL — HIGH (ref 68–114)
GAS PNL BLDV: 140 MMOL/L — SIGNIFICANT CHANGE UP (ref 135–145)
GAS PNL BLDV: SIGNIFICANT CHANGE UP
GAS PNL BLDV: SIGNIFICANT CHANGE UP
GLUCOSE BLDC GLUCOMTR-MCNC: 250 MG/DL — HIGH (ref 70–99)
GLUCOSE BLDV-MCNC: 201 MG/DL — HIGH (ref 70–99)
GLUCOSE SERPL-MCNC: 187 MG/DL — HIGH (ref 70–99)
GLUCOSE UR QL: NEGATIVE — SIGNIFICANT CHANGE UP
HAV IGM SER-ACNC: SIGNIFICANT CHANGE UP
HBV CORE IGM SER-ACNC: SIGNIFICANT CHANGE UP
HBV SURFACE AG SER-ACNC: SIGNIFICANT CHANGE UP
HCG SERPL-ACNC: <2 MIU/ML — SIGNIFICANT CHANGE UP
HCG UR QL: NEGATIVE — SIGNIFICANT CHANGE UP
HCO3 BLDV-SCNC: 26 MMOL/L — SIGNIFICANT CHANGE UP (ref 21–29)
HCT VFR BLD CALC: 37.5 % — SIGNIFICANT CHANGE UP (ref 34.5–45)
HCT VFR BLDA CALC: 35 % — LOW (ref 39–50)
HCV AB S/CO SERPL IA: 0.19 S/CO — SIGNIFICANT CHANGE UP (ref 0–0.99)
HCV AB SERPL-IMP: SIGNIFICANT CHANGE UP
HGB BLD CALC-MCNC: 11.4 G/DL — LOW (ref 11.5–15.5)
HGB BLD-MCNC: 11.7 G/DL — SIGNIFICANT CHANGE UP (ref 11.5–15.5)
HYALINE CASTS # UR AUTO: 1 /LPF — SIGNIFICANT CHANGE UP (ref 0–2)
IMM GRANULOCYTES NFR BLD AUTO: 0.3 % — SIGNIFICANT CHANGE UP (ref 0–1.5)
KETONES UR-MCNC: NEGATIVE — SIGNIFICANT CHANGE UP
LACTATE BLDV-MCNC: 1.6 MMOL/L — SIGNIFICANT CHANGE UP (ref 0.7–2)
LEUKOCYTE ESTERASE UR-ACNC: ABNORMAL
LYMPHOCYTES # BLD AUTO: 1.32 K/UL — SIGNIFICANT CHANGE UP (ref 1–3.3)
LYMPHOCYTES # BLD AUTO: 21.3 % — SIGNIFICANT CHANGE UP (ref 13–44)
MCHC RBC-ENTMCNC: 29.2 PG — SIGNIFICANT CHANGE UP (ref 27–34)
MCHC RBC-ENTMCNC: 31.2 GM/DL — LOW (ref 32–36)
MCV RBC AUTO: 93.5 FL — SIGNIFICANT CHANGE UP (ref 80–100)
MONOCYTES # BLD AUTO: 0.43 K/UL — SIGNIFICANT CHANGE UP (ref 0–0.9)
MONOCYTES NFR BLD AUTO: 6.9 % — SIGNIFICANT CHANGE UP (ref 2–14)
NEUTROPHILS # BLD AUTO: 3.95 K/UL — SIGNIFICANT CHANGE UP (ref 1.8–7.4)
NEUTROPHILS NFR BLD AUTO: 63.9 % — SIGNIFICANT CHANGE UP (ref 43–77)
NITRITE UR-MCNC: NEGATIVE — SIGNIFICANT CHANGE UP
NRBC # BLD: 0 /100 WBCS — SIGNIFICANT CHANGE UP (ref 0–0)
PCO2 BLDV: 47 MMHG — SIGNIFICANT CHANGE UP (ref 35–50)
PH BLDV: 7.36 — SIGNIFICANT CHANGE UP (ref 7.35–7.45)
PH UR: 6 — SIGNIFICANT CHANGE UP (ref 5–8)
PLATELET # BLD AUTO: 279 K/UL — SIGNIFICANT CHANGE UP (ref 150–400)
PO2 BLDV: 31 MMHG — SIGNIFICANT CHANGE UP (ref 25–45)
POTASSIUM BLDV-SCNC: 3.7 MMOL/L — SIGNIFICANT CHANGE UP (ref 3.5–5.3)
POTASSIUM SERPL-MCNC: 3.8 MMOL/L — SIGNIFICANT CHANGE UP (ref 3.5–5.3)
POTASSIUM SERPL-SCNC: 3.8 MMOL/L — SIGNIFICANT CHANGE UP (ref 3.5–5.3)
PROT SERPL-MCNC: 8.6 G/DL — HIGH (ref 6–8.3)
PROT UR-MCNC: ABNORMAL
RBC # BLD: 4.01 M/UL — SIGNIFICANT CHANGE UP (ref 3.8–5.2)
RBC # FLD: 14.5 % — SIGNIFICANT CHANGE UP (ref 10.3–14.5)
RBC CASTS # UR COMP ASSIST: 3 /HPF — SIGNIFICANT CHANGE UP (ref 0–4)
SAO2 % BLDV: 51 % — LOW (ref 67–88)
SARS-COV-2 RNA SPEC QL NAA+PROBE: SIGNIFICANT CHANGE UP
SODIUM SERPL-SCNC: 137 MMOL/L — SIGNIFICANT CHANGE UP (ref 135–145)
SP GR SPEC: 1.02 — SIGNIFICANT CHANGE UP (ref 1.01–1.02)
T-CELL CD4 SUBSET PNL BLD: 315 /UL — LOW (ref 489–1457)
UROBILINOGEN FLD QL: NEGATIVE — SIGNIFICANT CHANGE UP
WBC # BLD: 6.19 K/UL — SIGNIFICANT CHANGE UP (ref 3.8–10.5)
WBC # FLD AUTO: 6.19 K/UL — SIGNIFICANT CHANGE UP (ref 3.8–10.5)
WBC UR QL: 26 /HPF — HIGH (ref 0–5)

## 2021-05-27 PROCEDURE — 76830 TRANSVAGINAL US NON-OB: CPT | Mod: 26

## 2021-05-27 PROCEDURE — 99284 EMERGENCY DEPT VISIT MOD MDM: CPT

## 2021-05-27 PROCEDURE — 93975 VASCULAR STUDY: CPT | Mod: 26

## 2021-05-27 PROCEDURE — 99222 1ST HOSP IP/OBS MODERATE 55: CPT | Mod: GC

## 2021-05-27 PROCEDURE — 99254 IP/OBS CNSLTJ NEW/EST MOD 60: CPT

## 2021-05-27 RX ORDER — SODIUM CHLORIDE 9 MG/ML
500 INJECTION INTRAMUSCULAR; INTRAVENOUS; SUBCUTANEOUS ONCE
Refills: 0 | Status: COMPLETED | OUTPATIENT
Start: 2021-05-27 | End: 2021-05-27

## 2021-05-27 RX ORDER — INSULIN GLARGINE 100 [IU]/ML
20 INJECTION, SOLUTION SUBCUTANEOUS AT BEDTIME
Refills: 0 | Status: DISCONTINUED | OUTPATIENT
Start: 2021-05-27 | End: 2021-06-05

## 2021-05-27 RX ORDER — ACETAMINOPHEN 500 MG
975 TABLET ORAL EVERY 6 HOURS
Refills: 0 | Status: DISCONTINUED | OUTPATIENT
Start: 2021-05-27 | End: 2021-06-16

## 2021-05-27 RX ORDER — IBUPROFEN 200 MG
400 TABLET ORAL EVERY 6 HOURS
Refills: 0 | Status: DISCONTINUED | OUTPATIENT
Start: 2021-05-27 | End: 2021-06-14

## 2021-05-27 RX ORDER — FOSCARNET SODIUM 24 MG/ML
2100 INJECTION, SOLUTION INTRAVENOUS EVERY 12 HOURS
Refills: 0 | Status: DISCONTINUED | OUTPATIENT
Start: 2021-05-27 | End: 2021-06-16

## 2021-05-27 RX ORDER — DEXTROSE 50 % IN WATER 50 %
12.5 SYRINGE (ML) INTRAVENOUS ONCE
Refills: 0 | Status: DISCONTINUED | OUTPATIENT
Start: 2021-05-27 | End: 2021-06-12

## 2021-05-27 RX ORDER — SODIUM CHLORIDE 9 MG/ML
1000 INJECTION, SOLUTION INTRAVENOUS
Refills: 0 | Status: DISCONTINUED | OUTPATIENT
Start: 2021-05-27 | End: 2021-06-12

## 2021-05-27 RX ORDER — DEXTROSE 50 % IN WATER 50 %
25 SYRINGE (ML) INTRAVENOUS ONCE
Refills: 0 | Status: DISCONTINUED | OUTPATIENT
Start: 2021-05-27 | End: 2021-06-16

## 2021-05-27 RX ORDER — INSULIN LISPRO 100/ML
VIAL (ML) SUBCUTANEOUS
Refills: 0 | Status: DISCONTINUED | OUTPATIENT
Start: 2021-05-27 | End: 2021-06-16

## 2021-05-27 RX ORDER — HEPARIN SODIUM 5000 [USP'U]/ML
5000 INJECTION INTRAVENOUS; SUBCUTANEOUS EVERY 12 HOURS
Refills: 0 | Status: DISCONTINUED | OUTPATIENT
Start: 2021-05-27 | End: 2021-06-16

## 2021-05-27 RX ORDER — BICTEGRAVIR SODIUM, EMTRICITABINE, AND TENOFOVIR ALAFENAMIDE FUMARATE 30; 120; 15 MG/1; MG/1; MG/1
1 TABLET ORAL DAILY
Refills: 0 | Status: DISCONTINUED | OUTPATIENT
Start: 2021-05-27 | End: 2021-06-16

## 2021-05-27 RX ORDER — DEXTROSE 50 % IN WATER 50 %
15 SYRINGE (ML) INTRAVENOUS ONCE
Refills: 0 | Status: DISCONTINUED | OUTPATIENT
Start: 2021-05-27 | End: 2021-06-16

## 2021-05-27 RX ORDER — SODIUM CHLORIDE 9 MG/ML
500 INJECTION INTRAMUSCULAR; INTRAVENOUS; SUBCUTANEOUS
Refills: 0 | Status: DISCONTINUED | OUTPATIENT
Start: 2021-05-27 | End: 2021-05-28

## 2021-05-27 RX ORDER — ACETAMINOPHEN 500 MG
975 TABLET ORAL ONCE
Refills: 0 | Status: COMPLETED | OUTPATIENT
Start: 2021-05-27 | End: 2021-05-27

## 2021-05-27 RX ORDER — GLUCAGON INJECTION, SOLUTION 0.5 MG/.1ML
1 INJECTION, SOLUTION SUBCUTANEOUS ONCE
Refills: 0 | Status: DISCONTINUED | OUTPATIENT
Start: 2021-05-27 | End: 2021-06-12

## 2021-05-27 RX ADMIN — INSULIN GLARGINE 20 UNIT(S): 100 INJECTION, SOLUTION SUBCUTANEOUS at 21:47

## 2021-05-27 RX ADMIN — Medication 400 MILLIGRAM(S): at 21:04

## 2021-05-27 RX ADMIN — SODIUM CHLORIDE 1000 MILLILITER(S): 9 INJECTION INTRAMUSCULAR; INTRAVENOUS; SUBCUTANEOUS at 20:30

## 2021-05-27 RX ADMIN — FOSCARNET SODIUM 175 MILLIGRAM(S): 24 INJECTION, SOLUTION INTRAVENOUS at 21:05

## 2021-05-27 RX ADMIN — Medication 975 MILLIGRAM(S): at 22:37

## 2021-05-27 RX ADMIN — Medication 400 MILLIGRAM(S): at 21:34

## 2021-05-27 RX ADMIN — Medication 975 MILLIGRAM(S): at 22:07

## 2021-05-27 RX ADMIN — BICTEGRAVIR SODIUM, EMTRICITABINE, AND TENOFOVIR ALAFENAMIDE FUMARATE 1 TABLET(S): 30; 120; 15 TABLET ORAL at 21:47

## 2021-05-27 RX ADMIN — SODIUM CHLORIDE 500 MILLILITER(S): 9 INJECTION INTRAMUSCULAR; INTRAVENOUS; SUBCUTANEOUS at 18:01

## 2021-05-27 NOTE — ED ADULT NURSE NOTE - OBJECTIVE STATEMENT
51 yr old female from home, sent by Gyn for genital herpes x 4-5 mos, on valcyclovir x 2 yrs, (hx HIV/AIDS), has been on multiple abx without 50 y/o female PMHx Genital Herpes on valacyclovir x2 years, DM on insulin, HIV unknown last CD4 count/viral load now presenting to the ED with worsening labial mass not responding with oral valtrex. Patient reported the infection has been ongoing and worsening over the last year. Patient reported dysuria, yellow vaginal discharge and left lymphadenopathy/pelvic pain. Patient was instructed by GYN and ID if no  improvement will need IV meds. Patient also reported she had a biopsy of the lesion by her GYN a few months ago.

## 2021-05-27 NOTE — H&P ADULT - NSHPLABSRESULTS_GEN_ALL_CORE
11.7   6.19  )-----------( 279      ( 05-27 @ 10:57 )             37.5     05-27 @ 10:54    137  |  104  |  16  ----------------------------<  187  3.8   |  20  |  1.50    Ca    9.8      05-27 @ 10:54    TPro  8.6  /  Alb  3.8  /  TBili  0.4  /  DBili  x   /  AST  14  /  ALT  11  /  AlkPhos  336  05-27 @ 10:54

## 2021-05-27 NOTE — ED PROVIDER NOTE - PROGRESS NOTE DETAILS
DORI Richard: patient seen by ID attending Dr. Geol who recommended admission for IV foscarnet and performed a bedside scraping of the site. ID attending will place orders for culture and IV foscarnet. awaiting final GYN recs repeat to call to the gyn service awaiting call back

## 2021-05-27 NOTE — PROVIDER CONTACT NOTE (MEDICATION) - SITUATION
patient notified RN that she self medicated herself with home med humalog 10 units at 2100 prior POCT check

## 2021-05-27 NOTE — ED PROVIDER NOTE - CLINICAL SUMMARY MEDICAL DECISION MAKING FREE TEXT BOX
ap - 51 F w/ hx of HIV unknown last cd4 count, DM, prior tummy tuck and breast implants follows w/ Dr. Cj yao (OBGYN) presents to the ER w/ persistent HSV infection. Reports 6 months ago, external lesion was bx'd. PT reports Pt follows w/ infectious disease Eliana Yao ap - 51 F w/ hx of HIV unknown last cd4 count, DM, prior tummy tuck and breast implants follows w/ Dr. Cj yao (OBGYN) presents to the ER w/ persistent HSV infection. Reports 6 months ago, external lesion was bx'd. [id number] by 1.5 cm external mass, PT reports Pt follows w/ infectious disease Eliana Yao. plan for gyn c/s and id c/s. will tx w/ foscarnet per id

## 2021-05-27 NOTE — H&P ADULT - NSHPPHYSICALEXAM_GEN_ALL_CORE
Vital Signs Last 24 Hrs  T(C): 36.7 (27 May 2021 18:56), Max: 37 (27 May 2021 10:35)  T(F): 98 (27 May 2021 18:56), Max: 98.6 (27 May 2021 10:35)  HR: 78 (27 May 2021 18:56) (78 - 105)  BP: 144/91 (27 May 2021 18:56) (130/87 - 157/97)  BP(mean): --  RR: 18 (27 May 2021 18:56) (18 - 20)  SpO2: 99% (27 May 2021 18:56) (97% - 99%)    Gen NAD AOx3  CV RRR S1 S2  Pulm CTA b/l   Abd soft, nontender  : 2x4cm exophytic, granulated lesion on right labia, exquisitely tender to touch - non communication with left labia. Normal uretheral meatus. Normal left labia. No palpable lymphadenopathy on exam. No bleeding or discharge on pad  *Examined with Dr. Wood*  Ext: nontender b/l

## 2021-05-27 NOTE — H&P ADULT - NSICDXPASTMEDICALHX_GEN_ALL_CORE_FT
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

## 2021-05-27 NOTE — H&P ADULT - HISTORY OF PRESENT ILLNESS
50yo  perimenopausal female with PMHx HIV, DM2, presenting with an acutely worsening infection for HSV. Pt reports first outbreak two years ago, with no resolution of symptoms since that time. Pt has been treated with Valtrex TID for almost two years with no resolution of symptoms. Pt reports this particular outbreak on her right labia has gotten progressively worse over past 6 months. Pt also with Imiquid cream for 6 months with no improvement. Pt reports accompanying dysuria and vaginal discharge. Pt has been intermittently treated with abx for UTI and discharge, and pt reports symptoms return within days of completing course.     Patient denies CP, SOB, Cough, abdominal pain, N/V/D, fever, rash, dizziness.   No fever or chills, dysuria due to the ulceration.     PAST MEDICAL & SURGICAL HISTORY:  Diabetes mellitus -- -200, wearing CGM  HIV  Herpes genitalis  S/P Tubal Ligation  S/P bilateral breast implants    ObHx: NSVDx2

## 2021-05-27 NOTE — CONSULT NOTE ADULT - SUBJECTIVE AND OBJECTIVE BOX
Patient is a 51y old  Female who presents with a chief complaint of genital lesions    HPI:  50 y/o female PMHx Genital Herpes on valacyclovir x2 years, DM on insulin, HIV unknown last CD4 count/viral load now presenting to the ED with worsening labial mass not responding with oral valtrex. Patient reported the infection has been ongoing and worsening over the last 6 months. Patient reported dysuria, yellow vaginal discharge and left lymphadenopathy/pelvic pain. Patient denied CP, SOB, Cough, abdominal pain, N/V/D, fever, rash, dizziness.   This flare started 6 months ago and she has been on valtrex and imiquimod cream for 6 months with no improvement. No fever or chills, dysuria due to the ulceration. No headaches or nausea and vomiting, She is well controlled, March 2021 her viral load was <30 and CD4>200, with good % and ratio.       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 Fevers absent    Skin: No rash  	  Ophthalmologic: Denies any visual complaints, discharge, redness.  	  ENT: No nasal congestion or throat pain.     Respiratory and Thorax: No cough, sputum. Denies shortness of breath.  	  Cardiovascular: No chest pain, palpitations.    Gastrointestinal: No nausea, abdominal pain or diarrhea.    Genitourinary: per HPI     Musculoskeletal: No joint swelling.    Neurological: No extremity weakness.    Psychiatric: No hallucinations	    Extremity: No swelling     Endocrine: No polyuria or polydipsia     Allergic/Immunologic: No hives       Social history:  single, lives with family, no smoking.         FAMILY HISTORY:  + family hx of DM.       Allergies  No Known Allergies      Antimicrobials:      Vital Signs Last 24 Hrs  T(C): 37 (27 May 2021 10:35), Max: 37 (27 May 2021 10:35)  T(F): 98.6 (27 May 2021 10:35), Max: 98.6 (27 May 2021 10:35)  HR: 87 (27 May 2021 10:35) (87 - 105)  BP: 146/96 (27 May 2021 10:35) (146/96 - 157/97)  BP(mean): --  RR: 19 (27 May 2021 10:35) (19 - 20)  SpO2: 99% (27 May 2021 10:35) (99% - 99%)      PHYSICAL EXAM: Patient in no acute distress.    Constitutional: Comfortable. Awake and alert    Eyes: No discharge or conjunctival injection    ENT: No thrush. No pharyngeal exudate    Neck: Supple,     Respiratory: Good air entry bilaterally.    Cardiovascular: S1 S2 wnl, No murmurs.    Gastrointestinal: Soft BS(+) no tenderness, non distended.    Genitourinary: ulcerated mass projecting from vulva, tender, some drainage.     Extremities: No edema.    Vascular: peripheral pulses felt    Neurological: AAO X 3. No grossly focal deficits.    Skin: No rash     Musculoskeletal: No joint swelling.    Psychiatric: Affect normal.                              11.7   6.19  )-----------( 279      ( 27 May 2021 10:57 )             37.5       05-27    137  |  104  |  16  ----------------------------<  187<H>  3.8   |  20<L>  |  1.50<H>    Ca    9.8      27 May 2021 10:54    TPro  8.6<H>  /  Alb  3.8  /  TBili  0.4  /  DBili  x   /  AST  14  /  ALT  11  /  AlkPhos  336<H>  05-27      urinUrinalysis + Microscopic Examination (05.27.21 @ 14:12)   Glucose Qualitative, Urine: Negative   Blood, Urine: Trace   Color: Light Yellow   Ketone - Urine: Negative   Bilirubin: Negative   Urine Appearance: Clear   Urobilinogen: Negative   Specific Gravity: 1.017   Protein, Urine: 30 mg/dL   pH Urine: 6.0   Leukocyte Esterase Concentration: Large   Nitrite: Negative   Red Blood Cell - Urine: 3 /hpf   White Blood Cell - Urine: 26 /HPF   Epithelial Cells: 0 /hpf   Hyaline Casts: 1 /lpf   Bacteria: Negative     COVID-19 PCR (05.27.21 @ 10:46)   COVID-19 PCR: NotDetec:       Radiology:     < from: US Doppler Pelvis (05.27.21 @ 11:50) >  IMPRESSION:  No evidence of adnexal mass or fluid collection. No hydrosalpinx seen.

## 2021-05-27 NOTE — H&P ADULT - ATTENDING COMMENTS
OB attg note    Pt seen and examined. 52yo  with hx of DM, HIV well controlled, and HSV here with persistent R vulvar mass. Has been treated outpatient with valtrex and imiquimod but reports significant discomfort with vulvar mass worsening over last 6 mo. Per plan with gyn and ID, pt planned for inpatient admission for IV medications. Also reports intermittent yeast and UTI sx, recently completed abx for UTI. Patient currently stable, on exam has 5x3cm exophytic erythematous mass that encompasses entirety of R vulva. No e/o urinary obstruction. Per ID, plan for foscarnet IV and recommend biopsy with HSV viral cultures.     Yonny SCOTT

## 2021-05-27 NOTE — CONSULT NOTE ADULT - ASSESSMENT
50 y/o female PMHx Genital Herpes on valacyclovir x2 years, DM on insulin, HIV unknown last CD4 count/viral load now presenting to the ED with worsening labial mass not responding with oral valtrex    overall HIV infection, Resistant HSV infection. + vulvar mass      Plan:   given pt already on valtrex for 6 months with no improvement will need alternative therapy   will start foscarnet  monitor CMP daily as it can lead to significant electrolyte abnormalities  monitor CBC daily to monitor WBC  culture viral herpes sent, so the sensitivities can be performed  f/u GC/Chl  follow up syphilis screen.   given mass like appearance, recommend a bx of the mass and send it for viral culture and pathology too      HIV infection: well controlled   c/w bicktarvy       Plan discussed with ER PA, Gyn resident.       Nida Huitron  Pager: [phone]. If no response or past 5 pm call [phone].     50 y/o female PMHx Genital Herpes on valacyclovir x2 years, DM on insulin, HIV unknown last CD4 count/viral load now presenting to the ED with worsening labial mass not responding with oral valtrex    overall HIV infection, Resistant HSV infection. + vulvar mass      Plan:   given pt already on valtrex for 6 months with no improvement will need alternative therapy   will start foscarnet  will need 500 ml bolus of NS prior to the foscarnet infusion daily.   monitor CMP daily as it can lead to significant electrolyte abnormalities  monitor CBC daily to monitor WBC  culture viral herpes sent, so the sensitivities can be performed  f/u GC/Chl  follow up syphilis screen.   given mass like appearance, recommend a bx of the mass and send it for viral culture and pathology too      HIV infection: well controlled   c/w bicktarvy       Plan discussed with ER PA, Gyn resident.       Nida Huitron  Pager: [phone]. If no response or past 5 pm call [phone].

## 2021-05-27 NOTE — PHARMACOTHERAPY INTERVENTION NOTE - COMMENTS
MICHELLE Wolf with history of HSV and prolonged treatment with concern for resistant HSV.  Per Dr. Huitron, patient to start foscarnet.      From starting dose of 40mg/kg q8h for resistant HSV, with her current CrCl/kg of 0.7 for renal dosing, her dose would be 25mg/kg q12h (based on adjusted body weight of 83kg = 2100mg q12h).      Would recommend giving IV hydration with foscarnet doses.  Since low doses, can give 500mL of fluid prior to foscarnet and continue if can tolerate.  Monitor electrolytes (calcium, magnesium, potassium, phosphorus) frequently while on foscarnet and supplement as needed.    Tammy Lewis, PharmD, BCIDP  Clinical Pharmacist, Infectious Diseases  Cell: [phone]/Pager: [phone]

## 2021-05-27 NOTE — ED ADULT TRIAGE NOTE - CHIEF COMPLAINT QUOTE
diagnosed with genital herpes a couple months ago. Sent in by ID doctor  taking valacyclovir with no relief

## 2021-05-27 NOTE — ED PROVIDER NOTE - OBJECTIVE STATEMENT
50 y/o female PMHx Genital Herpes on valacyclovir x2 years, DM on insulin, HIV unknown last CD4 count/viral load now presenting to the ED with worsening labial mass not responding with oral valtrex. Patient reported the infection has been ongoing and worsening over the last year. Patient reported dysuria, yellow vaginal discharge and left lymphadenopathy/pelvic pain. Patient was instructed by GYN and ID if no  improvement will need IV meds. Patient also reported she had a biopsy of the lesion by her GYN a few months ago. Patient denied CP, SOB, Cough, abdominal pain, N/V/D, fever, rash, dizziness,       GYN Dr Eliana Yao   ID Dr. Mikhail Jacobs

## 2021-05-27 NOTE — PROVIDER CONTACT NOTE (MEDICATION) - RECOMMENDATIONS
notify provider; pt educated not to use home meds during hospital stay, pt verbalized understanding.

## 2021-05-28 ENCOUNTER — RESULT REVIEW (OUTPATIENT)
Age: 51
End: 2021-05-28

## 2021-05-28 DIAGNOSIS — Z29.9 ENCOUNTER FOR PROPHYLACTIC MEASURES, UNSPECIFIED: ICD-10-CM

## 2021-05-28 DIAGNOSIS — N18.30 CHRONIC KIDNEY DISEASE, STAGE 3 UNSPECIFIED: ICD-10-CM

## 2021-05-28 DIAGNOSIS — N90.89 OTHER SPECIFIED NONINFLAMMATORY DISORDERS OF VULVA AND PERINEUM: ICD-10-CM

## 2021-05-28 DIAGNOSIS — B20 HUMAN IMMUNODEFICIENCY VIRUS [HIV] DISEASE: ICD-10-CM

## 2021-05-28 DIAGNOSIS — E11.9 TYPE 2 DIABETES MELLITUS WITHOUT COMPLICATIONS: ICD-10-CM

## 2021-05-28 LAB
ALBUMIN SERPL ELPH-MCNC: 3.3 G/DL — SIGNIFICANT CHANGE UP (ref 3.3–5)
ALP SERPL-CCNC: 281 U/L — HIGH (ref 40–120)
ALT FLD-CCNC: 8 U/L — LOW (ref 10–45)
ANION GAP SERPL CALC-SCNC: 13 MMOL/L — SIGNIFICANT CHANGE UP (ref 5–17)
AST SERPL-CCNC: 13 U/L — SIGNIFICANT CHANGE UP (ref 10–40)
BILIRUB SERPL-MCNC: 0.3 MG/DL — SIGNIFICANT CHANGE UP (ref 0.2–1.2)
BUN SERPL-MCNC: 16 MG/DL — SIGNIFICANT CHANGE UP (ref 7–23)
CALCIUM SERPL-MCNC: 8.9 MG/DL — SIGNIFICANT CHANGE UP (ref 8.4–10.5)
CHLORIDE SERPL-SCNC: 107 MMOL/L — SIGNIFICANT CHANGE UP (ref 96–108)
CO2 SERPL-SCNC: 20 MMOL/L — LOW (ref 22–31)
COVID-19 SPIKE DOMAIN AB INTERP: POSITIVE
COVID-19 SPIKE DOMAIN ANTIBODY RESULT: >250 U/ML — HIGH
CREAT SERPL-MCNC: 1.52 MG/DL — HIGH (ref 0.5–1.3)
GLUCOSE BLDC GLUCOMTR-MCNC: 168 MG/DL — HIGH (ref 70–99)
GLUCOSE BLDC GLUCOMTR-MCNC: 189 MG/DL — HIGH (ref 70–99)
GLUCOSE BLDC GLUCOMTR-MCNC: 222 MG/DL — HIGH (ref 70–99)
GLUCOSE BLDC GLUCOMTR-MCNC: 229 MG/DL — HIGH (ref 70–99)
GLUCOSE SERPL-MCNC: 162 MG/DL — HIGH (ref 70–99)
HCT VFR BLD CALC: 34.2 % — LOW (ref 34.5–45)
HGB BLD-MCNC: 10.6 G/DL — LOW (ref 11.5–15.5)
HIV-1 VIRAL LOAD RESULT: SIGNIFICANT CHANGE UP
HIV1 RNA # SERPL NAA+PROBE: SIGNIFICANT CHANGE UP
HIV1 RNA SER-IMP: SIGNIFICANT CHANGE UP
HIV1 RNA SERPL NAA+PROBE-ACNC: SIGNIFICANT CHANGE UP
HIV1 RNA SERPL NAA+PROBE-LOG#: SIGNIFICANT CHANGE UP LG COP/ML
HSV+VZV DNA SPEC QL NAA+PROBE: ABNORMAL
MCHC RBC-ENTMCNC: 28.9 PG — SIGNIFICANT CHANGE UP (ref 27–34)
MCHC RBC-ENTMCNC: 31 GM/DL — LOW (ref 32–36)
MCV RBC AUTO: 93.2 FL — SIGNIFICANT CHANGE UP (ref 80–100)
NRBC # BLD: 0 /100 WBCS — SIGNIFICANT CHANGE UP (ref 0–0)
PLATELET # BLD AUTO: 260 K/UL — SIGNIFICANT CHANGE UP (ref 150–400)
POTASSIUM SERPL-MCNC: 3.9 MMOL/L — SIGNIFICANT CHANGE UP (ref 3.5–5.3)
POTASSIUM SERPL-SCNC: 3.9 MMOL/L — SIGNIFICANT CHANGE UP (ref 3.5–5.3)
PROT SERPL-MCNC: 7.6 G/DL — SIGNIFICANT CHANGE UP (ref 6–8.3)
RBC # BLD: 3.67 M/UL — LOW (ref 3.8–5.2)
RBC # FLD: 14.5 % — SIGNIFICANT CHANGE UP (ref 10.3–14.5)
SARS-COV-2 IGG+IGM SERPL QL IA: >250 U/ML — HIGH
SARS-COV-2 IGG+IGM SERPL QL IA: POSITIVE
SODIUM SERPL-SCNC: 140 MMOL/L — SIGNIFICANT CHANGE UP (ref 135–145)
SPECIMEN SOURCE: SIGNIFICANT CHANGE UP
WBC # BLD: 4.23 K/UL — SIGNIFICANT CHANGE UP (ref 3.8–10.5)
WBC # FLD AUTO: 4.23 K/UL — SIGNIFICANT CHANGE UP (ref 3.8–10.5)

## 2021-05-28 PROCEDURE — 99223 1ST HOSP IP/OBS HIGH 75: CPT

## 2021-05-28 PROCEDURE — 88305 TISSUE EXAM BY PATHOLOGIST: CPT | Mod: 26

## 2021-05-28 PROCEDURE — 99232 SBSQ HOSP IP/OBS MODERATE 35: CPT

## 2021-05-28 PROCEDURE — 99233 SBSQ HOSP IP/OBS HIGH 50: CPT

## 2021-05-28 RX ORDER — HYDROMORPHONE HYDROCHLORIDE 2 MG/ML
1 INJECTION INTRAMUSCULAR; INTRAVENOUS; SUBCUTANEOUS ONCE
Refills: 0 | Status: DISCONTINUED | OUTPATIENT
Start: 2021-05-28 | End: 2021-05-28

## 2021-05-28 RX ORDER — DULAGLUTIDE 4.5 MG/.5ML
0 INJECTION, SOLUTION SUBCUTANEOUS
Qty: 0 | Refills: 0 | DISCHARGE

## 2021-05-28 RX ORDER — SODIUM CHLORIDE 9 MG/ML
500 INJECTION INTRAMUSCULAR; INTRAVENOUS; SUBCUTANEOUS
Refills: 0 | Status: COMPLETED | OUTPATIENT
Start: 2021-05-28 | End: 2021-06-02

## 2021-05-28 RX ORDER — LIDOCAINE 4 G/100G
1 CREAM TOPICAL ONCE
Refills: 0 | Status: COMPLETED | OUTPATIENT
Start: 2021-05-28 | End: 2021-05-28

## 2021-05-28 RX ORDER — SODIUM CHLORIDE 9 MG/ML
500 INJECTION INTRAMUSCULAR; INTRAVENOUS; SUBCUTANEOUS
Refills: 0 | Status: DISCONTINUED | OUTPATIENT
Start: 2021-05-28 | End: 2021-05-28

## 2021-05-28 RX ORDER — SODIUM CHLORIDE 9 MG/ML
1000 INJECTION INTRAMUSCULAR; INTRAVENOUS; SUBCUTANEOUS
Refills: 0 | Status: DISCONTINUED | OUTPATIENT
Start: 2021-05-28 | End: 2021-06-09

## 2021-05-28 RX ORDER — SODIUM CHLORIDE 9 MG/ML
1000 INJECTION INTRAMUSCULAR; INTRAVENOUS; SUBCUTANEOUS
Refills: 0 | Status: DISCONTINUED | OUTPATIENT
Start: 2021-05-28 | End: 2021-05-28

## 2021-05-28 RX ORDER — TRAMADOL HYDROCHLORIDE 50 MG/1
25 TABLET ORAL ONCE
Refills: 0 | Status: DISCONTINUED | OUTPATIENT
Start: 2021-05-28 | End: 2021-05-28

## 2021-05-28 RX ORDER — INSULIN LISPRO 100/ML
VIAL (ML) SUBCUTANEOUS AT BEDTIME
Refills: 0 | Status: DISCONTINUED | OUTPATIENT
Start: 2021-05-28 | End: 2021-06-16

## 2021-05-28 RX ORDER — LIDOCAINE HYDROCHLORIDE AND EPINEPHRINE 10; 10 MG/ML; UG/ML
20 INJECTION, SOLUTION INFILTRATION; PERINEURAL ONCE
Refills: 0 | Status: DISCONTINUED | OUTPATIENT
Start: 2021-05-28 | End: 2021-06-16

## 2021-05-28 RX ORDER — OXYCODONE HYDROCHLORIDE 5 MG/1
5 TABLET ORAL EVERY 6 HOURS
Refills: 0 | Status: DISCONTINUED | OUTPATIENT
Start: 2021-05-28 | End: 2021-06-04

## 2021-05-28 RX ADMIN — FOSCARNET SODIUM 175 MILLIGRAM(S): 24 INJECTION, SOLUTION INTRAVENOUS at 09:16

## 2021-05-28 RX ADMIN — SODIUM CHLORIDE 1000 MILLILITER(S): 9 INJECTION INTRAMUSCULAR; INTRAVENOUS; SUBCUTANEOUS at 20:39

## 2021-05-28 RX ADMIN — BICTEGRAVIR SODIUM, EMTRICITABINE, AND TENOFOVIR ALAFENAMIDE FUMARATE 1 TABLET(S): 30; 120; 15 TABLET ORAL at 11:54

## 2021-05-28 RX ADMIN — HEPARIN SODIUM 5000 UNIT(S): 5000 INJECTION INTRAVENOUS; SUBCUTANEOUS at 17:16

## 2021-05-28 RX ADMIN — Medication 975 MILLIGRAM(S): at 14:33

## 2021-05-28 RX ADMIN — FOSCARNET SODIUM 175 MILLIGRAM(S): 24 INJECTION, SOLUTION INTRAVENOUS at 21:34

## 2021-05-28 RX ADMIN — INSULIN GLARGINE 20 UNIT(S): 100 INJECTION, SOLUTION SUBCUTANEOUS at 22:03

## 2021-05-28 RX ADMIN — Medication 975 MILLIGRAM(S): at 15:03

## 2021-05-28 RX ADMIN — LIDOCAINE 1 APPLICATION(S): 4 CREAM TOPICAL at 23:59

## 2021-05-28 RX ADMIN — OXYCODONE HYDROCHLORIDE 5 MILLIGRAM(S): 5 TABLET ORAL at 16:19

## 2021-05-28 RX ADMIN — Medication 4: at 12:53

## 2021-05-28 RX ADMIN — HEPARIN SODIUM 5000 UNIT(S): 5000 INJECTION INTRAVENOUS; SUBCUTANEOUS at 06:37

## 2021-05-28 RX ADMIN — Medication 2: at 09:17

## 2021-05-28 RX ADMIN — HYDROMORPHONE HYDROCHLORIDE 1 MILLIGRAM(S): 2 INJECTION INTRAMUSCULAR; INTRAVENOUS; SUBCUTANEOUS at 09:30

## 2021-05-28 RX ADMIN — TRAMADOL HYDROCHLORIDE 25 MILLIGRAM(S): 50 TABLET ORAL at 23:59

## 2021-05-28 RX ADMIN — Medication 2: at 17:16

## 2021-05-28 RX ADMIN — Medication 975 MILLIGRAM(S): at 06:37

## 2021-05-28 RX ADMIN — OXYCODONE HYDROCHLORIDE 5 MILLIGRAM(S): 5 TABLET ORAL at 16:49

## 2021-05-28 RX ADMIN — Medication 975 MILLIGRAM(S): at 07:07

## 2021-05-28 NOTE — PROGRESS NOTE ADULT - PROBLEM SELECTOR PLAN 1
- ID consult appreciated, started on foscarnet for resistant HSV infection   - f/u viral cx herpes, GC/chl, syphillis screen   - f/u biopsy results   - Daily 500mL bolus while on foscarnet, monitor CMP   - Vaginal/pelvic US performed on admission, no fluid collections or adenexal masses

## 2021-05-28 NOTE — PROGRESS NOTE ADULT - ASSESSMENT
52 y/o female PMHx Genital Herpes on valacyclovir x2 years, DM on insulin, HIV unknown last CD4 count/viral load now presenting to the ED with worsening labial mass not responding with oral valtrex    overall HIV infection, Resistant HSV infection. + vulvar mass      Plan:   given pt already on valtrex for 6 months with no improvement will need alternative therapy   c/w foscarnet  will need 500 ml bolus of NS prior to each foscarnet infusion q12h   c/w 50cc/hr of maintenance  fluid.   monitor CMP daily as it can lead to significant electrolyte abnormalities  monitor CBC daily to monitor WBC  culture viral herpes sent, so the sensitivities can be performed  f/u GC/Chl  follow up syphilis screen.   s/p bx of the mass follow up pathology.       HIV infection: well controlled   c/w bicktarvy       Plan discussed with Medicine Attending.     Please call ID service for questions over weekend at [phone].     Nida Huitron  Pager: [phone]. If no response or past 5 pm call [phone].

## 2021-05-28 NOTE — PROGRESS NOTE ADULT - SUBJECTIVE AND OBJECTIVE BOX
R2 MOI Progress Note   HD#2    Patient seen and examined at bedside.  No acute events overnight. No acute complaints.  Pain well controlled.  Patient is ambulating and tolerating diet. Passing flatus. Voiding.  Denies CP, SOB, N/V, fevers, and chills.    Pt received first dose of Foscarnet yesterday with no acute side effects.     Vital Signs Last 24 Hours  T(C): 36.7 (05-28-21 @ 05:15), Max: 37 (05-27-21 @ 10:35)  HR: 74 (05-28-21 @ 05:15) (71 - 105)  BP: 120/79 (05-28-21 @ 05:15) (120/71 - 157/97)  RR: 18 (05-28-21 @ 05:15) (18 - 20)  SpO2: 100% (05-28-21 @ 05:15) (95% - 100%)    I&O's Summary    27 May 2021 07:01  -  28 May 2021 07:00  --------------------------------------------------------  IN: 740 mL / OUT: 550 mL / NET: 190 mL        Physical Exam:  General: NAD  CV: RR, S1, S2  Lungs: CTA b/l, good air flow b/l   Abdomen: Soft, mildly-tender to palpation diffusely, non distended.  Ext: No pain or swelling     Labs:                        10.6   4.23  )-----------( 260      ( 28 May 2021 06:29 )             34.2   baso x      eos x      imm gran x      lymph x      mono x      poly x                            11.7   6.19  )-----------( 279      ( 27 May 2021 10:57 )             37.5   baso 0.8    eos 6.8    imm gran 0.3    lymph 21.3   mono 6.9    poly 63.9       MEDICATIONS  (STANDING):  bictegravir 50 mG/emtricitabine 200 mG/tenofovir alafenamide 25 mG (BIKTARVY) 1 Tablet(s) Oral daily  dextrose 40% Gel 15 Gram(s) Oral once  dextrose 5%. 1000 milliLiter(s) (50 mL/Hr) IV Continuous <Continuous>  dextrose 5%. 1000 milliLiter(s) (100 mL/Hr) IV Continuous <Continuous>  dextrose 50% Injectable 25 Gram(s) IV Push once  dextrose 50% Injectable 12.5 Gram(s) IV Push once  dextrose 50% Injectable 25 Gram(s) IV Push once  foscarnet (Peripheral) IVPB 2100 milliGRAM(s) IV Intermittent every 12 hours  glucagon  Injectable 1 milliGRAM(s) IntraMuscular once  heparin   Injectable 5000 Unit(s) SubCutaneous every 12 hours  insulin glargine Injectable (LANTUS) 20 Unit(s) SubCutaneous at bedtime  insulin lispro (ADMELOG) corrective regimen sliding scale   SubCutaneous three times a day before meals  sodium chloride 0.9% Bolus 500 milliLiter(s) IV Bolus <User Schedule>    MEDICATIONS  (PRN):  acetaminophen   Tablet .. 975 milliGRAM(s) Oral every 6 hours PRN Mild Pain (1 - 3)  ibuprofen  Tablet. 400 milliGRAM(s) Oral every 6 hours PRN Moderate Pain (4 - 6)

## 2021-05-28 NOTE — PROGRESS NOTE ADULT - SUBJECTIVE AND OBJECTIVE BOX
[facility] Division of Hospital Medicine  [place]  pager [phone]    Patient is a 51y old  Female who presents with a chief complaint of Valacyclovir resistant HSV (28 May 2021 07:30)      SUBJECTIVE / OVERNIGHT EVENTS:  Patient transferred from GYN service to medicine service. She was admitted  for worsening HSV infection on the right labia which has not improved with valtrex and imiquid cream over the past 6 months. Was seen by ID and started on foscarnet. Vulvar biopsy performed today by GYN.     Patient endorses some pain after the biopsy. No fever, chills, N/V/D.     Review of Systems:   CONSTITUTIONAL: No fever, weight loss, or fatigue  EYES: No eye pain, visual disturbances, or discharge  ENMT:  No difficulty hearing, tinnitus, vertigo; No sinus or throat pain  NECK: No pain or stiffness  RESPIRATORY: No cough, wheezing, chills or hemoptysis; No shortness of breath  CARDIOVASCULAR: No chest pain, palpitations, dizziness, or leg swelling  GASTROINTESTINAL: No abdominal or epigastric pain. No nausea, vomiting, or hematemesis; No diarrhea or constipation. No melena or hematochezia.  GENITOURINARY: + dysuria, no frequency, hematuria, or incontinence  NEUROLOGICAL: No headaches, memory loss, loss of strength, numbness, or tremors  ENDOCRINE: No heat or cold intolerance; No hair loss  MUSCULOSKELETAL: No joint pain or swelling; No muscle, back, or extremity pain  PSYCHIATRIC: No depression, anxiety, mood swings, or difficulty sleeping  HEME/LYMPH: No easy bruising, or bleeding gums  ALLERY AND IMMUNOLOGIC: No hives or eczema    PAST MEDICAL & SURGICAL HISTORY:  Diabetes mellitus -- -200, wearing CGM  HIV  Herpes genitalis  S/P Tubal Ligation  S/P bilateral breast implants    ObHx: NSVDx2 (27 May 2021 19:32)    Home Medications:  Biktarvy oral tablet: 1 tab(s) orally once a day (28 May 2021 12:01)  HumaLOG KwikPen 100 units/mL injectable solution: 10  injectable 3 times a day, As Needed (28 May 2021 12:01)  Lantus 100 units/mL subcutaneous solution: 20 unit(s) subcutaneous once a day (at bedtime) (28 May 2021 12:01)    Allergies    No Known Allergies    Social History: Never smoker, no alcohol or drug use. Lives with her daughter in split level home, independent with ADLs    FAMILY HISTORY:  No family history of DM      PHYSICAL EXAM:  Vital Signs Last 24 Hrs  T(C): 36.8 (28 May 2021 10:50), Max: 37 (27 May 2021 17:56)  T(F): 98.3 (28 May 2021 10:50), Max: 98.6 (27 May 2021 17:56)  HR: 66 (28 May 2021 10:50) (66 - 87)  BP: 126/77 (28 May 2021 10:50) (120/71 - 144/91)  BP(mean): --  RR: 18 (28 May 2021 10:50) (18 - 20)  SpO2: 98% (28 May 2021 10:50) (95% - 100%)    CONSTITUTIONAL: NAD, well-developed, well-groomed  EYES: PERRL; conjunctiva and sclera clear  ENMT: Moist oral mucosa, no pharyngeal injection or exudates; normal dentition  NECK: Supple, no palpable masses; no thyromegaly  RESPIRATORY: Normal respiratory effort; lungs are clear to auscultation bilaterally  CARDIOVASCULAR: Regular rate and rhythm, normal S1 and S2; No lower extremity edema  ABDOMEN: Nontender to palpation, normoactive bowel sounds, no rebound/guarding  MUSCULOSKELETAL:  no clubbing or cyanosis of digits; no joint swelling or tenderness to palpation  PSYCH: A+O to person, place, and time; affect appropriate  NEUROLOGY: CN 2-12 are intact and symmetric; no gross sensory deficits   SKIN: No rashes; no palpable lesions    * exam deferred, pain post-biopsy    LABS:                        10.6   4.23  )-----------( 260      ( 28 May 2021 06:29 )             34.2         140  |  107  |  16  ----------------------------<  162<H>  3.9   |  20<L>  |  1.52<H>    Ca    8.9      28 May 2021 06:29    TPro  7.6  /  Alb  3.3  /  TBili  0.3  /  DBili  x   /  AST  13  /  ALT  8<L>  /  AlkPhos  281<H>            Urinalysis Basic - ( 27 May 2021 14:12 )    Color: Light Yellow / Appearance: Clear / S.017 / pH: x  Gluc: x / Ketone: Negative  / Bili: Negative / Urobili: Negative   Blood: x / Protein: 30 mg/dL / Nitrite: Negative   Leuk Esterase: Large / RBC: 3 /hpf / WBC 26 /HPF   Sq Epi: x / Non Sq Epi: 0 /hpf / Bacteria: Negative          RADIOLOGY & ADDITIONAL TESTS:  Results Reviewed:   Imaging Personally Reviewed:  Electrocardiogram Personally Reviewed:    COORDINATION OF CARE:  Care Discussed with Consultants/Other Providers [Y/N]:  Prior or Outpatient Records Reviewed [Y/N]:

## 2021-05-28 NOTE — PROGRESS NOTE ADULT - SUBJECTIVE AND OBJECTIVE BOX
51y old  Female who presents with a chief complaint of Valacyclovir resistant HSV (28 May 2021 12:02)      Interval history:  afebrile, still with pain. s/p bx today       Allergies:   No Known Allergies      Antimicrobials:  bictegravir 50 mG/emtricitabine 200 mG/tenofovir alafenamide 25 mG (BIKTARVY) 1 Tablet(s) Oral daily  foscarnet (Peripheral) IVPB 2100 milliGRAM(s) IV Intermittent every 12 hours      REVIEW OF SYSTEMS:  No chest pain   No SOB  No abdominal pain  No rash.     Vital Signs Last 24 Hrs  T(C): 36.8 (05-28-21 @ 10:50), Max: 36.9 (05-27-21 @ 22:22)  T(F): 98.3 (05-28-21 @ 10:50), Max: 98.5 (05-27-21 @ 22:22)  HR: 66 (05-28-21 @ 10:50) (66 - 78)  BP: 126/77 (05-28-21 @ 10:50) (120/71 - 144/91)  BP(mean): --  RR: 18 (05-28-21 @ 10:50) (18 - 18)  SpO2: 98% (05-28-21 @ 10:50) (95% - 100%)      PHYSICAL EXAM:  Patient in no acute distress. AAOX3.  No icterus, no oral ulcers.  Cardiovascular: S1S2 normal.  Lungs: Good air entry B/L lung fields.  Gastrointestinal: soft, nontender, nondistended.  Extremities: no edema.  IV sites not inflamed.   ulcerated mass in vulva                         10.6   4.23  )-----------( 260      ( 28 May 2021 06:29 )             34.2   05-28    140  |  107  |  16  ----------------------------<  162<H>  3.9   |  20<L>  |  1.52<H>    Ca    8.9      28 May 2021 06:29    TPro  7.6  /  Alb  3.3  /  TBili  0.3  /  DBili  x   /  AST  13  /  ALT  8<L>  /  AlkPhos  281<H>  05-28      LIVER FUNCTIONS - ( 28 May 2021 06:29 )  Alb: 3.3 g/dL / Pro: 7.6 g/dL / ALK PHOS: 281 U/L / ALT: 8 U/L / AST: 13 U/L / GGT: x

## 2021-05-28 NOTE — PROGRESS NOTE ADULT - ASSESSMENT
50yo , with hx of HIV/DM2 presenting with worsening acute on chronic HSV infection with large vulvar mass. Pt admitted to GYN service for observation, with ID closely following for IV antiviral treatment.   Pt failed PO antiviral therapy with valtrex, to be started on Foscarnet.     Neuro: PO Motrin PRN, PO Tylenol PRN  CV: Hemodynamically stable, AM CBC stable as above   Pulm: O2 sat WNL on RA, Increase ambulation, encourage incentive spirometry use  GI: Tolerating regular diet  : Voiding spontaneously  Heme: DVT ppx: HSQ, SCDs while in bed  ID: Afebrile, No signs of infection. WBC 4 this morning.   - continue Biktarvy, follow up HIV viral load   - follow up GC/CT, Viral culture in ED   - Continue Foscarnet, follow up ID reccomendations   FEN: For NS bolus 500cc prior to foscarnet infusion daily, otherwise SLIV  Dispo: Admit to GYN, ID following    - For biopsy at bedside this morning    Joshua PGY2

## 2021-05-28 NOTE — CHART NOTE - NSCHARTNOTEFT_GEN_A_CORE
R/B/A of procedure obtained, all questions answered, consent obtained. Pt placed in butterfly position on bed. 6cc of 0.5%lidocaine/epi placed. Small biopsy taken with  forceps and scalpel. Pressure held. Silver nitrate placed atop. Excellent hemostasis at conclusion.    Viral culture collected as well.     Procedure performed with Dr. Campbell Lewis PGY2 R/B/A of procedure obtained, all questions answered, consent obtained. Pt placed in butterfly position on bed. 6cc of 0.5%lidocaine/epi placed. Small biopsy taken with  forceps and scalpel. Pressure held. Silver nitrate placed atop. Excellent hemostasis at conclusion.    Viral culture collected as well.     Procedure performed with Dr. Campbell Lewis PGY2    Present for bx.  No complications  MICHAEL Hightower

## 2021-05-28 NOTE — PROGRESS NOTE ADULT - ASSESSMENT
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

## 2021-05-29 LAB
ALBUMIN SERPL ELPH-MCNC: 3.2 G/DL — LOW (ref 3.3–5)
ALP SERPL-CCNC: 283 U/L — HIGH (ref 40–120)
ALT FLD-CCNC: 9 U/L — LOW (ref 10–45)
ANION GAP SERPL CALC-SCNC: 12 MMOL/L — SIGNIFICANT CHANGE UP (ref 5–17)
AST SERPL-CCNC: 12 U/L — SIGNIFICANT CHANGE UP (ref 10–40)
BASOPHILS # BLD AUTO: 0.06 K/UL — SIGNIFICANT CHANGE UP (ref 0–0.2)
BASOPHILS NFR BLD AUTO: 1.6 % — SIGNIFICANT CHANGE UP (ref 0–2)
BILIRUB SERPL-MCNC: 0.4 MG/DL — SIGNIFICANT CHANGE UP (ref 0.2–1.2)
BUN SERPL-MCNC: 14 MG/DL — SIGNIFICANT CHANGE UP (ref 7–23)
C TRACH RRNA SPEC QL NAA+PROBE: SIGNIFICANT CHANGE UP
CALCIUM SERPL-MCNC: 9.2 MG/DL — SIGNIFICANT CHANGE UP (ref 8.4–10.5)
CHLORIDE SERPL-SCNC: 106 MMOL/L — SIGNIFICANT CHANGE UP (ref 96–108)
CO2 SERPL-SCNC: 21 MMOL/L — LOW (ref 22–31)
CREAT SERPL-MCNC: 1.36 MG/DL — HIGH (ref 0.5–1.3)
CULTURE RESULTS: SIGNIFICANT CHANGE UP
EOSINOPHIL # BLD AUTO: 0.47 K/UL — SIGNIFICANT CHANGE UP (ref 0–0.5)
EOSINOPHIL NFR BLD AUTO: 12.3 % — HIGH (ref 0–6)
GLUCOSE BLDC GLUCOMTR-MCNC: 155 MG/DL — HIGH (ref 70–99)
GLUCOSE BLDC GLUCOMTR-MCNC: 158 MG/DL — HIGH (ref 70–99)
GLUCOSE BLDC GLUCOMTR-MCNC: 181 MG/DL — HIGH (ref 70–99)
GLUCOSE BLDC GLUCOMTR-MCNC: 294 MG/DL — HIGH (ref 70–99)
GLUCOSE SERPL-MCNC: 176 MG/DL — HIGH (ref 70–99)
HCT VFR BLD CALC: 33.9 % — LOW (ref 34.5–45)
HGB BLD-MCNC: 10.7 G/DL — LOW (ref 11.5–15.5)
IMM GRANULOCYTES NFR BLD AUTO: 0.3 % — SIGNIFICANT CHANGE UP (ref 0–1.5)
LYMPHOCYTES # BLD AUTO: 1.32 K/UL — SIGNIFICANT CHANGE UP (ref 1–3.3)
LYMPHOCYTES # BLD AUTO: 34.5 % — SIGNIFICANT CHANGE UP (ref 13–44)
MCHC RBC-ENTMCNC: 29.6 PG — SIGNIFICANT CHANGE UP (ref 27–34)
MCHC RBC-ENTMCNC: 31.6 GM/DL — LOW (ref 32–36)
MCV RBC AUTO: 93.9 FL — SIGNIFICANT CHANGE UP (ref 80–100)
MONOCYTES # BLD AUTO: 0.36 K/UL — SIGNIFICANT CHANGE UP (ref 0–0.9)
MONOCYTES NFR BLD AUTO: 9.4 % — SIGNIFICANT CHANGE UP (ref 2–14)
N GONORRHOEA RRNA SPEC QL NAA+PROBE: SIGNIFICANT CHANGE UP
NEUTROPHILS # BLD AUTO: 1.61 K/UL — LOW (ref 1.8–7.4)
NEUTROPHILS NFR BLD AUTO: 41.9 % — LOW (ref 43–77)
NRBC # BLD: 0 /100 WBCS — SIGNIFICANT CHANGE UP (ref 0–0)
PLATELET # BLD AUTO: 263 K/UL — SIGNIFICANT CHANGE UP (ref 150–400)
POTASSIUM SERPL-MCNC: 3.9 MMOL/L — SIGNIFICANT CHANGE UP (ref 3.5–5.3)
POTASSIUM SERPL-SCNC: 3.9 MMOL/L — SIGNIFICANT CHANGE UP (ref 3.5–5.3)
PROT SERPL-MCNC: 7.4 G/DL — SIGNIFICANT CHANGE UP (ref 6–8.3)
RBC # BLD: 3.61 M/UL — LOW (ref 3.8–5.2)
RBC # FLD: 14.5 % — SIGNIFICANT CHANGE UP (ref 10.3–14.5)
SODIUM SERPL-SCNC: 139 MMOL/L — SIGNIFICANT CHANGE UP (ref 135–145)
SPECIMEN SOURCE: SIGNIFICANT CHANGE UP
SPECIMEN SOURCE: SIGNIFICANT CHANGE UP
T PALLIDUM AB TITR SER: NEGATIVE — SIGNIFICANT CHANGE UP
WBC # BLD: 3.83 K/UL — SIGNIFICANT CHANGE UP (ref 3.8–10.5)
WBC # FLD AUTO: 3.83 K/UL — SIGNIFICANT CHANGE UP (ref 3.8–10.5)

## 2021-05-29 PROCEDURE — 99233 SBSQ HOSP IP/OBS HIGH 50: CPT

## 2021-05-29 RX ADMIN — INSULIN GLARGINE 20 UNIT(S): 100 INJECTION, SOLUTION SUBCUTANEOUS at 22:48

## 2021-05-29 RX ADMIN — FOSCARNET SODIUM 175 MILLIGRAM(S): 24 INJECTION, SOLUTION INTRAVENOUS at 21:43

## 2021-05-29 RX ADMIN — Medication 2: at 16:55

## 2021-05-29 RX ADMIN — HEPARIN SODIUM 5000 UNIT(S): 5000 INJECTION INTRAVENOUS; SUBCUTANEOUS at 06:34

## 2021-05-29 RX ADMIN — SODIUM CHLORIDE 1000 MILLILITER(S): 9 INJECTION INTRAMUSCULAR; INTRAVENOUS; SUBCUTANEOUS at 08:17

## 2021-05-29 RX ADMIN — Medication 2: at 08:23

## 2021-05-29 RX ADMIN — Medication 2: at 12:39

## 2021-05-29 RX ADMIN — Medication 1: at 22:49

## 2021-05-29 RX ADMIN — BICTEGRAVIR SODIUM, EMTRICITABINE, AND TENOFOVIR ALAFENAMIDE FUMARATE 1 TABLET(S): 30; 120; 15 TABLET ORAL at 11:20

## 2021-05-29 RX ADMIN — HEPARIN SODIUM 5000 UNIT(S): 5000 INJECTION INTRAVENOUS; SUBCUTANEOUS at 17:09

## 2021-05-29 RX ADMIN — TRAMADOL HYDROCHLORIDE 25 MILLIGRAM(S): 50 TABLET ORAL at 00:29

## 2021-05-29 RX ADMIN — FOSCARNET SODIUM 175 MILLIGRAM(S): 24 INJECTION, SOLUTION INTRAVENOUS at 08:49

## 2021-05-29 RX ADMIN — SODIUM CHLORIDE 1000 MILLILITER(S): 9 INJECTION INTRAMUSCULAR; INTRAVENOUS; SUBCUTANEOUS at 19:53

## 2021-05-29 NOTE — PROGRESS NOTE ADULT - SUBJECTIVE AND OBJECTIVE BOX
CC: F/U for HSV    Saw/spoke to patient. No fevers, no chills. Feels well.    Allergies  No Known Allergies    ANTIMICROBIALS:  bictegravir 50 mG/emtricitabine 200 mG/tenofovir alafenamide 25 mG (BIKTARVY) 1 daily  foscarnet (Peripheral) IVPB 2100 every 12 hours    PE:    Vital Signs Last 24 Hrs  T(C): 36.7 (29 May 2021 06:19), Max: 36.8 (28 May 2021 10:50)  T(F): 98 (29 May 2021 06:19), Max: 98.3 (28 May 2021 10:50)  HR: 78 (29 May 2021 06:19) (66 - 81)  BP: 136/88 (29 May 2021 06:19) (126/71 - 136/88)  RR: 17 (29 May 2021 06:19) (17 - 18)  SpO2: 99% (29 May 2021 06:19) (98% - 99%)    Gen: AOx3, NAD, non-toxic  CV: S1+S2 normal, nontachycardic  Resp: Clear bilat, no resp distress, no crackles/wheezes  Abd: Soft, nontender, +BS  Ext: No LE edema, no wounds    LABS:                        10.7   3.83  )-----------( 263      ( 29 May 2021 06:36 )             33.9         139  |  106  |  14  ----------------------------<  176<H>  3.9   |  21<L>  |  1.36<H>    Ca    9.2      29 May 2021 06:36    TPro  7.4  /  Alb  3.2<L>  /  TBili  0.4  /  DBili  x   /  AST  12  /  ALT  9<L>  /  AlkPhos  283<H>      Urinalysis Basic - ( 27 May 2021 14:12 )    Color: Light Yellow / Appearance: Clear / S.017 / pH: x  Gluc: x / Ketone: Negative  / Bili: Negative / Urobili: Negative   Blood: x / Protein: 30 mg/dL / Nitrite: Negative   Leuk Esterase: Large / RBC: 3 /hpf / WBC 26 /HPF   Sq Epi: x / Non Sq Epi: 0 /hpf / Bacteria: Negative    MICROBIOLOGY:    .Urine Clean Catch (Midstream)  21   >=3 organisms. Probable collection contamination.     HIV-1 RNA Quantitative, Viral Load Log: NOT DET. lg /mL (21 @ 17:01)    (otherwise reviewed)    RADIOLOGY:     USG:      FINDINGS:    Uterus: 8.9 cm x 4.6 cm x 6.6 cm. Within normal limits.  Endometrium: 4 mm. Within normal limits.    Right ovary: 2.5 cm x 0.9 cm x 1.1 cm. Within normal limits.  Left ovary: 2.9 cm x 1.3 cm x 2.0 cm. Within normal limits.    Duplex and color flow Doppler evaluation of the ovaries reveals bilateral arterial and venous flow.    Fluid: None. No adnexal masses or fluid collections are seen.    IMPRESSION:  No evidence of adnexal mass or fluid collection. No hydrosalpinx seen.

## 2021-05-29 NOTE — PROGRESS NOTE ADULT - SUBJECTIVE AND OBJECTIVE BOX
[facility] Division of Hospital Medicine  Mari ParkDO pager [phone]    Patient is a 51y old  Female who presents with a chief complaint of Valacyclovir resistant HSV (28 May 2021 18:52)      SUBJECTIVE / OVERNIGHT EVENTS:  Had some vulvar pain overnight requiring oxycodone/tramadol but it is much improved this AM. No discharge from the mass but still having some bleeding post-biopsy. No fever, chills, SOB, CP. Tolerating PO.   ADDITIONAL REVIEW OF SYSTEMS:    MEDICATIONS  (STANDING):  bictegravir 50 mG/emtricitabine 200 mG/tenofovir alafenamide 25 mG (BIKTARVY) 1 Tablet(s) Oral daily  dextrose 40% Gel 15 Gram(s) Oral once  dextrose 5%. 1000 milliLiter(s) (50 mL/Hr) IV Continuous <Continuous>  dextrose 5%. 1000 milliLiter(s) (100 mL/Hr) IV Continuous <Continuous>  dextrose 50% Injectable 25 Gram(s) IV Push once  dextrose 50% Injectable 12.5 Gram(s) IV Push once  dextrose 50% Injectable 25 Gram(s) IV Push once  foscarnet (Peripheral) IVPB 2100 milliGRAM(s) IV Intermittent every 12 hours  glucagon  Injectable 1 milliGRAM(s) IntraMuscular once  heparin   Injectable 5000 Unit(s) SubCutaneous every 12 hours  insulin glargine Injectable (LANTUS) 20 Unit(s) SubCutaneous at bedtime  insulin lispro (ADMELOG) corrective regimen sliding scale   SubCutaneous three times a day before meals  insulin lispro (ADMELOG) corrective regimen sliding scale   SubCutaneous at bedtime  lidocaine 0.5%/epinephrine 1:200,000 Inj 20 milliLiter(s) Local Injection once  sodium chloride 0.9% Bolus 500 milliLiter(s) IV Bolus <User Schedule>  sodium chloride 0.9%. 1000 milliLiter(s) (50 mL/Hr) IV Continuous <Continuous>    MEDICATIONS  (PRN):  acetaminophen   Tablet .. 975 milliGRAM(s) Oral every 6 hours PRN Mild Pain (1 - 3)  ibuprofen  Tablet. 400 milliGRAM(s) Oral every 6 hours PRN Moderate Pain (4 - 6)  oxyCODONE    IR 5 milliGRAM(s) Oral every 6 hours PRN Severe Pain (7 - 10)      CAPILLARY BLOOD GLUCOSE      POCT Blood Glucose.: 181 mg/dL (29 May 2021 08:21)  POCT Blood Glucose.: 222 mg/dL (28 May 2021 21:47)  POCT Blood Glucose.: 168 mg/dL (28 May 2021 16:59)  POCT Blood Glucose.: 229 mg/dL (28 May 2021 12:50)    I&O's Summary    28 May 2021 07:01  -  29 May 2021 07:00  --------------------------------------------------------  IN: 2235 mL / OUT: 2250 mL / NET: -15 mL    29 May 2021 07:01  -  29 May 2021 12:35  --------------------------------------------------------  IN: 860 mL / OUT: 400 mL / NET: 460 mL        PHYSICAL EXAM:  Vital Signs Last 24 Hrs  T(C): 36.7 (29 May 2021 06:19), Max: 36.8 (28 May 2021 14:00)  T(F): 98 (29 May 2021 06:19), Max: 98.2 (28 May 2021 14:00)  HR: 78 (29 May 2021 06:19) (71 - 81)  BP: 136/88 (29 May 2021 06:19) (126/71 - 136/88)  BP(mean): --  RR: 17 (29 May 2021 06:19) (17 - 18)  SpO2: 99% (29 May 2021 06:19) (98% - 99%)    CONSTITUTIONAL: NAD, well-developed, well-groomed  EYES: PERRL; conjunctiva and sclera clear  ENMT: Moist oral mucosa, no pharyngeal injection or exudates; normal dentition  RESPIRATORY: Normal respiratory effort; lungs are clear to auscultation bilaterally  CARDIOVASCULAR: Regular rate and rhythm, normal S1 and S2; No lower extremity edema  ABDOMEN: Nontender to palpation, normoactive bowel sounds, no rebound/guarding  MUSCULOSKELETAL:  no clubbing or cyanosis of digits; no joint swelling or tenderness to palpation  PSYCH: A+O to person, place, and time; affect appropriate    LABS:                        10.7   3.83  )-----------( 263      ( 29 May 2021 06:36 )             33.9         139  |  106  |  14  ----------------------------<  176<H>  3.9   |  21<L>  |  1.36<H>    Ca    9.2      29 May 2021 06:36    TPro  7.4  /  Alb  3.2<L>  /  TBili  0.4  /  DBili  x   /  AST  12  /  ALT  9<L>  /  AlkPhos  283<H>            Urinalysis Basic - ( 27 May 2021 14:12 )    Color: Light Yellow / Appearance: Clear / S.017 / pH: x  Gluc: x / Ketone: Negative  / Bili: Negative / Urobili: Negative   Blood: x / Protein: 30 mg/dL / Nitrite: Negative   Leuk Esterase: Large / RBC: 3 /hpf / WBC 26 /HPF   Sq Epi: x / Non Sq Epi: 0 /hpf / Bacteria: Negative        Culture - Urine (collected 27 May 2021 17:22)  Source: .Urine Clean Catch (Midstream)  Final Report (29 May 2021 08:01):    >=3 organisms. Probable collection contamination.        RADIOLOGY & ADDITIONAL TESTS:  Results Reviewed:   Imaging Personally Reviewed:  Electrocardiogram Personally Reviewed:    COORDINATION OF CARE:  Care Discussed with Consultants/Other Providers [Y/N]:  Prior or Outpatient Records Reviewed [Y/N]:

## 2021-05-29 NOTE — PROGRESS NOTE ADULT - PROBLEM SELECTOR PLAN 1
- ID consult appreciated, started on foscarnet for resistant HSV infection - confirmed HSV 2, sensitivities and final pathology pending   - GC/chl, syphillis screen negative  - 500mL bolus q12 while on foscarnet, monitor CMP. Also on maintenance fluids at 50cc/hr   - Vaginal/pelvic US performed on admission, no fluid collections or adenexal masses  - ID recommendations appreciated

## 2021-05-29 NOTE — PROGRESS NOTE ADULT - ASSESSMENT
50 y/o female PMHx Genital Herpes on valacyclovir x2 years, DM on insulin, HIV unknown last CD4 count/viral load now presenting to the ED with worsening labial mass not responding with oral valtrex  Poor response to PO valtrex, treating with foscarnet, but need close monitoring for drug toxicity  Elevated Cr, trending down?  Slight leukopenia  HIV CD4 >200, HIV VL UD  Overall HIV infection, Resistant HSV infection. + vulvar mass  - Foscarnet 2100mg q 12  - Bolus 500ml NS prior to Foscarnet q 12; would continue 50cc maintenance fluid otherwise  - Trend CBC and CMP to monitor for potential drug adverse effects  - Note Trep screen negative, GC screen negative  - F/U pathology  - Biktarvy daily    Shailesh Day MD  Pager [phone]  After 5pm and on weekends call [phone]

## 2021-05-30 LAB
ALBUMIN SERPL ELPH-MCNC: 3.3 G/DL — SIGNIFICANT CHANGE UP (ref 3.3–5)
ALP SERPL-CCNC: 269 U/L — HIGH (ref 40–120)
ALT FLD-CCNC: 9 U/L — LOW (ref 10–45)
ANION GAP SERPL CALC-SCNC: 14 MMOL/L — SIGNIFICANT CHANGE UP (ref 5–17)
APPEARANCE UR: CLEAR — SIGNIFICANT CHANGE UP
AST SERPL-CCNC: 12 U/L — SIGNIFICANT CHANGE UP (ref 10–40)
BILIRUB SERPL-MCNC: 0.3 MG/DL — SIGNIFICANT CHANGE UP (ref 0.2–1.2)
BILIRUB UR-MCNC: NEGATIVE — SIGNIFICANT CHANGE UP
BUN SERPL-MCNC: 16 MG/DL — SIGNIFICANT CHANGE UP (ref 7–23)
CALCIUM SERPL-MCNC: 9.4 MG/DL — SIGNIFICANT CHANGE UP (ref 8.4–10.5)
CHLORIDE SERPL-SCNC: 105 MMOL/L — SIGNIFICANT CHANGE UP (ref 96–108)
CO2 SERPL-SCNC: 19 MMOL/L — LOW (ref 22–31)
COLOR SPEC: COLORLESS — SIGNIFICANT CHANGE UP
CREAT SERPL-MCNC: 1.36 MG/DL — HIGH (ref 0.5–1.3)
DIFF PNL FLD: NEGATIVE — SIGNIFICANT CHANGE UP
GLUCOSE BLDC GLUCOMTR-MCNC: 128 MG/DL — HIGH (ref 70–99)
GLUCOSE BLDC GLUCOMTR-MCNC: 152 MG/DL — HIGH (ref 70–99)
GLUCOSE BLDC GLUCOMTR-MCNC: 203 MG/DL — HIGH (ref 70–99)
GLUCOSE BLDC GLUCOMTR-MCNC: 253 MG/DL — HIGH (ref 70–99)
GLUCOSE SERPL-MCNC: 188 MG/DL — HIGH (ref 70–99)
GLUCOSE UR QL: ABNORMAL
HCT VFR BLD CALC: 33.5 % — LOW (ref 34.5–45)
HGB BLD-MCNC: 10.6 G/DL — LOW (ref 11.5–15.5)
KETONES UR-MCNC: NEGATIVE — SIGNIFICANT CHANGE UP
LEUKOCYTE ESTERASE UR-ACNC: ABNORMAL
MCHC RBC-ENTMCNC: 29.5 PG — SIGNIFICANT CHANGE UP (ref 27–34)
MCHC RBC-ENTMCNC: 31.6 GM/DL — LOW (ref 32–36)
MCV RBC AUTO: 93.3 FL — SIGNIFICANT CHANGE UP (ref 80–100)
NITRITE UR-MCNC: NEGATIVE — SIGNIFICANT CHANGE UP
NRBC # BLD: 0 /100 WBCS — SIGNIFICANT CHANGE UP (ref 0–0)
PH UR: 5.5 — SIGNIFICANT CHANGE UP (ref 5–8)
PLATELET # BLD AUTO: 268 K/UL — SIGNIFICANT CHANGE UP (ref 150–400)
POTASSIUM SERPL-MCNC: 3.9 MMOL/L — SIGNIFICANT CHANGE UP (ref 3.5–5.3)
POTASSIUM SERPL-SCNC: 3.9 MMOL/L — SIGNIFICANT CHANGE UP (ref 3.5–5.3)
PROT SERPL-MCNC: 7.6 G/DL — SIGNIFICANT CHANGE UP (ref 6–8.3)
PROT UR-MCNC: ABNORMAL
RBC # BLD: 3.59 M/UL — LOW (ref 3.8–5.2)
RBC # FLD: 14.5 % — SIGNIFICANT CHANGE UP (ref 10.3–14.5)
SODIUM SERPL-SCNC: 138 MMOL/L — SIGNIFICANT CHANGE UP (ref 135–145)
SP GR SPEC: 1.01 — SIGNIFICANT CHANGE UP (ref 1.01–1.02)
UROBILINOGEN FLD QL: NEGATIVE — SIGNIFICANT CHANGE UP
WBC # BLD: 3.86 K/UL — SIGNIFICANT CHANGE UP (ref 3.8–10.5)
WBC # FLD AUTO: 3.86 K/UL — SIGNIFICANT CHANGE UP (ref 3.8–10.5)

## 2021-05-30 PROCEDURE — 99233 SBSQ HOSP IP/OBS HIGH 50: CPT

## 2021-05-30 RX ORDER — TRAMADOL HYDROCHLORIDE 50 MG/1
25 TABLET ORAL ONCE
Refills: 0 | Status: DISCONTINUED | OUTPATIENT
Start: 2021-05-30 | End: 2021-05-30

## 2021-05-30 RX ORDER — LIDOCAINE 4 G/100G
1 CREAM TOPICAL THREE TIMES A DAY
Refills: 0 | Status: DISCONTINUED | OUTPATIENT
Start: 2021-05-30 | End: 2021-06-16

## 2021-05-30 RX ADMIN — SODIUM CHLORIDE 1000 MILLILITER(S): 9 INJECTION INTRAMUSCULAR; INTRAVENOUS; SUBCUTANEOUS at 08:54

## 2021-05-30 RX ADMIN — FOSCARNET SODIUM 175 MILLIGRAM(S): 24 INJECTION, SOLUTION INTRAVENOUS at 08:54

## 2021-05-30 RX ADMIN — FOSCARNET SODIUM 175 MILLIGRAM(S): 24 INJECTION, SOLUTION INTRAVENOUS at 22:08

## 2021-05-30 RX ADMIN — TRAMADOL HYDROCHLORIDE 25 MILLIGRAM(S): 50 TABLET ORAL at 20:44

## 2021-05-30 RX ADMIN — BICTEGRAVIR SODIUM, EMTRICITABINE, AND TENOFOVIR ALAFENAMIDE FUMARATE 1 TABLET(S): 30; 120; 15 TABLET ORAL at 12:28

## 2021-05-30 RX ADMIN — INSULIN GLARGINE 20 UNIT(S): 100 INJECTION, SOLUTION SUBCUTANEOUS at 22:07

## 2021-05-30 RX ADMIN — Medication 2: at 12:28

## 2021-05-30 RX ADMIN — Medication 1: at 22:07

## 2021-05-30 RX ADMIN — HEPARIN SODIUM 5000 UNIT(S): 5000 INJECTION INTRAVENOUS; SUBCUTANEOUS at 05:14

## 2021-05-30 RX ADMIN — Medication 4: at 08:51

## 2021-05-30 RX ADMIN — HEPARIN SODIUM 5000 UNIT(S): 5000 INJECTION INTRAVENOUS; SUBCUTANEOUS at 17:29

## 2021-05-30 RX ADMIN — TRAMADOL HYDROCHLORIDE 25 MILLIGRAM(S): 50 TABLET ORAL at 21:14

## 2021-05-30 RX ADMIN — SODIUM CHLORIDE 1000 MILLILITER(S): 9 INJECTION INTRAMUSCULAR; INTRAVENOUS; SUBCUTANEOUS at 20:08

## 2021-05-30 NOTE — PROGRESS NOTE ADULT - ASSESSMENT
50 yo female with PMH HIV, DMII, CKD stage 3 presenting with worsening acute on chronic HSV infection with large vulvar mass.

## 2021-05-30 NOTE — PROGRESS NOTE ADULT - PROBLEM SELECTOR PLAN 1
- on foscarnet for resistant HSV infection ; confirmed HSV 2, sensitivities and final pathology pending   - GC/chl, syphillis screen negative  - 500mL bolus q12 while on foscarnet, monitor CMP. Also on maintenance fluids at 50cc/hr   - Vaginal/pelvic US performed on admission, no fluid collections or adnexal masses  - ID recommendations appreciated  - UA negative for UTI ; hold off additional antomicrobials

## 2021-05-30 NOTE — PROGRESS NOTE ADULT - SUBJECTIVE AND OBJECTIVE BOX
Patient is a 51y old  Female who presents with a chief complaint of Valacyclovir resistant HSV (29 May 2021 12:34)      SUBJECTIVE / OVERNIGHT EVENTS:    MEDICATIONS  (STANDING):  bictegravir 50 mG/emtricitabine 200 mG/tenofovir alafenamide 25 mG (BIKTARVY) 1 Tablet(s) Oral daily  dextrose 40% Gel 15 Gram(s) Oral once  dextrose 5%. 1000 milliLiter(s) (50 mL/Hr) IV Continuous <Continuous>  dextrose 5%. 1000 milliLiter(s) (100 mL/Hr) IV Continuous <Continuous>  dextrose 50% Injectable 25 Gram(s) IV Push once  dextrose 50% Injectable 12.5 Gram(s) IV Push once  dextrose 50% Injectable 25 Gram(s) IV Push once  foscarnet (Peripheral) IVPB 2100 milliGRAM(s) IV Intermittent every 12 hours  glucagon  Injectable 1 milliGRAM(s) IntraMuscular once  heparin   Injectable 5000 Unit(s) SubCutaneous every 12 hours  insulin glargine Injectable (LANTUS) 20 Unit(s) SubCutaneous at bedtime  insulin lispro (ADMELOG) corrective regimen sliding scale   SubCutaneous three times a day before meals  insulin lispro (ADMELOG) corrective regimen sliding scale   SubCutaneous at bedtime  lidocaine 0.5%/epinephrine 1:200,000 Inj 20 milliLiter(s) Local Injection once  sodium chloride 0.9% Bolus 500 milliLiter(s) IV Bolus <User Schedule>  sodium chloride 0.9%. 1000 milliLiter(s) (50 mL/Hr) IV Continuous <Continuous>    MEDICATIONS  (PRN):  acetaminophen   Tablet .. 975 milliGRAM(s) Oral every 6 hours PRN Mild Pain (1 - 3)  ibuprofen  Tablet. 400 milliGRAM(s) Oral every 6 hours PRN Moderate Pain (4 - 6)  oxyCODONE    IR 5 milliGRAM(s) Oral every 6 hours PRN Severe Pain (7 - 10)      Vital Signs Last 24 Hrs  T(C): 36.9 (30 May 2021 05:05), Max: 36.9 (30 May 2021 05:05)  T(F): 98.4 (30 May 2021 05:05), Max: 98.4 (30 May 2021 05:05)  HR: 82 (30 May 2021 05:05) (76 - 82)  BP: 143/90 (30 May 2021 05:05) (132/78 - 143/90)  BP(mean): --  RR: 18 (30 May 2021 05:05) (18 - 18)  SpO2: 98% (30 May 2021 05:05) (98% - 100%)  CAPILLARY BLOOD GLUCOSE      POCT Blood Glucose.: 203 mg/dL (30 May 2021 08:48)  POCT Blood Glucose.: 294 mg/dL (29 May 2021 22:28)  POCT Blood Glucose.: 158 mg/dL (29 May 2021 16:52)  POCT Blood Glucose.: 155 mg/dL (29 May 2021 12:37)    I&O's Summary    29 May 2021 07:01  -  30 May 2021 07:00  --------------------------------------------------------  IN: 1940 mL / OUT: 800 mL / NET: 1140 mL    30 May 2021 07:01  -  30 May 2021 11:44  --------------------------------------------------------  IN: 240 mL / OUT: 400 mL / NET: -160 mL        PHYSICAL EXAM:  GENERAL: NAD, well-developed  HEAD:  Atraumatic, Normocephalic  EYES: EOMI, PERRLA, conjunctiva and sclera clear  NECK: Supple, No JVD  CHEST/LUNG: Clear to auscultation bilaterally; No wheeze  HEART: Regular rate and rhythm; No murmurs, rubs, or gallops  ABDOMEN: Soft, Nontender, Nondistended; Bowel sounds present  EXTREMITIES:  2+ Peripheral Pulses, No clubbing, cyanosis, or edema  PSYCH: AAOx3  NEUROLOGY: non-focal  SKIN: No rashes or lesions    LABS:                        10.6   3.86  )-----------( 268      ( 30 May 2021 07:33 )             33.5     05-30    138  |  105  |  16  ----------------------------<  188<H>  3.9   |  19<L>  |  1.36<H>    Ca    9.4      30 May 2021 07:30    TPro  7.6  /  Alb  3.3  /  TBili  0.3  /  DBili  x   /  AST  12  /  ALT  9<L>  /  AlkPhos  269<H>  05-30          Urinalysis Basic - ( 30 May 2021 08:20 )    Color: x / Appearance: x / SG: x / pH: x  Gluc: x / Ketone: x  / Bili: x / Urobili: x   Blood: x / Protein: x / Nitrite: x   Leuk Esterase: x / RBC: 3 /hpf / WBC 16 /HPF   Sq Epi: x / Non Sq Epi: 0 /hpf / Bacteria: Negative        RADIOLOGY & ADDITIONAL TESTS:    Imaging Personally Reviewed:    Consultant(s) Notes Reviewed:      Care Discussed with Consultants/Other Providers:   Patient is a 51y old  Female who presents with a chief complaint of Valacyclovir resistant HSV (29 May 2021 12:34)      SUBJECTIVE / OVERNIGHT EVENTS:  Pt seen and examined. No acute events overnight. She c/o urinary frequency and urgency. Denies dysuria. Also reports pain and bleeding from vulvar mass. Denies fever/chills.    MEDICATIONS  (STANDING):  bictegravir 50 mG/emtricitabine 200 mG/tenofovir alafenamide 25 mG (BIKTARVY) 1 Tablet(s) Oral daily  dextrose 40% Gel 15 Gram(s) Oral once  dextrose 5%. 1000 milliLiter(s) (50 mL/Hr) IV Continuous <Continuous>  dextrose 5%. 1000 milliLiter(s) (100 mL/Hr) IV Continuous <Continuous>  dextrose 50% Injectable 25 Gram(s) IV Push once  dextrose 50% Injectable 12.5 Gram(s) IV Push once  dextrose 50% Injectable 25 Gram(s) IV Push once  foscarnet (Peripheral) IVPB 2100 milliGRAM(s) IV Intermittent every 12 hours  glucagon  Injectable 1 milliGRAM(s) IntraMuscular once  heparin   Injectable 5000 Unit(s) SubCutaneous every 12 hours  insulin glargine Injectable (LANTUS) 20 Unit(s) SubCutaneous at bedtime  insulin lispro (ADMELOG) corrective regimen sliding scale   SubCutaneous three times a day before meals  insulin lispro (ADMELOG) corrective regimen sliding scale   SubCutaneous at bedtime  lidocaine 0.5%/epinephrine 1:200,000 Inj 20 milliLiter(s) Local Injection once  sodium chloride 0.9% Bolus 500 milliLiter(s) IV Bolus <User Schedule>  sodium chloride 0.9%. 1000 milliLiter(s) (50 mL/Hr) IV Continuous <Continuous>    MEDICATIONS  (PRN):  acetaminophen   Tablet .. 975 milliGRAM(s) Oral every 6 hours PRN Mild Pain (1 - 3)  ibuprofen  Tablet. 400 milliGRAM(s) Oral every 6 hours PRN Moderate Pain (4 - 6)  oxyCODONE    IR 5 milliGRAM(s) Oral every 6 hours PRN Severe Pain (7 - 10)      Vital Signs Last 24 Hrs  T(C): 36.9 (30 May 2021 05:05), Max: 36.9 (30 May 2021 05:05)  T(F): 98.4 (30 May 2021 05:05), Max: 98.4 (30 May 2021 05:05)  HR: 82 (30 May 2021 05:05) (76 - 82)  BP: 143/90 (30 May 2021 05:05) (132/78 - 143/90)  BP(mean): --  RR: 18 (30 May 2021 05:05) (18 - 18)  SpO2: 98% (30 May 2021 05:05) (98% - 100%)  CAPILLARY BLOOD GLUCOSE      POCT Blood Glucose.: 203 mg/dL (30 May 2021 08:48)  POCT Blood Glucose.: 294 mg/dL (29 May 2021 22:28)  POCT Blood Glucose.: 158 mg/dL (29 May 2021 16:52)  POCT Blood Glucose.: 155 mg/dL (29 May 2021 12:37)    I&O's Summary    29 May 2021 07:01  -  30 May 2021 07:00  --------------------------------------------------------  IN: 1940 mL / OUT: 800 mL / NET: 1140 mL    30 May 2021 07:01  -  30 May 2021 11:44  --------------------------------------------------------  IN: 240 mL / OUT: 400 mL / NET: -160 mL        PHYSICAL EXAM:  GENERAL: NAD, anicteric, afebrile  HEAD:  Atraumatic, Normocephalic  NECK: Supple, No JVD  CHEST/LUNG: Clear to auscultation bilaterally; No wheeze  HEART: Regular rate and rhythm; No murmurs, rubs, or gallops  ABDOMEN: Soft, Nontender, Nondistended; Bowel sounds present  : 2x4cm exophytic, granulated lesion on right labia, exquisitely tender to touch   EXTREMITIES:  2+ Peripheral Pulses, No clubbing, cyanosis, or edema  PSYCH: AAOx3  NEUROLOGY: non-focal  SKIN: No rashes or lesions    LABS:                        10.6   3.86  )-----------( 268      ( 30 May 2021 07:33 )             33.5     05-30    138  |  105  |  16  ----------------------------<  188<H>  3.9   |  19<L>  |  1.36<H>    Ca    9.4      30 May 2021 07:30    TPro  7.6  /  Alb  3.3  /  TBili  0.3  /  DBili  x   /  AST  12  /  ALT  9<L>  /  AlkPhos  269<H>  05-30          Urinalysis Basic - ( 30 May 2021 08:20 )    Color: x / Appearance: x / SG: x / pH: x  Gluc: x / Ketone: x  / Bili: x / Urobili: x   Blood: x / Protein: x / Nitrite: x   Leuk Esterase: x / RBC: 3 /hpf / WBC 16 /HPF   Sq Epi: x / Non Sq Epi: 0 /hpf / Bacteria: Negative            Care Discussed with Consultants/Other Providers: ID

## 2021-05-31 LAB
ALBUMIN SERPL ELPH-MCNC: 3.2 G/DL — LOW (ref 3.3–5)
ALP SERPL-CCNC: 274 U/L — HIGH (ref 40–120)
ALT FLD-CCNC: 12 U/L — SIGNIFICANT CHANGE UP (ref 10–45)
ANION GAP SERPL CALC-SCNC: 12 MMOL/L — SIGNIFICANT CHANGE UP (ref 5–17)
AST SERPL-CCNC: 15 U/L — SIGNIFICANT CHANGE UP (ref 10–40)
BILIRUB SERPL-MCNC: 0.4 MG/DL — SIGNIFICANT CHANGE UP (ref 0.2–1.2)
BUN SERPL-MCNC: 16 MG/DL — SIGNIFICANT CHANGE UP (ref 7–23)
CALCIUM SERPL-MCNC: 9.5 MG/DL — SIGNIFICANT CHANGE UP (ref 8.4–10.5)
CHLORIDE SERPL-SCNC: 104 MMOL/L — SIGNIFICANT CHANGE UP (ref 96–108)
CO2 SERPL-SCNC: 21 MMOL/L — LOW (ref 22–31)
CREAT SERPL-MCNC: 1.4 MG/DL — HIGH (ref 0.5–1.3)
GLUCOSE BLDC GLUCOMTR-MCNC: 152 MG/DL — HIGH (ref 70–99)
GLUCOSE BLDC GLUCOMTR-MCNC: 156 MG/DL — HIGH (ref 70–99)
GLUCOSE BLDC GLUCOMTR-MCNC: 208 MG/DL — HIGH (ref 70–99)
GLUCOSE BLDC GLUCOMTR-MCNC: 252 MG/DL — HIGH (ref 70–99)
GLUCOSE SERPL-MCNC: 242 MG/DL — HIGH (ref 70–99)
HCT VFR BLD CALC: 33.8 % — LOW (ref 34.5–45)
HGB BLD-MCNC: 10.7 G/DL — LOW (ref 11.5–15.5)
MCHC RBC-ENTMCNC: 29.5 PG — SIGNIFICANT CHANGE UP (ref 27–34)
MCHC RBC-ENTMCNC: 31.7 GM/DL — LOW (ref 32–36)
MCV RBC AUTO: 93.1 FL — SIGNIFICANT CHANGE UP (ref 80–100)
NRBC # BLD: 0 /100 WBCS — SIGNIFICANT CHANGE UP (ref 0–0)
PLATELET # BLD AUTO: 271 K/UL — SIGNIFICANT CHANGE UP (ref 150–400)
POTASSIUM SERPL-MCNC: 4.1 MMOL/L — SIGNIFICANT CHANGE UP (ref 3.5–5.3)
POTASSIUM SERPL-SCNC: 4.1 MMOL/L — SIGNIFICANT CHANGE UP (ref 3.5–5.3)
PROT SERPL-MCNC: 7.5 G/DL — SIGNIFICANT CHANGE UP (ref 6–8.3)
RBC # BLD: 3.63 M/UL — LOW (ref 3.8–5.2)
RBC # FLD: 14.5 % — SIGNIFICANT CHANGE UP (ref 10.3–14.5)
SODIUM SERPL-SCNC: 137 MMOL/L — SIGNIFICANT CHANGE UP (ref 135–145)
WBC # BLD: 3.95 K/UL — SIGNIFICANT CHANGE UP (ref 3.8–10.5)
WBC # FLD AUTO: 3.95 K/UL — SIGNIFICANT CHANGE UP (ref 3.8–10.5)

## 2021-05-31 PROCEDURE — 99233 SBSQ HOSP IP/OBS HIGH 50: CPT

## 2021-05-31 PROCEDURE — 99232 SBSQ HOSP IP/OBS MODERATE 35: CPT

## 2021-05-31 RX ADMIN — Medication 2: at 13:26

## 2021-05-31 RX ADMIN — INSULIN GLARGINE 20 UNIT(S): 100 INJECTION, SOLUTION SUBCUTANEOUS at 22:10

## 2021-05-31 RX ADMIN — Medication 2: at 08:48

## 2021-05-31 RX ADMIN — FOSCARNET SODIUM 175 MILLIGRAM(S): 24 INJECTION, SOLUTION INTRAVENOUS at 08:51

## 2021-05-31 RX ADMIN — HEPARIN SODIUM 5000 UNIT(S): 5000 INJECTION INTRAVENOUS; SUBCUTANEOUS at 05:17

## 2021-05-31 RX ADMIN — FOSCARNET SODIUM 175 MILLIGRAM(S): 24 INJECTION, SOLUTION INTRAVENOUS at 22:10

## 2021-05-31 RX ADMIN — HEPARIN SODIUM 5000 UNIT(S): 5000 INJECTION INTRAVENOUS; SUBCUTANEOUS at 17:38

## 2021-05-31 RX ADMIN — SODIUM CHLORIDE 1000 MILLILITER(S): 9 INJECTION INTRAMUSCULAR; INTRAVENOUS; SUBCUTANEOUS at 08:47

## 2021-05-31 RX ADMIN — Medication 1: at 22:10

## 2021-05-31 RX ADMIN — SODIUM CHLORIDE 1000 MILLILITER(S): 9 INJECTION INTRAMUSCULAR; INTRAVENOUS; SUBCUTANEOUS at 20:49

## 2021-05-31 RX ADMIN — BICTEGRAVIR SODIUM, EMTRICITABINE, AND TENOFOVIR ALAFENAMIDE FUMARATE 1 TABLET(S): 30; 120; 15 TABLET ORAL at 13:26

## 2021-05-31 RX ADMIN — Medication 4: at 17:38

## 2021-05-31 NOTE — PROGRESS NOTE ADULT - SUBJECTIVE AND OBJECTIVE BOX
Patient is a 51y old  Female who presents with a chief complaint of Valacyclovir resistant HSV (31 May 2021 12:57)      SUBJECTIVE / OVERNIGHT EVENTS:  Pt seen and examined. No acute events overnight. States that pain from vulval mass is unchanged. She still reports frequency ; denies fever/chills, dysuria, hematuria. Seen by GYN this AM ; reccs appreciated.    MEDICATIONS  (STANDING):  bictegravir 50 mG/emtricitabine 200 mG/tenofovir alafenamide 25 mG (BIKTARVY) 1 Tablet(s) Oral daily  dextrose 40% Gel 15 Gram(s) Oral once  dextrose 5%. 1000 milliLiter(s) (50 mL/Hr) IV Continuous <Continuous>  dextrose 5%. 1000 milliLiter(s) (100 mL/Hr) IV Continuous <Continuous>  dextrose 50% Injectable 25 Gram(s) IV Push once  dextrose 50% Injectable 12.5 Gram(s) IV Push once  dextrose 50% Injectable 25 Gram(s) IV Push once  foscarnet (Peripheral) IVPB 2100 milliGRAM(s) IV Intermittent every 12 hours  glucagon  Injectable 1 milliGRAM(s) IntraMuscular once  heparin   Injectable 5000 Unit(s) SubCutaneous every 12 hours  insulin glargine Injectable (LANTUS) 20 Unit(s) SubCutaneous at bedtime  insulin lispro (ADMELOG) corrective regimen sliding scale   SubCutaneous three times a day before meals  insulin lispro (ADMELOG) corrective regimen sliding scale   SubCutaneous at bedtime  lidocaine 0.5%/epinephrine 1:200,000 Inj 20 milliLiter(s) Local Injection once  sodium chloride 0.9% Bolus 500 milliLiter(s) IV Bolus <User Schedule>  sodium chloride 0.9%. 1000 milliLiter(s) (50 mL/Hr) IV Continuous <Continuous>    MEDICATIONS  (PRN):  acetaminophen   Tablet .. 975 milliGRAM(s) Oral every 6 hours PRN Mild Pain (1 - 3)  ibuprofen  Tablet. 400 milliGRAM(s) Oral every 6 hours PRN Moderate Pain (4 - 6)  lidocaine 2% Gel 1 Application(s) Topical three times a day PRN Pain in labia region  oxyCODONE    IR 5 milliGRAM(s) Oral every 6 hours PRN Severe Pain (7 - 10)      Vital Signs Last 24 Hrs  T(C): 36.7 (31 May 2021 13:45), Max: 36.8 (30 May 2021 17:31)  T(F): 98 (31 May 2021 13:45), Max: 98.2 (30 May 2021 17:31)  HR: 85 (31 May 2021 13:45) (70 - 85)  BP: 148/88 (31 May 2021 13:45) (126/81 - 148/90)  BP(mean): --  RR: 18 (31 May 2021 13:45) (18 - 18)  SpO2: 99% (31 May 2021 13:45) (97% - 100%)  CAPILLARY BLOOD GLUCOSE      POCT Blood Glucose.: 156 mg/dL (31 May 2021 13:08)  POCT Blood Glucose.: 152 mg/dL (31 May 2021 08:44)  POCT Blood Glucose.: 253 mg/dL (30 May 2021 21:49)  POCT Blood Glucose.: 128 mg/dL (30 May 2021 17:45)    I&O's Summary    30 May 2021 07:01  -  31 May 2021 07:00  --------------------------------------------------------  IN: 1460 mL / OUT: 700 mL / NET: 760 mL    31 May 2021 07:01  -  31 May 2021 14:11  --------------------------------------------------------  IN: 480 mL / OUT: 0 mL / NET: 480 mL        PHYSICAL EXAM:  GENERAL: NAD, anicteric, afebrile  HEAD:  Atraumatic, Normocephalic  NECK: Supple, No JVD  CHEST/LUNG: Clear to auscultation bilaterally; No wheeze  HEART: Regular rate and rhythm; No murmurs, rubs, or gallops  ABDOMEN: Soft, Nontender, Nondistended; Bowel sounds present  : 2x4cm exophytic, granulated lesion on right labia, exquisitely tender to touch   EXTREMITIES:  2+ Peripheral Pulses, No clubbing, cyanosis, or edema  PSYCH: AAOx3  NEUROLOGY: non-focal  SKIN: No rashes or lesions    LABS:                        10.7   3.95  )-----------( 271      ( 31 May 2021 07:19 )             33.8     05-31    137  |  104  |  16  ----------------------------<  242<H>  4.1   |  21<L>  |  1.40<H>    Ca    9.5      31 May 2021 07:00    TPro  7.5  /  Alb  3.2<L>  /  TBili  0.4  /  DBili  x   /  AST  15  /  ALT  12  /  AlkPhos  274<H>  05-31          Urinalysis Basic - ( 30 May 2021 08:20 )    Color: x / Appearance: x / SG: x / pH: x  Gluc: x / Ketone: x  / Bili: x / Urobili: x   Blood: x / Protein: x / Nitrite: x   Leuk Esterase: x / RBC: 3 /hpf / WBC 16 /HPF   Sq Epi: x / Non Sq Epi: 0 /hpf / Bacteria: Negative          Consultant(s) Notes Reviewed:  GYN

## 2021-05-31 NOTE — PROGRESS NOTE ADULT - SUBJECTIVE AND OBJECTIVE BOX
Pain improving    ICU Vital Signs Last 24 Hrs  T(C): 36.8 (31 May 2021 06:13), Max: 36.8 (30 May 2021 13:39)  T(F): 98.2 (31 May 2021 06:13), Max: 98.3 (30 May 2021 13:39)  HR: 82 (31 May 2021 06:13) (70 - 82)  BP: 148/90 (31 May 2021 06:13) (126/81 - 148/90)  RR: 18 (31 May 2021 06:13) (18 - 18)  SpO2: 99% (31 May 2021 06:13) (97% - 100%)                          10.7   3.95  )-----------( 271      ( 31 May 2021 07:19 )             33.8   05-31    137  |  104  |  16  ----------------------------<  242<H>  4.1   |  21<L>  |  1.40<H>    Ca    9.5      31 May 2021 07:00    TPro  7.5  /  Alb  3.2<L>  /  TBili  0.4  /  DBili  x   /  AST  15  /  ALT  12  /  AlkPhos  274<H>  05-31      NAD  ABd soft nontender  Vulva deferred

## 2021-05-31 NOTE — PROGRESS NOTE ADULT - ASSESSMENT
52yo P2 w HIV and DM2 admitted w worsening ulcerous resistant vulvar/vaginal herpes on IV treatment    pain improving  slow healing per pt  plan at least to continue IV treatment in friday or longer  path pending for biopsy

## 2021-06-01 LAB
ALBUMIN SERPL ELPH-MCNC: 3.5 G/DL — SIGNIFICANT CHANGE UP (ref 3.3–5)
ALP SERPL-CCNC: 294 U/L — HIGH (ref 40–120)
ALT FLD-CCNC: 19 U/L — SIGNIFICANT CHANGE UP (ref 10–45)
ANION GAP SERPL CALC-SCNC: 14 MMOL/L — SIGNIFICANT CHANGE UP (ref 5–17)
AST SERPL-CCNC: 24 U/L — SIGNIFICANT CHANGE UP (ref 10–40)
BILIRUB SERPL-MCNC: 0.5 MG/DL — SIGNIFICANT CHANGE UP (ref 0.2–1.2)
BUN SERPL-MCNC: 15 MG/DL — SIGNIFICANT CHANGE UP (ref 7–23)
CALCIUM SERPL-MCNC: 9.7 MG/DL — SIGNIFICANT CHANGE UP (ref 8.4–10.5)
CHLORIDE SERPL-SCNC: 104 MMOL/L — SIGNIFICANT CHANGE UP (ref 96–108)
CO2 SERPL-SCNC: 19 MMOL/L — LOW (ref 22–31)
CREAT SERPL-MCNC: 1.39 MG/DL — HIGH (ref 0.5–1.3)
GLUCOSE BLDC GLUCOMTR-MCNC: 100 MG/DL — HIGH (ref 70–99)
GLUCOSE BLDC GLUCOMTR-MCNC: 152 MG/DL — HIGH (ref 70–99)
GLUCOSE BLDC GLUCOMTR-MCNC: 201 MG/DL — HIGH (ref 70–99)
GLUCOSE BLDC GLUCOMTR-MCNC: 226 MG/DL — HIGH (ref 70–99)
GLUCOSE SERPL-MCNC: 213 MG/DL — HIGH (ref 70–99)
HCT VFR BLD CALC: 34.9 % — SIGNIFICANT CHANGE UP (ref 34.5–45)
HGB BLD-MCNC: 11 G/DL — LOW (ref 11.5–15.5)
MCHC RBC-ENTMCNC: 28.9 PG — SIGNIFICANT CHANGE UP (ref 27–34)
MCHC RBC-ENTMCNC: 31.5 GM/DL — LOW (ref 32–36)
MCV RBC AUTO: 91.8 FL — SIGNIFICANT CHANGE UP (ref 80–100)
NRBC # BLD: 0 /100 WBCS — SIGNIFICANT CHANGE UP (ref 0–0)
PLATELET # BLD AUTO: 295 K/UL — SIGNIFICANT CHANGE UP (ref 150–400)
POTASSIUM SERPL-MCNC: 4.3 MMOL/L — SIGNIFICANT CHANGE UP (ref 3.5–5.3)
POTASSIUM SERPL-SCNC: 4.3 MMOL/L — SIGNIFICANT CHANGE UP (ref 3.5–5.3)
PROT SERPL-MCNC: 8.2 G/DL — SIGNIFICANT CHANGE UP (ref 6–8.3)
RBC # BLD: 3.8 M/UL — SIGNIFICANT CHANGE UP (ref 3.8–5.2)
RBC # FLD: 14.6 % — HIGH (ref 10.3–14.5)
SODIUM SERPL-SCNC: 137 MMOL/L — SIGNIFICANT CHANGE UP (ref 135–145)
WBC # BLD: 3.52 K/UL — LOW (ref 3.8–10.5)
WBC # FLD AUTO: 3.52 K/UL — LOW (ref 3.8–10.5)

## 2021-06-01 PROCEDURE — 99232 SBSQ HOSP IP/OBS MODERATE 35: CPT

## 2021-06-01 PROCEDURE — 99233 SBSQ HOSP IP/OBS HIGH 50: CPT

## 2021-06-01 RX ORDER — INSULIN LISPRO 100/ML
2 VIAL (ML) SUBCUTANEOUS
Refills: 0 | Status: DISCONTINUED | OUTPATIENT
Start: 2021-06-01 | End: 2021-06-05

## 2021-06-01 RX ORDER — TRAMADOL HYDROCHLORIDE 50 MG/1
25 TABLET ORAL ONCE
Refills: 0 | Status: DISCONTINUED | OUTPATIENT
Start: 2021-06-01 | End: 2021-06-01

## 2021-06-01 RX ADMIN — HEPARIN SODIUM 5000 UNIT(S): 5000 INJECTION INTRAVENOUS; SUBCUTANEOUS at 06:34

## 2021-06-01 RX ADMIN — TRAMADOL HYDROCHLORIDE 25 MILLIGRAM(S): 50 TABLET ORAL at 07:20

## 2021-06-01 RX ADMIN — SODIUM CHLORIDE 1000 MILLILITER(S): 9 INJECTION INTRAMUSCULAR; INTRAVENOUS; SUBCUTANEOUS at 08:52

## 2021-06-01 RX ADMIN — TRAMADOL HYDROCHLORIDE 25 MILLIGRAM(S): 50 TABLET ORAL at 06:47

## 2021-06-01 RX ADMIN — Medication 2 UNIT(S): at 13:51

## 2021-06-01 RX ADMIN — Medication 2 UNIT(S): at 17:46

## 2021-06-01 RX ADMIN — SODIUM CHLORIDE 1000 MILLILITER(S): 9 INJECTION INTRAMUSCULAR; INTRAVENOUS; SUBCUTANEOUS at 21:06

## 2021-06-01 RX ADMIN — BICTEGRAVIR SODIUM, EMTRICITABINE, AND TENOFOVIR ALAFENAMIDE FUMARATE 1 TABLET(S): 30; 120; 15 TABLET ORAL at 12:46

## 2021-06-01 RX ADMIN — HEPARIN SODIUM 5000 UNIT(S): 5000 INJECTION INTRAVENOUS; SUBCUTANEOUS at 17:46

## 2021-06-01 RX ADMIN — FOSCARNET SODIUM 175 MILLIGRAM(S): 24 INJECTION, SOLUTION INTRAVENOUS at 21:59

## 2021-06-01 RX ADMIN — INSULIN GLARGINE 20 UNIT(S): 100 INJECTION, SOLUTION SUBCUTANEOUS at 21:59

## 2021-06-01 RX ADMIN — Medication 4: at 17:43

## 2021-06-01 RX ADMIN — Medication 4: at 08:32

## 2021-06-01 RX ADMIN — SODIUM CHLORIDE 50 MILLILITER(S): 9 INJECTION INTRAMUSCULAR; INTRAVENOUS; SUBCUTANEOUS at 21:06

## 2021-06-01 RX ADMIN — FOSCARNET SODIUM 175 MILLIGRAM(S): 24 INJECTION, SOLUTION INTRAVENOUS at 08:52

## 2021-06-01 NOTE — PROGRESS NOTE ADULT - SUBJECTIVE AND OBJECTIVE BOX
Scott Lux   Pager [phone]  Office [phone]      CC: Patient is a 51y old  Female who presents with a chief complaint of Valacyclovir resistant HSV (31 May 2021 14:11)      SUBJECTIVE / OVERNIGHT EVENTS:    MEDICATIONS  (STANDING):  bictegravir 50 mG/emtricitabine 200 mG/tenofovir alafenamide 25 mG (BIKTARVY) 1 Tablet(s) Oral daily  dextrose 40% Gel 15 Gram(s) Oral once  dextrose 5%. 1000 milliLiter(s) (50 mL/Hr) IV Continuous <Continuous>  dextrose 5%. 1000 milliLiter(s) (100 mL/Hr) IV Continuous <Continuous>  dextrose 50% Injectable 25 Gram(s) IV Push once  dextrose 50% Injectable 12.5 Gram(s) IV Push once  dextrose 50% Injectable 25 Gram(s) IV Push once  foscarnet (Peripheral) IVPB 2100 milliGRAM(s) IV Intermittent every 12 hours  glucagon  Injectable 1 milliGRAM(s) IntraMuscular once  heparin   Injectable 5000 Unit(s) SubCutaneous every 12 hours  insulin glargine Injectable (LANTUS) 20 Unit(s) SubCutaneous at bedtime  insulin lispro (ADMELOG) corrective regimen sliding scale   SubCutaneous three times a day before meals  insulin lispro (ADMELOG) corrective regimen sliding scale   SubCutaneous at bedtime  lidocaine 0.5%/epinephrine 1:200,000 Inj 20 milliLiter(s) Local Injection once  sodium chloride 0.9% Bolus 500 milliLiter(s) IV Bolus <User Schedule>  sodium chloride 0.9%. 1000 milliLiter(s) (50 mL/Hr) IV Continuous <Continuous>    MEDICATIONS  (PRN):  acetaminophen   Tablet .. 975 milliGRAM(s) Oral every 6 hours PRN Mild Pain (1 - 3)  ibuprofen  Tablet. 400 milliGRAM(s) Oral every 6 hours PRN Moderate Pain (4 - 6)  lidocaine 2% Gel 1 Application(s) Topical three times a day PRN Pain in labia region  oxyCODONE    IR 5 milliGRAM(s) Oral every 6 hours PRN Severe Pain (7 - 10)      Vital Signs Last 24 Hrs  T(C): 36.7 (01 Jun 2021 06:36), Max: 36.8 (31 May 2021 17:18)  T(F): 98 (01 Jun 2021 06:36), Max: 98.3 (31 May 2021 17:18)  HR: 83 (01 Jun 2021 06:36) (83 - 88)  BP: 145/85 (01 Jun 2021 06:36) (124/82 - 157/91)  BP(mean): --  RR: 18 (01 Jun 2021 06:36) (18 - 18)  SpO2: 99% (01 Jun 2021 06:36) (98% - 99%)  CAPILLARY BLOOD GLUCOSE      POCT Blood Glucose.: 226 mg/dL (01 Jun 2021 08:19)  POCT Blood Glucose.: 252 mg/dL (31 May 2021 21:53)  POCT Blood Glucose.: 208 mg/dL (31 May 2021 17:18)  POCT Blood Glucose.: 156 mg/dL (31 May 2021 13:08)    I&O's Summary    31 May 2021 07:01  -  01 Jun 2021 07:00  --------------------------------------------------------  IN: 2615 mL / OUT: 0 mL / NET: 2615 mL    01 Jun 2021 07:01  -  01 Jun 2021 11:57  --------------------------------------------------------  IN: 240 mL / OUT: 0 mL / NET: 240 mL      tele:    PHYSICAL EXAM:    GENERAL: NAD   HEENT: EOMI, PERRL  PULM: Clear to auscultation bilaterally  CV: Regular rate and rhythm; nl S1, S2; No murmurs, rubs, or gallops  ABDOMEN: Soft, Nontender, Nondistended; Bowel sounds present  EXTREMITIES/MSK:  No edema, calf tenderness   PSYCH: AAOx3  NEUROLOGY: non-focal          LABS:                        11.0   3.52  )-----------( 295      ( 01 Jun 2021 06:57 )             34.9     06-01    137  |  104  |  15  ----------------------------<  213<H>  4.3   |  19<L>  |  1.39<H>    Ca    9.7      01 Jun 2021 06:57    TPro  8.2  /  Alb  3.5  /  TBili  0.5  /  DBili  x   /  AST  24  /  ALT  19  /  AlkPhos  294<H>  06-01                RADIOLOGY & ADDITIONAL TESTS:    Imaging Personally Reviewed:    Consultant(s) Notes Reviewed:      Care Discussed with Consultants/Other Providers:   Scott Lux   Pager [phone]  Office [phone]      CC: Patient is a 51y old  Female who presents with a chief complaint of Valacyclovir resistant HSV (31 May 2021 14:11)      SUBJECTIVE / OVERNIGHT EVENTS: Labial discomfort improved. No f/c/r. No n/v/d.     MEDICATIONS  (STANDING):  bictegravir 50 mG/emtricitabine 200 mG/tenofovir alafenamide 25 mG (BIKTARVY) 1 Tablet(s) Oral daily  dextrose 40% Gel 15 Gram(s) Oral once  dextrose 5%. 1000 milliLiter(s) (50 mL/Hr) IV Continuous <Continuous>  dextrose 5%. 1000 milliLiter(s) (100 mL/Hr) IV Continuous <Continuous>  dextrose 50% Injectable 25 Gram(s) IV Push once  dextrose 50% Injectable 12.5 Gram(s) IV Push once  dextrose 50% Injectable 25 Gram(s) IV Push once  foscarnet (Peripheral) IVPB 2100 milliGRAM(s) IV Intermittent every 12 hours  glucagon  Injectable 1 milliGRAM(s) IntraMuscular once  heparin   Injectable 5000 Unit(s) SubCutaneous every 12 hours  insulin glargine Injectable (LANTUS) 20 Unit(s) SubCutaneous at bedtime  insulin lispro (ADMELOG) corrective regimen sliding scale   SubCutaneous three times a day before meals  insulin lispro (ADMELOG) corrective regimen sliding scale   SubCutaneous at bedtime  lidocaine 0.5%/epinephrine 1:200,000 Inj 20 milliLiter(s) Local Injection once  sodium chloride 0.9% Bolus 500 milliLiter(s) IV Bolus <User Schedule>  sodium chloride 0.9%. 1000 milliLiter(s) (50 mL/Hr) IV Continuous <Continuous>    MEDICATIONS  (PRN):  acetaminophen   Tablet .. 975 milliGRAM(s) Oral every 6 hours PRN Mild Pain (1 - 3)  ibuprofen  Tablet. 400 milliGRAM(s) Oral every 6 hours PRN Moderate Pain (4 - 6)  lidocaine 2% Gel 1 Application(s) Topical three times a day PRN Pain in labia region  oxyCODONE    IR 5 milliGRAM(s) Oral every 6 hours PRN Severe Pain (7 - 10)      Vital Signs Last 24 Hrs  T(C): 36.7 (01 Jun 2021 06:36), Max: 36.8 (31 May 2021 17:18)  T(F): 98 (01 Jun 2021 06:36), Max: 98.3 (31 May 2021 17:18)  HR: 83 (01 Jun 2021 06:36) (83 - 88)  BP: 145/85 (01 Jun 2021 06:36) (124/82 - 157/91)  BP(mean): --  RR: 18 (01 Jun 2021 06:36) (18 - 18)  SpO2: 99% (01 Jun 2021 06:36) (98% - 99%)  CAPILLARY BLOOD GLUCOSE      POCT Blood Glucose.: 226 mg/dL (01 Jun 2021 08:19)  POCT Blood Glucose.: 252 mg/dL (31 May 2021 21:53)  POCT Blood Glucose.: 208 mg/dL (31 May 2021 17:18)  POCT Blood Glucose.: 156 mg/dL (31 May 2021 13:08)    I&O's Summary    31 May 2021 07:01  -  01 Jun 2021 07:00  --------------------------------------------------------  IN: 2615 mL / OUT: 0 mL / NET: 2615 mL    01 Jun 2021 07:01  -  01 Jun 2021 11:57  --------------------------------------------------------  IN: 240 mL / OUT: 0 mL / NET: 240 mL          PHYSICAL EXAM: chaparoned by pt's nurse    GENERAL: NAD   HEENT: EOMI, PERRL  PULM: Clear to auscultation bilaterally  CV: Regular rate and rhythm; nl S1, S2; No murmurs, rubs, or gallops  ABDOMEN: Soft, Nontender, Nondistended; Bowel sounds present  EXTREMITIES/MSK:  No edema, calf tenderness   PSYCH: AAOx3  NEUROLOGY: non-focal  : edematous labia c ulcerations          LABS:                        11.0   3.52  )-----------( 295      ( 01 Jun 2021 06:57 )             34.9     06-01    137  |  104  |  15  ----------------------------<  213<H>  4.3   |  19<L>  |  1.39<H>    Ca    9.7      01 Jun 2021 06:57    TPro  8.2  /  Alb  3.5  /  TBili  0.5  /  DBili  x   /  AST  24  /  ALT  19  /  AlkPhos  294<H>  06-01                RADIOLOGY & ADDITIONAL TESTS:    Imaging Personally Reviewed:    Consultant(s) Notes Reviewed:      Care Discussed with Consultants/Other Providers: ROCKY

## 2021-06-01 NOTE — PROGRESS NOTE ADULT - PROBLEM SELECTOR PLAN 1
- on foscarnet for resistant HSV infection ; confirmed HSV 2, sensitivities and final pathology pending   - GC/chl, syphillis screen negative  - 500mL bolus q12 while on foscarnet, monitor CMP. Also on maintenance fluids at 50cc/hr   - Vaginal/pelvic US performed on admission, no fluid collections or adnexal masses  - ID recommendations appreciated  - UA negative for UTI ; hold off additional antimicrobials

## 2021-06-01 NOTE — PROGRESS NOTE ADULT - ASSESSMENT
50 y/o female PMHx Genital Herpes on valacyclovir x2 years, DM on insulin, HIV unknown last CD4 count/viral load now presenting to the ED with worsening labial mass not responding with oral valtrex    overall HIV infection, Resistant HSV 2 infection. + vulvar mass      Plan:   c/w foscarnet, clinically improving gradually   c/w 500 ml bolus of NS prior to each foscarnet infusion q12h   c/w 50cc/hr of maintenance  fluid.   monitor CMP daily as it can lead to significant electrolyte abnormalities  monitor CBC daily to monitor WBC  culture viral herpes sent, so the sensitivities can be performed  GC/Chl negative   syphilis screen negative   s/p bx of the mass follow up pathology.       HIV infection: well controlled   c/w alla Huitron  Pager: [phone]. If no response or past 5 pm call [phone].

## 2021-06-01 NOTE — PROGRESS NOTE ADULT - SUBJECTIVE AND OBJECTIVE BOX
51y old  Female who presents with a chief complaint of Valacyclovir resistant HSV (01 Jun 2021 11:56)      Interval history:  Afebrile, the lesions improving. Still with dysuria.       Allergies:   No Known Allergies      Antimicrobials:  bictegravir 50 mG/emtricitabine 200 mG/tenofovir alafenamide 25 mG (BIKTARVY) 1 Tablet(s) Oral daily  foscarnet (Peripheral) IVPB 2100 milliGRAM(s) IV Intermittent every 12 hours      REVIEW OF SYSTEMS:  No chest pain   No SOB  No N/V  No dysuria  No rash.       Vital Signs Last 24 Hrs  T(C): 36.8 (06-01-21 @ 14:26), Max: 36.8 (05-31-21 @ 17:18)  T(F): 98.2 (06-01-21 @ 14:26), Max: 98.3 (05-31-21 @ 17:18)  HR: 85 (06-01-21 @ 14:26) (83 - 88)  BP: 148/80 (06-01-21 @ 14:26) (124/82 - 157/91)  BP(mean): --  RR: 18 (06-01-21 @ 14:26) (18 - 18)  SpO2: 95% (06-01-21 @ 14:26) (95% - 99%)      PHYSICAL EXAM:  Patient in no acute distress. AAOX3.  No icterus, no oral ulcers.  Cardiovascular: S1S2 normal.  Lungs: Good air entry B/L lung fields.  Gastrointestinal: soft, nontender, nondistended.  Extremities: no edema.  IV sites not inflamed.                           11.0   3.52  )-----------( 295      ( 01 Jun 2021 06:57 )             34.9   06-01    137  |  104  |  15  ----------------------------<  213<H>  4.3   |  19<L>  |  1.39<H>    Ca    9.7      01 Jun 2021 06:57    TPro  8.2  /  Alb  3.5  /  TBili  0.5  /  DBili  x   /  AST  24  /  ALT  19  /  AlkPhos  294<H>  06-01      LIVER FUNCTIONS - ( 01 Jun 2021 06:57 )  Alb: 3.5 g/dL / Pro: 8.2 g/dL / ALK PHOS: 294 U/L / ALT: 19 U/L / AST: 24 U/L / GGT: x             Herpes Simplex Virus 1/2 VZV Lesions, PCR (05.28.21 @ 16:55)   Herpes Simplex Virus 1/2  VZV PCR Source: lesion   Herpes Simplex Virus 1/2  VZV PCR Result: HSV2 Detected

## 2021-06-02 LAB
ALBUMIN SERPL ELPH-MCNC: 3.4 G/DL — SIGNIFICANT CHANGE UP (ref 3.3–5)
ALP SERPL-CCNC: 265 U/L — HIGH (ref 40–120)
ALT FLD-CCNC: 26 U/L — SIGNIFICANT CHANGE UP (ref 10–45)
ANION GAP SERPL CALC-SCNC: 12 MMOL/L — SIGNIFICANT CHANGE UP (ref 5–17)
AST SERPL-CCNC: 27 U/L — SIGNIFICANT CHANGE UP (ref 10–40)
BILIRUB SERPL-MCNC: 0.5 MG/DL — SIGNIFICANT CHANGE UP (ref 0.2–1.2)
BUN SERPL-MCNC: 16 MG/DL — SIGNIFICANT CHANGE UP (ref 7–23)
CALCIUM SERPL-MCNC: 9.9 MG/DL — SIGNIFICANT CHANGE UP (ref 8.4–10.5)
CHLORIDE SERPL-SCNC: 104 MMOL/L — SIGNIFICANT CHANGE UP (ref 96–108)
CO2 SERPL-SCNC: 21 MMOL/L — LOW (ref 22–31)
CREAT SERPL-MCNC: 1.41 MG/DL — HIGH (ref 0.5–1.3)
GLUCOSE BLDC GLUCOMTR-MCNC: 110 MG/DL — HIGH (ref 70–99)
GLUCOSE BLDC GLUCOMTR-MCNC: 159 MG/DL — HIGH (ref 70–99)
GLUCOSE BLDC GLUCOMTR-MCNC: 203 MG/DL — HIGH (ref 70–99)
GLUCOSE BLDC GLUCOMTR-MCNC: 219 MG/DL — HIGH (ref 70–99)
GLUCOSE SERPL-MCNC: 152 MG/DL — HIGH (ref 70–99)
HCT VFR BLD CALC: 33.9 % — LOW (ref 34.5–45)
HGB BLD-MCNC: 10.8 G/DL — LOW (ref 11.5–15.5)
MAGNESIUM SERPL-MCNC: 1.6 MG/DL — SIGNIFICANT CHANGE UP (ref 1.6–2.6)
MCHC RBC-ENTMCNC: 29.1 PG — SIGNIFICANT CHANGE UP (ref 27–34)
MCHC RBC-ENTMCNC: 31.9 GM/DL — LOW (ref 32–36)
MCV RBC AUTO: 91.4 FL — SIGNIFICANT CHANGE UP (ref 80–100)
NRBC # BLD: 0 /100 WBCS — SIGNIFICANT CHANGE UP (ref 0–0)
PLATELET # BLD AUTO: 292 K/UL — SIGNIFICANT CHANGE UP (ref 150–400)
POTASSIUM SERPL-MCNC: 4 MMOL/L — SIGNIFICANT CHANGE UP (ref 3.5–5.3)
POTASSIUM SERPL-SCNC: 4 MMOL/L — SIGNIFICANT CHANGE UP (ref 3.5–5.3)
PROT SERPL-MCNC: 8.2 G/DL — SIGNIFICANT CHANGE UP (ref 6–8.3)
RBC # BLD: 3.71 M/UL — LOW (ref 3.8–5.2)
RBC # FLD: 14.5 % — SIGNIFICANT CHANGE UP (ref 10.3–14.5)
SODIUM SERPL-SCNC: 137 MMOL/L — SIGNIFICANT CHANGE UP (ref 135–145)
WBC # BLD: 3.81 K/UL — SIGNIFICANT CHANGE UP (ref 3.8–10.5)
WBC # FLD AUTO: 3.81 K/UL — SIGNIFICANT CHANGE UP (ref 3.8–10.5)

## 2021-06-02 PROCEDURE — 99233 SBSQ HOSP IP/OBS HIGH 50: CPT

## 2021-06-02 PROCEDURE — 99232 SBSQ HOSP IP/OBS MODERATE 35: CPT

## 2021-06-02 RX ORDER — SODIUM CHLORIDE 9 MG/ML
500 INJECTION INTRAMUSCULAR; INTRAVENOUS; SUBCUTANEOUS
Refills: 0 | Status: COMPLETED | OUTPATIENT
Start: 2021-06-02 | End: 2021-06-07

## 2021-06-02 RX ORDER — INSULIN LISPRO 100/ML
2 VIAL (ML) SUBCUTANEOUS
Refills: 0 | Status: DISCONTINUED | OUTPATIENT
Start: 2021-06-02 | End: 2021-06-05

## 2021-06-02 RX ADMIN — FOSCARNET SODIUM 175 MILLIGRAM(S): 24 INJECTION, SOLUTION INTRAVENOUS at 23:34

## 2021-06-02 RX ADMIN — SODIUM CHLORIDE 1000 MILLILITER(S): 9 INJECTION INTRAMUSCULAR; INTRAVENOUS; SUBCUTANEOUS at 09:15

## 2021-06-02 RX ADMIN — Medication 2: at 09:06

## 2021-06-02 RX ADMIN — Medication 2 UNIT(S): at 12:57

## 2021-06-02 RX ADMIN — Medication 2 UNIT(S): at 17:19

## 2021-06-02 RX ADMIN — FOSCARNET SODIUM 175 MILLIGRAM(S): 24 INJECTION, SOLUTION INTRAVENOUS at 10:18

## 2021-06-02 RX ADMIN — BICTEGRAVIR SODIUM, EMTRICITABINE, AND TENOFOVIR ALAFENAMIDE FUMARATE 1 TABLET(S): 30; 120; 15 TABLET ORAL at 12:57

## 2021-06-02 RX ADMIN — SODIUM CHLORIDE 50 MILLILITER(S): 9 INJECTION INTRAMUSCULAR; INTRAVENOUS; SUBCUTANEOUS at 21:46

## 2021-06-02 RX ADMIN — Medication 4: at 12:58

## 2021-06-02 RX ADMIN — SODIUM CHLORIDE 1000 MILLILITER(S): 9 INJECTION INTRAMUSCULAR; INTRAVENOUS; SUBCUTANEOUS at 20:55

## 2021-06-02 RX ADMIN — HEPARIN SODIUM 5000 UNIT(S): 5000 INJECTION INTRAVENOUS; SUBCUTANEOUS at 05:38

## 2021-06-02 RX ADMIN — INSULIN GLARGINE 20 UNIT(S): 100 INJECTION, SOLUTION SUBCUTANEOUS at 21:45

## 2021-06-02 RX ADMIN — HEPARIN SODIUM 5000 UNIT(S): 5000 INJECTION INTRAVENOUS; SUBCUTANEOUS at 17:20

## 2021-06-02 NOTE — PROGRESS NOTE ADULT - ASSESSMENT
52 y/o female PMHx Genital Herpes on valacyclovir x2 years, DM on insulin, HIV unknown last CD4 count/viral load now presenting to the ED with worsening labial mass not responding with oral valtrex    overall HIV infection, Resistant HSV 2 infection. + vulvar mass      Plan:   c/w foscarnet, clinically improving gradually   c/w 500 ml bolus of NS prior to each foscarnet infusion q12h   c/w 50cc/hr of maintenance  fluid.   monitor CMP daily as it can lead to significant electrolyte abnormalities with mg   monitor CBC daily to monitor WBC  culture viral herpes sent, so the sensitivities can be performed  GC/Chl negative   syphilis screen negative   s/p bx of the mass follow up pathology.   Gyn on board, once path is back based on it can discuss role of surgical excision.       HIV infection: well controlled   c/w bicktarvy       Plan discussed with Medicine Attending and BELEN Huitron  Pager: [phone]. If no response or past 5 pm call [phone].

## 2021-06-02 NOTE — PROGRESS NOTE ADULT - SUBJECTIVE AND OBJECTIVE BOX
51y old  Female who presents with a chief complaint of Valacyclovir resistant HSV (02 Jun 2021 10:37)      Interval history:  Afebrile, feeling better.       Allergies:   No Known Allergies      Antimicrobials:  bictegravir 50 mG/emtricitabine 200 mG/tenofovir alafenamide 25 mG (BIKTARVY) 1 Tablet(s) Oral daily  foscarnet (Peripheral) IVPB 2100 milliGRAM(s) IV Intermittent every 12 hours      REVIEW OF SYSTEMS:  No chest pain  No SOB  No abdominal pain  No new rash.       Vital Signs Last 24 Hrs  T(C): 36.8 (06-02-21 @ 13:19), Max: 36.8 (06-01-21 @ 21:55)  T(F): 98.3 (06-02-21 @ 13:19), Max: 98.3 (06-02-21 @ 13:19)  HR: 79 (06-02-21 @ 13:19) (73 - 88)  BP: 118/78 (06-02-21 @ 13:19) (118/78 - 146/90)  BP(mean): --  RR: 18 (06-02-21 @ 13:19) (18 - 18)  SpO2: 97% (06-02-21 @ 13:19) (96% - 99%)      PHYSICAL EXAM:  Patient in no acute distress. AAOX3.  No icterus, no oral ulcers.  Cardiovascular: S1S2 normal.  Lungs: Good air entry B/L lung fields.  Gastrointestinal: soft, nontender, nondistended.  Extremities: no edema.  IV sites not inflamed.                           10.8   3.81  )-----------( 292      ( 02 Jun 2021 07:04 )             33.9   06-02    137  |  104  |  16  ----------------------------<  152<H>  4.0   |  21<L>  |  1.41<H>    Ca    9.9      02 Jun 2021 07:00  Mg     1.6     06-02    TPro  8.2  /  Alb  3.4  /  TBili  0.5  /  DBili  x   /  AST  27  /  ALT  26  /  AlkPhos  265<H>  06-02      LIVER FUNCTIONS - ( 02 Jun 2021 07:00 )  Alb: 3.4 g/dL / Pro: 8.2 g/dL / ALK PHOS: 265 U/L / ALT: 26 U/L / AST: 27 U/L / GGT: x

## 2021-06-02 NOTE — PROGRESS NOTE ADULT - SUBJECTIVE AND OBJECTIVE BOX
Scott Lux   Pager [phone]  Office [phone]      CC: Patient is a 51y old  Female who presents with a chief complaint of Valacyclovir resistant HSV (01 Jun 2021 16:46)      SUBJECTIVE / OVERNIGHT EVENTS:    MEDICATIONS  (STANDING):  bictegravir 50 mG/emtricitabine 200 mG/tenofovir alafenamide 25 mG (BIKTARVY) 1 Tablet(s) Oral daily  dextrose 40% Gel 15 Gram(s) Oral once  dextrose 5%. 1000 milliLiter(s) (50 mL/Hr) IV Continuous <Continuous>  dextrose 5%. 1000 milliLiter(s) (100 mL/Hr) IV Continuous <Continuous>  dextrose 50% Injectable 25 Gram(s) IV Push once  dextrose 50% Injectable 12.5 Gram(s) IV Push once  dextrose 50% Injectable 25 Gram(s) IV Push once  foscarnet (Peripheral) IVPB 2100 milliGRAM(s) IV Intermittent every 12 hours  glucagon  Injectable 1 milliGRAM(s) IntraMuscular once  heparin   Injectable 5000 Unit(s) SubCutaneous every 12 hours  insulin glargine Injectable (LANTUS) 20 Unit(s) SubCutaneous at bedtime  insulin lispro (ADMELOG) corrective regimen sliding scale   SubCutaneous three times a day before meals  insulin lispro (ADMELOG) corrective regimen sliding scale   SubCutaneous at bedtime  insulin lispro Injectable (ADMELOG) 2 Unit(s) SubCutaneous before lunch  insulin lispro Injectable (ADMELOG) 2 Unit(s) SubCutaneous before dinner  lidocaine 0.5%/epinephrine 1:200,000 Inj 20 milliLiter(s) Local Injection once  sodium chloride 0.9%. 1000 milliLiter(s) (50 mL/Hr) IV Continuous <Continuous>    MEDICATIONS  (PRN):  acetaminophen   Tablet .. 975 milliGRAM(s) Oral every 6 hours PRN Mild Pain (1 - 3)  ibuprofen  Tablet. 400 milliGRAM(s) Oral every 6 hours PRN Moderate Pain (4 - 6)  lidocaine 2% Gel 1 Application(s) Topical three times a day PRN Pain in labia region  oxyCODONE    IR 5 milliGRAM(s) Oral every 6 hours PRN Severe Pain (7 - 10)      Vital Signs Last 24 Hrs  T(C): 36.4 (02 Jun 2021 05:40), Max: 36.8 (01 Jun 2021 14:26)  T(F): 97.6 (02 Jun 2021 05:40), Max: 98.2 (01 Jun 2021 14:26)  HR: 73 (02 Jun 2021 05:40) (73 - 88)  BP: 146/90 (02 Jun 2021 05:40) (138/75 - 148/80)  BP(mean): --  RR: 18 (02 Jun 2021 05:40) (18 - 18)  SpO2: 98% (02 Jun 2021 05:40) (95% - 99%)  CAPILLARY BLOOD GLUCOSE      POCT Blood Glucose.: 159 mg/dL (02 Jun 2021 08:59)  POCT Blood Glucose.: 152 mg/dL (01 Jun 2021 21:44)  POCT Blood Glucose.: 201 mg/dL (01 Jun 2021 16:51)  POCT Blood Glucose.: 100 mg/dL (01 Jun 2021 12:51)    I&O's Summary    01 Jun 2021 07:01  -  02 Jun 2021 07:00  --------------------------------------------------------  IN: 4155 mL / OUT: 0 mL / NET: 4155 mL      tele:    PHYSICAL EXAM:    GENERAL: NAD   HEENT: EOMI, PERRL  PULM: Clear to auscultation bilaterally  CV: Regular rate and rhythm; nl S1, S2; No murmurs, rubs, or gallops  ABDOMEN: Soft, Nontender, Nondistended; Bowel sounds present  EXTREMITIES/MSK:  No edema, calf tenderness   PSYCH: AAOx3  NEUROLOGY: non-focal          LABS:                        10.8   3.81  )-----------( 292      ( 02 Jun 2021 07:04 )             33.9     06-02    137  |  104  |  16  ----------------------------<  152<H>  4.0   |  21<L>  |  1.41<H>    Ca    9.9      02 Jun 2021 07:00  Mg     1.6     06-02    TPro  8.2  /  Alb  3.4  /  TBili  0.5  /  DBili  x   /  AST  27  /  ALT  26  /  AlkPhos  265<H>  06-02                RADIOLOGY & ADDITIONAL TESTS:    Imaging Personally Reviewed:    Consultant(s) Notes Reviewed:      Care Discussed with Consultants/Other Providers:   Scott Lux   Pager [phone]  Office [phone]      CC: Patient is a 51y old  Female who presents with a chief complaint of Valacyclovir resistant HSV (01 Jun 2021 16:46)      SUBJECTIVE / OVERNIGHT EVENTS: Reports that labial pain has sig improved. no f/c/r. no n/v/d/abd pain.     MEDICATIONS  (STANDING):  bictegravir 50 mG/emtricitabine 200 mG/tenofovir alafenamide 25 mG (BIKTARVY) 1 Tablet(s) Oral daily  dextrose 40% Gel 15 Gram(s) Oral once  dextrose 5%. 1000 milliLiter(s) (50 mL/Hr) IV Continuous <Continuous>  dextrose 5%. 1000 milliLiter(s) (100 mL/Hr) IV Continuous <Continuous>  dextrose 50% Injectable 25 Gram(s) IV Push once  dextrose 50% Injectable 12.5 Gram(s) IV Push once  dextrose 50% Injectable 25 Gram(s) IV Push once  foscarnet (Peripheral) IVPB 2100 milliGRAM(s) IV Intermittent every 12 hours  glucagon  Injectable 1 milliGRAM(s) IntraMuscular once  heparin   Injectable 5000 Unit(s) SubCutaneous every 12 hours  insulin glargine Injectable (LANTUS) 20 Unit(s) SubCutaneous at bedtime  insulin lispro (ADMELOG) corrective regimen sliding scale   SubCutaneous three times a day before meals  insulin lispro (ADMELOG) corrective regimen sliding scale   SubCutaneous at bedtime  insulin lispro Injectable (ADMELOG) 2 Unit(s) SubCutaneous before lunch  insulin lispro Injectable (ADMELOG) 2 Unit(s) SubCutaneous before dinner  lidocaine 0.5%/epinephrine 1:200,000 Inj 20 milliLiter(s) Local Injection once  sodium chloride 0.9%. 1000 milliLiter(s) (50 mL/Hr) IV Continuous <Continuous>    MEDICATIONS  (PRN):  acetaminophen   Tablet .. 975 milliGRAM(s) Oral every 6 hours PRN Mild Pain (1 - 3)  ibuprofen  Tablet. 400 milliGRAM(s) Oral every 6 hours PRN Moderate Pain (4 - 6)  lidocaine 2% Gel 1 Application(s) Topical three times a day PRN Pain in labia region  oxyCODONE    IR 5 milliGRAM(s) Oral every 6 hours PRN Severe Pain (7 - 10)      Vital Signs Last 24 Hrs  T(C): 36.4 (02 Jun 2021 05:40), Max: 36.8 (01 Jun 2021 14:26)  T(F): 97.6 (02 Jun 2021 05:40), Max: 98.2 (01 Jun 2021 14:26)  HR: 73 (02 Jun 2021 05:40) (73 - 88)  BP: 146/90 (02 Jun 2021 05:40) (138/75 - 148/80)  BP(mean): --  RR: 18 (02 Jun 2021 05:40) (18 - 18)  SpO2: 98% (02 Jun 2021 05:40) (95% - 99%)  CAPILLARY BLOOD GLUCOSE      POCT Blood Glucose.: 159 mg/dL (02 Jun 2021 08:59)  POCT Blood Glucose.: 152 mg/dL (01 Jun 2021 21:44)  POCT Blood Glucose.: 201 mg/dL (01 Jun 2021 16:51)  POCT Blood Glucose.: 100 mg/dL (01 Jun 2021 12:51)    I&O's Summary    01 Jun 2021 07:01  -  02 Jun 2021 07:00  --------------------------------------------------------  IN: 4155 mL / OUT: 0 mL / NET: 4155 mL      tele:    PHYSICAL EXAM:    GENERAL: NAD   HEENT: EOMI, PERRL  PULM: Clear to auscultation bilaterally  CV: Regular rate and rhythm; nl S1, S2; No murmurs, rubs, or gallops  ABDOMEN: Soft, Nontender, Nondistended; Bowel sounds present  EXTREMITIES/MSK:  No edema, calf tenderness   PSYCH: AAOx3  NEUROLOGY: non-focal          LABS:                        10.8   3.81  )-----------( 292      ( 02 Jun 2021 07:04 )             33.9     06-02    137  |  104  |  16  ----------------------------<  152<H>  4.0   |  21<L>  |  1.41<H>    Ca    9.9      02 Jun 2021 07:00  Mg     1.6     06-02    TPro  8.2  /  Alb  3.4  /  TBili  0.5  /  DBili  x   /  AST  27  /  ALT  26  /  AlkPhos  265<H>  06-02                RADIOLOGY & ADDITIONAL TESTS:    Imaging Personally Reviewed:    Consultant(s) Notes Reviewed:      Care Discussed with Consultants/Other Providers:   Scott Lux   Pager [phone]  Office [phone]      CC: Patient is a 51y old  Female who presents with a chief complaint of Valacyclovir resistant HSV (01 Jun 2021 16:46)      SUBJECTIVE / OVERNIGHT EVENTS: Reports that labial pain has sig improved. no f/c/r. no n/v/d/abd pain.     MEDICATIONS  (STANDING):  bictegravir 50 mG/emtricitabine 200 mG/tenofovir alafenamide 25 mG (BIKTARVY) 1 Tablet(s) Oral daily  dextrose 40% Gel 15 Gram(s) Oral once  dextrose 5%. 1000 milliLiter(s) (50 mL/Hr) IV Continuous <Continuous>  dextrose 5%. 1000 milliLiter(s) (100 mL/Hr) IV Continuous <Continuous>  dextrose 50% Injectable 25 Gram(s) IV Push once  dextrose 50% Injectable 12.5 Gram(s) IV Push once  dextrose 50% Injectable 25 Gram(s) IV Push once  foscarnet (Peripheral) IVPB 2100 milliGRAM(s) IV Intermittent every 12 hours  glucagon  Injectable 1 milliGRAM(s) IntraMuscular once  heparin   Injectable 5000 Unit(s) SubCutaneous every 12 hours  insulin glargine Injectable (LANTUS) 20 Unit(s) SubCutaneous at bedtime  insulin lispro (ADMELOG) corrective regimen sliding scale   SubCutaneous three times a day before meals  insulin lispro (ADMELOG) corrective regimen sliding scale   SubCutaneous at bedtime  insulin lispro Injectable (ADMELOG) 2 Unit(s) SubCutaneous before lunch  insulin lispro Injectable (ADMELOG) 2 Unit(s) SubCutaneous before dinner  lidocaine 0.5%/epinephrine 1:200,000 Inj 20 milliLiter(s) Local Injection once  sodium chloride 0.9%. 1000 milliLiter(s) (50 mL/Hr) IV Continuous <Continuous>    MEDICATIONS  (PRN):  acetaminophen   Tablet .. 975 milliGRAM(s) Oral every 6 hours PRN Mild Pain (1 - 3)  ibuprofen  Tablet. 400 milliGRAM(s) Oral every 6 hours PRN Moderate Pain (4 - 6)  lidocaine 2% Gel 1 Application(s) Topical three times a day PRN Pain in labia region  oxyCODONE    IR 5 milliGRAM(s) Oral every 6 hours PRN Severe Pain (7 - 10)      Vital Signs Last 24 Hrs  T(C): 36.4 (02 Jun 2021 05:40), Max: 36.8 (01 Jun 2021 14:26)  T(F): 97.6 (02 Jun 2021 05:40), Max: 98.2 (01 Jun 2021 14:26)  HR: 73 (02 Jun 2021 05:40) (73 - 88)  BP: 146/90 (02 Jun 2021 05:40) (138/75 - 148/80)  BP(mean): --  RR: 18 (02 Jun 2021 05:40) (18 - 18)  SpO2: 98% (02 Jun 2021 05:40) (95% - 99%)  CAPILLARY BLOOD GLUCOSE      POCT Blood Glucose.: 159 mg/dL (02 Jun 2021 08:59)  POCT Blood Glucose.: 152 mg/dL (01 Jun 2021 21:44)  POCT Blood Glucose.: 201 mg/dL (01 Jun 2021 16:51)  POCT Blood Glucose.: 100 mg/dL (01 Jun 2021 12:51)    I&O's Summary    01 Jun 2021 07:01  -  02 Jun 2021 07:00  --------------------------------------------------------  IN: 4155 mL / OUT: 0 mL / NET: 4155 mL          PHYSICAL EXAM: chaparoned by pt's nurse    GENERAL: NAD   HEENT: EOMI, PERRL  PULM: Clear to auscultation bilaterally  CV: Regular rate and rhythm; nl S1, S2; No murmurs, rubs, or gallops  ABDOMEN: Soft, Nontender, Nondistended; Bowel sounds present  EXTREMITIES/MSK:  No edema, calf tenderness   PSYCH: AAOx3  NEUROLOGY: non-focal  : +mass c ulcerations (left sided ulceration is healing)          LABS:                        10.8   3.81  )-----------( 292      ( 02 Jun 2021 07:04 )             33.9     06-02    137  |  104  |  16  ----------------------------<  152<H>  4.0   |  21<L>  |  1.41<H>    Ca    9.9      02 Jun 2021 07:00  Mg     1.6     06-02    TPro  8.2  /  Alb  3.4  /  TBili  0.5  /  DBili  x   /  AST  27  /  ALT  26  /  AlkPhos  265<H>  06-02                RADIOLOGY & ADDITIONAL TESTS:    Imaging Personally Reviewed:    Consultant(s) Notes Reviewed:      Care Discussed with Consultants/Other Providers: ACP/ID

## 2021-06-03 ENCOUNTER — NON-APPOINTMENT (OUTPATIENT)
Age: 51
End: 2021-06-03

## 2021-06-03 LAB
ALBUMIN SERPL ELPH-MCNC: 3.4 G/DL — SIGNIFICANT CHANGE UP (ref 3.3–5)
ALP SERPL-CCNC: 269 U/L — HIGH (ref 40–120)
ALT FLD-CCNC: 27 U/L — SIGNIFICANT CHANGE UP (ref 10–45)
ANION GAP SERPL CALC-SCNC: 13 MMOL/L — SIGNIFICANT CHANGE UP (ref 5–17)
AST SERPL-CCNC: 24 U/L — SIGNIFICANT CHANGE UP (ref 10–40)
BILIRUB SERPL-MCNC: 0.4 MG/DL — SIGNIFICANT CHANGE UP (ref 0.2–1.2)
BUN SERPL-MCNC: 17 MG/DL — SIGNIFICANT CHANGE UP (ref 7–23)
CALCIUM SERPL-MCNC: 9.7 MG/DL — SIGNIFICANT CHANGE UP (ref 8.4–10.5)
CHLORIDE SERPL-SCNC: 104 MMOL/L — SIGNIFICANT CHANGE UP (ref 96–108)
CO2 SERPL-SCNC: 21 MMOL/L — LOW (ref 22–31)
CREAT SERPL-MCNC: 1.4 MG/DL — HIGH (ref 0.5–1.3)
GLUCOSE BLDC GLUCOMTR-MCNC: 156 MG/DL — HIGH (ref 70–99)
GLUCOSE BLDC GLUCOMTR-MCNC: 176 MG/DL — HIGH (ref 70–99)
GLUCOSE BLDC GLUCOMTR-MCNC: 184 MG/DL — HIGH (ref 70–99)
GLUCOSE BLDC GLUCOMTR-MCNC: 218 MG/DL — HIGH (ref 70–99)
GLUCOSE SERPL-MCNC: 182 MG/DL — HIGH (ref 70–99)
HCT VFR BLD CALC: 32.6 % — LOW (ref 34.5–45)
HGB BLD-MCNC: 10.4 G/DL — LOW (ref 11.5–15.5)
MAGNESIUM SERPL-MCNC: 1.6 MG/DL — SIGNIFICANT CHANGE UP (ref 1.6–2.6)
MCHC RBC-ENTMCNC: 29.4 PG — SIGNIFICANT CHANGE UP (ref 27–34)
MCHC RBC-ENTMCNC: 31.9 GM/DL — LOW (ref 32–36)
MCV RBC AUTO: 92.1 FL — SIGNIFICANT CHANGE UP (ref 80–100)
NRBC # BLD: 0 /100 WBCS — SIGNIFICANT CHANGE UP (ref 0–0)
PLATELET # BLD AUTO: 287 K/UL — SIGNIFICANT CHANGE UP (ref 150–400)
POTASSIUM SERPL-MCNC: 3.8 MMOL/L — SIGNIFICANT CHANGE UP (ref 3.5–5.3)
POTASSIUM SERPL-SCNC: 3.8 MMOL/L — SIGNIFICANT CHANGE UP (ref 3.5–5.3)
PROT SERPL-MCNC: 7.9 G/DL — SIGNIFICANT CHANGE UP (ref 6–8.3)
RBC # BLD: 3.54 M/UL — LOW (ref 3.8–5.2)
RBC # FLD: 14.4 % — SIGNIFICANT CHANGE UP (ref 10.3–14.5)
SODIUM SERPL-SCNC: 138 MMOL/L — SIGNIFICANT CHANGE UP (ref 135–145)
WBC # BLD: 3.6 K/UL — LOW (ref 3.8–10.5)
WBC # FLD AUTO: 3.6 K/UL — LOW (ref 3.8–10.5)

## 2021-06-03 PROCEDURE — 99233 SBSQ HOSP IP/OBS HIGH 50: CPT

## 2021-06-03 RX ADMIN — FOSCARNET SODIUM 175 MILLIGRAM(S): 24 INJECTION, SOLUTION INTRAVENOUS at 11:13

## 2021-06-03 RX ADMIN — HEPARIN SODIUM 5000 UNIT(S): 5000 INJECTION INTRAVENOUS; SUBCUTANEOUS at 17:29

## 2021-06-03 RX ADMIN — Medication 2: at 08:29

## 2021-06-03 RX ADMIN — FOSCARNET SODIUM 175 MILLIGRAM(S): 24 INJECTION, SOLUTION INTRAVENOUS at 22:24

## 2021-06-03 RX ADMIN — Medication 2 UNIT(S): at 13:09

## 2021-06-03 RX ADMIN — SODIUM CHLORIDE 1000 MILLILITER(S): 9 INJECTION INTRAMUSCULAR; INTRAVENOUS; SUBCUTANEOUS at 11:12

## 2021-06-03 RX ADMIN — BICTEGRAVIR SODIUM, EMTRICITABINE, AND TENOFOVIR ALAFENAMIDE FUMARATE 1 TABLET(S): 30; 120; 15 TABLET ORAL at 11:13

## 2021-06-03 RX ADMIN — Medication 2: at 13:09

## 2021-06-03 RX ADMIN — INSULIN GLARGINE 20 UNIT(S): 100 INJECTION, SOLUTION SUBCUTANEOUS at 22:28

## 2021-06-03 RX ADMIN — Medication 2 UNIT(S): at 08:28

## 2021-06-03 RX ADMIN — Medication 2: at 17:30

## 2021-06-03 RX ADMIN — Medication 2 UNIT(S): at 17:30

## 2021-06-03 RX ADMIN — SODIUM CHLORIDE 1000 MILLILITER(S): 9 INJECTION INTRAMUSCULAR; INTRAVENOUS; SUBCUTANEOUS at 21:15

## 2021-06-03 RX ADMIN — HEPARIN SODIUM 5000 UNIT(S): 5000 INJECTION INTRAVENOUS; SUBCUTANEOUS at 06:40

## 2021-06-03 NOTE — PROGRESS NOTE ADULT - SUBJECTIVE AND OBJECTIVE BOX
51y old  Female who presents with a chief complaint of Valacyclovir resistant HSV (03 Jun 2021 10:24)      Interval history:  Afebrile, decreasing size of vulvar lesion.       Allergies:   No Known Allergies      Antimicrobials:  bictegravir 50 mG/emtricitabine 200 mG/tenofovir alafenamide 25 mG (BIKTARVY) 1 Tablet(s) Oral daily  foscarnet (Peripheral) IVPB 2100 milliGRAM(s) IV Intermittent every 12 hours      REVIEW OF SYSTEMS:  No chest pain   No SOB  No abdominal pain  No rash.       Vital Signs Last 24 Hrs  T(C): 36.7 (06-03-21 @ 14:20), Max: 36.7 (06-03-21 @ 14:20)  T(F): 98.1 (06-03-21 @ 14:20), Max: 98.1 (06-03-21 @ 14:20)  HR: 69 (06-03-21 @ 14:20) (69 - 79)  BP: 130/75 (06-03-21 @ 14:20) (130/75 - 142/83)  BP(mean): --  RR: 18 (06-03-21 @ 14:20) (18 - 18)  SpO2: 94% (06-03-21 @ 14:20) (94% - 97%)      PHYSICAL EXAM:  Patient in no acute distress. AAOX3.  No icterus, no oral ulcers.  Cardiovascular: S1S2 normal.  Lungs: + air entry B/L lung fields.  Gastrointestinal: soft, nontender, nondistended.  Extremities: no edema.  IV sites not inflamed.                             10.4   3.60  )-----------( 287      ( 03 Jun 2021 06:54 )             32.6   06-03    138  |  104  |  17  ----------------------------<  182<H>  3.8   |  21<L>  |  1.40<H>    Ca    9.7      03 Jun 2021 06:54  Mg     1.6     06-03    TPro  7.9  /  Alb  3.4  /  TBili  0.4  /  DBili  x   /  AST  24  /  ALT  27  /  AlkPhos  269<H>  06-03      LIVER FUNCTIONS - ( 03 Jun 2021 06:54 )  Alb: 3.4 g/dL / Pro: 7.9 g/dL / ALK PHOS: 269 U/L / ALT: 27 U/L / AST: 24 U/L / GGT: x

## 2021-06-03 NOTE — CHART NOTE - NSCHARTNOTEFT_GEN_A_CORE
50yo , with hx of HIV/DM2 admitting with worsening acute on chronic HSV infection with large vulvar mass. Biopsy of mass and HSV swab collected and performed on .   Culture and pathology reviewed as below.     Herpes Simplex Virus 1/2 VZV Lesions, PCR (21 @ 16:55)    Herpes Simplex Virus 1/2  VZV PCR Source: lesion    Herpes Simplex Virus 1/2  VZV PCR Result: HSV2 Detected HSV 1/2  and VZV assay is a Real-Time PCR test for the qualitative  detection and differentiation of herpes simplex virus type 1 (HSV1), 2  (HSV2) and varicella-zoster virus (VZV) DNA in lesion samples. The result  should be interpretedwith consideration of all clinical and laboratory  findings.      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   Pt admitted to medicine service for infectious disease consultation and management of known acyclovir resistant chronic HSV infection with foscarnet.     Inpatient GYN team to sign off.   Pt to follow up with Dr. Yao in office on discharge.     cathleen Lewis PGY2  Call/page with questions: Pager [phone]/[phone] 50yo , with hx of HIV/DM2 admitting with worsening acute on chronic HSV infection with large vulvar mass. Biopsy of mass and HSV swab collected and performed on .   Culture and pathology reviewed as below.     Herpes Simplex Virus 1/2 VZV Lesions, PCR (21 @ 16:55)    Herpes Simplex Virus 1/2  VZV PCR Source: lesion    Herpes Simplex Virus 1/2  VZV PCR Result: HSV2 Detected HSV 1/2  and VZV assay is a Real-Time PCR test for the qualitative  detection and differentiation of herpes simplex virus type 1 (HSV1), 2  (HSV2) and varicella-zoster virus (VZV) DNA in lesion samples. The result  should be interpretedwith consideration of all clinical and laboratory  findings.      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   Given above findings, will consult Gyn Oncology for further work up and treatment.     cathleen Lewis PGY2  Call/page with questions: Pager [phone]/[phone] 52yo , with hx of HIV/DM2 admitting with worsening acute on chronic HSV infection with large vulvar mass. Biopsy of mass and HSV swab collected and performed on .   Culture and pathology reviewed as below.     Herpes Simplex Virus 1/2 VZV Lesions, PCR (21 @ 16:55)    Herpes Simplex Virus 1/2  VZV PCR Source: lesion    Herpes Simplex Virus 1/2  VZV PCR Result: HSV2 Detected HSV 1/2  and VZV assay is a Real-Time PCR test for the qualitative  detection and differentiation of herpes simplex virus type 1 (HSV1), 2  (HSV2) and varicella-zoster virus (VZV) DNA in lesion samples. The result  should be interpretedwith consideration of all clinical and laboratory  findings.      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   Given above findings, will consult Gyn Oncology for further work up and treatment.     Dr. Yao to  patient on above results and findings and discuss Gyn Onc consultation.     cathleen Lewis PGY2  Call/page with questions: Pager [phone]/Spectra [phone]

## 2021-06-03 NOTE — PROGRESS NOTE ADULT - SUBJECTIVE AND OBJECTIVE BOX
Scott Lux   Pager [phone]  Office [phone]      CC: Patient is a 51y old  Female who presents with a chief complaint of Valacyclovir resistant HSV (02 Jun 2021 16:20)      SUBJECTIVE / OVERNIGHT EVENTS:    MEDICATIONS  (STANDING):  bictegravir 50 mG/emtricitabine 200 mG/tenofovir alafenamide 25 mG (BIKTARVY) 1 Tablet(s) Oral daily  dextrose 40% Gel 15 Gram(s) Oral once  dextrose 5%. 1000 milliLiter(s) (50 mL/Hr) IV Continuous <Continuous>  dextrose 5%. 1000 milliLiter(s) (100 mL/Hr) IV Continuous <Continuous>  dextrose 50% Injectable 25 Gram(s) IV Push once  dextrose 50% Injectable 12.5 Gram(s) IV Push once  dextrose 50% Injectable 25 Gram(s) IV Push once  foscarnet (Peripheral) IVPB 2100 milliGRAM(s) IV Intermittent every 12 hours  glucagon  Injectable 1 milliGRAM(s) IntraMuscular once  heparin   Injectable 5000 Unit(s) SubCutaneous every 12 hours  insulin glargine Injectable (LANTUS) 20 Unit(s) SubCutaneous at bedtime  insulin lispro (ADMELOG) corrective regimen sliding scale   SubCutaneous three times a day before meals  insulin lispro (ADMELOG) corrective regimen sliding scale   SubCutaneous at bedtime  insulin lispro Injectable (ADMELOG) 2 Unit(s) SubCutaneous before lunch  insulin lispro Injectable (ADMELOG) 2 Unit(s) SubCutaneous before dinner  insulin lispro Injectable (ADMELOG) 2 Unit(s) SubCutaneous before breakfast  lidocaine 0.5%/epinephrine 1:200,000 Inj 20 milliLiter(s) Local Injection once  sodium chloride 0.9% Bolus 500 milliLiter(s) IV Bolus <User Schedule>  sodium chloride 0.9%. 1000 milliLiter(s) (50 mL/Hr) IV Continuous <Continuous>    MEDICATIONS  (PRN):  acetaminophen   Tablet .. 975 milliGRAM(s) Oral every 6 hours PRN Mild Pain (1 - 3)  ibuprofen  Tablet. 400 milliGRAM(s) Oral every 6 hours PRN Moderate Pain (4 - 6)  lidocaine 2% Gel 1 Application(s) Topical three times a day PRN Pain in labia region  oxyCODONE    IR 5 milliGRAM(s) Oral every 6 hours PRN Severe Pain (7 - 10)      Vital Signs Last 24 Hrs  T(C): 36.5 (03 Jun 2021 06:01), Max: 36.8 (02 Jun 2021 13:19)  T(F): 97.7 (03 Jun 2021 06:01), Max: 98.3 (02 Jun 2021 13:19)  HR: 74 (03 Jun 2021 06:01) (74 - 79)  BP: 135/82 (03 Jun 2021 06:01) (118/78 - 142/83)  BP(mean): --  RR: 18 (03 Jun 2021 06:01) (18 - 18)  SpO2: 97% (03 Jun 2021 06:01) (97% - 97%)  CAPILLARY BLOOD GLUCOSE      POCT Blood Glucose.: 176 mg/dL (03 Jun 2021 08:19)  POCT Blood Glucose.: 219 mg/dL (02 Jun 2021 21:36)  POCT Blood Glucose.: 110 mg/dL (02 Jun 2021 17:13)  POCT Blood Glucose.: 203 mg/dL (02 Jun 2021 12:56)    I&O's Summary    02 Jun 2021 07:01  -  03 Jun 2021 07:00  --------------------------------------------------------  IN: 3505 mL / OUT: 0 mL / NET: 3505 mL      tele:    PHYSICAL EXAM:    GENERAL: NAD   HEENT: EOMI, PERRL  PULM: Clear to auscultation bilaterally  CV: Regular rate and rhythm; nl S1, S2; No murmurs, rubs, or gallops  ABDOMEN: Soft, Nontender, Nondistended; Bowel sounds present  EXTREMITIES/MSK:  No edema, calf tenderness   PSYCH: AAOx3  NEUROLOGY: non-focal          LABS:                        10.4   3.60  )-----------( 287      ( 03 Jun 2021 06:54 )             32.6     06-03    138  |  104  |  17  ----------------------------<  182<H>  3.8   |  21<L>  |  1.40<H>    Ca    9.7      03 Jun 2021 06:54  Mg     1.6     06-03    TPro  7.9  /  Alb  3.4  /  TBili  0.4  /  DBili  x   /  AST  24  /  ALT  27  /  AlkPhos  269<H>  06-03                RADIOLOGY & ADDITIONAL TESTS:    Imaging Personally Reviewed:    Consultant(s) Notes Reviewed:      Care Discussed with Consultants/Other Providers:   Scott Lux   Pager [phone]  Office [phone]      CC: Patient is a 51y old  Female who presents with a chief complaint of Valacyclovir resistant HSV (02 Jun 2021 16:20)      SUBJECTIVE / OVERNIGHT EVENTS: had some bleeding from ulcer areas overnight; resolved this am. no cp/sob/n/v/d/abd pain. no f/c/r    MEDICATIONS  (STANDING):  bictegravir 50 mG/emtricitabine 200 mG/tenofovir alafenamide 25 mG (BIKTARVY) 1 Tablet(s) Oral daily  dextrose 40% Gel 15 Gram(s) Oral once  dextrose 5%. 1000 milliLiter(s) (50 mL/Hr) IV Continuous <Continuous>  dextrose 5%. 1000 milliLiter(s) (100 mL/Hr) IV Continuous <Continuous>  dextrose 50% Injectable 25 Gram(s) IV Push once  dextrose 50% Injectable 12.5 Gram(s) IV Push once  dextrose 50% Injectable 25 Gram(s) IV Push once  foscarnet (Peripheral) IVPB 2100 milliGRAM(s) IV Intermittent every 12 hours  glucagon  Injectable 1 milliGRAM(s) IntraMuscular once  heparin   Injectable 5000 Unit(s) SubCutaneous every 12 hours  insulin glargine Injectable (LANTUS) 20 Unit(s) SubCutaneous at bedtime  insulin lispro (ADMELOG) corrective regimen sliding scale   SubCutaneous three times a day before meals  insulin lispro (ADMELOG) corrective regimen sliding scale   SubCutaneous at bedtime  insulin lispro Injectable (ADMELOG) 2 Unit(s) SubCutaneous before lunch  insulin lispro Injectable (ADMELOG) 2 Unit(s) SubCutaneous before dinner  insulin lispro Injectable (ADMELOG) 2 Unit(s) SubCutaneous before breakfast  lidocaine 0.5%/epinephrine 1:200,000 Inj 20 milliLiter(s) Local Injection once  sodium chloride 0.9% Bolus 500 milliLiter(s) IV Bolus <User Schedule>  sodium chloride 0.9%. 1000 milliLiter(s) (50 mL/Hr) IV Continuous <Continuous>    MEDICATIONS  (PRN):  acetaminophen   Tablet .. 975 milliGRAM(s) Oral every 6 hours PRN Mild Pain (1 - 3)  ibuprofen  Tablet. 400 milliGRAM(s) Oral every 6 hours PRN Moderate Pain (4 - 6)  lidocaine 2% Gel 1 Application(s) Topical three times a day PRN Pain in labia region  oxyCODONE    IR 5 milliGRAM(s) Oral every 6 hours PRN Severe Pain (7 - 10)      Vital Signs Last 24 Hrs  T(C): 36.5 (03 Jun 2021 06:01), Max: 36.8 (02 Jun 2021 13:19)  T(F): 97.7 (03 Jun 2021 06:01), Max: 98.3 (02 Jun 2021 13:19)  HR: 74 (03 Jun 2021 06:01) (74 - 79)  BP: 135/82 (03 Jun 2021 06:01) (118/78 - 142/83)  BP(mean): --  RR: 18 (03 Jun 2021 06:01) (18 - 18)  SpO2: 97% (03 Jun 2021 06:01) (97% - 97%)  CAPILLARY BLOOD GLUCOSE      POCT Blood Glucose.: 176 mg/dL (03 Jun 2021 08:19)  POCT Blood Glucose.: 219 mg/dL (02 Jun 2021 21:36)  POCT Blood Glucose.: 110 mg/dL (02 Jun 2021 17:13)  POCT Blood Glucose.: 203 mg/dL (02 Jun 2021 12:56)    I&O's Summary    02 Jun 2021 07:01  -  03 Jun 2021 07:00  --------------------------------------------------------  IN: 3505 mL / OUT: 0 mL / NET: 3505 mL          PHYSICAL EXAM: chaparoned by pt's nurse    GENERAL: NAD   HEENT: EOMI, PERRL  PULM: Clear to auscultation bilaterally  CV: Regular rate and rhythm; nl S1, S2; No murmurs, rubs, or gallops  ABDOMEN: Soft, Nontender, Nondistended; Bowel sounds present  EXTREMITIES/MSK:  No edema, calf tenderness   PSYCH: AAOx3  NEUROLOGY: non-focal  : labial mass c unchanged ulcerations and no bleeding          LABS:                        10.4   3.60  )-----------( 287      ( 03 Jun 2021 06:54 )             32.6     06-03    138  |  104  |  17  ----------------------------<  182<H>  3.8   |  21<L>  |  1.40<H>    Ca    9.7      03 Jun 2021 06:54  Mg     1.6     06-03    TPro  7.9  /  Alb  3.4  /  TBili  0.4  /  DBili  x   /  AST  24  /  ALT  27  /  AlkPhos  269<H>  06-03                RADIOLOGY & ADDITIONAL TESTS:    Imaging Personally Reviewed:    Consultant(s) Notes Reviewed:      Care Discussed with Consultants/Other Providers: gyn regarding bleeding

## 2021-06-03 NOTE — PROGRESS NOTE ADULT - ASSESSMENT
50 y/o female PMHx Genital Herpes on valacyclovir x2 years, DM on insulin, HIV unknown last CD4 count/viral load now presenting to the ED with worsening labial mass not responding with oral valtrex    overall HIV infection, Resistant HSV 2 infection. + vulvar mass      Plan:   c/w foscarnet, clinically improving gradually   c/w 500 ml bolus of NS prior to each foscarnet infusion q12h   c/w 50cc/hr of maintenance  fluid.   monitor CMP daily as it can lead to significant electrolyte abnormalities with mg   monitor CBC daily to monitor WBC  culture viral herpes sent, so the sensitivities can be performed  GC/Chl negative   syphilis screen negative   s/p bx of the mass follow up pathology.   Gyn on board,   path with ulceration, chronic inflammation, squamous dysplasia.   reviewed the path with pathologist, they don't see any viral cytopathic effects.   recommend Gyn Oncology evaluation       HIV infection: well controlled   c/w bicktarvy       Plan discussed with Pathology Attending and Gyn resident       Nida Huitron  Pager: [phone]. If no response or past 5 pm call [phone].    My colleague will cover 6/4-6/7

## 2021-06-03 NOTE — PROGRESS NOTE ADULT - PROBLEM SELECTOR PLAN 1
- on foscarnet for resistant HSV infection ; confirmed HSV 2, sensitivities and final pathology pending   - GC/chl, syphillis screen negative  - 500mL bolus q12 while on foscarnet, monitor CMP. Also on maintenance fluids at 50cc/hr   - Vaginal/pelvic US performed on admission, no fluid collections or adnexal masses  - ID recommendations appreciated  - UA negative for UTI ; hold off additional antimicrobials  - will d/w gyn regarding eventual resection - on foscarnet for resistant HSV infection ; confirmed HSV 2, sensitivities and final pathology pending   - GC/chl, syphillis screen negative  - 500mL bolus q12 while on foscarnet, monitor CMP. 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 will d/w gyn

## 2021-06-04 DIAGNOSIS — E83.39 OTHER DISORDERS OF PHOSPHORUS METABOLISM: ICD-10-CM

## 2021-06-04 DIAGNOSIS — R74.8 ABNORMAL LEVELS OF OTHER SERUM ENZYMES: ICD-10-CM

## 2021-06-04 LAB
ALBUMIN SERPL ELPH-MCNC: 3.5 G/DL — SIGNIFICANT CHANGE UP (ref 3.3–5)
ALP SERPL-CCNC: 263 U/L — HIGH (ref 40–120)
ALT FLD-CCNC: 25 U/L — SIGNIFICANT CHANGE UP (ref 10–45)
ANION GAP SERPL CALC-SCNC: 12 MMOL/L — SIGNIFICANT CHANGE UP (ref 5–17)
AST SERPL-CCNC: 19 U/L — SIGNIFICANT CHANGE UP (ref 10–40)
BASOPHILS # BLD AUTO: 0.07 K/UL — SIGNIFICANT CHANGE UP (ref 0–0.2)
BASOPHILS NFR BLD AUTO: 1.8 % — SIGNIFICANT CHANGE UP (ref 0–2)
BILIRUB SERPL-MCNC: 0.5 MG/DL — SIGNIFICANT CHANGE UP (ref 0.2–1.2)
BUN SERPL-MCNC: 17 MG/DL — SIGNIFICANT CHANGE UP (ref 7–23)
CALCIUM SERPL-MCNC: 9.9 MG/DL — SIGNIFICANT CHANGE UP (ref 8.4–10.5)
CHLORIDE SERPL-SCNC: 105 MMOL/L — SIGNIFICANT CHANGE UP (ref 96–108)
CO2 SERPL-SCNC: 22 MMOL/L — SIGNIFICANT CHANGE UP (ref 22–31)
CREAT SERPL-MCNC: 1.43 MG/DL — HIGH (ref 0.5–1.3)
EOSINOPHIL # BLD AUTO: 0.51 K/UL — HIGH (ref 0–0.5)
EOSINOPHIL NFR BLD AUTO: 13.4 % — HIGH (ref 0–6)
GLUCOSE BLDC GLUCOMTR-MCNC: 126 MG/DL — HIGH (ref 70–99)
GLUCOSE BLDC GLUCOMTR-MCNC: 155 MG/DL — HIGH (ref 70–99)
GLUCOSE BLDC GLUCOMTR-MCNC: 155 MG/DL — HIGH (ref 70–99)
GLUCOSE BLDC GLUCOMTR-MCNC: 222 MG/DL — HIGH (ref 70–99)
GLUCOSE SERPL-MCNC: 111 MG/DL — HIGH (ref 70–99)
HCT VFR BLD CALC: 32.9 % — LOW (ref 34.5–45)
HGB BLD-MCNC: 10.5 G/DL — LOW (ref 11.5–15.5)
IMM GRANULOCYTES NFR BLD AUTO: 0.3 % — SIGNIFICANT CHANGE UP (ref 0–1.5)
LYMPHOCYTES # BLD AUTO: 1.72 K/UL — SIGNIFICANT CHANGE UP (ref 1–3.3)
LYMPHOCYTES # BLD AUTO: 45 % — HIGH (ref 13–44)
MAGNESIUM SERPL-MCNC: 1.6 MG/DL — SIGNIFICANT CHANGE UP (ref 1.6–2.6)
MCHC RBC-ENTMCNC: 29.2 PG — SIGNIFICANT CHANGE UP (ref 27–34)
MCHC RBC-ENTMCNC: 31.9 GM/DL — LOW (ref 32–36)
MCV RBC AUTO: 91.4 FL — SIGNIFICANT CHANGE UP (ref 80–100)
MONOCYTES # BLD AUTO: 0.36 K/UL — SIGNIFICANT CHANGE UP (ref 0–0.9)
MONOCYTES NFR BLD AUTO: 9.4 % — SIGNIFICANT CHANGE UP (ref 2–14)
NEUTROPHILS # BLD AUTO: 1.15 K/UL — LOW (ref 1.8–7.4)
NEUTROPHILS NFR BLD AUTO: 30.1 % — LOW (ref 43–77)
NRBC # BLD: 0 /100 WBCS — SIGNIFICANT CHANGE UP (ref 0–0)
PHOSPHATE SERPL-MCNC: 5.6 MG/DL — HIGH (ref 2.5–4.5)
PLATELET # BLD AUTO: 279 K/UL — SIGNIFICANT CHANGE UP (ref 150–400)
POTASSIUM SERPL-MCNC: 3.7 MMOL/L — SIGNIFICANT CHANGE UP (ref 3.5–5.3)
POTASSIUM SERPL-SCNC: 3.7 MMOL/L — SIGNIFICANT CHANGE UP (ref 3.5–5.3)
PROT SERPL-MCNC: 7.9 G/DL — SIGNIFICANT CHANGE UP (ref 6–8.3)
RBC # BLD: 3.6 M/UL — LOW (ref 3.8–5.2)
RBC # FLD: 14.6 % — HIGH (ref 10.3–14.5)
SODIUM SERPL-SCNC: 139 MMOL/L — SIGNIFICANT CHANGE UP (ref 135–145)
WBC # BLD: 3.82 K/UL — SIGNIFICANT CHANGE UP (ref 3.8–10.5)
WBC # FLD AUTO: 3.82 K/UL — SIGNIFICANT CHANGE UP (ref 3.8–10.5)

## 2021-06-04 PROCEDURE — 99233 SBSQ HOSP IP/OBS HIGH 50: CPT

## 2021-06-04 PROCEDURE — 99232 SBSQ HOSP IP/OBS MODERATE 35: CPT

## 2021-06-04 RX ORDER — POLYETHYLENE GLYCOL 3350 17 G/17G
17 POWDER, FOR SOLUTION ORAL DAILY
Refills: 0 | Status: DISCONTINUED | OUTPATIENT
Start: 2021-06-04 | End: 2021-06-16

## 2021-06-04 RX ADMIN — Medication 975 MILLIGRAM(S): at 15:28

## 2021-06-04 RX ADMIN — BICTEGRAVIR SODIUM, EMTRICITABINE, AND TENOFOVIR ALAFENAMIDE FUMARATE 1 TABLET(S): 30; 120; 15 TABLET ORAL at 11:59

## 2021-06-04 RX ADMIN — FOSCARNET SODIUM 175 MILLIGRAM(S): 24 INJECTION, SOLUTION INTRAVENOUS at 22:06

## 2021-06-04 RX ADMIN — FOSCARNET SODIUM 175 MILLIGRAM(S): 24 INJECTION, SOLUTION INTRAVENOUS at 10:43

## 2021-06-04 RX ADMIN — Medication 2: at 13:32

## 2021-06-04 RX ADMIN — Medication 2 UNIT(S): at 08:48

## 2021-06-04 RX ADMIN — HEPARIN SODIUM 5000 UNIT(S): 5000 INJECTION INTRAVENOUS; SUBCUTANEOUS at 17:25

## 2021-06-04 RX ADMIN — OXYCODONE HYDROCHLORIDE 5 MILLIGRAM(S): 5 TABLET ORAL at 01:07

## 2021-06-04 RX ADMIN — HEPARIN SODIUM 5000 UNIT(S): 5000 INJECTION INTRAVENOUS; SUBCUTANEOUS at 05:56

## 2021-06-04 RX ADMIN — Medication 975 MILLIGRAM(S): at 14:58

## 2021-06-04 RX ADMIN — SODIUM CHLORIDE 1000 MILLILITER(S): 9 INJECTION INTRAMUSCULAR; INTRAVENOUS; SUBCUTANEOUS at 20:52

## 2021-06-04 RX ADMIN — INSULIN GLARGINE 20 UNIT(S): 100 INJECTION, SOLUTION SUBCUTANEOUS at 22:05

## 2021-06-04 RX ADMIN — Medication 2 UNIT(S): at 17:24

## 2021-06-04 RX ADMIN — SODIUM CHLORIDE 50 MILLILITER(S): 9 INJECTION INTRAMUSCULAR; INTRAVENOUS; SUBCUTANEOUS at 22:06

## 2021-06-04 RX ADMIN — Medication 2 UNIT(S): at 13:33

## 2021-06-04 RX ADMIN — SODIUM CHLORIDE 1000 MILLILITER(S): 9 INJECTION INTRAMUSCULAR; INTRAVENOUS; SUBCUTANEOUS at 10:43

## 2021-06-04 RX ADMIN — OXYCODONE HYDROCHLORIDE 5 MILLIGRAM(S): 5 TABLET ORAL at 00:37

## 2021-06-04 RX ADMIN — Medication 2: at 17:24

## 2021-06-04 NOTE — PROGRESS NOTE ADULT - PROBLEM SELECTOR PLAN 1
- on foscarnet for resistant HSV infection ; confirmed HSV 2, sensitivities and final pathology pending   - GC/chl, syphillis screen negative  - 500mL bolus q12 while on foscarnet, monitor CMP. 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 GYN-ONC to consult

## 2021-06-04 NOTE — DIETITIAN INITIAL EVALUATION ADULT. - CHIEF COMPLAINT
The patient is a "50 y/o female PMHx Genital Herpes on valacyclovir x2 years, DM on insulin, HIV unknown last CD4 count/viral load now presenting to the ED with worsening labial mass not responding with oral valtrex"

## 2021-06-04 NOTE — DIETITIAN INITIAL EVALUATION ADULT. - CONTINUE CURRENT NUTRITION CARE PLAN
1) Continue current consistent carbohydrate with snack, no concentrated phosphorus diet. RD remains available for diet changes as needed/able./yes

## 2021-06-04 NOTE — PROGRESS NOTE ADULT - SUBJECTIVE AND OBJECTIVE BOX
Scott Lux   Pager [phone]  Office [phone]      CC: Patient is a 51y old  Female who presents with a chief complaint of Valacyclovir resistant HSV (03 Jun 2021 16:08)      SUBJECTIVE / OVERNIGHT EVENTS:    MEDICATIONS  (STANDING):  bictegravir 50 mG/emtricitabine 200 mG/tenofovir alafenamide 25 mG (BIKTARVY) 1 Tablet(s) Oral daily  dextrose 40% Gel 15 Gram(s) Oral once  dextrose 5%. 1000 milliLiter(s) (50 mL/Hr) IV Continuous <Continuous>  dextrose 5%. 1000 milliLiter(s) (100 mL/Hr) IV Continuous <Continuous>  dextrose 50% Injectable 25 Gram(s) IV Push once  dextrose 50% Injectable 12.5 Gram(s) IV Push once  dextrose 50% Injectable 25 Gram(s) IV Push once  foscarnet (Peripheral) IVPB 2100 milliGRAM(s) IV Intermittent every 12 hours  glucagon  Injectable 1 milliGRAM(s) IntraMuscular once  heparin   Injectable 5000 Unit(s) SubCutaneous every 12 hours  insulin glargine Injectable (LANTUS) 20 Unit(s) SubCutaneous at bedtime  insulin lispro (ADMELOG) corrective regimen sliding scale   SubCutaneous three times a day before meals  insulin lispro (ADMELOG) corrective regimen sliding scale   SubCutaneous at bedtime  insulin lispro Injectable (ADMELOG) 2 Unit(s) SubCutaneous before lunch  insulin lispro Injectable (ADMELOG) 2 Unit(s) SubCutaneous before dinner  insulin lispro Injectable (ADMELOG) 2 Unit(s) SubCutaneous before breakfast  lidocaine 0.5%/epinephrine 1:200,000 Inj 20 milliLiter(s) Local Injection once  sodium chloride 0.9% Bolus 500 milliLiter(s) IV Bolus <User Schedule>  sodium chloride 0.9%. 1000 milliLiter(s) (50 mL/Hr) IV Continuous <Continuous>    MEDICATIONS  (PRN):  acetaminophen   Tablet .. 975 milliGRAM(s) Oral every 6 hours PRN Mild Pain (1 - 3)  ibuprofen  Tablet. 400 milliGRAM(s) Oral every 6 hours PRN Moderate Pain (4 - 6)  lidocaine 2% Gel 1 Application(s) Topical three times a day PRN Pain in labia region  oxyCODONE    IR 5 milliGRAM(s) Oral every 6 hours PRN Severe Pain (7 - 10)      Vital Signs Last 24 Hrs  T(C): 36.7 (04 Jun 2021 06:00), Max: 36.7 (03 Jun 2021 14:20)  T(F): 98 (04 Jun 2021 06:00), Max: 98.1 (03 Jun 2021 14:20)  HR: 67 (04 Jun 2021 06:00) (67 - 70)  BP: 129/83 (04 Jun 2021 06:00) (129/83 - 154/92)  BP(mean): --  RR: 16 (04 Jun 2021 06:00) (16 - 18)  SpO2: 98% (04 Jun 2021 06:00) (94% - 98%)  CAPILLARY BLOOD GLUCOSE      POCT Blood Glucose.: 126 mg/dL (04 Jun 2021 08:44)  POCT Blood Glucose.: 218 mg/dL (03 Jun 2021 22:23)  POCT Blood Glucose.: 156 mg/dL (03 Jun 2021 17:26)  POCT Blood Glucose.: 184 mg/dL (03 Jun 2021 13:07)    I&O's Summary    03 Jun 2021 07:01  -  04 Jun 2021 07:00  --------------------------------------------------------  IN: 2395 mL / OUT: 0 mL / NET: 2395 mL      tele:    PHYSICAL EXAM:    GENERAL: NAD   HEENT: EOMI, PERRL  PULM: Clear to auscultation bilaterally  CV: Regular rate and rhythm; nl S1, S2; No murmurs, rubs, or gallops  ABDOMEN: Soft, Nontender, Nondistended; Bowel sounds present  EXTREMITIES/MSK:  No edema, calf tenderness   PSYCH: AAOx3  NEUROLOGY: non-focal          LABS:                        10.5   3.82  )-----------( 279      ( 04 Jun 2021 06:50 )             32.9     06-04    139  |  105  |  17  ----------------------------<  111<H>  3.7   |  22  |  1.43<H>    Ca    9.9      04 Jun 2021 06:50  Phos  5.6     06-04  Mg     1.6     06-04    TPro  7.9  /  Alb  3.5  /  TBili  0.5  /  DBili  x   /  AST  19  /  ALT  25  /  AlkPhos  263<H>  06-04                RADIOLOGY & ADDITIONAL TESTS:    Imaging Personally Reviewed:    Consultant(s) Notes Reviewed:      Care Discussed with Consultants/Other Providers:   Scott Lux   Pager [phone]  Office [phone]      CC: Patient is a 51y old  Female who presents with a chief complaint of Valacyclovir resistant HSV (03 Jun 2021 16:08)      SUBJECTIVE / OVERNIGHT EVENTS: labial mass pain improved. no bleeding. no f/c/r. no n/v/d.    MEDICATIONS  (STANDING):  bictegravir 50 mG/emtricitabine 200 mG/tenofovir alafenamide 25 mG (BIKTARVY) 1 Tablet(s) Oral daily  dextrose 40% Gel 15 Gram(s) Oral once  dextrose 5%. 1000 milliLiter(s) (50 mL/Hr) IV Continuous <Continuous>  dextrose 5%. 1000 milliLiter(s) (100 mL/Hr) IV Continuous <Continuous>  dextrose 50% Injectable 25 Gram(s) IV Push once  dextrose 50% Injectable 12.5 Gram(s) IV Push once  dextrose 50% Injectable 25 Gram(s) IV Push once  foscarnet (Peripheral) IVPB 2100 milliGRAM(s) IV Intermittent every 12 hours  glucagon  Injectable 1 milliGRAM(s) IntraMuscular once  heparin   Injectable 5000 Unit(s) SubCutaneous every 12 hours  insulin glargine Injectable (LANTUS) 20 Unit(s) SubCutaneous at bedtime  insulin lispro (ADMELOG) corrective regimen sliding scale   SubCutaneous three times a day before meals  insulin lispro (ADMELOG) corrective regimen sliding scale   SubCutaneous at bedtime  insulin lispro Injectable (ADMELOG) 2 Unit(s) SubCutaneous before lunch  insulin lispro Injectable (ADMELOG) 2 Unit(s) SubCutaneous before dinner  insulin lispro Injectable (ADMELOG) 2 Unit(s) SubCutaneous before breakfast  lidocaine 0.5%/epinephrine 1:200,000 Inj 20 milliLiter(s) Local Injection once  sodium chloride 0.9% Bolus 500 milliLiter(s) IV Bolus <User Schedule>  sodium chloride 0.9%. 1000 milliLiter(s) (50 mL/Hr) IV Continuous <Continuous>    MEDICATIONS  (PRN):  acetaminophen   Tablet .. 975 milliGRAM(s) Oral every 6 hours PRN Mild Pain (1 - 3)  ibuprofen  Tablet. 400 milliGRAM(s) Oral every 6 hours PRN Moderate Pain (4 - 6)  lidocaine 2% Gel 1 Application(s) Topical three times a day PRN Pain in labia region  oxyCODONE    IR 5 milliGRAM(s) Oral every 6 hours PRN Severe Pain (7 - 10)      Vital Signs Last 24 Hrs  T(C): 36.7 (04 Jun 2021 06:00), Max: 36.7 (03 Jun 2021 14:20)  T(F): 98 (04 Jun 2021 06:00), Max: 98.1 (03 Jun 2021 14:20)  HR: 67 (04 Jun 2021 06:00) (67 - 70)  BP: 129/83 (04 Jun 2021 06:00) (129/83 - 154/92)  BP(mean): --  RR: 16 (04 Jun 2021 06:00) (16 - 18)  SpO2: 98% (04 Jun 2021 06:00) (94% - 98%)  CAPILLARY BLOOD GLUCOSE      POCT Blood Glucose.: 126 mg/dL (04 Jun 2021 08:44)  POCT Blood Glucose.: 218 mg/dL (03 Jun 2021 22:23)  POCT Blood Glucose.: 156 mg/dL (03 Jun 2021 17:26)  POCT Blood Glucose.: 184 mg/dL (03 Jun 2021 13:07)    I&O's Summary    03 Jun 2021 07:01  -  04 Jun 2021 07:00  --------------------------------------------------------  IN: 2395 mL / OUT: 0 mL / NET: 2395 mL          PHYSICAL EXAM: chaparoned by pt's nurse    GENERAL: NAD   HEENT: EOMI, PERRL  PULM: Clear to auscultation bilaterally  CV: Regular rate and rhythm; nl S1, S2; No murmurs, rubs, or gallops  ABDOMEN: Soft, Nontender, Nondistended; Bowel sounds present  EXTREMITIES/MSK:  No edema, calf tenderness   PSYCH: AAOx3  NEUROLOGY: non-focal  : decrease in size of labial mass          LABS:                        10.5   3.82  )-----------( 279      ( 04 Jun 2021 06:50 )             32.9     06-04    139  |  105  |  17  ----------------------------<  111<H>  3.7   |  22  |  1.43<H>    Ca    9.9      04 Jun 2021 06:50  Phos  5.6     06-04  Mg     1.6     06-04    TPro  7.9  /  Alb  3.5  /  TBili  0.5  /  DBili  x   /  AST  19  /  ALT  25  /  AlkPhos  263<H>  06-04                RADIOLOGY & ADDITIONAL TESTS:    Imaging Personally Reviewed:    Consultant(s) Notes Reviewed:      Care Discussed with Consultants/Other Providers: ACP

## 2021-06-04 NOTE — PROGRESS NOTE ADULT - SUBJECTIVE AND OBJECTIVE BOX
51y old  Female who presents with a chief complaint of Valacyclovir resistant HSV (04 Jun 2021 12:05)      Interval history:  Afebrile, feeling well, no complains.       Allergies:   No Known Allergies      Antimicrobials:  bictegravir 50 mG/emtricitabine 200 mG/tenofovir alafenamide 25 mG (BIKTARVY) 1 Tablet(s) Oral daily  foscarnet (Peripheral) IVPB 2100 milliGRAM(s) IV Intermittent every 12 hours      REVIEW OF SYSTEMS:  No chest pain  No SOB  No abdominal pain  No new rash.       Vital Signs Last 24 Hrs  T(C): 36.9 (06-04-21 @ 13:56), Max: 36.9 (06-04-21 @ 13:56)  T(F): 98.4 (06-04-21 @ 13:56), Max: 98.4 (06-04-21 @ 13:56)  HR: 74 (06-04-21 @ 13:56) (67 - 74)  BP: 148/90 (06-04-21 @ 13:56) (129/83 - 154/92)  BP(mean): --  RR: 18 (06-04-21 @ 13:56) (16 - 18)  SpO2: 100% (06-04-21 @ 13:56) (97% - 100%)      PHYSICAL EXAM:  Patient in no acute distress. AAOX3.  No icterus, no oral ulcers.  Cardiovascular: S1S2 normal.  Lungs: + air entry B/L lung fields.  Gastrointestinal: soft, nontender, nondistended.  Extremities: no edema.  IV sites not inflamed.                             10.5   3.82  )-----------( 279      ( 04 Jun 2021 06:50 )             32.9   06-04    139  |  105  |  17  ----------------------------<  111<H>  3.7   |  22  |  1.43<H>    Ca    9.9      04 Jun 2021 06:50  Phos  5.6     06-04  Mg     1.6     06-04    TPro  7.9  /  Alb  3.5  /  TBili  0.5  /  DBili  x   /  AST  19  /  ALT  25  /  AlkPhos  263<H>  06-04      LIVER FUNCTIONS - ( 04 Jun 2021 06:50 )  Alb: 3.5 g/dL / Pro: 7.9 g/dL / ALK PHOS: 263 U/L / ALT: 25 U/L / AST: 19 U/L / GGT: x

## 2021-06-04 NOTE — PROGRESS NOTE ADULT - ASSESSMENT
52 y/o female PMHx Genital Herpes on valacyclovir x2 years, DM on insulin, HIV unknown last CD4 count/viral load now presenting to the ED with worsening labial mass not responding with oral valtrex    overall HIV infection, Resistant HSV 2 infection. + vulvar mass      Plan:   c/w foscarnet, clinically improving gradually   c/w 500 ml bolus of NS prior to each foscarnet infusion q12h   c/w 50cc/hr of maintenance  fluid.   monitor CMP daily as it can lead to significant electrolyte abnormalities with mg   monitor CBC daily to monitor WBC  culture viral herpes with HSV 2, called lab to add on sensitivities.   GC/Chl negative   syphilis screen negative   Gyn on board,   path with ulceration, chronic inflammation, squamous dysplasia.   reviewed the path with pathologist, they don't see any viral cytopathic effects.   Gyn Oncology evaluation pending       HIV infection: well controlled   c/w alla Huitron  Pager: [phone]. If no response or past 5 pm call [phone].      Please call ID service for questions over weekend at [phone].  52 y/o female PMHx Genital Herpes on valacyclovir x2 years, DM on insulin, HIV unknown last CD4 count/viral load now presenting to the ED with worsening labial mass not responding with oral valtrex    overall HIV infection, Resistant HSV 2 infection. + vulvar mass      Plan:   c/w foscarnet, clinically improving gradually   c/w 500 ml bolus of NS prior to each foscarnet infusion q12h   c/w 50cc/hr of maintenance  fluid.   monitor CMP daily as it can lead to significant electrolyte abnormalities with mg   monitor CBC daily to monitor WBC  culture viral herpes still pending   GC/Chl negative   syphilis screen negative   Gyn on board,   path with ulceration, chronic inflammation, squamous dysplasia.   reviewed the path with pathologist, they don't see any viral cytopathic effects.   Gyn Oncology evaluation pending       HIV infection: well controlled   c/w alla Huitron  Pager: [phone]. If no response or past 5 pm call [phone].      Please call ID service for questions over weekend at [phone].

## 2021-06-04 NOTE — DIETITIAN INITIAL EVALUATION ADULT. - OTHER INFO
Pt with Hx of Type 2 diabetes; manages with Humalog, Trulicity, and Lantus. Checks blood sugars several times daily; normal range <200 mg/dL. Recent A1C 9.0% (5/27) indicating poor glycemic control.    Dosing wt: 225 lbs. Daily wt in lbs: 226.1 (6/2). Reports UBW of 225 lbs, denies any recent changes in wt.    Pt is eating well with no changes in appetite. Denies recent N/V, diarrhea, or constipation. Last BM 6/1.     Provided verbal education on Nutrition Therapy for Type 2 Diabetes. Emphasis on pairing carbohydrates with protein for glycemic control; portion sizes; choosing whole grains vs refined carbohydrates; limiting refined sugars. Pt made aware RD to remain available for any questions.

## 2021-06-04 NOTE — DIETITIAN INITIAL EVALUATION ADULT. - PERTINENT LABORATORY DATA
06-04 Na 139 mmol/L Glu 111 mg/dL<H> K+ 3.7 mmol/L Cr  1.43 mg/dL<H> BUN 17 mg/dL Phos 5.6 mg/dL<H> Alb 3.5 g/dL Hgb 10.5 g/dL<L> Hct 32.9 %<L>  -Elevated Phos noted, pt now on no concentrated Phos  A1C with Estimated Average Glucose Result: 9.0 % (05-27-21 @ 15:31)  CAPILLARY BLOOD GLUCOSE  POCT Blood Glucose.: 126 mg/dL (04 Jun 2021 08:44)  POCT Blood Glucose.: 218 mg/dL (03 Jun 2021 22:23)  POCT Blood Glucose.: 156 mg/dL (03 Jun 2021 17:26)  POCT Blood Glucose.: 184 mg/dL (03 Jun 2021 13:07)

## 2021-06-04 NOTE — DIETITIAN INITIAL EVALUATION ADULT. - ORAL INTAKE PTA/DIET HISTORY
Pt was eating well with no changes in appetite. Pt was following a no concentrated sweets diet. Confirms no known food allergies. Denies Hx of chewing or swallowing issues. Denies micronutrient use. Pt took Vitamin C, E, and zinc.

## 2021-06-05 LAB
ALBUMIN SERPL ELPH-MCNC: 3.5 G/DL — SIGNIFICANT CHANGE UP (ref 3.3–5)
ALP SERPL-CCNC: 272 U/L — HIGH (ref 40–120)
ALT FLD-CCNC: 24 U/L — SIGNIFICANT CHANGE UP (ref 10–45)
ANION GAP SERPL CALC-SCNC: 13 MMOL/L — SIGNIFICANT CHANGE UP (ref 5–17)
AST SERPL-CCNC: 18 U/L — SIGNIFICANT CHANGE UP (ref 10–40)
BILIRUB SERPL-MCNC: 0.5 MG/DL — SIGNIFICANT CHANGE UP (ref 0.2–1.2)
BUN SERPL-MCNC: 22 MG/DL — SIGNIFICANT CHANGE UP (ref 7–23)
CALCIUM SERPL-MCNC: 9.8 MG/DL — SIGNIFICANT CHANGE UP (ref 8.4–10.5)
CHLORIDE SERPL-SCNC: 103 MMOL/L — SIGNIFICANT CHANGE UP (ref 96–108)
CO2 SERPL-SCNC: 21 MMOL/L — LOW (ref 22–31)
CREAT SERPL-MCNC: 1.5 MG/DL — HIGH (ref 0.5–1.3)
GLUCOSE BLDC GLUCOMTR-MCNC: 161 MG/DL — HIGH (ref 70–99)
GLUCOSE BLDC GLUCOMTR-MCNC: 163 MG/DL — HIGH (ref 70–99)
GLUCOSE BLDC GLUCOMTR-MCNC: 197 MG/DL — HIGH (ref 70–99)
GLUCOSE BLDC GLUCOMTR-MCNC: 229 MG/DL — HIGH (ref 70–99)
GLUCOSE SERPL-MCNC: 166 MG/DL — HIGH (ref 70–99)
HCT VFR BLD CALC: 33.7 % — LOW (ref 34.5–45)
HGB BLD-MCNC: 10.7 G/DL — LOW (ref 11.5–15.5)
MAGNESIUM SERPL-MCNC: 1.7 MG/DL — SIGNIFICANT CHANGE UP (ref 1.6–2.6)
MCHC RBC-ENTMCNC: 29.2 PG — SIGNIFICANT CHANGE UP (ref 27–34)
MCHC RBC-ENTMCNC: 31.8 GM/DL — LOW (ref 32–36)
MCV RBC AUTO: 92.1 FL — SIGNIFICANT CHANGE UP (ref 80–100)
NRBC # BLD: 0 /100 WBCS — SIGNIFICANT CHANGE UP (ref 0–0)
PLATELET # BLD AUTO: 272 K/UL — SIGNIFICANT CHANGE UP (ref 150–400)
POTASSIUM SERPL-MCNC: 3.8 MMOL/L — SIGNIFICANT CHANGE UP (ref 3.5–5.3)
POTASSIUM SERPL-SCNC: 3.8 MMOL/L — SIGNIFICANT CHANGE UP (ref 3.5–5.3)
PROT SERPL-MCNC: 8.1 G/DL — SIGNIFICANT CHANGE UP (ref 6–8.3)
RBC # BLD: 3.66 M/UL — LOW (ref 3.8–5.2)
RBC # FLD: 14.6 % — HIGH (ref 10.3–14.5)
SODIUM SERPL-SCNC: 137 MMOL/L — SIGNIFICANT CHANGE UP (ref 135–145)
WBC # BLD: 3.59 K/UL — LOW (ref 3.8–10.5)
WBC # FLD AUTO: 3.59 K/UL — LOW (ref 3.8–10.5)

## 2021-06-05 PROCEDURE — 99233 SBSQ HOSP IP/OBS HIGH 50: CPT

## 2021-06-05 RX ORDER — INSULIN GLARGINE 100 [IU]/ML
22 INJECTION, SOLUTION SUBCUTANEOUS AT BEDTIME
Refills: 0 | Status: DISCONTINUED | OUTPATIENT
Start: 2021-06-05 | End: 2021-06-06

## 2021-06-05 RX ORDER — INSULIN LISPRO 100/ML
4 VIAL (ML) SUBCUTANEOUS
Refills: 0 | Status: DISCONTINUED | OUTPATIENT
Start: 2021-06-05 | End: 2021-06-15

## 2021-06-05 RX ADMIN — Medication 2 UNIT(S): at 12:50

## 2021-06-05 RX ADMIN — Medication 2: at 08:50

## 2021-06-05 RX ADMIN — FOSCARNET SODIUM 175 MILLIGRAM(S): 24 INJECTION, SOLUTION INTRAVENOUS at 08:51

## 2021-06-05 RX ADMIN — HEPARIN SODIUM 5000 UNIT(S): 5000 INJECTION INTRAVENOUS; SUBCUTANEOUS at 06:15

## 2021-06-05 RX ADMIN — SODIUM CHLORIDE 1000 MILLILITER(S): 9 INJECTION INTRAMUSCULAR; INTRAVENOUS; SUBCUTANEOUS at 20:32

## 2021-06-05 RX ADMIN — SODIUM CHLORIDE 1000 MILLILITER(S): 9 INJECTION INTRAMUSCULAR; INTRAVENOUS; SUBCUTANEOUS at 08:13

## 2021-06-05 RX ADMIN — Medication 4: at 12:50

## 2021-06-05 RX ADMIN — SODIUM CHLORIDE 50 MILLILITER(S): 9 INJECTION INTRAMUSCULAR; INTRAVENOUS; SUBCUTANEOUS at 21:01

## 2021-06-05 RX ADMIN — BICTEGRAVIR SODIUM, EMTRICITABINE, AND TENOFOVIR ALAFENAMIDE FUMARATE 1 TABLET(S): 30; 120; 15 TABLET ORAL at 11:17

## 2021-06-05 RX ADMIN — Medication 2 UNIT(S): at 08:50

## 2021-06-05 RX ADMIN — SODIUM CHLORIDE 50 MILLILITER(S): 9 INJECTION INTRAMUSCULAR; INTRAVENOUS; SUBCUTANEOUS at 08:51

## 2021-06-05 RX ADMIN — INSULIN GLARGINE 22 UNIT(S): 100 INJECTION, SOLUTION SUBCUTANEOUS at 22:17

## 2021-06-05 RX ADMIN — Medication 4 UNIT(S): at 17:05

## 2021-06-05 RX ADMIN — FOSCARNET SODIUM 175 MILLIGRAM(S): 24 INJECTION, SOLUTION INTRAVENOUS at 21:00

## 2021-06-05 RX ADMIN — Medication 2: at 17:04

## 2021-06-05 RX ADMIN — HEPARIN SODIUM 5000 UNIT(S): 5000 INJECTION INTRAVENOUS; SUBCUTANEOUS at 17:04

## 2021-06-05 NOTE — PROGRESS NOTE ADULT - SUBJECTIVE AND OBJECTIVE BOX
[facility] Division of Hospital Medicine  Britt Henao MD  Pager (DARREN, 9V-2O): [phone]  Other Times:  [phone]    Patient is a 51y old  Female who presents with a chief complaint of Valacyclovir resistant HSV (04 Jun 2021 14:35)      SUBJECTIVE / OVERNIGHT EVENTS:    Patient was examined this morning. Reports that vulvar rash is improved now, she has not noticed any discharge, no vulvar/vaginal pain, no dysuria. Denies headache, dizziness, fever, chills, chest pain, dyspnea, cough, sore throat, dysphagia, oral lesion, abdominal pain, diarrhea, hematuria, vaginal discharge, pain/numbness/swelling in extremities, any other skin lesion.       ADDITIONAL REVIEW OF SYSTEMS:    MEDICATIONS  (STANDING):  bictegravir 50 mG/emtricitabine 200 mG/tenofovir alafenamide 25 mG (BIKTARVY) 1 Tablet(s) Oral daily  dextrose 40% Gel 15 Gram(s) Oral once  dextrose 5%. 1000 milliLiter(s) (50 mL/Hr) IV Continuous <Continuous>  dextrose 5%. 1000 milliLiter(s) (100 mL/Hr) IV Continuous <Continuous>  dextrose 50% Injectable 25 Gram(s) IV Push once  dextrose 50% Injectable 12.5 Gram(s) IV Push once  dextrose 50% Injectable 25 Gram(s) IV Push once  foscarnet (Peripheral) IVPB 2100 milliGRAM(s) IV Intermittent every 12 hours  glucagon  Injectable 1 milliGRAM(s) IntraMuscular once  heparin   Injectable 5000 Unit(s) SubCutaneous every 12 hours  insulin glargine Injectable (LANTUS) 20 Unit(s) SubCutaneous at bedtime  insulin lispro (ADMELOG) corrective regimen sliding scale   SubCutaneous at bedtime  insulin lispro (ADMELOG) corrective regimen sliding scale   SubCutaneous three times a day before meals  insulin lispro Injectable (ADMELOG) 2 Unit(s) SubCutaneous before lunch  insulin lispro Injectable (ADMELOG) 2 Unit(s) SubCutaneous before dinner  insulin lispro Injectable (ADMELOG) 2 Unit(s) SubCutaneous before breakfast  lidocaine 0.5%/epinephrine 1:200,000 Inj 20 milliLiter(s) Local Injection once  sodium chloride 0.9% Bolus 500 milliLiter(s) IV Bolus <User Schedule>  sodium chloride 0.9%. 1000 milliLiter(s) (50 mL/Hr) IV Continuous <Continuous>    MEDICATIONS  (PRN):  acetaminophen   Tablet .. 975 milliGRAM(s) Oral every 6 hours PRN Mild Pain (1 - 3)  ibuprofen  Tablet. 400 milliGRAM(s) Oral every 6 hours PRN Moderate Pain (4 - 6)  lidocaine 2% Gel 1 Application(s) Topical three times a day PRN Pain in labia region  polyethylene glycol 3350 17 Gram(s) Oral daily PRN Constipation      CAPILLARY BLOOD GLUCOSE      POCT Blood Glucose.: 229 mg/dL (05 Jun 2021 12:46)  POCT Blood Glucose.: 161 mg/dL (05 Jun 2021 08:23)  POCT Blood Glucose.: 222 mg/dL (04 Jun 2021 22:02)  POCT Blood Glucose.: 155 mg/dL (04 Jun 2021 17:20)    I&O's Summary    04 Jun 2021 07:01  -  05 Jun 2021 07:00  --------------------------------------------------------  IN: 1620 mL / OUT: 0 mL / NET: 1620 mL    05 Jun 2021 07:01  -  05 Jun 2021 13:57  --------------------------------------------------------  IN: 720 mL / OUT: 0 mL / NET: 720 mL        PHYSICAL EXAM:  Vital Signs Last 24 Hrs  T(C): 36.8 (05 Jun 2021 13:25), Max: 36.9 (04 Jun 2021 17:30)  T(F): 98.3 (05 Jun 2021 13:25), Max: 98.5 (04 Jun 2021 17:30)  HR: 85 (05 Jun 2021 13:25) (68 - 85)  BP: 136/83 (05 Jun 2021 13:25) (124/76 - 150/90)  BP(mean): --  RR: 18 (05 Jun 2021 13:25) (18 - 18)  SpO2: 97% (05 Jun 2021 13:25) (97% - 98%)      PHYSICAL EXAM:  GENERAL: NAD   HEENT: EOMI, PERRL, MMM  PULM: Clear to auscultation bilaterally  CV: Regular rate and rhythm; nl S1, S2; No murmurs, rubs, or gallops  ABDOMEN: Soft, Nontender, Nondistended; Bowel sounds present  EXTREMITIES/MSK:  No edema  PSYCH: AAOx3  NEUROLOGY: non-focal  : exophytic vulvar mass      LABS:                        10.7   3.59  )-----------( 272      ( 05 Jun 2021 07:26 )             33.7     06-05    137  |  103  |  22  ----------------------------<  166<H>  3.8   |  21<L>  |  1.50<H>    Ca    9.8      05 Jun 2021 07:23  Phos  5.6     06-04  Mg     1.7     06-05    TPro  8.1  /  Alb  3.5  /  TBili  0.5  /  DBili  x   /  AST  18  /  ALT  24  /  AlkPhos  272<H>  06-05                RADIOLOGY & ADDITIONAL TESTS:  Results Reviewed:   Imaging Personally Reviewed:  Electrocardiogram Personally Reviewed:    COORDINATION OF CARE:  Care Discussed with Consultants/Other Providers [Y/N]:  Prior or Outpatient Records Reviewed [Y/N]:

## 2021-06-05 NOTE — PROGRESS NOTE ADULT - PROBLEM SELECTOR PLAN 1
- On foscarnet for resistant HSV infection ; confirmed HSV 2, sensitivities and final pathology pending   - GC/chl, syphillis screen negative  - NS 500mL bolus q12 while on foscarnet, monitor CMP. 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 GYN-ONC consult - F/U

## 2021-06-06 LAB
ALBUMIN SERPL ELPH-MCNC: 3.5 G/DL — SIGNIFICANT CHANGE UP (ref 3.3–5)
ALP SERPL-CCNC: 263 U/L — HIGH (ref 40–120)
ALT FLD-CCNC: 21 U/L — SIGNIFICANT CHANGE UP (ref 10–45)
ANION GAP SERPL CALC-SCNC: 12 MMOL/L — SIGNIFICANT CHANGE UP (ref 5–17)
AST SERPL-CCNC: 13 U/L — SIGNIFICANT CHANGE UP (ref 10–40)
BILIRUB SERPL-MCNC: 0.4 MG/DL — SIGNIFICANT CHANGE UP (ref 0.2–1.2)
BUN SERPL-MCNC: 21 MG/DL — SIGNIFICANT CHANGE UP (ref 7–23)
CALCIUM SERPL-MCNC: 9.6 MG/DL — SIGNIFICANT CHANGE UP (ref 8.4–10.5)
CHLORIDE SERPL-SCNC: 103 MMOL/L — SIGNIFICANT CHANGE UP (ref 96–108)
CO2 SERPL-SCNC: 23 MMOL/L — SIGNIFICANT CHANGE UP (ref 22–31)
CREAT SERPL-MCNC: 1.46 MG/DL — HIGH (ref 0.5–1.3)
GLUCOSE BLDC GLUCOMTR-MCNC: 140 MG/DL — HIGH (ref 70–99)
GLUCOSE BLDC GLUCOMTR-MCNC: 169 MG/DL — HIGH (ref 70–99)
GLUCOSE BLDC GLUCOMTR-MCNC: 284 MG/DL — HIGH (ref 70–99)
GLUCOSE BLDC GLUCOMTR-MCNC: 95 MG/DL — SIGNIFICANT CHANGE UP (ref 70–99)
GLUCOSE SERPL-MCNC: 241 MG/DL — HIGH (ref 70–99)
HCT VFR BLD CALC: 33.4 % — LOW (ref 34.5–45)
HGB BLD-MCNC: 10.5 G/DL — LOW (ref 11.5–15.5)
MAGNESIUM SERPL-MCNC: 1.7 MG/DL — SIGNIFICANT CHANGE UP (ref 1.6–2.6)
MCHC RBC-ENTMCNC: 29.2 PG — SIGNIFICANT CHANGE UP (ref 27–34)
MCHC RBC-ENTMCNC: 31.4 GM/DL — LOW (ref 32–36)
MCV RBC AUTO: 93 FL — SIGNIFICANT CHANGE UP (ref 80–100)
NRBC # BLD: 0 /100 WBCS — SIGNIFICANT CHANGE UP (ref 0–0)
PLATELET # BLD AUTO: 271 K/UL — SIGNIFICANT CHANGE UP (ref 150–400)
POTASSIUM SERPL-MCNC: 4.3 MMOL/L — SIGNIFICANT CHANGE UP (ref 3.5–5.3)
POTASSIUM SERPL-SCNC: 4.3 MMOL/L — SIGNIFICANT CHANGE UP (ref 3.5–5.3)
PROT SERPL-MCNC: 8 G/DL — SIGNIFICANT CHANGE UP (ref 6–8.3)
RBC # BLD: 3.59 M/UL — LOW (ref 3.8–5.2)
RBC # FLD: 14.8 % — HIGH (ref 10.3–14.5)
SODIUM SERPL-SCNC: 138 MMOL/L — SIGNIFICANT CHANGE UP (ref 135–145)
WBC # BLD: 3.62 K/UL — LOW (ref 3.8–10.5)
WBC # FLD AUTO: 3.62 K/UL — LOW (ref 3.8–10.5)

## 2021-06-06 PROCEDURE — 99233 SBSQ HOSP IP/OBS HIGH 50: CPT

## 2021-06-06 RX ORDER — INSULIN GLARGINE 100 [IU]/ML
24 INJECTION, SOLUTION SUBCUTANEOUS AT BEDTIME
Refills: 0 | Status: DISCONTINUED | OUTPATIENT
Start: 2021-06-06 | End: 2021-06-16

## 2021-06-06 RX ORDER — MAGNESIUM OXIDE 400 MG ORAL TABLET 241.3 MG
400 TABLET ORAL ONCE
Refills: 0 | Status: COMPLETED | OUTPATIENT
Start: 2021-06-06 | End: 2021-06-06

## 2021-06-06 RX ADMIN — Medication 4 UNIT(S): at 13:22

## 2021-06-06 RX ADMIN — BICTEGRAVIR SODIUM, EMTRICITABINE, AND TENOFOVIR ALAFENAMIDE FUMARATE 1 TABLET(S): 30; 120; 15 TABLET ORAL at 13:17

## 2021-06-06 RX ADMIN — HEPARIN SODIUM 5000 UNIT(S): 5000 INJECTION INTRAVENOUS; SUBCUTANEOUS at 17:32

## 2021-06-06 RX ADMIN — INSULIN GLARGINE 24 UNIT(S): 100 INJECTION, SOLUTION SUBCUTANEOUS at 21:59

## 2021-06-06 RX ADMIN — Medication 975 MILLIGRAM(S): at 13:47

## 2021-06-06 RX ADMIN — FOSCARNET SODIUM 175 MILLIGRAM(S): 24 INJECTION, SOLUTION INTRAVENOUS at 20:39

## 2021-06-06 RX ADMIN — FOSCARNET SODIUM 175 MILLIGRAM(S): 24 INJECTION, SOLUTION INTRAVENOUS at 08:28

## 2021-06-06 RX ADMIN — Medication 4 UNIT(S): at 08:52

## 2021-06-06 RX ADMIN — MAGNESIUM OXIDE 400 MG ORAL TABLET 400 MILLIGRAM(S): 241.3 TABLET ORAL at 13:17

## 2021-06-06 RX ADMIN — SODIUM CHLORIDE 1000 MILLILITER(S): 9 INJECTION INTRAMUSCULAR; INTRAVENOUS; SUBCUTANEOUS at 20:05

## 2021-06-06 RX ADMIN — Medication 2: at 08:28

## 2021-06-06 RX ADMIN — Medication 1: at 21:59

## 2021-06-06 RX ADMIN — HEPARIN SODIUM 5000 UNIT(S): 5000 INJECTION INTRAVENOUS; SUBCUTANEOUS at 05:59

## 2021-06-06 RX ADMIN — SODIUM CHLORIDE 1000 MILLILITER(S): 9 INJECTION INTRAMUSCULAR; INTRAVENOUS; SUBCUTANEOUS at 08:28

## 2021-06-06 RX ADMIN — Medication 4 UNIT(S): at 19:04

## 2021-06-06 RX ADMIN — Medication 975 MILLIGRAM(S): at 13:17

## 2021-06-06 NOTE — CHART NOTE - NSCHARTNOTEFT_GEN_A_CORE
Nutrition Follow Up Note  Patient seen for: consult received for "hyperphosphatemia"    Hospital course as per chart: 51y Female with PMH of HIV, CKD3 and DM2 who presented with "an acutely worsening infection for HSV" with large vulvar mass. Admitted . Chart reviewed, events noted.    Source: [] Patient       [x] EMR        [] RN        [] Family at bedside       [] Other:    Diet Order:   Diet, Consistent Carbohydrate w/Evening Snack:   No Concentrated Phosphorus (21)    - Is current order appropriate/adequate? [x] Yes  []  No:     - PO intake :   [] >75%  Adequate    [] 50-75%  Fair       [] <50%  Poor    - Nutrition-related concerns:    GI:  Last BM ___.   Bowel Regimen? [x] Yes   [] No    Weights:   Daily Weight in k.6 ()  Will continue to monitor and trend weights.    Nutritionally Pertinent MEDICATIONS  (STANDING):  bictegravir 50 mG/emtricitabine 200 mG/tenofovir alafenamide 25 mG (BIKTARVY)  dextrose 40% Gel  dextrose 5%.  dextrose 5%.  dextrose 50% Injectable  dextrose 50% Injectable  dextrose 50% Injectable  foscarnet (Peripheral) IVPB  glucagon  Injectable  insulin glargine Injectable (LANTUS)  insulin lispro (ADMELOG) corrective regimen sliding scale  insulin lispro (ADMELOG) corrective regimen sliding scale  insulin lispro Injectable (ADMELOG)  sodium chloride 0.9% Bolus  sodium chloride 0.9%.    Pertinent Labs:  @ 07:12: Na 138, BUN 21, Cr 1.46<H>, <H>, K+ 4.3, Phos --, Mg 1.7, Alk Phos 263<H>, ALT/SGPT 21, AST/SGOT 13, HbA1c --    A1C with Estimated Average Glucose Result: 9.0 % (21 @ 15:31)    Finger Sticks:  POCT Blood Glucose.: 169 mg/dL ( @ 08:08)  POCT Blood Glucose.: 163 mg/dL ( @ 21:24)  POCT Blood Glucose.: 197 mg/dL ( @ 16:52)  POCT Blood Glucose.: 229 mg/dL ( @ 12:46)    Skin per nursing documentation: no pressure injuries per flowsheets   Edema per flowsheets: 2+ to labia    Estimated Needs:   Based on  pounds/70.5 kg  Estimated Energy Needs (25-30 kcal/kg): 4237-1304 kcal/day  Estimated Protein Needs (1-1.2 g/kg): 71-85 g/day  Estimated Fluid Needs (25-30 ml/kg): 4741-4729 ml/day    Previous Nutrition Diagnosis: Altered Nutrition Related Lab Values  Nutrition Diagnosis is: [x] ongoing  [] resolved [] not applicable   Being addressed with: therapeutic diet, diet education    New Nutrition Diagnosis: not applicable  Inadequate Oral Intake  Inadequate Protein-Energy Intake  Food & Nutrition Related Knowledge Deficit  Malnutrition    Recommendations:     Monitoring and Evaluation: Monitor PO intake, weight, labs, skin, GI status, diet     RD remains available upon request and will follow up per protocol  Yolanda Cheng MS RD CDN [facility] Pager #[phone] Nutrition Follow Up Note  Patient seen for: consult received for "hyperphosphatemia"    Hospital course as per chart: 51y Female with PMH of HIV, CKD3 and DM2 who presented with "an acutely worsening infection for HSV" with large vulvar mass. Admitted . Chart reviewed, events noted.    Source: [x] Patient       [x] EMR        [] RN        [] Family at bedside       [] Other:    Diet Order:   Diet, Consistent Carbohydrate w/Evening Snack:   No Concentrated Phosphorus (21)    - Is current order appropriate/adequate? [x] Yes  []  No:     - PO intake :   [x] >75%  Adequate    [] 50-75%  Fair       [] <50%  Poor    GI: Denies nausea/vomiting/diarrhea/constipation. Last BM yesterday  per pt.   Bowel Regimen? [x] Yes   [] No    Pt recalls diet education provided by RD at initial evaluation on . Reinforced. Phosphorus Content of Foods handout and RD contact information provided. Pt requesting oral nutrition supplement. Encouraged continued good PO intake as tolerated with supplement in-between meals to avoid early satiety. All nutrition-related questions answered. Pt made aware RD remains available.    Weights:   Daily Weight in k.6 ()  Will continue to monitor and trend weights.    Nutritionally Pertinent MEDICATIONS  (STANDING):  bictegravir 50 mG/emtricitabine 200 mG/tenofovir alafenamide 25 mG (BIKTARVY)  dextrose 40% Gel  dextrose 5%.  dextrose 5%.  dextrose 50% Injectable  dextrose 50% Injectable  dextrose 50% Injectable  foscarnet (Peripheral) IVPB  glucagon  Injectable  insulin glargine Injectable (LANTUS)  insulin lispro (ADMELOG) corrective regimen sliding scale  insulin lispro (ADMELOG) corrective regimen sliding scale  insulin lispro Injectable (ADMELOG)  sodium chloride 0.9% Bolus  sodium chloride 0.9%.    Pertinent Labs:  @ 07:12: Na 138, BUN 21, Cr 1.46<H>, <H>, K+ 4.3, Phos --, Mg 1.7, Alk Phos 263<H>, ALT/SGPT 21, AST/SGOT 13, HbA1c --    A1C with Estimated Average Glucose Result: 9.0 % (21 @ 15:31)    Finger Sticks:  POCT Blood Glucose.: 169 mg/dL ( @ 08:08)  POCT Blood Glucose.: 163 mg/dL ( @ 21:24)  POCT Blood Glucose.: 197 mg/dL ( @ 16:52)  POCT Blood Glucose.: 229 mg/dL ( @ 12:46)    Skin per nursing documentation: no pressure injuries per flowsheets   Edema per flowsheets: 2+ to labia    Estimated Needs:   Based on  pounds/70.5 kg  Estimated Energy Needs (25-30 kcal/kg): 3642-2781 kcal/day  Estimated Protein Needs (1-1.2 g/kg): 71-85 g/day  Estimated Fluid Needs (25-30 ml/kg): 4302-1600 ml/day    Previous Nutrition Diagnosis: Altered Nutrition Related Lab Values  Nutrition Diagnosis is: [x] ongoing  [] resolved [] not applicable   Being addressed with: therapeutic diet, diet education    New Nutrition Diagnosis: not applicable    Recommendations:   1) Continue current diet: consistent carbohydrate (evening snack), No Concentrated Phosphorus. Monitor/adjust PRN.   2) Recommend Suplena 1 daily (420 kcal, 11 g protein in each) to optimize intake per pt request   3) Diet education provided. Patient made aware RD remains available.     Monitoring and Evaluation: Monitor PO intake, weight, labs, skin, GI status, diet     RD remains available upon request and will follow up per protocol  Yolanda Cheng MS, RD, CDN [facility] Pager #[phone] Nutrition Follow Up Note  Patient seen for: consult received for "hyperphosphatemia"    Hospital course as per chart: 51y Female with PMH of HIV, CKD3 and DM2 who presented with "an acutely worsening infection for HSV" with large vulvar mass. Admitted . Chart reviewed, events noted.    Source: [x] Patient       [x] EMR        [] RN        [] Family at bedside       [] Other:    Diet Order:   Diet, Consistent Carbohydrate w/Evening Snack:   No Concentrated Phosphorus (21)    - Is current order appropriate/adequate? [x] Yes  []  No:     - PO intake :   [x] >75%  Adequate    [] 50-75%  Fair       [] <50%  Poor    GI: Denies nausea/vomiting/diarrhea/constipation. Last BM yesterday  per pt.   Bowel Regimen? [x] Yes   [] No    Pt recalls diet education provided by RD at initial evaluation on . Reinforced. Phosphorus Content of Foods handout and RD contact information provided. Pt requesting oral nutrition supplement. Encouraged continued good PO intake as tolerated with supplement in-between meals to avoid early satiety. All nutrition-related questions answered. Pt made aware RD remains available.    Weights:   Daily Weight in k.6 ()  Will continue to monitor and trend weights.    Nutritionally Pertinent MEDICATIONS  (STANDING):  bictegravir 50 mG/emtricitabine 200 mG/tenofovir alafenamide 25 mG (BIKTARVY)  dextrose 40% Gel  dextrose 5%.  dextrose 5%.  dextrose 50% Injectable  dextrose 50% Injectable  dextrose 50% Injectable  foscarnet (Peripheral) IVPB  glucagon  Injectable  insulin glargine Injectable (LANTUS)  insulin lispro (ADMELOG) corrective regimen sliding scale  insulin lispro (ADMELOG) corrective regimen sliding scale  insulin lispro Injectable (ADMELOG)  sodium chloride 0.9% Bolus  sodium chloride 0.9%.    Pertinent Labs:  @ 07:12: Na 138, BUN 21, Cr 1.46<H>, <H>, K+ 4.3, Phos --, Mg 1.7, Alk Phos 263<H>, ALT/SGPT 21, AST/SGOT 13, HbA1c --    A1C with Estimated Average Glucose Result: 9.0 % (21 @ 15:31)    Finger Sticks:  POCT Blood Glucose.: 169 mg/dL ( @ 08:08)  POCT Blood Glucose.: 163 mg/dL ( @ 21:24)  POCT Blood Glucose.: 197 mg/dL ( @ 16:52)  POCT Blood Glucose.: 229 mg/dL ( @ 12:46)    Skin per nursing documentation: no pressure injuries per flowsheets   Edema per flowsheets: 2+ to labia    Estimated Needs:   Based on  pounds/70.5 kg  Estimated Energy Needs (25-30 kcal/kg): 5892-3074 kcal/day  Estimated Protein Needs (1-1.2 g/kg): 71-85 g/day  Estimated Fluid Needs (25-30 ml/kg): 1372-9600 ml/day    Previous Nutrition Diagnosis: Altered Nutrition Related Lab Values  Nutrition Diagnosis is: [x] ongoing  [] resolved [] not applicable   Being addressed with: therapeutic diet, diet education    New Nutrition Diagnosis: not applicable    Recommendations:   1) Continue current diet: consistent carbohydrate (evening snack), No Concentrated Phosphorus. Monitor/adjust PRN.   2) Recommend Suplena 1 daily (420 kcal, 11 g protein in each) to optimize intake per pt request - spoke to ROCKY Jackson  3) Diet education provided. Patient made aware RD remains available.     Monitoring and Evaluation: Monitor PO intake, weight, labs, skin, GI status, diet     RD remains available upon request and will follow up per protocol  Yolanda Cheng MS, RD, CDN [facility] Pager #[phone]

## 2021-06-06 NOTE — PROGRESS NOTE ADULT - SUBJECTIVE AND OBJECTIVE BOX
[facility] Division of Hospital Medicine  Britt Henao MD  Pager (DARREN, 0B-3T): [phone]  Other Times:  [phone]    Patient is a 51y old  Female who presents with a chief complaint of Valacyclovir resistant HSV (05 Jun 2021 13:56)      SUBJECTIVE / OVERNIGHT EVENTS:    Patient was examined this morning. No new complaint/issue. Patient reports improvement in vulvar rash, no discharge, no vulvar/vaginal pain or dysuria. She has two small scarred lesions on her forearms which she noticed after mosquito bites, no itchy anymore. Denies headache, dizziness, fever, chills, chest pain, dyspnea, cough, sore throat, dysphagia, oral lesion, abdominal pain, diarrhea, hematuria, vaginal discharge, pain/numbness/swelling in extremities, any other skin lesion.       ADDITIONAL REVIEW OF SYSTEMS:    MEDICATIONS  (STANDING):  bictegravir 50 mG/emtricitabine 200 mG/tenofovir alafenamide 25 mG (BIKTARVY) 1 Tablet(s) Oral daily  dextrose 40% Gel 15 Gram(s) Oral once  dextrose 5%. 1000 milliLiter(s) (50 mL/Hr) IV Continuous <Continuous>  dextrose 5%. 1000 milliLiter(s) (100 mL/Hr) IV Continuous <Continuous>  dextrose 50% Injectable 25 Gram(s) IV Push once  dextrose 50% Injectable 12.5 Gram(s) IV Push once  dextrose 50% Injectable 25 Gram(s) IV Push once  foscarnet (Peripheral) IVPB 2100 milliGRAM(s) IV Intermittent every 12 hours  glucagon  Injectable 1 milliGRAM(s) IntraMuscular once  heparin   Injectable 5000 Unit(s) SubCutaneous every 12 hours  insulin glargine Injectable (LANTUS) 24 Unit(s) SubCutaneous at bedtime  insulin lispro (ADMELOG) corrective regimen sliding scale   SubCutaneous at bedtime  insulin lispro (ADMELOG) corrective regimen sliding scale   SubCutaneous three times a day before meals  insulin lispro Injectable (ADMELOG) 4 Unit(s) SubCutaneous three times a day with meals  lidocaine 0.5%/epinephrine 1:200,000 Inj 20 milliLiter(s) Local Injection once  magnesium oxide 400 milliGRAM(s) Oral once  sodium chloride 0.9% Bolus 500 milliLiter(s) IV Bolus <User Schedule>  sodium chloride 0.9%. 1000 milliLiter(s) (50 mL/Hr) IV Continuous <Continuous>    MEDICATIONS  (PRN):  acetaminophen   Tablet .. 975 milliGRAM(s) Oral every 6 hours PRN Mild Pain (1 - 3)  ibuprofen  Tablet. 400 milliGRAM(s) Oral every 6 hours PRN Moderate Pain (4 - 6)  lidocaine 2% Gel 1 Application(s) Topical three times a day PRN Pain in labia region  polyethylene glycol 3350 17 Gram(s) Oral daily PRN Constipation      CAPILLARY BLOOD GLUCOSE      POCT Blood Glucose.: 169 mg/dL (06 Jun 2021 08:08)  POCT Blood Glucose.: 163 mg/dL (05 Jun 2021 21:24)  POCT Blood Glucose.: 197 mg/dL (05 Jun 2021 16:52)  POCT Blood Glucose.: 229 mg/dL (05 Jun 2021 12:46)    I&O's Summary    05 Jun 2021 07:01  -  06 Jun 2021 07:00  --------------------------------------------------------  IN: 1080 mL / OUT: 0 mL / NET: 1080 mL    06 Jun 2021 07:01  -  06 Jun 2021 12:38  --------------------------------------------------------  IN: 260 mL / OUT: 0 mL / NET: 260 mL        PHYSICAL EXAM:  Vital Signs Last 24 Hrs  T(C): 36.5 (06 Jun 2021 05:41), Max: 36.8 (05 Jun 2021 13:25)  T(F): 97.7 (06 Jun 2021 05:41), Max: 98.3 (05 Jun 2021 13:25)  HR: 72 (06 Jun 2021 05:41) (72 - 85)  BP: 134/86 (06 Jun 2021 05:41) (120/77 - 136/83)  BP(mean): --  RR: 18 (06 Jun 2021 05:41) (18 - 18)  SpO2: 98% (06 Jun 2021 05:41) (97% - 99%)      PHYSICAL EXAM:  GENERAL: NAD   HEENT: EOMI, PERRL, MMM  PULM: Clear to auscultation bilaterally  CV: Regular rate and rhythm; nl S1, S2; No murmurs, rubs, or gallops  ABDOMEN: Soft, Nontender, Nondistended; Bowel sounds present  EXTREMITIES/MSK:  No edema  PSYCH: AAOx3  NEUROLOGY: non-focal  : exophytic vulvar mass  SKIN: warm dry. two small areas of healed lesions on forearms no sign of acute inflammation.           LABS:                        10.5   3.62  )-----------( 271      ( 06 Jun 2021 07:24 )             33.4     06-06    138  |  103  |  21  ----------------------------<  241<H>  4.3   |  23  |  1.46<H>    Ca    9.6      06 Jun 2021 07:12  Mg     1.7     06-06    TPro  8.0  /  Alb  3.5  /  TBili  0.4  /  DBili  x   /  AST  13  /  ALT  21  /  AlkPhos  263<H>  06-06                RADIOLOGY & ADDITIONAL TESTS:  Results Reviewed:   Imaging Personally Reviewed:  Electrocardiogram Personally Reviewed:    COORDINATION OF CARE:  Care Discussed with Consultants/Other Providers [Y/N]:  Prior or Outpatient Records Reviewed [Y/N]:

## 2021-06-07 LAB
ALBUMIN SERPL ELPH-MCNC: 3.6 G/DL — SIGNIFICANT CHANGE UP (ref 3.3–5)
ALP SERPL-CCNC: 271 U/L — HIGH (ref 40–120)
ALT FLD-CCNC: 23 U/L — SIGNIFICANT CHANGE UP (ref 10–45)
ANION GAP SERPL CALC-SCNC: 12 MMOL/L — SIGNIFICANT CHANGE UP (ref 5–17)
APPEARANCE UR: CLEAR — SIGNIFICANT CHANGE UP
AST SERPL-CCNC: 19 U/L — SIGNIFICANT CHANGE UP (ref 10–40)
BACTERIA # UR AUTO: ABNORMAL
BILIRUB SERPL-MCNC: 0.3 MG/DL — SIGNIFICANT CHANGE UP (ref 0.2–1.2)
BILIRUB UR-MCNC: NEGATIVE — SIGNIFICANT CHANGE UP
BUN SERPL-MCNC: 20 MG/DL — SIGNIFICANT CHANGE UP (ref 7–23)
CALCIUM SERPL-MCNC: 10 MG/DL — SIGNIFICANT CHANGE UP (ref 8.4–10.5)
CHLORIDE SERPL-SCNC: 104 MMOL/L — SIGNIFICANT CHANGE UP (ref 96–108)
CO2 SERPL-SCNC: 21 MMOL/L — LOW (ref 22–31)
COLOR SPEC: COLORLESS — SIGNIFICANT CHANGE UP
CREAT SERPL-MCNC: 1.45 MG/DL — HIGH (ref 0.5–1.3)
DIFF PNL FLD: NEGATIVE — SIGNIFICANT CHANGE UP
EPI CELLS # UR: 0 /HPF — SIGNIFICANT CHANGE UP
GLUCOSE BLDC GLUCOMTR-MCNC: 136 MG/DL — HIGH (ref 70–99)
GLUCOSE BLDC GLUCOMTR-MCNC: 171 MG/DL — HIGH (ref 70–99)
GLUCOSE BLDC GLUCOMTR-MCNC: 180 MG/DL — HIGH (ref 70–99)
GLUCOSE BLDC GLUCOMTR-MCNC: 229 MG/DL — HIGH (ref 70–99)
GLUCOSE SERPL-MCNC: 182 MG/DL — HIGH (ref 70–99)
GLUCOSE UR QL: NEGATIVE — SIGNIFICANT CHANGE UP
HCT VFR BLD CALC: 33.8 % — LOW (ref 34.5–45)
HGB BLD-MCNC: 10.7 G/DL — LOW (ref 11.5–15.5)
HYALINE CASTS # UR AUTO: 0 /LPF — SIGNIFICANT CHANGE UP (ref 0–2)
KETONES UR-MCNC: NEGATIVE — SIGNIFICANT CHANGE UP
LEUKOCYTE ESTERASE UR-ACNC: ABNORMAL
MAGNESIUM SERPL-MCNC: 1.8 MG/DL — SIGNIFICANT CHANGE UP (ref 1.6–2.6)
MCHC RBC-ENTMCNC: 29.7 PG — SIGNIFICANT CHANGE UP (ref 27–34)
MCHC RBC-ENTMCNC: 31.7 GM/DL — LOW (ref 32–36)
MCV RBC AUTO: 93.9 FL — SIGNIFICANT CHANGE UP (ref 80–100)
NITRITE UR-MCNC: NEGATIVE — SIGNIFICANT CHANGE UP
NRBC # BLD: 0 /100 WBCS — SIGNIFICANT CHANGE UP (ref 0–0)
PH UR: 6 — SIGNIFICANT CHANGE UP (ref 5–8)
PLATELET # BLD AUTO: 286 K/UL — SIGNIFICANT CHANGE UP (ref 150–400)
POTASSIUM SERPL-MCNC: 4 MMOL/L — SIGNIFICANT CHANGE UP (ref 3.5–5.3)
POTASSIUM SERPL-SCNC: 4 MMOL/L — SIGNIFICANT CHANGE UP (ref 3.5–5.3)
PROT SERPL-MCNC: 8.2 G/DL — SIGNIFICANT CHANGE UP (ref 6–8.3)
PROT UR-MCNC: ABNORMAL
RBC # BLD: 3.6 M/UL — LOW (ref 3.8–5.2)
RBC # FLD: 14.6 % — HIGH (ref 10.3–14.5)
RBC CASTS # UR COMP ASSIST: 2 /HPF — SIGNIFICANT CHANGE UP (ref 0–4)
SODIUM SERPL-SCNC: 137 MMOL/L — SIGNIFICANT CHANGE UP (ref 135–145)
SP GR SPEC: 1.01 — SIGNIFICANT CHANGE UP (ref 1.01–1.02)
UROBILINOGEN FLD QL: NEGATIVE — SIGNIFICANT CHANGE UP
WBC # BLD: 3.74 K/UL — LOW (ref 3.8–10.5)
WBC # FLD AUTO: 3.74 K/UL — LOW (ref 3.8–10.5)
WBC UR QL: 24 /HPF — HIGH (ref 0–5)

## 2021-06-07 PROCEDURE — 99232 SBSQ HOSP IP/OBS MODERATE 35: CPT

## 2021-06-07 RX ADMIN — Medication 4 UNIT(S): at 17:16

## 2021-06-07 RX ADMIN — SODIUM CHLORIDE 50 MILLILITER(S): 9 INJECTION INTRAMUSCULAR; INTRAVENOUS; SUBCUTANEOUS at 21:27

## 2021-06-07 RX ADMIN — Medication 2: at 17:15

## 2021-06-07 RX ADMIN — Medication 4 UNIT(S): at 08:04

## 2021-06-07 RX ADMIN — INSULIN GLARGINE 24 UNIT(S): 100 INJECTION, SOLUTION SUBCUTANEOUS at 21:15

## 2021-06-07 RX ADMIN — Medication 2: at 08:03

## 2021-06-07 RX ADMIN — SODIUM CHLORIDE 1000 MILLILITER(S): 9 INJECTION INTRAMUSCULAR; INTRAVENOUS; SUBCUTANEOUS at 09:32

## 2021-06-07 RX ADMIN — Medication 4 UNIT(S): at 13:28

## 2021-06-07 RX ADMIN — FOSCARNET SODIUM 175 MILLIGRAM(S): 24 INJECTION, SOLUTION INTRAVENOUS at 09:31

## 2021-06-07 RX ADMIN — HEPARIN SODIUM 5000 UNIT(S): 5000 INJECTION INTRAVENOUS; SUBCUTANEOUS at 17:15

## 2021-06-07 RX ADMIN — HEPARIN SODIUM 5000 UNIT(S): 5000 INJECTION INTRAVENOUS; SUBCUTANEOUS at 05:23

## 2021-06-07 RX ADMIN — Medication 400 MILLIGRAM(S): at 23:14

## 2021-06-07 RX ADMIN — FOSCARNET SODIUM 175 MILLIGRAM(S): 24 INJECTION, SOLUTION INTRAVENOUS at 22:44

## 2021-06-07 RX ADMIN — Medication 400 MILLIGRAM(S): at 22:44

## 2021-06-07 RX ADMIN — Medication 4: at 13:26

## 2021-06-07 RX ADMIN — BICTEGRAVIR SODIUM, EMTRICITABINE, AND TENOFOVIR ALAFENAMIDE FUMARATE 1 TABLET(S): 30; 120; 15 TABLET ORAL at 11:53

## 2021-06-07 NOTE — PROGRESS NOTE ADULT - SUBJECTIVE AND OBJECTIVE BOX
[facility] Division of Hospital Medicine  Britt Henao MD  Pager (SARA-GEOVANY, 2H-2W): [phone]  Other Times:  [phone]    Patient is a 51y old  Female who presents with a chief complaint of Valacyclovir resistant HSV (06 Jun 2021 12:38)      SUBJECTIVE / OVERNIGHT EVENTS:    Patient was examined this morning. She is reporting new onset bladder pain upon urination, no burning sensation in the urethra, feels like she isnt emptying her bladder fully. No hematuria, no flank pain. No changes in vulvar lesion, no vaginal discharge. ROS negative otherwise.       ADDITIONAL REVIEW OF SYSTEMS: neg    MEDICATIONS  (STANDING):  bictegravir 50 mG/emtricitabine 200 mG/tenofovir alafenamide 25 mG (BIKTARVY) 1 Tablet(s) Oral daily  dextrose 40% Gel 15 Gram(s) Oral once  dextrose 5%. 1000 milliLiter(s) (50 mL/Hr) IV Continuous <Continuous>  dextrose 5%. 1000 milliLiter(s) (100 mL/Hr) IV Continuous <Continuous>  dextrose 50% Injectable 25 Gram(s) IV Push once  dextrose 50% Injectable 12.5 Gram(s) IV Push once  dextrose 50% Injectable 25 Gram(s) IV Push once  foscarnet (Peripheral) IVPB 2100 milliGRAM(s) IV Intermittent every 12 hours  glucagon  Injectable 1 milliGRAM(s) IntraMuscular once  heparin   Injectable 5000 Unit(s) SubCutaneous every 12 hours  insulin glargine Injectable (LANTUS) 24 Unit(s) SubCutaneous at bedtime  insulin lispro (ADMELOG) corrective regimen sliding scale   SubCutaneous at bedtime  insulin lispro (ADMELOG) corrective regimen sliding scale   SubCutaneous three times a day before meals  insulin lispro Injectable (ADMELOG) 4 Unit(s) SubCutaneous three times a day with meals  lidocaine 0.5%/epinephrine 1:200,000 Inj 20 milliLiter(s) Local Injection once  sodium chloride 0.9%. 1000 milliLiter(s) (50 mL/Hr) IV Continuous <Continuous>    MEDICATIONS  (PRN):  acetaminophen   Tablet .. 975 milliGRAM(s) Oral every 6 hours PRN Mild Pain (1 - 3)  ibuprofen  Tablet. 400 milliGRAM(s) Oral every 6 hours PRN Moderate Pain (4 - 6)  lidocaine 2% Gel 1 Application(s) Topical three times a day PRN Pain in labia region  polyethylene glycol 3350 17 Gram(s) Oral daily PRN Constipation      CAPILLARY BLOOD GLUCOSE      POCT Blood Glucose.: 180 mg/dL (07 Jun 2021 07:55)  POCT Blood Glucose.: 284 mg/dL (06 Jun 2021 21:56)  POCT Blood Glucose.: 140 mg/dL (06 Jun 2021 18:59)  POCT Blood Glucose.: 95 mg/dL (06 Jun 2021 13:06)    I&O's Summary    06 Jun 2021 07:01  -  07 Jun 2021 07:00  --------------------------------------------------------  IN: 2955 mL / OUT: 0 mL / NET: 2955 mL        PHYSICAL EXAM:  Vital Signs Last 24 Hrs  T(C): 36.7 (07 Jun 2021 08:51), Max: 36.8 (06 Jun 2021 13:31)  T(F): 98 (07 Jun 2021 08:51), Max: 98.3 (06 Jun 2021 20:53)  HR: 73 (07 Jun 2021 08:51) (72 - 86)  BP: 125/80 (07 Jun 2021 08:51) (125/80 - 134/84)  BP(mean): --  RR: 18 (07 Jun 2021 08:51) (17 - 18)  SpO2: 98% (07 Jun 2021 08:51) (97% - 100%)      PHYSICAL EXAM:  GENERAL: NAD   HEENT: EOMI, PERRL, MMM  PULM: Clear to auscultation bilaterally  CV: Regular rate and rhythm; nl S1, S2; No murmurs, rubs, or gallops  ABDOMEN: Soft, Nontender, Nondistended; Bowel sounds present  EXTREMITIES/MSK:  No edema  PSYCH: AAOx3  NEUROLOGY: non-focal  : exophytic vulvar mass  SKIN: warm dry. two small areas of healed lesions on forearms no sign of acute inflammation.       LABS:                        10.7   3.74  )-----------( 286      ( 07 Jun 2021 06:52 )             33.8     06-07    137  |  104  |  20  ----------------------------<  182<H>  4.0   |  21<L>  |  1.45<H>    Ca    10.0      07 Jun 2021 06:51  Mg     1.8     06-07    TPro  8.2  /  Alb  3.6  /  TBili  0.3  /  DBili  x   /  AST  19  /  ALT  23  /  AlkPhos  271<H>  06-07                RADIOLOGY & ADDITIONAL TESTS:  Results Reviewed:   Imaging Personally Reviewed:  Electrocardiogram Personally Reviewed:    COORDINATION OF CARE:  Care Discussed with Consultants/Other Providers [Y/N]:  Prior or Outpatient Records Reviewed [Y/N]:

## 2021-06-07 NOTE — PROGRESS NOTE ADULT - SUBJECTIVE AND OBJECTIVE BOX
Follow Up:  Resistant genital HSV infection    Inverval History/ROS:Patient is a 51y old  Female who presents with a chief complaint of Valacyclovir resistant HSV (2021 10:55)    Pt feels genital pain, size of largest lesion is significantly improved since hospitalization. Denies dysuria, flank pain, fever, chills, N/V. Does admit to some bladder urgency which ahs been worsening over last few days.    Allergies    No Known Allergies    Intolerances        ANTIMICROBIALS:  bictegravir 50 mG/emtricitabine 200 mG/tenofovir alafenamide 25 mG (BIKTARVY) 1 daily  foscarnet (Peripheral) IVPB 2100 every 12 hours      OTHER MEDS:  acetaminophen   Tablet .. 975 milliGRAM(s) Oral every 6 hours PRN  dextrose 40% Gel 15 Gram(s) Oral once  dextrose 5%. 1000 milliLiter(s) IV Continuous <Continuous>  dextrose 5%. 1000 milliLiter(s) IV Continuous <Continuous>  dextrose 50% Injectable 25 Gram(s) IV Push once  dextrose 50% Injectable 12.5 Gram(s) IV Push once  dextrose 50% Injectable 25 Gram(s) IV Push once  glucagon  Injectable 1 milliGRAM(s) IntraMuscular once  heparin   Injectable 5000 Unit(s) SubCutaneous every 12 hours  ibuprofen  Tablet. 400 milliGRAM(s) Oral every 6 hours PRN  insulin glargine Injectable (LANTUS) 24 Unit(s) SubCutaneous at bedtime  insulin lispro (ADMELOG) corrective regimen sliding scale   SubCutaneous at bedtime  insulin lispro (ADMELOG) corrective regimen sliding scale   SubCutaneous three times a day before meals  insulin lispro Injectable (ADMELOG) 4 Unit(s) SubCutaneous three times a day with meals  lidocaine 0.5%/epinephrine 1:200,000 Inj 20 milliLiter(s) Local Injection once  lidocaine 2% Gel 1 Application(s) Topical three times a day PRN  polyethylene glycol 3350 17 Gram(s) Oral daily PRN  sodium chloride 0.9%. 1000 milliLiter(s) IV Continuous <Continuous>      Vital Signs Last 24 Hrs  T(C): 36.7 (2021 17:11), Max: 36.8 (2021 20:53)  T(F): 98 (2021 17:11), Max: 98.3 (2021 20:53)  HR: 66 (2021 17:11) (66 - 82)  BP: 144/76 (2021 17:11) (125/80 - 144/76)  BP(mean): --  RR: 18 (2021 17:11) (17 - 18)  SpO2: 99% (2021 17:11) (97% - 100%)    Physical Exam  Gen: Well appearing, NAD  Lungs: CTA B/L, no w/r/r  Heart: RRR, no m/r/g  Abdomen: soft, non-ttp  : no flank or suprapubic TTP  Ext: no pitting edema  Skin: no rashes                          10.7   3.74  )-----------( 286      ( 2021 06:52 )             33.8       06-07    137  |  104  |  20  ----------------------------<  182<H>  4.0   |  21<L>  |  1.45<H>    Ca    10.0      2021 06:51  Mg     1.8     06-07    TPro  8.2  /  Alb  3.6  /  TBili  0.3  /  DBili  x   /  AST  19  /  ALT  23  /  AlkPhos  271<H>  06-07      Urinalysis Basic - ( 2021 10:42 )    Color: Colorless / Appearance: Clear / S.013 / pH: x  Gluc: x / Ketone: Negative  / Bili: Negative / Urobili: Negative   Blood: x / Protein: 30 mg/dL / Nitrite: Negative   Leuk Esterase: Large / RBC: 2 /hpf / WBC 24 /HPF   Sq Epi: x / Non Sq Epi: 0 /hpf / Bacteria: Few        MICROBIOLOGY:  Herpes Simplex Virus 1/2 VZV Lesions, PCR (21 @ 16:55)   Herpes Simplex Virus 1/2  VZV PCR Source: lesion   Herpes Simplex Virus 1/2  VZV PCR Result: HSV2 Detected HSV 1/2 and VZV assay is a Real-Time PCR test for the qualitative   RADIOLOGY:     Follow Up:  Resistant genital HSV infection    Inverval History/ROS:Patient is a 51y old  Female who presents with a chief complaint of Valacyclovir resistant HSV (2021 10:55)    Pt feels genital pain, size of largest lesion is significantly improved since hospitalization. Denies dysuria, flank pain, fever, chills, N/V. Does admit to some bladder urgency which ahs been worsening over last few days.  No cough, no SOB.       Allergies  No Known Allergies        ANTIMICROBIALS:  bictegravir 50 mG/emtricitabine 200 mG/tenofovir alafenamide 25 mG (BIKTARVY) 1 daily  foscarnet (Peripheral) IVPB 2100 every 12 hours        Vital Signs Last 24 Hrs  T(C): 36.7 (2021 17:11), Max: 36.8 (2021 20:53)  T(F): 98 (2021 17:11), Max: 98.3 (2021 20:53)  HR: 66 (2021 17:11) (66 - 82)  BP: 144/76 (2021 17:11) (125/80 - 144/76)  BP(mean): --  RR: 18 (2021 17:11) (17 - 18)  SpO2: 99% (2021 17:11) (97% - 100%)      Physical Exam  Gen: Well appearing, NAD  Lungs: CTA B/L  Heart: RRR, S1 S2 normal   Abdomen: soft, non-ttp  : no flank or suprapubic TTP  Ext: no pitting edema  Skin: no new rashes                          10.7   3.74  )-----------( 286      ( 2021 06:52 )             33.8           137  |  104  |  20  ----------------------------<  182<H>  4.0   |  21<L>  |  1.45<H>    Ca    10.0      2021 06:51  Mg     1.8         TPro  8.2  /  Alb  3.6  /  TBili  0.3  /  DBili  x   /  AST  19  /  ALT  23  /  AlkPhos  271<H>  06-07      Urinalysis Basic - ( 2021 10:42 )    Color: Colorless / Appearance: Clear / S.013 / pH: x  Gluc: x / Ketone: Negative  / Bili: Negative / Urobili: Negative   Blood: x / Protein: 30 mg/dL / Nitrite: Negative   Leuk Esterase: Large / RBC: 2 /hpf / WBC 24 /HPF   Sq Epi: x / Non Sq Epi: 0 /hpf / Bacteria: Few        MICROBIOLOGY:  Herpes Simplex Virus 1/2 VZV Lesions, PCR (21 @ 16:55)   Herpes Simplex Virus 1/2  VZV PCR Source: lesion   Herpes Simplex Virus 1/2  VZV PCR Result: HSV2 Detected HSV 1/2 and VZV assay is a Real-Time PCR test for the qualitative

## 2021-06-07 NOTE — PROGRESS NOTE ADULT - PROBLEM SELECTOR PLAN 1
- On foscarnet for resistant HSV infection ; confirmed HSV 2, sensitivities and final pathology pending   - GC/chl, syphillis screen negative  - NS 500mL bolus q12 while on foscarnet, monitor CMP. 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 GYN-ONC consulted over the weekend- awaiting evaluation (patient follows Dr. Eliana Yao outpatient)    #Bladder pain  Patient endorsing new bladder pain when urinating, also difficulty emptying bladder, no hematuria  Check UA, urine culture, Bladder scan  Awaiting Gyn eval

## 2021-06-07 NOTE — PROGRESS NOTE ADULT - ASSESSMENT
50 y/o female PMHx Genital Herpes on valacyclovir x2 years, DM on insulin, HIV unknown last CD4 count/viral load now presenting to the ED with worsening labial mass not responding with oral valtrex    overall HIV infection, Resistant HSV 2 infection. + vulvar mass      Plan:   c/w foscarnet, clinically improving gradually   c/w 500 ml bolus of NS prior to each foscarnet infusion q12h   c/w 50cc/hr of maintenance  fluid.   monitor CMP daily as it can lead to significant electrolyte abnormalities with mg   monitor CBC daily to monitor WBC  culture viral herpes still pending   GC/Chl negative   syphilis screen negative   Gyn on board,   path with ulceration, chronic inflammation, squamous dysplasia.   reviewed the path with pathologist, they don't see any viral cytopathic effects.   Gyn Oncology evaluation pending       HIV infection: well controlled   c/w biktarvy        50 y/o female PMHx Genital Herpes on valacyclovir x2 years, DM on insulin, HIV unknown last CD4 count/viral load now presenting to the ED with worsening labial mass not responding with oral valtrex    overall HIV infection, Resistant HSV 2 infection. + vulvar mass  improving clinically       Plan:   c/w foscarnet, clinically improving gradually   c/w 500 ml bolus of NS prior to each foscarnet infusion q12h   c/w 50cc/hr of maintenance  fluid.   monitor CMP daily as it can lead to significant electrolyte abnormalities with mg   monitor CBC daily to monitor WBC  culture viral herpes still pending   GC/Chl negative   syphilis screen negative   Gyn on board,   path with ulceration, chronic inflammation, squamous dysplasia.   reviewed the path with pathologist, they don't see any viral cytopathic effects.   Gyn Oncology evaluation pending       HIV infection: well controlled   c/w biktarvy

## 2021-06-08 LAB
ALBUMIN SERPL ELPH-MCNC: 3.6 G/DL — SIGNIFICANT CHANGE UP (ref 3.3–5)
ALP SERPL-CCNC: 258 U/L — HIGH (ref 40–120)
ALT FLD-CCNC: 22 U/L — SIGNIFICANT CHANGE UP (ref 10–45)
ANION GAP SERPL CALC-SCNC: 11 MMOL/L — SIGNIFICANT CHANGE UP (ref 5–17)
AST SERPL-CCNC: 15 U/L — SIGNIFICANT CHANGE UP (ref 10–40)
BILIRUB SERPL-MCNC: 0.4 MG/DL — SIGNIFICANT CHANGE UP (ref 0.2–1.2)
BUN SERPL-MCNC: 18 MG/DL — SIGNIFICANT CHANGE UP (ref 7–23)
CALCIUM SERPL-MCNC: 9.4 MG/DL — SIGNIFICANT CHANGE UP (ref 8.4–10.5)
CHLORIDE SERPL-SCNC: 105 MMOL/L — SIGNIFICANT CHANGE UP (ref 96–108)
CO2 SERPL-SCNC: 20 MMOL/L — LOW (ref 22–31)
CREAT SERPL-MCNC: 1.38 MG/DL — HIGH (ref 0.5–1.3)
CULTURE RESULTS: SIGNIFICANT CHANGE UP
GLUCOSE BLDC GLUCOMTR-MCNC: 133 MG/DL — HIGH (ref 70–99)
GLUCOSE BLDC GLUCOMTR-MCNC: 143 MG/DL — HIGH (ref 70–99)
GLUCOSE BLDC GLUCOMTR-MCNC: 174 MG/DL — HIGH (ref 70–99)
GLUCOSE BLDC GLUCOMTR-MCNC: 205 MG/DL — HIGH (ref 70–99)
GLUCOSE SERPL-MCNC: 142 MG/DL — HIGH (ref 70–99)
HCT VFR BLD CALC: 34.4 % — LOW (ref 34.5–45)
HGB BLD-MCNC: 11 G/DL — LOW (ref 11.5–15.5)
MAGNESIUM SERPL-MCNC: 1.8 MG/DL — SIGNIFICANT CHANGE UP (ref 1.6–2.6)
MCHC RBC-ENTMCNC: 29.8 PG — SIGNIFICANT CHANGE UP (ref 27–34)
MCHC RBC-ENTMCNC: 32 GM/DL — SIGNIFICANT CHANGE UP (ref 32–36)
MCV RBC AUTO: 93.2 FL — SIGNIFICANT CHANGE UP (ref 80–100)
NRBC # BLD: 0 /100 WBCS — SIGNIFICANT CHANGE UP (ref 0–0)
PLATELET # BLD AUTO: 259 K/UL — SIGNIFICANT CHANGE UP (ref 150–400)
POTASSIUM SERPL-MCNC: 4 MMOL/L — SIGNIFICANT CHANGE UP (ref 3.5–5.3)
POTASSIUM SERPL-SCNC: 4 MMOL/L — SIGNIFICANT CHANGE UP (ref 3.5–5.3)
PROT SERPL-MCNC: 8.1 G/DL — SIGNIFICANT CHANGE UP (ref 6–8.3)
RBC # BLD: 3.69 M/UL — LOW (ref 3.8–5.2)
RBC # FLD: 14.9 % — HIGH (ref 10.3–14.5)
SODIUM SERPL-SCNC: 136 MMOL/L — SIGNIFICANT CHANGE UP (ref 135–145)
SPECIMEN SOURCE: SIGNIFICANT CHANGE UP
WBC # BLD: 3.44 K/UL — LOW (ref 3.8–10.5)
WBC # FLD AUTO: 3.44 K/UL — LOW (ref 3.8–10.5)

## 2021-06-08 PROCEDURE — 99232 SBSQ HOSP IP/OBS MODERATE 35: CPT | Mod: GC

## 2021-06-08 PROCEDURE — 99233 SBSQ HOSP IP/OBS HIGH 50: CPT

## 2021-06-08 RX ORDER — CEFTRIAXONE 500 MG/1
1000 INJECTION, POWDER, FOR SOLUTION INTRAMUSCULAR; INTRAVENOUS EVERY 24 HOURS
Refills: 0 | Status: DISCONTINUED | OUTPATIENT
Start: 2021-06-08 | End: 2021-06-09

## 2021-06-08 RX ADMIN — Medication 4 UNIT(S): at 17:37

## 2021-06-08 RX ADMIN — HEPARIN SODIUM 5000 UNIT(S): 5000 INJECTION INTRAVENOUS; SUBCUTANEOUS at 06:40

## 2021-06-08 RX ADMIN — INSULIN GLARGINE 24 UNIT(S): 100 INJECTION, SOLUTION SUBCUTANEOUS at 21:45

## 2021-06-08 RX ADMIN — Medication 2: at 17:37

## 2021-06-08 RX ADMIN — FOSCARNET SODIUM 175 MILLIGRAM(S): 24 INJECTION, SOLUTION INTRAVENOUS at 09:50

## 2021-06-08 RX ADMIN — HEPARIN SODIUM 5000 UNIT(S): 5000 INJECTION INTRAVENOUS; SUBCUTANEOUS at 17:35

## 2021-06-08 RX ADMIN — BICTEGRAVIR SODIUM, EMTRICITABINE, AND TENOFOVIR ALAFENAMIDE FUMARATE 1 TABLET(S): 30; 120; 15 TABLET ORAL at 12:21

## 2021-06-08 RX ADMIN — Medication 4 UNIT(S): at 13:42

## 2021-06-08 RX ADMIN — CEFTRIAXONE 100 MILLIGRAM(S): 500 INJECTION, POWDER, FOR SOLUTION INTRAMUSCULAR; INTRAVENOUS at 09:14

## 2021-06-08 RX ADMIN — Medication 4 UNIT(S): at 08:36

## 2021-06-08 RX ADMIN — SODIUM CHLORIDE 50 MILLILITER(S): 9 INJECTION INTRAMUSCULAR; INTRAVENOUS; SUBCUTANEOUS at 09:53

## 2021-06-08 NOTE — PROGRESS NOTE ADULT - SUBJECTIVE AND OBJECTIVE BOX
[facility] Division of Hospital Medicine  Britt Henao MD  Pager (SARA-F, 1D-6C): [phone]  Other Times:  [phone]    Patient is a 51y old  Female who presents with a chief complaint of Valacyclovir resistant HSV (2021 11:26)      SUBJECTIVE / OVERNIGHT EVENTS:    Patient was examined this morning. Reports that bladder pain is improved today, no dysuria, no hematuria, no flank pain. No other acute complaint. ROS negative     ADDITIONAL REVIEW OF SYSTEMS: neg      MEDICATIONS  (STANDING):  bictegravir 50 mG/emtricitabine 200 mG/tenofovir alafenamide 25 mG (BIKTARVY) 1 Tablet(s) Oral daily  cefTRIAXone   IVPB 1000 milliGRAM(s) IV Intermittent every 24 hours  dextrose 40% Gel 15 Gram(s) Oral once  dextrose 5%. 1000 milliLiter(s) (50 mL/Hr) IV Continuous <Continuous>  dextrose 5%. 1000 milliLiter(s) (100 mL/Hr) IV Continuous <Continuous>  dextrose 50% Injectable 25 Gram(s) IV Push once  dextrose 50% Injectable 12.5 Gram(s) IV Push once  dextrose 50% Injectable 25 Gram(s) IV Push once  foscarnet (Peripheral) IVPB 2100 milliGRAM(s) IV Intermittent every 12 hours  glucagon  Injectable 1 milliGRAM(s) IntraMuscular once  heparin   Injectable 5000 Unit(s) SubCutaneous every 12 hours  insulin glargine Injectable (LANTUS) 24 Unit(s) SubCutaneous at bedtime  insulin lispro (ADMELOG) corrective regimen sliding scale   SubCutaneous at bedtime  insulin lispro (ADMELOG) corrective regimen sliding scale   SubCutaneous three times a day before meals  insulin lispro Injectable (ADMELOG) 4 Unit(s) SubCutaneous three times a day with meals  lidocaine 0.5%/epinephrine 1:200,000 Inj 20 milliLiter(s) Local Injection once  sodium chloride 0.9%. 1000 milliLiter(s) (50 mL/Hr) IV Continuous <Continuous>    MEDICATIONS  (PRN):  acetaminophen   Tablet .. 975 milliGRAM(s) Oral every 6 hours PRN Mild Pain (1 - 3)  ibuprofen  Tablet. 400 milliGRAM(s) Oral every 6 hours PRN Moderate Pain (4 - 6)  lidocaine 2% Gel 1 Application(s) Topical three times a day PRN Pain in labia region  polyethylene glycol 3350 17 Gram(s) Oral daily PRN Constipation      CAPILLARY BLOOD GLUCOSE      POCT Blood Glucose.: 143 mg/dL (2021 08:17)  POCT Blood Glucose.: 136 mg/dL (2021 21:12)  POCT Blood Glucose.: 171 mg/dL (2021 16:55)  POCT Blood Glucose.: 229 mg/dL (2021 12:39)    I&O's Summary    2021 07:01  -  2021 07:00  --------------------------------------------------------  IN: 2910 mL / OUT: 300 mL / NET: 2610 mL        PHYSICAL EXAM:  Vital Signs Last 24 Hrs  T(C): 36.7 (2021 09:17), Max: 36.9 (2021 06:09)  T(F): 98 (2021 09:17), Max: 98.4 (2021 06:09)  HR: 75 (2021 09:17) (66 - 75)  BP: 123/80 (2021 09:17) (122/78 - 144/76)  BP(mean): --  RR: 18 (2021 09:17) (18 - 18)  SpO2: 98% (2021 09:17) (96% - 99%)      PHYSICAL EXAM:  GENERAL: NAD   HEENT: EOMI, PERRL, MMM  PULM: Clear to auscultation bilaterally  CV: Regular rate and rhythm; nl S1, S2; No murmurs, rubs, or gallops  ABDOMEN: Soft, Nontender, Nondistended; Bowel sounds present  EXTREMITIES/MSK:  No edema  PSYCH: AAOx3  NEUROLOGY: non-focal  : exophytic vulvar mass  SKIN: warm dry. two small areas of healed lesions on forearms no sign of acute inflammation.         LABS:                        11.0   3.44  )-----------( 259      ( 2021 06:35 )             34.4     06-08    136  |  105  |  18  ----------------------------<  142<H>  4.0   |  20<L>  |  1.38<H>    Ca    9.4      2021 06:35  Mg     1.8         TPro  8.1  /  Alb  3.6  /  TBili  0.4  /  DBili  x   /  AST  15  /  ALT  22  /  AlkPhos  258<H>            Urinalysis Basic - ( 2021 10:42 )    Color: Colorless / Appearance: Clear / S.013 / pH: x  Gluc: x / Ketone: Negative  / Bili: Negative / Urobili: Negative   Blood: x / Protein: 30 mg/dL / Nitrite: Negative   Leuk Esterase: Large / RBC: 2 /hpf / WBC 24 /HPF   Sq Epi: x / Non Sq Epi: 0 /hpf / Bacteria: Few          RADIOLOGY & ADDITIONAL TESTS:  Results Reviewed:   Imaging Personally Reviewed:  Electrocardiogram Personally Reviewed:    COORDINATION OF CARE:  Care Discussed with Consultants/Other Providers [Y/N]:  Prior or Outpatient Records Reviewed [Y/N]:

## 2021-06-08 NOTE — PROGRESS NOTE ADULT - SUBJECTIVE AND OBJECTIVE BOX
Follow Up:  Resitant HSV genital infection    Inverval History/ROS:Patient is a 51y old  Female who presents with a chief complaint of Valacyclovir resistant HSV (2021 12:14)    Pt afebrile without leukoctosis, feeling well. States genital pain which prompted admission resolved two days into admission and has not recurred. Noted size of lesions decreasing and color change away from bright red to duller shade.    Pt was having bladder pain on urination yesterday, UA with some pyuria ( which pt has persistently had) few bacteria. Pain improved with NSAIDs. Pt notes that she feel similar pain whenever she takes tylenol in the past. Pt was started on CTX by primary team for possible UTI.    Allergies    No Known Allergies    Intolerances        ANTIMICROBIALS:  bictegravir 50 mG/emtricitabine 200 mG/tenofovir alafenamide 25 mG (BIKTARVY) 1 daily  cefTRIAXone   IVPB 1000 every 24 hours  foscarnet (Peripheral) IVPB 2100 every 12 hours      OTHER MEDS:  acetaminophen   Tablet .. 975 milliGRAM(s) Oral every 6 hours PRN  dextrose 40% Gel 15 Gram(s) Oral once  dextrose 5%. 1000 milliLiter(s) IV Continuous <Continuous>  dextrose 5%. 1000 milliLiter(s) IV Continuous <Continuous>  dextrose 50% Injectable 25 Gram(s) IV Push once  dextrose 50% Injectable 12.5 Gram(s) IV Push once  dextrose 50% Injectable 25 Gram(s) IV Push once  glucagon  Injectable 1 milliGRAM(s) IntraMuscular once  heparin   Injectable 5000 Unit(s) SubCutaneous every 12 hours  ibuprofen  Tablet. 400 milliGRAM(s) Oral every 6 hours PRN  insulin glargine Injectable (LANTUS) 24 Unit(s) SubCutaneous at bedtime  insulin lispro (ADMELOG) corrective regimen sliding scale   SubCutaneous at bedtime  insulin lispro (ADMELOG) corrective regimen sliding scale   SubCutaneous three times a day before meals  insulin lispro Injectable (ADMELOG) 4 Unit(s) SubCutaneous three times a day with meals  lidocaine 0.5%/epinephrine 1:200,000 Inj 20 milliLiter(s) Local Injection once  lidocaine 2% Gel 1 Application(s) Topical three times a day PRN  polyethylene glycol 3350 17 Gram(s) Oral daily PRN  sodium chloride 0.9%. 1000 milliLiter(s) IV Continuous <Continuous>      Vital Signs Last 24 Hrs  T(C): 36.6 (2021 13:36), Max: 36.9 (2021 06:09)  T(F): 97.8 (2021 13:36), Max: 98.4 (2021 06:09)  HR: 68 (2021 13:36) (66 - 75)  BP: 132/86 (2021 13:36) (122/78 - 144/76)  BP(mean): --  RR: 18 (2021 13:36) (18 - 18)  SpO2: 100% (2021 13:36) (96% - 100%)    Physical Exam  Gen: Well appearing, NAD  Lungs: CTA B/L, no w/r/r  Heart: RRR, no m/r/g  Abdomen: soft, non-ttp  Ext: no pitting edema   exam: deferred  Skin: no rashes                          11.0   3.44  )-----------( 259      ( 2021 06:35 )             34.4       06-08    136  |  105  |  18  ----------------------------<  142<H>  4.0   |  20<L>  |  1.38<H>    Ca    9.4      2021 06:35  Mg     1.8     06-08    TPro  8.1  /  Alb  3.6  /  TBili  0.4  /  DBili  x   /  AST  15  /  ALT  22  /  AlkPhos  258<H>  06-08      Urinalysis Basic - ( 2021 10:42 )    Color: Colorless / Appearance: Clear / S.013 / pH: x  Gluc: x / Ketone: Negative  / Bili: Negative / Urobili: Negative   Blood: x / Protein: 30 mg/dL / Nitrite: Negative   Leuk Esterase: Large / RBC: 2 /hpf / WBC 24 /HPF   Sq Epi: x / Non Sq Epi: 0 /hpf / Bacteria: Few        MICROBIOLOGY:  Herpes Simplex Virus 1/2 VZV Lesions, PCR (21 @ 16:55)   Herpes Simplex Virus 1/2  VZV PCR Source: lesion   Herpes Simplex Virus 1/2  VZV PCR Result: HSV2 Detected HSV 1/2 and VZV assay is a Real-Time PCR test for the qualitative   HSV VIral Culture Pending (specimen collected )          RADIOLOGY:  < from: US Doppler Pelvis (21 @ 11:50) >  IMPRESSION:  No evidence of adnexal mass or fluid collection. No hydrosalpinx seen.             Follow Up:  Resitant HSV genital infection    Inverval History/ROS:Patient is a 51y old  Female who presents with a chief complaint of Valacyclovir resistant HSV (2021 12:14)    Pt afebrile without leukoctosis, feeling well. States genital pain which prompted admission resolved two days into admission and has not recurred. Noted size of lesions decreasing and color change away from bright red to duller shade.    Pt was having bladder pain on urination yesterday, UA with some pyuria ( which pt has persistently had) few bacteria. Pain improved with NSAIDs. Pt notes that she feel similar pain whenever she takes tylenol in the past. Pt was started on CTX by primary team for possible UTI.      Allergies  No Known Allergies        ANTIMICROBIALS:  bictegravir 50 mG/emtricitabine 200 mG/tenofovir alafenamide 25 mG (BIKTARVY) 1 daily  cefTRIAXone   IVPB 1000 every 24 hours  foscarnet (Peripheral) IVPB 2100 every 12 hours        Vital Signs Last 24 Hrs  T(C): 36.6 (2021 13:36), Max: 36.9 (2021 06:09)  T(F): 97.8 (2021 13:36), Max: 98.4 (2021 06:09)  HR: 68 (2021 13:36) (66 - 75)  BP: 132/86 (2021 13:36) (122/78 - 144/76)  BP(mean): --  RR: 18 (2021 13:36) (18 - 18)  SpO2: 100% (2021 13:36) (96% - 100%)      Physical Exam  Gen: Well appearing, NAD  Lungs: + air entry b/l.   Heart: S1 S2 normal   Abdomen: soft, non-ttp  Ext: no pitting edema   exam: vulval mass like lesion, ulcerations improving.   Skin: no rashes                          11.0   3.44  )-----------( 259      ( 2021 06:35 )             34.4       06-    136  |  105  |  18  ----------------------------<  142<H>  4.0   |  20<L>  |  1.38<H>    Ca    9.4      2021 06:35  Mg     1.8         TPro  8.1  /  Alb  3.6  /  TBili  0.4  /  DBili  x   /  AST  15  /  ALT  22  /  AlkPhos  258<H>        Urinalysis Basic - ( 2021 10:42 )    Color: Colorless / Appearance: Clear / S.013 / pH: x  Gluc: x / Ketone: Negative  / Bili: Negative / Urobili: Negative   Blood: x / Protein: 30 mg/dL / Nitrite: Negative   Leuk Esterase: Large / RBC: 2 /hpf / WBC 24 /HPF   Sq Epi: x / Non Sq Epi: 0 /hpf / Bacteria: Few        MICROBIOLOGY:  Herpes Simplex Virus 1/2 VZV Lesions, PCR (21 @ 16:55)   Herpes Simplex Virus 1/2  VZV PCR Source: lesion   Herpes Simplex Virus 1/2  VZV PCR Result: HSV2 Detected HSV 1/2 and VZV assay is a Real-Time PCR test for the qualitative   HSV VIral Culture Pending (specimen collected )        RADIOLOGY:  < from: US Doppler Pelvis (21 @ 11:50) >  IMPRESSION:  No evidence of adnexal mass or fluid collection. No hydrosalpinx seen.

## 2021-06-08 NOTE — CONSULT NOTE ADULT - ASSESSMENT
52yo , with hx of HIV/DM2 presenting with worsening acute on chronic HSV infection with large vulvar mass. Pt currently on continued course of IV foscarnet with mild improvement of symptoms. 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 Gyn Onc consult pending.  52yo , with hx of HIV/DM2 presenting with worsening acute on chronic HSV infection with large vulvar mass. Pt currently on continued course of IV foscarnet with mild improvement of symptoms. 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 Gyn Onc consult pending.   - Would recommend CTAP w/ IV Contrast, if pt can handle given current creatinine. If not, would consider MRI A/P w/ gadolinium for evaluation of extent of disease.   - Dr. Neves to see patient     Final recs pending  Dickinson PGY2 52yo , with hx of HIV/DM2 presenting with worsening acute on chronic HSV infection with large vulvar mass. Pt currently on continued course of IV foscarnet with mild improvement of symptoms. 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 Mass improving with current IV antiviral treatment.   - No need for inpatient imaging  - Recommend continued course of IV antiviral treatment per ID/medicine team  - Pt to follow up with Dr. Mariajose Bardales in 3-4 weeks after discharge for examination and surgical planning   - Pt given Dr. Bardales's phone number and business card at bedside for follow up    Pt seen and examined with Dr. Jeffy Lewis PGY2

## 2021-06-08 NOTE — CONSULT NOTE ADULT - ATTENDING COMMENTS
Patient seen and examined, in short - patient who is immunocompromised, HIV +, noted to have vulvar dysplasia on prior biopsy- and admitted with herpes outbreak on IV antiviral therapy.  on exam - left and right upper anterior vulva replaced by thickened tissue - per patient it is much improved, pain is much resolved  Given significant improvement of the lesion per pain, and left groin lymphadenopathy resolving, this is likely dysplasia with superinfection with herpes  patient will need to finish iv therapy and this mass will need to be re-evaluated in a few wks  we discussed need for resection if dysplasia or cancer is noted on future biopsies  will f/u outpatient, will schedule appt for patient.

## 2021-06-08 NOTE — PROGRESS NOTE ADULT - PROBLEM SELECTOR PLAN 1
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   - GYN-ONC consulted over the weekend- awaiting evaluation (patient follows Dr. Eliana Yao outpatient)  - NS 500mL bolus q12 while on foscarnet, monitor CMP. Also on maintenance IVF NS at 50cc/hr   - Per ID: will need to continue Foscarnet inpatient to finish 21 total days (Day 1 May 27)    #Bladder pain, UTI  Patient endorsed new bladder pain when urinating 6/7, also difficulty emptying bladder, no hematuria  Bladder scan negative for retention. UA + , urine cx pending -F/U  Start Ceftriaxone x3 days  Gyn evaluation - On foscarnet for resistant HSV infection ; confirmed HSV 2, sensitivities and final pathology pending   - GC/chl, syphillis screen negative  - Vaginal/pelvic US performed on admission, no fluid collections or adnexal masses  - Path: 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   - GYN-ONC consulted over the weekend- awaiting evaluation (patient follows Dr. Eliana Yao outpatient)  - NS 500mL bolus q12 while on foscarnet, monitor CMP. Also on maintenance IVF NS at 50cc/hr   - Per ID: will need to continue Foscarnet inpatient to finish 21 total days (Day 1 May 27)    #Bladder pain, UTI  -new onset symptoms  Patient endorsed new bladder pain when urinating 6/7, also difficulty emptying bladder, no hematuria  Bladder scan negative for retention. UA + , urine cx pending -F/U  Start IV Ceftriaxone x3 days  GYN evaluation

## 2021-06-08 NOTE — PROGRESS NOTE ADULT - ASSESSMENT
52 y/o female PMHx Genital Herpes on valacyclovir x2 years, DM on insulin, HIV unknown last CD4 count/viral load now presenting to the ED with worsening labial mass not responding with oral valtrex    overall HIV infection, Resistant HSV 2 infection. + vulvar mass  improving clinically       Plan:   c/w foscarnet, clinically improving gradually   c/w 500 ml bolus of NS prior to each foscarnet infusion q12h   c/w 50cc/hr of maintenance  fluid.   monitor CMP daily as it can lead to significant electrolyte abnormalities with mg   monitor CBC daily to monitor WBC  culture viral herpes still pending since 5/27  GC/Chl negative   syphilis screen negative   Gyn on board,   path with ulceration, chronic inflammation, squamous dysplasia.   reviewed the path with pathologist, they don't see any viral cytopathic effects.   Gyn Oncology evaluation pending     Bladder pain  -UA with some pyuria, which has been persistant for patient in s/o of genital lesions. Few bacteria, f/u Ucx  -no dysuria, pain relieved with NSAIDs  -Pt was started on CTX. Unclear that this is true UTI, if treating would limit to short course      HIV infection: well controlled   c/w biktarvy        50 y/o female PMHx Genital Herpes on valacyclovir x2 years, DM on insulin, HIV unknown last CD4 count/viral load now presenting to the ED with worsening labial mass not responding with oral valtrex    overall HIV infection, Resistant HSV 2 infection. + vulvar mass  improving clinically         Plan:   c/w foscarnet, clinically improving gradually   c/w 500 ml bolus of NS prior to each foscarnet infusion q12h   c/w 50cc/hr of maintenance  fluid.   monitor CMP daily as it can lead to significant electrolyte abnormalities with mg   monitor CBC daily to monitor WBC  culture viral herpes still pending since 5/27  GC/Chl negative   syphilis screen negative   Gyn on board,   path with ulceration, chronic inflammation, squamous dysplasia.   reviewed the path with pathologist, they don't see any viral cytopathic effects.   Gyn Oncology evaluation pending       Bladder pain  -UA with some pyuria, which has been persistant for patient in s/o of genital lesions. Few bacteria, f/u Ucx  -no dysuria, pain relieved with NSAIDs  -Pt was started on CTX. Unclear that this is true UTI, if treating would limit to short course      HIV infection: well controlled   c/w biktarvy

## 2021-06-09 LAB
ALBUMIN SERPL ELPH-MCNC: 3.4 G/DL — SIGNIFICANT CHANGE UP (ref 3.3–5)
ALP SERPL-CCNC: 261 U/L — HIGH (ref 40–120)
ALT FLD-CCNC: 20 U/L — SIGNIFICANT CHANGE UP (ref 10–45)
ANION GAP SERPL CALC-SCNC: 12 MMOL/L — SIGNIFICANT CHANGE UP (ref 5–17)
AST SERPL-CCNC: 16 U/L — SIGNIFICANT CHANGE UP (ref 10–40)
BILIRUB SERPL-MCNC: 0.4 MG/DL — SIGNIFICANT CHANGE UP (ref 0.2–1.2)
BUN SERPL-MCNC: 16 MG/DL — SIGNIFICANT CHANGE UP (ref 7–23)
CALCIUM SERPL-MCNC: 10.2 MG/DL — SIGNIFICANT CHANGE UP (ref 8.4–10.5)
CHLORIDE SERPL-SCNC: 106 MMOL/L — SIGNIFICANT CHANGE UP (ref 96–108)
CO2 SERPL-SCNC: 20 MMOL/L — LOW (ref 22–31)
CREAT SERPL-MCNC: 1.43 MG/DL — HIGH (ref 0.5–1.3)
GLUCOSE BLDC GLUCOMTR-MCNC: 109 MG/DL — HIGH (ref 70–99)
GLUCOSE BLDC GLUCOMTR-MCNC: 140 MG/DL — HIGH (ref 70–99)
GLUCOSE BLDC GLUCOMTR-MCNC: 151 MG/DL — HIGH (ref 70–99)
GLUCOSE BLDC GLUCOMTR-MCNC: 240 MG/DL — HIGH (ref 70–99)
GLUCOSE SERPL-MCNC: 155 MG/DL — HIGH (ref 70–99)
HCT VFR BLD CALC: 34.2 % — LOW (ref 34.5–45)
HGB BLD-MCNC: 10.8 G/DL — LOW (ref 11.5–15.5)
MAGNESIUM SERPL-MCNC: 1.8 MG/DL — SIGNIFICANT CHANGE UP (ref 1.6–2.6)
MCHC RBC-ENTMCNC: 29.3 PG — SIGNIFICANT CHANGE UP (ref 27–34)
MCHC RBC-ENTMCNC: 31.6 GM/DL — LOW (ref 32–36)
MCV RBC AUTO: 92.9 FL — SIGNIFICANT CHANGE UP (ref 80–100)
NRBC # BLD: 0 /100 WBCS — SIGNIFICANT CHANGE UP (ref 0–0)
PLATELET # BLD AUTO: 260 K/UL — SIGNIFICANT CHANGE UP (ref 150–400)
POTASSIUM SERPL-MCNC: 3.9 MMOL/L — SIGNIFICANT CHANGE UP (ref 3.5–5.3)
POTASSIUM SERPL-SCNC: 3.9 MMOL/L — SIGNIFICANT CHANGE UP (ref 3.5–5.3)
PROT SERPL-MCNC: 7.9 G/DL — SIGNIFICANT CHANGE UP (ref 6–8.3)
RBC # BLD: 3.68 M/UL — LOW (ref 3.8–5.2)
RBC # FLD: 15 % — HIGH (ref 10.3–14.5)
SODIUM SERPL-SCNC: 138 MMOL/L — SIGNIFICANT CHANGE UP (ref 135–145)
WBC # BLD: 3.34 K/UL — LOW (ref 3.8–10.5)
WBC # FLD AUTO: 3.34 K/UL — LOW (ref 3.8–10.5)

## 2021-06-09 PROCEDURE — 99233 SBSQ HOSP IP/OBS HIGH 50: CPT

## 2021-06-09 PROCEDURE — 99232 SBSQ HOSP IP/OBS MODERATE 35: CPT | Mod: GC

## 2021-06-09 RX ORDER — SODIUM CHLORIDE 9 MG/ML
500 INJECTION INTRAMUSCULAR; INTRAVENOUS; SUBCUTANEOUS
Refills: 0 | Status: DISCONTINUED | OUTPATIENT
Start: 2021-06-09 | End: 2021-06-16

## 2021-06-09 RX ORDER — SODIUM CHLORIDE 9 MG/ML
1000 INJECTION INTRAMUSCULAR; INTRAVENOUS; SUBCUTANEOUS
Refills: 0 | Status: DISCONTINUED | OUTPATIENT
Start: 2021-06-09 | End: 2021-06-16

## 2021-06-09 RX ADMIN — Medication 4 UNIT(S): at 17:37

## 2021-06-09 RX ADMIN — SODIUM CHLORIDE 1000 MILLILITER(S): 9 INJECTION INTRAMUSCULAR; INTRAVENOUS; SUBCUTANEOUS at 12:00

## 2021-06-09 RX ADMIN — BICTEGRAVIR SODIUM, EMTRICITABINE, AND TENOFOVIR ALAFENAMIDE FUMARATE 1 TABLET(S): 30; 120; 15 TABLET ORAL at 11:49

## 2021-06-09 RX ADMIN — FOSCARNET SODIUM 175 MILLIGRAM(S): 24 INJECTION, SOLUTION INTRAVENOUS at 00:08

## 2021-06-09 RX ADMIN — INSULIN GLARGINE 24 UNIT(S): 100 INJECTION, SOLUTION SUBCUTANEOUS at 21:32

## 2021-06-09 RX ADMIN — FOSCARNET SODIUM 175 MILLIGRAM(S): 24 INJECTION, SOLUTION INTRAVENOUS at 12:40

## 2021-06-09 RX ADMIN — Medication 2: at 17:36

## 2021-06-09 RX ADMIN — Medication 4 UNIT(S): at 12:38

## 2021-06-09 RX ADMIN — CEFTRIAXONE 100 MILLIGRAM(S): 500 INJECTION, POWDER, FOR SOLUTION INTRAMUSCULAR; INTRAVENOUS at 08:07

## 2021-06-09 RX ADMIN — Medication 4 UNIT(S): at 08:07

## 2021-06-09 RX ADMIN — HEPARIN SODIUM 5000 UNIT(S): 5000 INJECTION INTRAVENOUS; SUBCUTANEOUS at 17:34

## 2021-06-09 RX ADMIN — SODIUM CHLORIDE 1000 MILLILITER(S): 9 INJECTION INTRAMUSCULAR; INTRAVENOUS; SUBCUTANEOUS at 23:39

## 2021-06-09 RX ADMIN — HEPARIN SODIUM 5000 UNIT(S): 5000 INJECTION INTRAVENOUS; SUBCUTANEOUS at 06:18

## 2021-06-09 NOTE — PROGRESS NOTE ADULT - ASSESSMENT
52 y/o female PMHx Genital Herpes on valacyclovir x2 years, DM on insulin, HIV unknown last CD4 count/viral load now presenting to the ED with worsening labial mass not responding with oral valtrex    overall HIV infection, Resistant HSV 2 infection. + vulvar mass  improving clinically         Plan:   c/w foscarnet, clinically improving gradually   c/w 500 ml bolus of NS prior to each foscarnet infusion q12h   c/w 50cc/hr of maintenance  fluid.   monitor CMP daily as it can lead to significant electrolyte abnormalities with mg   monitor CBC daily to monitor WBC  culture viral herpes still pending since 5/27  GC/Chl negative   syphilis screen negative   Gyn on board,   path with ulceration, chronic inflammation, squamous dysplasia.   reviewed the path with pathologist, they don't see any viral cytopathic effects.   Gyn Oncology evaluation pending       Bladder pain  -UA with some pyuria, which has been persistant for patient in s/o of genital lesions. Ucx with > 100K GBS  -pain improving with antibiotics, can discontinue CTX and start Amoxicillin 875 mg q12 PO ( 24 hours after last dose of CTX) to complete 3 days total of antibiotics therapy  -no dysuria, pain relieved with NSAIDs  -Pt was started on CTX. Unclear that this is true UTI, if treating would limit to short course      HIV infection: well controlled   c/w biktarvy     Discussed with NP   50 y/o female PMHx Genital Herpes on valacyclovir x2 years, DM on insulin, HIV unknown last CD4 count/viral load now presenting to the ED with worsening labial mass not responding with oral valtrex    overall HIV infection, Resistant HSV 2 infection. + vulvar mass  improving clinically         Plan:   c/w foscarnet, clinically improving gradually   c/w 500 ml bolus of NS prior to each foscarnet infusion q12h   c/w 50cc/hr of maintenance  fluid.   monitor CMP daily as it can lead to significant electrolyte abnormalities with mg   monitor CBC daily to monitor WBC  culture viral herpes still pending since 5/27  GC/Chl negative   syphilis screen negative   Gyn on board,   path with ulceration, chronic inflammation, squamous dysplasia.   reviewed the path with pathologist, they don't see any viral cytopathic effects.   Gyn Oncology evaluation pending       Bladder pain  -UA with some pyuria, which has been persistant for patient in s/o of genital lesions. Ucx with > 100K GBS  -pain improving with antibiotics, can discontinue CTX and start Amoxicillin 875 mg q12 PO ( 24 hours after last dose of CTX) to complete 3 days total of antibiotics therapy  -no dysuria, pain relieved with NSAIDs  -Pt was started on CTX. Unclear that this is true UTI, if treating would limit to short course      HIV infection: well controlled   c/w biktarvy

## 2021-06-09 NOTE — PROGRESS NOTE ADULT - ATTENDING COMMENTS
50 y/o female PMHx Genital Herpes on valacyclovir x2 years, DM on insulin, HIV unknown last CD4 count/viral load now presenting to the ED with worsening labial mass not responding with oral valtrex    overall HIV infection, Resistant HSV 2 infection. + vulvar mass  improving clinically         Plan:   c/w foscarnet, clinically improving gradually, day 14/21 of therapy today   c/w 500 ml bolus of NS prior to each foscarnet infusion q12h   c/w 50cc/hr of maintenance  fluid.   monitor CMP daily as it can lead to significant electrolyte abnormalities with mg   monitor CBC daily to monitor WBC  culture viral herpes still pending since 5/27  GC/Chl negative   syphilis screen negative   Gyn on board,   path with ulceration, chronic inflammation, squamous dysplasia.   reviewed the path with pathologist, they don't see any viral cytopathic effects.   Gyn Oncology evaluation final recs pending       Bladder pain  -UA with some pyuria, which has been persistant for patient in s/o of genital lesions. Ucx with > 100K GBS  -pain improving with antibiotics, can discontinue CTX and start Amoxicillin 500 mg q12 PO ( 24 hours after last dose of CTX) to complete 3 days total of antibiotics therapy  -no dysuria, pain relieved with NSAIDs      HIV infection: well controlled   c/w biktarvy       Discussed with BELEN Huitron  Pager: [phone]. If no response or past 5 pm call [phone].
52 y/o female PMHx Genital Herpes on valacyclovir x2 years, DM on insulin, HIV unknown last CD4 count/viral load now presenting to the ED with worsening labial mass not responding with oral valtrex    overall HIV infection, Resistant HSV 2 infection. + vulvar mass  improving clinically       Plan:   c/w foscarnet, clinically improving gradually   c/w 500 ml bolus of NS prior to each foscarnet infusion q12h   c/w 50cc/hr of maintenance  fluid.   monitor CMP daily as it can lead to significant electrolyte abnormalities with mg   monitor CBC daily to monitor WBC  culture viral herpes still pending   GC/Chl negative   syphilis screen negative   Gyn on board,   path with ulceration, chronic inflammation, squamous dysplasia.   Gyn Oncology evaluation pending       HIV infection: well controlled   c/w pedro Huitron  Pager: [phone]. If no response or past 5 pm call [phone].
Pt seen at bedside for vulvar biopsy.  Consent obtained.  Lesion cleaned w betadine.  Subcut lidocaine injected.  Vulvar biopsy performed w scalpel.  Hemostasis achieved w pressure and silver nitrate.  Viral culture obtained from lesion.    Appreciate ID F/U  F/u biopsy  Cont foscarnet
50 y/o female PMHx Genital Herpes on valacyclovir x2 years, DM on insulin, HIV unknown last CD4 count/viral load now presenting to the ED with worsening labial mass not responding with oral valtrex    Overall HIV infection, Resistant HSV 2 infection. + vulvar mass  improving clinically       Plan:   c/w foscarnet, clinically improving gradually   c/w 500 ml bolus of NS prior to each foscarnet infusion q12h   c/w 50cc/hr of maintenance  fluid.   monitor CMP daily as it can lead to significant electrolyte abnormalities with mg   monitor CBC daily to monitor WBC  culture viral herpes still pending since 5/27  GC/Chl negative   syphilis screen negative   Gyn on board,   path with ulceration, chronic inflammation, squamous dysplasia.   reviewed the path with pathologist, they don't see any viral cytopathic effects.   Gyn Oncology evaluation pending       Bladder pain  -UA with some pyuria, which has been persistant for patient in s/o of genital lesions. Few bacteria, f/u Ucx  -no dysuria, pain relieved with NSAIDs  -Pt was started on CTX. Unclear that this is true UTI, consider stopping       HIV infection: well controlled   c/w biktarvy      Plan discussed with Medicine NP    Nida Huitron  Pager: [phone]. If no response or past 5 pm call [phone].

## 2021-06-09 NOTE — PROGRESS NOTE ADULT - SUBJECTIVE AND OBJECTIVE BOX
Follow Up:  Resistant Genital HSV    Inverval History/ROS:Patient is a 51y old  Female who presents with a chief complaint of Valacyclovir resistant HSV (09 Jun 2021 11:57)    Suprapubic pain improving, denies fever, chills, flank pain.    Allergies    No Known Allergies    Intolerances        ANTIMICROBIALS:  bictegravir 50 mG/emtricitabine 200 mG/tenofovir alafenamide 25 mG (BIKTARVY) 1 daily  cefTRIAXone   IVPB 1000 every 24 hours  foscarnet (Peripheral) IVPB 2100 every 12 hours      OTHER MEDS:  acetaminophen   Tablet .. 975 milliGRAM(s) Oral every 6 hours PRN  dextrose 40% Gel 15 Gram(s) Oral once  dextrose 5%. 1000 milliLiter(s) IV Continuous <Continuous>  dextrose 5%. 1000 milliLiter(s) IV Continuous <Continuous>  dextrose 50% Injectable 25 Gram(s) IV Push once  dextrose 50% Injectable 12.5 Gram(s) IV Push once  dextrose 50% Injectable 25 Gram(s) IV Push once  glucagon  Injectable 1 milliGRAM(s) IntraMuscular once  heparin   Injectable 5000 Unit(s) SubCutaneous every 12 hours  ibuprofen  Tablet. 400 milliGRAM(s) Oral every 6 hours PRN  insulin glargine Injectable (LANTUS) 24 Unit(s) SubCutaneous at bedtime  insulin lispro (ADMELOG) corrective regimen sliding scale   SubCutaneous at bedtime  insulin lispro (ADMELOG) corrective regimen sliding scale   SubCutaneous three times a day before meals  insulin lispro Injectable (ADMELOG) 4 Unit(s) SubCutaneous three times a day with meals  lidocaine 0.5%/epinephrine 1:200,000 Inj 20 milliLiter(s) Local Injection once  lidocaine 2% Gel 1 Application(s) Topical three times a day PRN  polyethylene glycol 3350 17 Gram(s) Oral daily PRN  sodium chloride 0.9% Bolus 500 milliLiter(s) IV Bolus <User Schedule>  sodium chloride 0.9%. 1000 milliLiter(s) IV Continuous <Continuous>      Vital Signs Last 24 Hrs  T(C): 36.7 (09 Jun 2021 13:38), Max: 37.2 (08 Jun 2021 17:52)  T(F): 98 (09 Jun 2021 13:38), Max: 99 (08 Jun 2021 17:52)  HR: 69 (09 Jun 2021 13:38) (68 - 87)  BP: 148/90 (09 Jun 2021 13:38) (122/75 - 148/90)  BP(mean): --  RR: 18 (09 Jun 2021 13:38) (18 - 18)  SpO2: 100% (09 Jun 2021 13:38) (96% - 100%)    Physical Exam  Gen: Well appearing, NAD  Lungs: CTA B/L, no w/r/r  Heart: RRR, no m/r/g  Abdomen: soft, non-ttp  : multible labial lesions, left sided erythematous lesion decreasing in size  Ext: no pitting edema  Skin: no rashes                          10.8   3.34  )-----------( 260      ( 09 Jun 2021 07:22 )             34.2       06-09    138  |  106  |  16  ----------------------------<  155<H>  3.9   |  20<L>  |  1.43<H>    Ca    10.2      09 Jun 2021 07:22  Mg     1.8     06-09    TPro  7.9  /  Alb  3.4  /  TBili  0.4  /  DBili  x   /  AST  16  /  ALT  20  /  AlkPhos  261<H>  06-09          MICROBIOLOGY:Culture Results:   >100,000 CFU/ml Streptococcus agalactiae (Group B) isolated  Group B streptococci are susceptible to ampicillin,  penicillin and cefazolin, but may be resistant to  erythromycin and clindamycin.  Recommendations for intrapartum prophylaxis for Group B  streptococci are penicillin or ampicillin.  <10,000 CFU/ml Normal Urogenital jasbir present (06-07 @ 15:17)      RADIOLOGY:  Herpes Simplex Virus 1/2 VZV Lesions, PCR (05.28.21 @ 16:55)   Herpes Simplex Virus 1/2  VZV PCR Source: lesion   Herpes Simplex Virus 1/2  VZV PCR Result: HSV2 Detected HSV 1/2 and VZV assay is a Real-Time PCR test for the qualitative   HSV VIral Culture Pending (specimen collected 5/27)        RADIOLOGY:  < from: US Doppler Pelvis (05.27.21 @ 11:50) >  IMPRESSION:  No evidence of adnexal mass or fluid collection. No hydrosalpinx seen.       Follow Up:  Resistant Genital HSV    Inverval History/ROS:Patient is a 51y old  Female who presents with a chief complaint of Valacyclovir resistant HSV (09 Jun 2021 11:57)    Suprapubic pain improving, denies fever, chills, flank pain.  No N/V, no SOB     Allergies  No Known Allergies        ANTIMICROBIALS:  bictegravir 50 mG/emtricitabine 200 mG/tenofovir alafenamide 25 mG (BIKTARVY) 1 daily  cefTRIAXone   IVPB 1000 every 24 hours  foscarnet (Peripheral) IVPB 2100 every 12 hours        Vital Signs Last 24 Hrs  T(C): 36.7 (09 Jun 2021 13:38), Max: 37.2 (08 Jun 2021 17:52)  T(F): 98 (09 Jun 2021 13:38), Max: 99 (08 Jun 2021 17:52)  HR: 69 (09 Jun 2021 13:38) (68 - 87)  BP: 148/90 (09 Jun 2021 13:38) (122/75 - 148/90)  BP(mean): --  RR: 18 (09 Jun 2021 13:38) (18 - 18)  SpO2: 100% (09 Jun 2021 13:38) (96% - 100%)      Physical Exam  Gen: Well appearing, NAD  Lungs: CTA B/L,  Heart: S1S2 normal   Abdomen: soft, non-ttp  : multible labial lesions, left sided erythematous lesion decreasing in size  Ext: no pitting edema  Skin: no rashes                          10.8   3.34  )-----------( 260      ( 09 Jun 2021 07:22 )             34.2       06-09    138  |  106  |  16  ----------------------------<  155<H>  3.9   |  20<L>  |  1.43<H>    Ca    10.2      09 Jun 2021 07:22  Mg     1.8     06-09    TPro  7.9  /  Alb  3.4  /  TBili  0.4  /  DBili  x   /  AST  16  /  ALT  20  /  AlkPhos  261<H>  06-09          MICROBIOLOGY:Culture Results:   >100,000 CFU/ml Streptococcus agalactiae (Group B) isolated  Group B streptococci are susceptible to ampicillin,  penicillin and cefazolin, but may be resistant to  erythromycin and clindamycin.  Recommendations for intrapartum prophylaxis for Group B  streptococci are penicillin or ampicillin.  <10,000 CFU/ml Normal Urogenital jasbir present (06-07 @ 15:17)      RADIOLOGY:  Herpes Simplex Virus 1/2 VZV Lesions, PCR (05.28.21 @ 16:55)   Herpes Simplex Virus 1/2  VZV PCR Source: lesion   Herpes Simplex Virus 1/2  VZV PCR Result: HSV2 Detected HSV 1/2 and VZV assay is a Real-Time PCR test for the qualitative   HSV VIral Culture Pending (specimen collected 5/27)        RADIOLOGY:  < from: US Doppler Pelvis (05.27.21 @ 11:50) >  IMPRESSION:  No evidence of adnexal mass or fluid collection. No hydrosalpinx seen.

## 2021-06-09 NOTE — PROGRESS NOTE ADULT - PROBLEM SELECTOR PLAN 1
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   - GYN-ONC eval (patient follows Dr. Eliana Yao outpatient): Will check MRI A+P w david - F/U   - NS 500mL bolus q12 while on foscarnet, monitor CMP. Also on maintenance IVF NS at 50cc/hr   - Per ID: will need to continue Foscarnet inpatient to finish 21 total days (Day 1 May 27)    #Bladder pain, UTI  -new onset symptoms  Patient endorsed new bladder pain when urinating 6/7, also difficulty emptying bladder, no hematuria  Bladder scans negative for retention. UA + , urine cx >100K strep agalactiae grp b, grp b strep susceptible to penicillin, cefazolin. Currently on IV Ceftriaxone. Will discuss with ID

## 2021-06-09 NOTE — PROGRESS NOTE ADULT - SUBJECTIVE AND OBJECTIVE BOX
[facility] Division of Hospital Medicine  Brtit Henao MD  Pager (SARA-GEOVANY, 5Y-7I): [phone]  Other Times:  [phone]    Patient is a 51y old  Female who presents with a chief complaint of Valacyclovir resistant HSV (08 Jun 2021 14:07)      SUBJECTIVE / OVERNIGHT EVENTS:    patient was examined this morning. bladder discomfort is improving, she feels she is urinating better now. No other acute complaint. ROS negative otherwise  '  ADDITIONAL REVIEW OF SYSTEMS:neg    MEDICATIONS  (STANDING):  bictegravir 50 mG/emtricitabine 200 mG/tenofovir alafenamide 25 mG (BIKTARVY) 1 Tablet(s) Oral daily  cefTRIAXone   IVPB 1000 milliGRAM(s) IV Intermittent every 24 hours  dextrose 40% Gel 15 Gram(s) Oral once  dextrose 5%. 1000 milliLiter(s) (50 mL/Hr) IV Continuous <Continuous>  dextrose 5%. 1000 milliLiter(s) (100 mL/Hr) IV Continuous <Continuous>  dextrose 50% Injectable 25 Gram(s) IV Push once  dextrose 50% Injectable 12.5 Gram(s) IV Push once  dextrose 50% Injectable 25 Gram(s) IV Push once  foscarnet (Peripheral) IVPB 2100 milliGRAM(s) IV Intermittent every 12 hours  glucagon  Injectable 1 milliGRAM(s) IntraMuscular once  heparin   Injectable 5000 Unit(s) SubCutaneous every 12 hours  insulin glargine Injectable (LANTUS) 24 Unit(s) SubCutaneous at bedtime  insulin lispro (ADMELOG) corrective regimen sliding scale   SubCutaneous at bedtime  insulin lispro (ADMELOG) corrective regimen sliding scale   SubCutaneous three times a day before meals  insulin lispro Injectable (ADMELOG) 4 Unit(s) SubCutaneous three times a day with meals  lidocaine 0.5%/epinephrine 1:200,000 Inj 20 milliLiter(s) Local Injection once  sodium chloride 0.9% Bolus 500 milliLiter(s) IV Bolus <User Schedule>  sodium chloride 0.9%. 1000 milliLiter(s) (50 mL/Hr) IV Continuous <Continuous>    MEDICATIONS  (PRN):  acetaminophen   Tablet .. 975 milliGRAM(s) Oral every 6 hours PRN Mild Pain (1 - 3)  ibuprofen  Tablet. 400 milliGRAM(s) Oral every 6 hours PRN Moderate Pain (4 - 6)  lidocaine 2% Gel 1 Application(s) Topical three times a day PRN Pain in labia region  polyethylene glycol 3350 17 Gram(s) Oral daily PRN Constipation      CAPILLARY BLOOD GLUCOSE      POCT Blood Glucose.: 140 mg/dL (09 Jun 2021 07:49)  POCT Blood Glucose.: 205 mg/dL (08 Jun 2021 21:33)  POCT Blood Glucose.: 174 mg/dL (08 Jun 2021 17:26)  POCT Blood Glucose.: 133 mg/dL (08 Jun 2021 13:27)    I&O's Summary    08 Jun 2021 07:01  -  09 Jun 2021 07:00  --------------------------------------------------------  IN: 3390 mL / OUT: 0 mL / NET: 3390 mL        PHYSICAL EXAM:  Vital Signs Last 24 Hrs  T(C): 36.7 (09 Jun 2021 09:05), Max: 37.2 (08 Jun 2021 17:52)  T(F): 98.1 (09 Jun 2021 09:05), Max: 99 (08 Jun 2021 17:52)  HR: 87 (09 Jun 2021 09:05) (68 - 87)  BP: 137/83 (09 Jun 2021 09:05) (122/75 - 140/82)  BP(mean): --  RR: 18 (09 Jun 2021 09:05) (18 - 18)  SpO2: 96% (09 Jun 2021 09:05) (96% - 100%)      PHYSICAL EXAM:  GENERAL: NAD   HEENT: EOMI, PERRL, MMM  PULM: Clear to auscultation bilaterally  CV: Regular rate and rhythm; nl S1, S2; No murmurs, rubs, or gallops  ABDOMEN: Soft, Nontender, Nondistended; Bowel sounds present  EXTREMITIES/MSK:  No edema  PSYCH: AAOx3  NEUROLOGY: non-focal  : exophytic vulvar mass  SKIN: warm dry. two small areas of healed lesions on forearms no sign of acute inflammation.         LABS:                        10.8   3.34  )-----------( 260      ( 09 Jun 2021 07:22 )             34.2     06-09    138  |  106  |  16  ----------------------------<  155<H>  3.9   |  20<L>  |  1.43<H>    Ca    10.2      09 Jun 2021 07:22  Mg     1.8     06-09    TPro  7.9  /  Alb  3.4  /  TBili  0.4  /  DBili  x   /  AST  16  /  ALT  20  /  AlkPhos  261<H>  06-09              Culture - Urine (collected 07 Jun 2021 15:17)  Source: .Urine Clean Catch (Midstream)  Final Report (08 Jun 2021 15:05):    >100,000 CFU/ml Streptococcus agalactiae (Group B) isolated    Group B streptococci are susceptible to ampicillin,    penicillin and cefazolin, but may be resistant to    erythromycin and clindamycin.    Recommendations for intrapartum prophylaxis for Group B    streptococci are penicillin or ampicillin.    <10,000 CFU/ml Normal Urogenital jasbir present        RADIOLOGY & ADDITIONAL TESTS:  Results Reviewed:   Imaging Personally Reviewed:  Electrocardiogram Personally Reviewed:    COORDINATION OF CARE:  Care Discussed with Consultants/Other Providers [Y/N]:  Prior or Outpatient Records Reviewed [Y/N]:

## 2021-06-10 LAB
ALBUMIN SERPL ELPH-MCNC: 3.6 G/DL — SIGNIFICANT CHANGE UP (ref 3.3–5)
ALP SERPL-CCNC: 273 U/L — HIGH (ref 40–120)
ALT FLD-CCNC: 17 U/L — SIGNIFICANT CHANGE UP (ref 10–45)
ANION GAP SERPL CALC-SCNC: 13 MMOL/L — SIGNIFICANT CHANGE UP (ref 5–17)
APPEARANCE UR: CLEAR — SIGNIFICANT CHANGE UP
AST SERPL-CCNC: 14 U/L — SIGNIFICANT CHANGE UP (ref 10–40)
BACTERIA # UR AUTO: NEGATIVE — SIGNIFICANT CHANGE UP
BILIRUB SERPL-MCNC: 0.4 MG/DL — SIGNIFICANT CHANGE UP (ref 0.2–1.2)
BILIRUB UR-MCNC: NEGATIVE — SIGNIFICANT CHANGE UP
BUN SERPL-MCNC: 19 MG/DL — SIGNIFICANT CHANGE UP (ref 7–23)
CALCIUM SERPL-MCNC: 10.3 MG/DL — SIGNIFICANT CHANGE UP (ref 8.4–10.5)
CHLORIDE SERPL-SCNC: 104 MMOL/L — SIGNIFICANT CHANGE UP (ref 96–108)
CO2 SERPL-SCNC: 20 MMOL/L — LOW (ref 22–31)
COLOR SPEC: COLORLESS — SIGNIFICANT CHANGE UP
CREAT SERPL-MCNC: 1.5 MG/DL — HIGH (ref 0.5–1.3)
DIFF PNL FLD: NEGATIVE — SIGNIFICANT CHANGE UP
EPI CELLS # UR: 0 /HPF — SIGNIFICANT CHANGE UP
GLUCOSE BLDC GLUCOMTR-MCNC: 125 MG/DL — HIGH (ref 70–99)
GLUCOSE BLDC GLUCOMTR-MCNC: 141 MG/DL — HIGH (ref 70–99)
GLUCOSE BLDC GLUCOMTR-MCNC: 182 MG/DL — HIGH (ref 70–99)
GLUCOSE BLDC GLUCOMTR-MCNC: 201 MG/DL — HIGH (ref 70–99)
GLUCOSE SERPL-MCNC: 127 MG/DL — HIGH (ref 70–99)
GLUCOSE UR QL: NEGATIVE — SIGNIFICANT CHANGE UP
HCT VFR BLD CALC: 33.8 % — LOW (ref 34.5–45)
HGB BLD-MCNC: 10.9 G/DL — LOW (ref 11.5–15.5)
HYALINE CASTS # UR AUTO: 0 /LPF — SIGNIFICANT CHANGE UP (ref 0–2)
KETONES UR-MCNC: NEGATIVE — SIGNIFICANT CHANGE UP
LEUKOCYTE ESTERASE UR-ACNC: NEGATIVE — SIGNIFICANT CHANGE UP
MAGNESIUM SERPL-MCNC: 1.8 MG/DL — SIGNIFICANT CHANGE UP (ref 1.6–2.6)
MCHC RBC-ENTMCNC: 30 PG — SIGNIFICANT CHANGE UP (ref 27–34)
MCHC RBC-ENTMCNC: 32.2 GM/DL — SIGNIFICANT CHANGE UP (ref 32–36)
MCV RBC AUTO: 93.1 FL — SIGNIFICANT CHANGE UP (ref 80–100)
NITRITE UR-MCNC: NEGATIVE — SIGNIFICANT CHANGE UP
NRBC # BLD: 0 /100 WBCS — SIGNIFICANT CHANGE UP (ref 0–0)
PH UR: 6 — SIGNIFICANT CHANGE UP (ref 5–8)
PLATELET # BLD AUTO: 267 K/UL — SIGNIFICANT CHANGE UP (ref 150–400)
POTASSIUM SERPL-MCNC: 3.8 MMOL/L — SIGNIFICANT CHANGE UP (ref 3.5–5.3)
POTASSIUM SERPL-SCNC: 3.8 MMOL/L — SIGNIFICANT CHANGE UP (ref 3.5–5.3)
PROT SERPL-MCNC: 8.4 G/DL — HIGH (ref 6–8.3)
PROT UR-MCNC: ABNORMAL
RBC # BLD: 3.63 M/UL — LOW (ref 3.8–5.2)
RBC # FLD: 15 % — HIGH (ref 10.3–14.5)
RBC CASTS # UR COMP ASSIST: 1 /HPF — SIGNIFICANT CHANGE UP (ref 0–4)
SODIUM SERPL-SCNC: 137 MMOL/L — SIGNIFICANT CHANGE UP (ref 135–145)
SP GR SPEC: 1.01 — SIGNIFICANT CHANGE UP (ref 1.01–1.02)
UROBILINOGEN FLD QL: NEGATIVE — SIGNIFICANT CHANGE UP
WBC # BLD: 3.32 K/UL — LOW (ref 3.8–10.5)
WBC # FLD AUTO: 3.32 K/UL — LOW (ref 3.8–10.5)
WBC UR QL: 2 /HPF — SIGNIFICANT CHANGE UP (ref 0–5)

## 2021-06-10 PROCEDURE — 99232 SBSQ HOSP IP/OBS MODERATE 35: CPT

## 2021-06-10 PROCEDURE — 99222 1ST HOSP IP/OBS MODERATE 55: CPT

## 2021-06-10 RX ADMIN — Medication 1 TABLET(S): at 06:06

## 2021-06-10 RX ADMIN — HEPARIN SODIUM 5000 UNIT(S): 5000 INJECTION INTRAVENOUS; SUBCUTANEOUS at 06:06

## 2021-06-10 RX ADMIN — Medication 1 TABLET(S): at 17:40

## 2021-06-10 RX ADMIN — FOSCARNET SODIUM 175 MILLIGRAM(S): 24 INJECTION, SOLUTION INTRAVENOUS at 00:45

## 2021-06-10 RX ADMIN — FOSCARNET SODIUM 175 MILLIGRAM(S): 24 INJECTION, SOLUTION INTRAVENOUS at 12:39

## 2021-06-10 RX ADMIN — SODIUM CHLORIDE 1000 MILLILITER(S): 9 INJECTION INTRAMUSCULAR; INTRAVENOUS; SUBCUTANEOUS at 12:09

## 2021-06-10 RX ADMIN — FOSCARNET SODIUM 175 MILLIGRAM(S): 24 INJECTION, SOLUTION INTRAVENOUS at 23:24

## 2021-06-10 RX ADMIN — Medication 4 UNIT(S): at 12:53

## 2021-06-10 RX ADMIN — INSULIN GLARGINE 24 UNIT(S): 100 INJECTION, SOLUTION SUBCUTANEOUS at 22:06

## 2021-06-10 RX ADMIN — Medication 4 UNIT(S): at 17:39

## 2021-06-10 RX ADMIN — Medication 4: at 17:39

## 2021-06-10 RX ADMIN — BICTEGRAVIR SODIUM, EMTRICITABINE, AND TENOFOVIR ALAFENAMIDE FUMARATE 1 TABLET(S): 30; 120; 15 TABLET ORAL at 12:39

## 2021-06-10 RX ADMIN — Medication 4 UNIT(S): at 09:28

## 2021-06-10 RX ADMIN — HEPARIN SODIUM 5000 UNIT(S): 5000 INJECTION INTRAVENOUS; SUBCUTANEOUS at 17:40

## 2021-06-10 RX ADMIN — SODIUM CHLORIDE 1000 MILLILITER(S): 9 INJECTION INTRAMUSCULAR; INTRAVENOUS; SUBCUTANEOUS at 22:45

## 2021-06-10 NOTE — PROGRESS NOTE ADULT - PROBLEM SELECTOR PLAN 1
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   - GYN-ONC eval (patient follows Dr. Eliana Yao outpatient): Will check MRI A+P w david - F/U on further recs  - NS 500mL bolus q12 while on foscarnet, monitor CMP. Also on maintenance IVF NS at 50cc/hr   - Per ID: will need to continue Foscarnet inpatient to finish 21 total days (Day 1- May 27)    #Bladder pain, UTI  -new onset symptoms, now improved  Patient endorsed new bladder pain when urinating 6/7, also difficulty emptying bladder, no hematuria  Bladder scans negative for retention. UA + , urine cx >100K strep agalactiae grp b, grp b strep susceptible to penicillin, cefazolin. Received Ceftriaxone x2days, Augmentin x1day. Appreciate ID recs

## 2021-06-10 NOTE — PROGRESS NOTE ADULT - SUBJECTIVE AND OBJECTIVE BOX
[facility] Division of Hospital Medicine  Britt Henao MD  Pager (SARA-GEOVANY, 3K-1U): [phone]  Other Times:  [phone]    Patient is a 51y old  Female who presents with a chief complaint of Valacyclovir resistant HSV (2021 17:09)      SUBJECTIVE / OVERNIGHT EVENTS:    Patient was examined this morning. Reports that bladder pain is much improved today. She is urinating normally. Denies any abnormal discharge. ROS neg otherwise.    ADDITIONAL REVIEW OF SYSTEMS: neg    MEDICATIONS  (STANDING):  amoxicillin  875 milliGRAM(s)/clavulanate 1 Tablet(s) Oral every 12 hours  bictegravir 50 mG/emtricitabine 200 mG/tenofovir alafenamide 25 mG (BIKTARVY) 1 Tablet(s) Oral daily  dextrose 40% Gel 15 Gram(s) Oral once  dextrose 5%. 1000 milliLiter(s) (50 mL/Hr) IV Continuous <Continuous>  dextrose 5%. 1000 milliLiter(s) (100 mL/Hr) IV Continuous <Continuous>  dextrose 50% Injectable 12.5 Gram(s) IV Push once  dextrose 50% Injectable 25 Gram(s) IV Push once  dextrose 50% Injectable 25 Gram(s) IV Push once  foscarnet (Peripheral) IVPB 2100 milliGRAM(s) IV Intermittent every 12 hours  glucagon  Injectable 1 milliGRAM(s) IntraMuscular once  heparin   Injectable 5000 Unit(s) SubCutaneous every 12 hours  insulin glargine Injectable (LANTUS) 24 Unit(s) SubCutaneous at bedtime  insulin lispro (ADMELOG) corrective regimen sliding scale   SubCutaneous at bedtime  insulin lispro (ADMELOG) corrective regimen sliding scale   SubCutaneous three times a day before meals  insulin lispro Injectable (ADMELOG) 4 Unit(s) SubCutaneous three times a day with meals  lidocaine 0.5%/epinephrine 1:200,000 Inj 20 milliLiter(s) Local Injection once  sodium chloride 0.9% Bolus 500 milliLiter(s) IV Bolus <User Schedule>  sodium chloride 0.9%. 1000 milliLiter(s) (50 mL/Hr) IV Continuous <Continuous>    MEDICATIONS  (PRN):  acetaminophen   Tablet .. 975 milliGRAM(s) Oral every 6 hours PRN Mild Pain (1 - 3)  ibuprofen  Tablet. 400 milliGRAM(s) Oral every 6 hours PRN Moderate Pain (4 - 6)  lidocaine 2% Gel 1 Application(s) Topical three times a day PRN Pain in labia region  polyethylene glycol 3350 17 Gram(s) Oral daily PRN Constipation      CAPILLARY BLOOD GLUCOSE      POCT Blood Glucose.: 125 mg/dL (10 Marcelino 2021 08:48)  POCT Blood Glucose.: 240 mg/dL (2021 21:17)  POCT Blood Glucose.: 151 mg/dL (2021 17:34)  POCT Blood Glucose.: 109 mg/dL (2021 12:36)    I&O's Summary    2021 07:01  -  10 Marcelino 2021 07:00  --------------------------------------------------------  IN: 2910 mL / OUT: 300 mL / NET: 2610 mL        PHYSICAL EXAM:  Vital Signs Last 24 Hrs  T(C): 36.8 (10 Marcelino 2021 09:05), Max: 36.8 (10 Marcelino 2021 09:05)  T(F): 98.2 (10 Marcelino 2021 09:05), Max: 98.2 (10 Marcelino 2021 09:05)  HR: 85 (10 Marcelino 2021 09:05) (69 - 85)  BP: 135/80 (10 Marcelino 2021 09:05) (128/78 - 148/90)  BP(mean): --  RR: 18 (10 Marcelino 2021 09:05) (18 - 18)  SpO2: 99% (10 Marcelino 2021 09:05) (97% - 100%)      PHYSICAL EXAM:  GENERAL: NAD   HEENT: EOMI, PERRL, MMM  PULM: Clear to auscultation bilaterally  CV: Regular rate and rhythm; nl S1, S2; No murmurs, rubs, or gallops  ABDOMEN: Soft, Nontender, Nondistended; Bowel sounds present  EXTREMITIES/MSK:  No edema  PSYCH: AAOx3  NEUROLOGY: non-focal  : exophytic vulvar mass  SKIN: warm dry. two small areas of healed lesions on forearms no sign of acute inflammation.         LABS:                        10.9   3.32  )-----------( 267      ( 10 Marcelino 2021 06:57 )             33.8     06-10    137  |  104  |  19  ----------------------------<  127<H>  3.8   |  20<L>  |  1.50<H>    Ca    10.3      10 Marcelino 2021 06:57  Mg     1.8     06-10    TPro  8.4<H>  /  Alb  3.6  /  TBili  0.4  /  DBili  x   /  AST  14  /  ALT  17  /  AlkPhos  273<H>  06-10          Urinalysis Basic - ( 10 Marcelino 2021 07:05 )    Color: Colorless / Appearance: Clear / S.010 / pH: x  Gluc: x / Ketone: Negative  / Bili: Negative / Urobili: Negative   Blood: x / Protein: 30 mg/dL / Nitrite: Negative   Leuk Esterase: Negative / RBC: 1 /hpf / WBC 2 /HPF   Sq Epi: x / Non Sq Epi: 0 /hpf / Bacteria: Negative        Culture - Urine (collected 2021 15:17)  Source: .Urine Clean Catch (Midstream)  Final Report (2021 15:05):    >100,000 CFU/ml Streptococcus agalactiae (Group B) isolated    Group B streptococci are susceptible to ampicillin,    penicillin and cefazolin, but may be resistant to    erythromycin and clindamycin.    Recommendations for intrapartum prophylaxis for Group B    streptococci are penicillin or ampicillin.    <10,000 CFU/ml Normal Urogenital ajsbir present        RADIOLOGY & ADDITIONAL TESTS:  Results Reviewed:   Imaging Personally Reviewed:  Electrocardiogram Personally Reviewed:    COORDINATION OF CARE:  Care Discussed with Consultants/Other Providers [Y/N]:  Prior or Outpatient Records Reviewed [Y/N]:

## 2021-06-10 NOTE — PROGRESS NOTE ADULT - ASSESSMENT
50 y/o female PMHx Genital Herpes on valacyclovir x2 years, DM on insulin, HIV unknown last CD4 count/viral load now presenting to the ED with worsening labial mass not responding with oral valtrex    overall HIV infection, Resistant HSV 2 infection. + vulvar mass      Plan:   c/w foscarnet, clinically improving gradually   c/w 500 ml bolus of NS prior to each foscarnet infusion q12h   c/w 50cc/hr of maintenance  fluid.   monitor CMP daily as it can lead to significant electrolyte abnormalities with mg   monitor CBC daily to monitor WBC, trending down gradually.   GC/Chl negative   syphilis screen negative   Gyn on board,   path with ulceration, chronic inflammation, squamous dysplasia.   reviewed the path with pathologist, they don't see any viral cytopathic effects.   s/p Gyn Oncology eval, follow up in a month.       HIV infection: well controlled   c/w bicktarvy       UTI: Improved symptoms   plan for 3 days total, s/p completion       Nida Huitron  Pager: [phone]. If no response or past 5 pm call [phone].

## 2021-06-11 LAB
ALBUMIN SERPL ELPH-MCNC: 3.6 G/DL — SIGNIFICANT CHANGE UP (ref 3.3–5)
ALP SERPL-CCNC: 285 U/L — HIGH (ref 40–120)
ALT FLD-CCNC: 17 U/L — SIGNIFICANT CHANGE UP (ref 10–45)
ANION GAP SERPL CALC-SCNC: 14 MMOL/L — SIGNIFICANT CHANGE UP (ref 5–17)
AST SERPL-CCNC: 14 U/L — SIGNIFICANT CHANGE UP (ref 10–40)
BILIRUB SERPL-MCNC: 0.5 MG/DL — SIGNIFICANT CHANGE UP (ref 0.2–1.2)
BUN SERPL-MCNC: 17 MG/DL — SIGNIFICANT CHANGE UP (ref 7–23)
CALCIUM SERPL-MCNC: 10 MG/DL — SIGNIFICANT CHANGE UP (ref 8.4–10.5)
CHLORIDE SERPL-SCNC: 103 MMOL/L — SIGNIFICANT CHANGE UP (ref 96–108)
CO2 SERPL-SCNC: 21 MMOL/L — LOW (ref 22–31)
CREAT SERPL-MCNC: 1.42 MG/DL — HIGH (ref 0.5–1.3)
GLUCOSE BLDC GLUCOMTR-MCNC: 123 MG/DL — HIGH (ref 70–99)
GLUCOSE BLDC GLUCOMTR-MCNC: 136 MG/DL — HIGH (ref 70–99)
GLUCOSE BLDC GLUCOMTR-MCNC: 139 MG/DL — HIGH (ref 70–99)
GLUCOSE BLDC GLUCOMTR-MCNC: 213 MG/DL — HIGH (ref 70–99)
GLUCOSE SERPL-MCNC: 156 MG/DL — HIGH (ref 70–99)
HCT VFR BLD CALC: 33.9 % — LOW (ref 34.5–45)
HGB BLD-MCNC: 10.9 G/DL — LOW (ref 11.5–15.5)
MAGNESIUM SERPL-MCNC: 1.6 MG/DL — SIGNIFICANT CHANGE UP (ref 1.6–2.6)
MCHC RBC-ENTMCNC: 29.5 PG — SIGNIFICANT CHANGE UP (ref 27–34)
MCHC RBC-ENTMCNC: 32.2 GM/DL — SIGNIFICANT CHANGE UP (ref 32–36)
MCV RBC AUTO: 91.6 FL — SIGNIFICANT CHANGE UP (ref 80–100)
NRBC # BLD: 0 /100 WBCS — SIGNIFICANT CHANGE UP (ref 0–0)
PLATELET # BLD AUTO: 254 K/UL — SIGNIFICANT CHANGE UP (ref 150–400)
POTASSIUM SERPL-MCNC: 3.9 MMOL/L — SIGNIFICANT CHANGE UP (ref 3.5–5.3)
POTASSIUM SERPL-SCNC: 3.9 MMOL/L — SIGNIFICANT CHANGE UP (ref 3.5–5.3)
PROT SERPL-MCNC: 8.4 G/DL — HIGH (ref 6–8.3)
RBC # BLD: 3.7 M/UL — LOW (ref 3.8–5.2)
RBC # FLD: 14.9 % — HIGH (ref 10.3–14.5)
SODIUM SERPL-SCNC: 138 MMOL/L — SIGNIFICANT CHANGE UP (ref 135–145)
WBC # BLD: 3.54 K/UL — LOW (ref 3.8–10.5)
WBC # FLD AUTO: 3.54 K/UL — LOW (ref 3.8–10.5)

## 2021-06-11 PROCEDURE — 99232 SBSQ HOSP IP/OBS MODERATE 35: CPT

## 2021-06-11 PROCEDURE — 99233 SBSQ HOSP IP/OBS HIGH 50: CPT

## 2021-06-11 PROCEDURE — 74183 MRI ABD W/O CNTR FLWD CNTR: CPT | Mod: 26

## 2021-06-11 PROCEDURE — 72197 MRI PELVIS W/O & W/DYE: CPT | Mod: 26

## 2021-06-11 RX ADMIN — Medication 4 UNIT(S): at 19:51

## 2021-06-11 RX ADMIN — Medication 4 UNIT(S): at 08:26

## 2021-06-11 RX ADMIN — SODIUM CHLORIDE 1000 MILLILITER(S): 9 INJECTION INTRAMUSCULAR; INTRAVENOUS; SUBCUTANEOUS at 12:16

## 2021-06-11 RX ADMIN — BICTEGRAVIR SODIUM, EMTRICITABINE, AND TENOFOVIR ALAFENAMIDE FUMARATE 1 TABLET(S): 30; 120; 15 TABLET ORAL at 12:17

## 2021-06-11 RX ADMIN — HEPARIN SODIUM 5000 UNIT(S): 5000 INJECTION INTRAVENOUS; SUBCUTANEOUS at 17:26

## 2021-06-11 RX ADMIN — SODIUM CHLORIDE 50 MILLILITER(S): 9 INJECTION INTRAMUSCULAR; INTRAVENOUS; SUBCUTANEOUS at 12:16

## 2021-06-11 RX ADMIN — Medication 4 UNIT(S): at 12:17

## 2021-06-11 RX ADMIN — FOSCARNET SODIUM 175 MILLIGRAM(S): 24 INJECTION, SOLUTION INTRAVENOUS at 12:16

## 2021-06-11 RX ADMIN — INSULIN GLARGINE 24 UNIT(S): 100 INJECTION, SOLUTION SUBCUTANEOUS at 22:13

## 2021-06-11 RX ADMIN — HEPARIN SODIUM 5000 UNIT(S): 5000 INJECTION INTRAVENOUS; SUBCUTANEOUS at 05:33

## 2021-06-11 RX ADMIN — SODIUM CHLORIDE 1000 MILLILITER(S): 9 INJECTION INTRAMUSCULAR; INTRAVENOUS; SUBCUTANEOUS at 23:35

## 2021-06-11 NOTE — PROGRESS NOTE ADULT - SUBJECTIVE AND OBJECTIVE BOX
51y old  Female who presents with a chief complaint of Valacyclovir resistant HSV (11 Jun 2021 15:45)      Interval history:  Afebrile, feeling well, dysuria and bladder pressure resolved.       Allergies:   No Known Allergies      Antimicrobials:  bictegravir 50 mG/emtricitabine 200 mG/tenofovir alafenamide 25 mG (BIKTARVY) 1 Tablet(s) Oral daily  foscarnet (Peripheral) IVPB 2100 milliGRAM(s) IV Intermittent every 12 hours      REVIEW OF SYSTEMS:  No chest pain   No SOB  No abdominal pain        Vital Signs Last 24 Hrs  T(C): 36.5 (06-11-21 @ 14:18), Max: 37 (06-10-21 @ 21:08)  T(F): 97.7 (06-11-21 @ 14:18), Max: 98.6 (06-10-21 @ 21:08)  HR: 74 (06-11-21 @ 14:18) (74 - 92)  BP: 124/75 (06-11-21 @ 14:18) (124/75 - 149/83)  BP(mean): --  RR: 20 (06-11-21 @ 14:18) (18 - 20)  SpO2: 94% (06-11-21 @ 14:18) (94% - 98%)      PHYSICAL EXAM:  Patient in no acute distress. AAOX3.  No icterus, no oral ulcers.  Cardiovascular: S1S2 normal.  Lungs: + air entry B/L lung fields.  Gastrointestinal: soft, nontender, nondistended.  Extremities: no edema.  IV sites not inflamed.                           10.9   3.54  )-----------( 254      ( 11 Jun 2021 08:17 )             33.9   06-11    138  |  103  |  17  ----------------------------<  156<H>  3.9   |  21<L>  |  1.42<H>    Ca    10.0      11 Jun 2021 08:17  Mg     1.6     06-11    TPro  8.4<H>  /  Alb  3.6  /  TBili  0.5  /  DBili  x   /  AST  14  /  ALT  17  /  AlkPhos  285<H>  06-11      LIVER FUNCTIONS - ( 11 Jun 2021 08:17 )  Alb: 3.6 g/dL / Pro: 8.4 g/dL / ALK PHOS: 285 U/L / ALT: 17 U/L / AST: 14 U/L / GGT: x

## 2021-06-11 NOTE — PROGRESS NOTE ADULT - ASSESSMENT
50 y/o female PMHx Genital Herpes on valacyclovir x2 years, DM on insulin, HIV unknown last CD4 count/viral load now presenting to the ED with worsening labial mass not responding with oral valtrex    overall HIV infection, Resistant HSV 2 infection. + vulvar mass      Plan:   c/w foscarnet, clinically improving gradually, plan for 21 days of therapy, day 16/21 today.   c/w 500 ml bolus of NS prior to each foscarnet infusion q12h   c/w 50cc/hr of maintenance  fluid.   monitor CMP daily as it can lead to significant electrolyte abnormalities with mg   monitor CBC daily to monitor WBC, trending down gradually.   GC/Chl negative   syphilis screen negative   Gyn on board,   path with ulceration, chronic inflammation, squamous dysplasia.   reviewed the path with pathologist, they don't see any viral cytopathic effects.   s/p Gyn Oncology eval, follow up in a month.       HIV infection: well controlled   c/w bicktarvy       UTI: resolved symptoms   plan for 3 days total, s/p completion       Please call ID service for questions over weekend at [phone].       Nida Huitron  Pager: [phone]. If no response or past 5 pm call [phone].

## 2021-06-11 NOTE — PROGRESS NOTE ADULT - PROBLEM SELECTOR PLAN 1
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   - GYN-ONC eval (patient follows Dr. Eliana Yao outpatient): Will check MRI A+P w david - F/U on further recs  - NS 500mL bolus q12 while on foscarnet, monitor CMP. Also on maintenance IVF NS at 50cc/hr   - Per ID: will need to continue Foscarnet inpatient to finish 21 total days (Day 1- May 27)    #Bladder pain, UTI  -resolved.   Patient endorsed new bladder pain when urinating 6/7, also difficulty emptying bladder, no hematuria  Bladder scans negative for retention. UA + , urine cx >100K strep agalactiae grp b, grp b strep susceptible to penicillin, cefazolin. Received Ceftriaxone x2days, Augmentin x1day. Appreciate ID recs

## 2021-06-11 NOTE — PROGRESS NOTE ADULT - SUBJECTIVE AND OBJECTIVE BOX
Scott Lux   Pager [phone]  Office [phone]      CC: Patient is a 51y old  Female who presents with a chief complaint of Valacyclovir resistant HSV (10 Marcelino 2021 18:42)      SUBJECTIVE / OVERNIGHT EVENTS: Improved size of labial mass. no pain. no dysuria. no n/v/d/abd pain. No f/c/r. No cp/sob.    MEDICATIONS  (STANDING):  bictegravir 50 mG/emtricitabine 200 mG/tenofovir alafenamide 25 mG (BIKTARVY) 1 Tablet(s) Oral daily  dextrose 40% Gel 15 Gram(s) Oral once  dextrose 5%. 1000 milliLiter(s) (50 mL/Hr) IV Continuous <Continuous>  dextrose 5%. 1000 milliLiter(s) (100 mL/Hr) IV Continuous <Continuous>  dextrose 50% Injectable 25 Gram(s) IV Push once  dextrose 50% Injectable 12.5 Gram(s) IV Push once  dextrose 50% Injectable 25 Gram(s) IV Push once  foscarnet (Peripheral) IVPB 2100 milliGRAM(s) IV Intermittent every 12 hours  glucagon  Injectable 1 milliGRAM(s) IntraMuscular once  heparin   Injectable 5000 Unit(s) SubCutaneous every 12 hours  insulin glargine Injectable (LANTUS) 24 Unit(s) SubCutaneous at bedtime  insulin lispro (ADMELOG) corrective regimen sliding scale   SubCutaneous at bedtime  insulin lispro (ADMELOG) corrective regimen sliding scale   SubCutaneous three times a day before meals  insulin lispro Injectable (ADMELOG) 4 Unit(s) SubCutaneous three times a day with meals  lidocaine 0.5%/epinephrine 1:200,000 Inj 20 milliLiter(s) Local Injection once  sodium chloride 0.9% Bolus 500 milliLiter(s) IV Bolus <User Schedule>  sodium chloride 0.9%. 1000 milliLiter(s) (50 mL/Hr) IV Continuous <Continuous>    MEDICATIONS  (PRN):  acetaminophen   Tablet .. 975 milliGRAM(s) Oral every 6 hours PRN Mild Pain (1 - 3)  ibuprofen  Tablet. 400 milliGRAM(s) Oral every 6 hours PRN Moderate Pain (4 - 6)  lidocaine 2% Gel 1 Application(s) Topical three times a day PRN Pain in labia region  polyethylene glycol 3350 17 Gram(s) Oral daily PRN Constipation      Vital Signs Last 24 Hrs  T(C): 36.5 (2021 14:18), Max: 37 (10 Marcelino 2021 21:08)  T(F): 97.7 (2021 14:18), Max: 98.6 (10 Marcelino 2021 21:08)  HR: 74 (2021 14:18) (74 - 92)  BP: 124/75 (2021 14:18) (124/75 - 149/83)  BP(mean): --  RR: 20 (2021 14:18) (18 - 20)  SpO2: 94% (2021 14:18) (94% - 98%)  CAPILLARY BLOOD GLUCOSE      POCT Blood Glucose.: 136 mg/dL (2021 12:06)  POCT Blood Glucose.: 139 mg/dL (2021 08:19)  POCT Blood Glucose.: 182 mg/dL (10 Marcelino 2021 21:49)  POCT Blood Glucose.: 201 mg/dL (10 Marcelino 2021 17:27)    I&O's Summary    10 Marcelino 2021 07:01  -  2021 07:00  --------------------------------------------------------  IN: 3240 mL / OUT: 0 mL / NET: 3240 mL    2021 07:01  -  2021 15:45  --------------------------------------------------------  IN: 1475 mL / OUT: 0 mL / NET: 1475 mL          PHYSICAL EXAM: chaparoned by pt's nurse    GENERAL: NAD   HEENT: EOMI, PERRL  PULM: Clear to auscultation bilaterally  CV: Regular rate and rhythm; nl S1, S2; No murmurs, rubs, or gallops  ABDOMEN: Soft, Nontender, Nondistended; Bowel sounds present  EXTREMITIES/MSK:  No edema, calf tenderness   PSYCH: AAOx3  NEUROLOGY: non-focal  : smaller labial mass. healed ulcers          LABS:                        10.9   3.54  )-----------( 254      ( 2021 08:17 )             33.9     06-11    138  |  103  |  17  ----------------------------<  156<H>  3.9   |  21<L>  |  1.42<H>    Ca    10.0      2021 08:17  Mg     1.6     -    TPro  8.4<H>  /  Alb  3.6  /  TBili  0.5  /  DBili  x   /  AST  14  /  ALT  17  /  AlkPhos  285<H>  06-          Urinalysis Basic - ( 10 Marcelino 2021 07:05 )    Color: Colorless / Appearance: Clear / S.010 / pH: x  Gluc: x / Ketone: Negative  / Bili: Negative / Urobili: Negative   Blood: x / Protein: 30 mg/dL / Nitrite: Negative   Leuk Esterase: Negative / RBC: 1 /hpf / WBC 2 /HPF   Sq Epi: x / Non Sq Epi: 0 /hpf / Bacteria: Negative          RADIOLOGY & ADDITIONAL TESTS:    Imaging Personally Reviewed:    Consultant(s) Notes Reviewed:      Care Discussed with Consultants/Other Providers: ID

## 2021-06-12 LAB
ALBUMIN SERPL ELPH-MCNC: 3.5 G/DL — SIGNIFICANT CHANGE UP (ref 3.3–5)
ALP SERPL-CCNC: 255 U/L — HIGH (ref 40–120)
ALT FLD-CCNC: 17 U/L — SIGNIFICANT CHANGE UP (ref 10–45)
ANION GAP SERPL CALC-SCNC: 14 MMOL/L — SIGNIFICANT CHANGE UP (ref 5–17)
AST SERPL-CCNC: 15 U/L — SIGNIFICANT CHANGE UP (ref 10–40)
BILIRUB SERPL-MCNC: 0.4 MG/DL — SIGNIFICANT CHANGE UP (ref 0.2–1.2)
BUN SERPL-MCNC: 21 MG/DL — SIGNIFICANT CHANGE UP (ref 7–23)
CALCIUM SERPL-MCNC: 9.7 MG/DL — SIGNIFICANT CHANGE UP (ref 8.4–10.5)
CHLORIDE SERPL-SCNC: 102 MMOL/L — SIGNIFICANT CHANGE UP (ref 96–108)
CO2 SERPL-SCNC: 20 MMOL/L — LOW (ref 22–31)
CREAT SERPL-MCNC: 1.5 MG/DL — HIGH (ref 0.5–1.3)
GLUCOSE BLDC GLUCOMTR-MCNC: 132 MG/DL — HIGH (ref 70–99)
GLUCOSE BLDC GLUCOMTR-MCNC: 174 MG/DL — HIGH (ref 70–99)
GLUCOSE BLDC GLUCOMTR-MCNC: 210 MG/DL — HIGH (ref 70–99)
GLUCOSE BLDC GLUCOMTR-MCNC: 216 MG/DL — HIGH (ref 70–99)
GLUCOSE SERPL-MCNC: 198 MG/DL — HIGH (ref 70–99)
HCT VFR BLD CALC: 34.4 % — LOW (ref 34.5–45)
HGB BLD-MCNC: 11.1 G/DL — LOW (ref 11.5–15.5)
MAGNESIUM SERPL-MCNC: 1.6 MG/DL — SIGNIFICANT CHANGE UP (ref 1.6–2.6)
MCHC RBC-ENTMCNC: 30.2 PG — SIGNIFICANT CHANGE UP (ref 27–34)
MCHC RBC-ENTMCNC: 32.3 GM/DL — SIGNIFICANT CHANGE UP (ref 32–36)
MCV RBC AUTO: 93.7 FL — SIGNIFICANT CHANGE UP (ref 80–100)
NRBC # BLD: 0 /100 WBCS — SIGNIFICANT CHANGE UP (ref 0–0)
PLATELET # BLD AUTO: 251 K/UL — SIGNIFICANT CHANGE UP (ref 150–400)
POTASSIUM SERPL-MCNC: 3.7 MMOL/L — SIGNIFICANT CHANGE UP (ref 3.5–5.3)
POTASSIUM SERPL-SCNC: 3.7 MMOL/L — SIGNIFICANT CHANGE UP (ref 3.5–5.3)
PROT SERPL-MCNC: 8 G/DL — SIGNIFICANT CHANGE UP (ref 6–8.3)
RBC # BLD: 3.67 M/UL — LOW (ref 3.8–5.2)
RBC # FLD: 15.2 % — HIGH (ref 10.3–14.5)
SODIUM SERPL-SCNC: 136 MMOL/L — SIGNIFICANT CHANGE UP (ref 135–145)
WBC # BLD: 3.7 K/UL — LOW (ref 3.8–10.5)
WBC # FLD AUTO: 3.7 K/UL — LOW (ref 3.8–10.5)

## 2021-06-12 PROCEDURE — 99232 SBSQ HOSP IP/OBS MODERATE 35: CPT

## 2021-06-12 RX ADMIN — SODIUM CHLORIDE 50 MILLILITER(S): 9 INJECTION INTRAMUSCULAR; INTRAVENOUS; SUBCUTANEOUS at 12:27

## 2021-06-12 RX ADMIN — HEPARIN SODIUM 5000 UNIT(S): 5000 INJECTION INTRAVENOUS; SUBCUTANEOUS at 05:44

## 2021-06-12 RX ADMIN — SODIUM CHLORIDE 1000 MILLILITER(S): 9 INJECTION INTRAMUSCULAR; INTRAVENOUS; SUBCUTANEOUS at 12:28

## 2021-06-12 RX ADMIN — Medication 2: at 07:55

## 2021-06-12 RX ADMIN — Medication 4: at 19:35

## 2021-06-12 RX ADMIN — Medication 4 UNIT(S): at 07:56

## 2021-06-12 RX ADMIN — SODIUM CHLORIDE 1000 MILLILITER(S): 9 INJECTION INTRAMUSCULAR; INTRAVENOUS; SUBCUTANEOUS at 23:14

## 2021-06-12 RX ADMIN — Medication 4 UNIT(S): at 13:19

## 2021-06-12 RX ADMIN — BICTEGRAVIR SODIUM, EMTRICITABINE, AND TENOFOVIR ALAFENAMIDE FUMARATE 1 TABLET(S): 30; 120; 15 TABLET ORAL at 12:27

## 2021-06-12 RX ADMIN — Medication 4 UNIT(S): at 19:36

## 2021-06-12 RX ADMIN — HEPARIN SODIUM 5000 UNIT(S): 5000 INJECTION INTRAVENOUS; SUBCUTANEOUS at 17:35

## 2021-06-12 RX ADMIN — FOSCARNET SODIUM 175 MILLIGRAM(S): 24 INJECTION, SOLUTION INTRAVENOUS at 12:27

## 2021-06-12 RX ADMIN — INSULIN GLARGINE 24 UNIT(S): 100 INJECTION, SOLUTION SUBCUTANEOUS at 21:47

## 2021-06-12 RX ADMIN — FOSCARNET SODIUM 175 MILLIGRAM(S): 24 INJECTION, SOLUTION INTRAVENOUS at 00:19

## 2021-06-12 NOTE — PROGRESS NOTE ADULT - SUBJECTIVE AND OBJECTIVE BOX
HOSPITALIST NOTE    Dr. Mirza Rodgers DO  Attending Physician  Division of Hospital Medicine  [facility]  Pager:  [phone]    SUBJECTIVE  Reports some mild urinary hesitancy.   Improving.  No other complaints.    REVIEW OF SYSTEMS  12 point review of systems negative except for above.     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    Herpes genitalis    S/P Tubal Ligation    S/P bilateral breast implants        MEDICATIONS  (STANDING):  bictegravir 50 mG/emtricitabine 200 mG/tenofovir alafenamide 25 mG (BIKTARVY) 1 Tablet(s) Oral daily  dextrose 40% Gel 15 Gram(s) Oral once  dextrose 50% Injectable 25 Gram(s) IV Push once  dextrose 50% Injectable 25 Gram(s) IV Push once  foscarnet (Peripheral) IVPB 2100 milliGRAM(s) IV Intermittent every 12 hours  heparin   Injectable 5000 Unit(s) SubCutaneous every 12 hours  insulin glargine Injectable (LANTUS) 24 Unit(s) SubCutaneous at bedtime  insulin lispro (ADMELOG) corrective regimen sliding scale   SubCutaneous at bedtime  insulin lispro (ADMELOG) corrective regimen sliding scale   SubCutaneous three times a day before meals  insulin lispro Injectable (ADMELOG) 4 Unit(s) SubCutaneous three times a day with meals  lidocaine 0.5%/epinephrine 1:200,000 Inj 20 milliLiter(s) Local Injection once  sodium chloride 0.9% Bolus 500 milliLiter(s) IV Bolus <User Schedule>  sodium chloride 0.9%. 1000 milliLiter(s) (50 mL/Hr) IV Continuous <Continuous>    MEDICATIONS  (PRN):  acetaminophen   Tablet .. 975 milliGRAM(s) Oral every 6 hours PRN Mild Pain (1 - 3)  ibuprofen  Tablet. 400 milliGRAM(s) Oral every 6 hours PRN Moderate Pain (4 - 6)  lidocaine 2% Gel 1 Application(s) Topical three times a day PRN Pain in labia region  polyethylene glycol 3350 17 Gram(s) Oral daily PRN Constipation      Allergies    No Known Allergies    Intolerances        T(C): 36.6 (06-12-21 @ 09:27), Max: 37 (06-11-21 @ 21:15)  T(F): 97.9 (06-12-21 @ 09:27), Max: 98.6 (06-11-21 @ 21:15)  HR: 67 (06-12-21 @ 09:27) (64 - 81)  BP: 130/78 (06-12-21 @ 09:27) (124/75 - 149/85)  ABP: --  ABP(mean): --  RR: 18 (06-12-21 @ 09:27) (18 - 20)  SpO2: 97% (06-12-21 @ 09:27) (94% - 100%)      CONSTITUTIONAL: No acute distress.   HEENT:  Conjunctiva clear B/L.  Moist oral mucosa.   Cardiovascular: RRR with no murmurs. No JVD noted. No lower extremity edema B/L. Extremities are warm and well perfused. Radial pulses 2+ B/L. Dorsalis pedis pulses 2+ B/L.    Respiratory: Lungs CTAB. No wrr. No accessory muscle use.   Gastrointestinal:  Soft, nontender. Non-distended. Non-rigid. No CVA tenderness B/L.  Neurologic:  Alert and awake. Moving all extremities. Following commands. Making eye contact.    Skin:  No rashes noted. No skin erythema noted.   Psych:  Normal affect. Normal Mood.     LABS                        11.1   3.70  )-----------( 251      ( 12 Jun 2021 07:15 )             34.4     06-12    136  |  102  |  21  ----------------------------<  198<H>  3.7   |  20<L>  |  1.50<H>    Ca    9.7      12 Jun 2021 07:13  Mg     1.6     06-12    TPro  8.0  /  Alb  3.5  /  TBili  0.4  /  DBili  x   /  AST  15  /  ALT  17  /  AlkPhos  255<H>  06-12          ADDITIONAL STUDIES PERSONALLY REVIEWED    Our team discussed the case with all consults    All consultant contribution to care greatly appreciated.

## 2021-06-12 NOTE — PROGRESS NOTE ADULT - PROBLEM SELECTOR PLAN 1
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   - GYN-ONC eval (patient follows Dr. Eliana Yao outpatient): Pending read on MRI A+P w david - F/U on further recs  - NS 500mL bolus q12 while on foscarnet, monitor CMP. Also on maintenance IVF NS at 50cc/hr   - Per ID: will need to continue Foscarnet inpatient to finish 21 total days.    #Bladder pain, UTI  -resolved.   Patient endorsed new bladder pain when urinating 6/7, also difficulty emptying bladder, no hematuria  Bladder scans negative for retention. UA + , urine cx >100K strep agalactiae grp b, grp b strep susceptible to penicillin, cefazolin. Received Ceftriaxone x2days, Augmentin x1day. Appreciate ID recs.

## 2021-06-13 LAB
ALBUMIN SERPL ELPH-MCNC: 3.9 G/DL — SIGNIFICANT CHANGE UP (ref 3.3–5)
ALP SERPL-CCNC: 297 U/L — HIGH (ref 40–120)
ALT FLD-CCNC: 19 U/L — SIGNIFICANT CHANGE UP (ref 10–45)
ANION GAP SERPL CALC-SCNC: 14 MMOL/L — SIGNIFICANT CHANGE UP (ref 5–17)
AST SERPL-CCNC: 16 U/L — SIGNIFICANT CHANGE UP (ref 10–40)
BILIRUB SERPL-MCNC: 0.5 MG/DL — SIGNIFICANT CHANGE UP (ref 0.2–1.2)
BUN SERPL-MCNC: 23 MG/DL — SIGNIFICANT CHANGE UP (ref 7–23)
CALCIUM SERPL-MCNC: 10.3 MG/DL — SIGNIFICANT CHANGE UP (ref 8.4–10.5)
CHLORIDE SERPL-SCNC: 104 MMOL/L — SIGNIFICANT CHANGE UP (ref 96–108)
CO2 SERPL-SCNC: 20 MMOL/L — LOW (ref 22–31)
CREAT SERPL-MCNC: 1.57 MG/DL — HIGH (ref 0.5–1.3)
GLUCOSE BLDC GLUCOMTR-MCNC: 110 MG/DL — HIGH (ref 70–99)
GLUCOSE BLDC GLUCOMTR-MCNC: 139 MG/DL — HIGH (ref 70–99)
GLUCOSE BLDC GLUCOMTR-MCNC: 176 MG/DL — HIGH (ref 70–99)
GLUCOSE BLDC GLUCOMTR-MCNC: 178 MG/DL — HIGH (ref 70–99)
GLUCOSE SERPL-MCNC: 178 MG/DL — HIGH (ref 70–99)
HCT VFR BLD CALC: 35.8 % — SIGNIFICANT CHANGE UP (ref 34.5–45)
HGB BLD-MCNC: 11.3 G/DL — LOW (ref 11.5–15.5)
MAGNESIUM SERPL-MCNC: 1.7 MG/DL — SIGNIFICANT CHANGE UP (ref 1.6–2.6)
MCHC RBC-ENTMCNC: 29.3 PG — SIGNIFICANT CHANGE UP (ref 27–34)
MCHC RBC-ENTMCNC: 31.6 GM/DL — LOW (ref 32–36)
MCV RBC AUTO: 92.7 FL — SIGNIFICANT CHANGE UP (ref 80–100)
NRBC # BLD: 0 /100 WBCS — SIGNIFICANT CHANGE UP (ref 0–0)
PHOSPHATE SERPL-MCNC: 4.9 MG/DL — HIGH (ref 2.5–4.5)
PLATELET # BLD AUTO: 268 K/UL — SIGNIFICANT CHANGE UP (ref 150–400)
POTASSIUM SERPL-MCNC: 4 MMOL/L — SIGNIFICANT CHANGE UP (ref 3.5–5.3)
POTASSIUM SERPL-SCNC: 4 MMOL/L — SIGNIFICANT CHANGE UP (ref 3.5–5.3)
PROT SERPL-MCNC: 8.7 G/DL — HIGH (ref 6–8.3)
RBC # BLD: 3.86 M/UL — SIGNIFICANT CHANGE UP (ref 3.8–5.2)
RBC # FLD: 15 % — HIGH (ref 10.3–14.5)
SODIUM SERPL-SCNC: 138 MMOL/L — SIGNIFICANT CHANGE UP (ref 135–145)
WBC # BLD: 4.07 K/UL — SIGNIFICANT CHANGE UP (ref 3.8–10.5)
WBC # FLD AUTO: 4.07 K/UL — SIGNIFICANT CHANGE UP (ref 3.8–10.5)

## 2021-06-13 PROCEDURE — 99232 SBSQ HOSP IP/OBS MODERATE 35: CPT

## 2021-06-13 RX ADMIN — FOSCARNET SODIUM 175 MILLIGRAM(S): 24 INJECTION, SOLUTION INTRAVENOUS at 11:35

## 2021-06-13 RX ADMIN — FOSCARNET SODIUM 175 MILLIGRAM(S): 24 INJECTION, SOLUTION INTRAVENOUS at 23:34

## 2021-06-13 RX ADMIN — FOSCARNET SODIUM 175 MILLIGRAM(S): 24 INJECTION, SOLUTION INTRAVENOUS at 00:09

## 2021-06-13 RX ADMIN — Medication 4 UNIT(S): at 12:50

## 2021-06-13 RX ADMIN — BICTEGRAVIR SODIUM, EMTRICITABINE, AND TENOFOVIR ALAFENAMIDE FUMARATE 1 TABLET(S): 30; 120; 15 TABLET ORAL at 11:37

## 2021-06-13 RX ADMIN — HEPARIN SODIUM 5000 UNIT(S): 5000 INJECTION INTRAVENOUS; SUBCUTANEOUS at 05:07

## 2021-06-13 RX ADMIN — HEPARIN SODIUM 5000 UNIT(S): 5000 INJECTION INTRAVENOUS; SUBCUTANEOUS at 17:38

## 2021-06-13 RX ADMIN — Medication 2: at 17:37

## 2021-06-13 RX ADMIN — INSULIN GLARGINE 24 UNIT(S): 100 INJECTION, SOLUTION SUBCUTANEOUS at 21:55

## 2021-06-13 RX ADMIN — Medication 2: at 08:20

## 2021-06-13 RX ADMIN — Medication 4 UNIT(S): at 08:20

## 2021-06-13 RX ADMIN — SODIUM CHLORIDE 1000 MILLILITER(S): 9 INJECTION INTRAMUSCULAR; INTRAVENOUS; SUBCUTANEOUS at 23:07

## 2021-06-13 RX ADMIN — Medication 4 UNIT(S): at 17:37

## 2021-06-13 RX ADMIN — SODIUM CHLORIDE 1000 MILLILITER(S): 9 INJECTION INTRAMUSCULAR; INTRAVENOUS; SUBCUTANEOUS at 11:35

## 2021-06-13 NOTE — PROGRESS NOTE ADULT - PROBLEM SELECTOR PLAN 1
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   - GYN-ONC eval (patient follows Dr. Eliana Yao outpatient): MRI A+P w david-Bulky labia minora without easily delineated mass. No definite disruption of tissue plane- FU on further recs  - NS 500mL bolus q12 while on foscarnet, monitor CMP. Also on maintenance IVF NS at 50cc/hr   - Per ID: will need to continue Foscarnet inpatient to finish 21 total days.    #Bladder pain, UTI  -resolved.   Patient endorsed new bladder pain when urinating 6/7, also difficulty emptying bladder, no hematuria  Bladder scans negative for retention. UA + , urine cx >100K strep agalactiae grp b, grp b strep susceptible to penicillin, cefazolin. Received Ceftriaxone x2days, Augmentin x1day. Appreciate ID recs.

## 2021-06-13 NOTE — PROGRESS NOTE ADULT - SUBJECTIVE AND OBJECTIVE BOX
[facility] Division of Hospital Medicine  Scott CindaDO ese  Pager (DARREN, 8E-9X): [phone]  Other Times:  [phone]    Patient is a 51y old  Female who presents with a chief complaint of Valacyclovir resistant HSV (12 Jun 2021 10:50)    SUBJECTIVE / OVERNIGHT EVENTS: No acute events overnight. Patient seen and examined at bedside this morning, denies chest pain, shortness of breath, abdominal pain, nausea, vomiting or diarrhea.    REVIEW OF SYSTEMS:    CONSTITUTIONAL: No weakness, fevers or chills  EYES/ENT: No visual changes;  No vertigo or throat pain   NECK: No pain or stiffness  RESPIRATORY: No cough, wheezing, hemoptysis; No shortness of breath  CARDIOVASCULAR: No chest pain or palpitations  GASTROINTESTINAL: No abdominal or epigastric pain. No nausea, vomiting, or hematemesis; No diarrhea or constipation. No melena or hematochezia.  GENITOURINARY: No dysuria, frequency or hematuria  NEUROLOGICAL: No numbness or weakness  SKIN: No itching, burning, rashes, or lesions  MSK: No joint pain, no back pain  HEME: No easy bleeding, no easy bruising  All other review of systems is negative unless indicated above.    MEDICATIONS  (STANDING):  bictegravir 50 mG/emtricitabine 200 mG/tenofovir alafenamide 25 mG (BIKTARVY) 1 Tablet(s) Oral daily  dextrose 40% Gel 15 Gram(s) Oral once  dextrose 50% Injectable 25 Gram(s) IV Push once  dextrose 50% Injectable 25 Gram(s) IV Push once  foscarnet (Peripheral) IVPB 2100 milliGRAM(s) IV Intermittent every 12 hours  heparin   Injectable 5000 Unit(s) SubCutaneous every 12 hours  insulin glargine Injectable (LANTUS) 24 Unit(s) SubCutaneous at bedtime  insulin lispro (ADMELOG) corrective regimen sliding scale   SubCutaneous at bedtime  insulin lispro (ADMELOG) corrective regimen sliding scale   SubCutaneous three times a day before meals  insulin lispro Injectable (ADMELOG) 4 Unit(s) SubCutaneous three times a day with meals  lidocaine 0.5%/epinephrine 1:200,000 Inj 20 milliLiter(s) Local Injection once  sodium chloride 0.9% Bolus 500 milliLiter(s) IV Bolus <User Schedule>  sodium chloride 0.9%. 1000 milliLiter(s) (50 mL/Hr) IV Continuous <Continuous>    MEDICATIONS  (PRN):  acetaminophen   Tablet .. 975 milliGRAM(s) Oral every 6 hours PRN Mild Pain (1 - 3)  ibuprofen  Tablet. 400 milliGRAM(s) Oral every 6 hours PRN Moderate Pain (4 - 6)  lidocaine 2% Gel 1 Application(s) Topical three times a day PRN Pain in labia region  polyethylene glycol 3350 17 Gram(s) Oral daily PRN Constipation      CAPILLARY BLOOD GLUCOSE      POCT Blood Glucose.: 110 mg/dL (13 Jun 2021 12:13)  POCT Blood Glucose.: 176 mg/dL (13 Jun 2021 08:12)  POCT Blood Glucose.: 216 mg/dL (12 Jun 2021 21:38)  POCT Blood Glucose.: 210 mg/dL (12 Jun 2021 19:33)    I&O's Summary    12 Jun 2021 07:01  -  13 Jun 2021 07:00  --------------------------------------------------------  IN: 3425 mL / OUT: 0 mL / NET: 3425 mL        PHYSICAL EXAM:  Vital Signs Last 24 Hrs  T(C): 36.8 (13 Jun 2021 13:17), Max: 36.9 (13 Jun 2021 08:48)  T(F): 98.2 (13 Jun 2021 13:17), Max: 98.4 (13 Jun 2021 08:48)  HR: 65 (13 Jun 2021 13:17) (65 - 76)  BP: 120/70 (13 Jun 2021 13:17) (120/70 - 141/91)  BP(mean): --  RR: 18 (13 Jun 2021 13:17) (16 - 18)  SpO2: 96% (13 Jun 2021 13:17) (96% - 100%)    CONSTITUTIONAL: NAD, well-developed, well-groomed  RESPIRATORY: Normal respiratory effort; lungs are clear to auscultation bilaterally  CARDIOVASCULAR: Regular rate and rhythm, normal S1 and S2, no murmur/rub/gallop; No lower extremity edema  ABDOMEN: Nontender to palpation, normoactive bowel sounds, no rebound/guarding  MUSCULOSKELETAL: No clubbing or cyanosis of digits; no joint swelling or tenderness to palpation  PSYCH: A+O to person, place, and time; affect appropriate  NEUROLOGY: CN 2-12 are intact and symmetric; no gross sensory deficits   SKIN: No rashes; no palpable lesions    LABS:                        11.3   4.07  )-----------( 268      ( 13 Jun 2021 06:49 )             35.8     06-13    138  |  104  |  23  ----------------------------<  178<H>  4.0   |  20<L>  |  1.57<H>    Ca    10.3      13 Jun 2021 06:49  Phos  4.9     06-13  Mg     1.7     06-13    TPro  8.7<H>  /  Alb  3.9  /  TBili  0.5  /  DBili  x   /  AST  16  /  ALT  19  /  AlkPhos  297<H>  06-13

## 2021-06-14 LAB
ALBUMIN SERPL ELPH-MCNC: 3.5 G/DL — SIGNIFICANT CHANGE UP (ref 3.3–5)
ALP SERPL-CCNC: 267 U/L — HIGH (ref 40–120)
ALT FLD-CCNC: 16 U/L — SIGNIFICANT CHANGE UP (ref 10–45)
ANION GAP SERPL CALC-SCNC: 12 MMOL/L — SIGNIFICANT CHANGE UP (ref 5–17)
APPEARANCE UR: CLEAR — SIGNIFICANT CHANGE UP
AST SERPL-CCNC: 15 U/L — SIGNIFICANT CHANGE UP (ref 10–40)
BACTERIA # UR AUTO: NEGATIVE — SIGNIFICANT CHANGE UP
BILIRUB SERPL-MCNC: 0.4 MG/DL — SIGNIFICANT CHANGE UP (ref 0.2–1.2)
BILIRUB UR-MCNC: NEGATIVE — SIGNIFICANT CHANGE UP
BUN SERPL-MCNC: 21 MG/DL — SIGNIFICANT CHANGE UP (ref 7–23)
CALCIUM SERPL-MCNC: 10 MG/DL — SIGNIFICANT CHANGE UP (ref 8.4–10.5)
CHLORIDE SERPL-SCNC: 104 MMOL/L — SIGNIFICANT CHANGE UP (ref 96–108)
CO2 SERPL-SCNC: 21 MMOL/L — LOW (ref 22–31)
COLOR SPEC: SIGNIFICANT CHANGE UP
CREAT SERPL-MCNC: 1.47 MG/DL — HIGH (ref 0.5–1.3)
DIFF PNL FLD: NEGATIVE — SIGNIFICANT CHANGE UP
EPI CELLS # UR: 0 /HPF — SIGNIFICANT CHANGE UP
GLUCOSE BLDC GLUCOMTR-MCNC: 144 MG/DL — HIGH (ref 70–99)
GLUCOSE BLDC GLUCOMTR-MCNC: 182 MG/DL — HIGH (ref 70–99)
GLUCOSE BLDC GLUCOMTR-MCNC: 250 MG/DL — HIGH (ref 70–99)
GLUCOSE BLDC GLUCOMTR-MCNC: 253 MG/DL — HIGH (ref 70–99)
GLUCOSE SERPL-MCNC: 159 MG/DL — HIGH (ref 70–99)
GLUCOSE UR QL: ABNORMAL
HCT VFR BLD CALC: 34.2 % — LOW (ref 34.5–45)
HGB BLD-MCNC: 10.8 G/DL — LOW (ref 11.5–15.5)
KETONES UR-MCNC: NEGATIVE — SIGNIFICANT CHANGE UP
LEUKOCYTE ESTERASE UR-ACNC: NEGATIVE — SIGNIFICANT CHANGE UP
MAGNESIUM SERPL-MCNC: 1.7 MG/DL — SIGNIFICANT CHANGE UP (ref 1.6–2.6)
MCHC RBC-ENTMCNC: 29.3 PG — SIGNIFICANT CHANGE UP (ref 27–34)
MCHC RBC-ENTMCNC: 31.6 GM/DL — LOW (ref 32–36)
MCV RBC AUTO: 92.7 FL — SIGNIFICANT CHANGE UP (ref 80–100)
NITRITE UR-MCNC: NEGATIVE — SIGNIFICANT CHANGE UP
NRBC # BLD: 0 /100 WBCS — SIGNIFICANT CHANGE UP (ref 0–0)
PH UR: 6 — SIGNIFICANT CHANGE UP (ref 5–8)
PLATELET # BLD AUTO: 243 K/UL — SIGNIFICANT CHANGE UP (ref 150–400)
POTASSIUM SERPL-MCNC: 3.7 MMOL/L — SIGNIFICANT CHANGE UP (ref 3.5–5.3)
POTASSIUM SERPL-SCNC: 3.7 MMOL/L — SIGNIFICANT CHANGE UP (ref 3.5–5.3)
PROT SERPL-MCNC: 8 G/DL — SIGNIFICANT CHANGE UP (ref 6–8.3)
PROT UR-MCNC: ABNORMAL
RBC # BLD: 3.69 M/UL — LOW (ref 3.8–5.2)
RBC # FLD: 15 % — HIGH (ref 10.3–14.5)
RBC CASTS # UR COMP ASSIST: 0 /HPF — SIGNIFICANT CHANGE UP (ref 0–4)
SODIUM SERPL-SCNC: 137 MMOL/L — SIGNIFICANT CHANGE UP (ref 135–145)
SP GR SPEC: 1.01 — SIGNIFICANT CHANGE UP (ref 1.01–1.02)
UROBILINOGEN FLD QL: NEGATIVE — SIGNIFICANT CHANGE UP
WBC # BLD: 3.72 K/UL — LOW (ref 3.8–10.5)
WBC # FLD AUTO: 3.72 K/UL — LOW (ref 3.8–10.5)
WBC UR QL: 1 /HPF — SIGNIFICANT CHANGE UP (ref 0–5)

## 2021-06-14 PROCEDURE — 99232 SBSQ HOSP IP/OBS MODERATE 35: CPT

## 2021-06-14 PROCEDURE — 99233 SBSQ HOSP IP/OBS HIGH 50: CPT

## 2021-06-14 RX ADMIN — Medication 4 UNIT(S): at 12:38

## 2021-06-14 RX ADMIN — FOSCARNET SODIUM 175 MILLIGRAM(S): 24 INJECTION, SOLUTION INTRAVENOUS at 12:40

## 2021-06-14 RX ADMIN — HEPARIN SODIUM 5000 UNIT(S): 5000 INJECTION INTRAVENOUS; SUBCUTANEOUS at 05:04

## 2021-06-14 RX ADMIN — Medication 4 UNIT(S): at 18:21

## 2021-06-14 RX ADMIN — INSULIN GLARGINE 24 UNIT(S): 100 INJECTION, SOLUTION SUBCUTANEOUS at 21:45

## 2021-06-14 RX ADMIN — Medication 1: at 21:45

## 2021-06-14 RX ADMIN — Medication 4 UNIT(S): at 09:00

## 2021-06-14 RX ADMIN — BICTEGRAVIR SODIUM, EMTRICITABINE, AND TENOFOVIR ALAFENAMIDE FUMARATE 1 TABLET(S): 30; 120; 15 TABLET ORAL at 12:37

## 2021-06-14 RX ADMIN — SODIUM CHLORIDE 1000 MILLILITER(S): 9 INJECTION INTRAMUSCULAR; INTRAVENOUS; SUBCUTANEOUS at 23:30

## 2021-06-14 RX ADMIN — HEPARIN SODIUM 5000 UNIT(S): 5000 INJECTION INTRAVENOUS; SUBCUTANEOUS at 18:22

## 2021-06-14 RX ADMIN — Medication 2: at 12:38

## 2021-06-14 RX ADMIN — SODIUM CHLORIDE 1000 MILLILITER(S): 9 INJECTION INTRAMUSCULAR; INTRAVENOUS; SUBCUTANEOUS at 12:36

## 2021-06-14 RX ADMIN — Medication 4: at 18:21

## 2021-06-14 NOTE — PROGRESS NOTE ADULT - PROBLEM SELECTOR PLAN 1
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   - GYN-ONC eval (patient follows Dr. Eliana Yao outpatient): MRI A+P w david-Bulky labia minora without easily delineated mass. No definite disruption of tissue plane- FU on further recs  - NS 500mL bolus q12 while on foscarnet, monitor CMP. Also on maintenance IVF NS at 50cc/hr   - Per ID: will need to continue Foscarnet inpatient to finish 21 total days (through 6/16).    #Bladder pain, UTI    Patient endorsed new bladder pain when urinating 6/7, also difficulty emptying bladder, no hematuria  Bladder scans negative for retention. UA + , urine cx >100K strep agalactiae grp b, grp b strep susceptible to penicillin, cefazolin. Received Ceftriaxone x2days, Augmentin x1day. Appreciate ID recs.  now with recurrent urgency/frequency-repeat UA and f/u ID recs

## 2021-06-14 NOTE — PROGRESS NOTE ADULT - ASSESSMENT
52 yo female with PMH HIV, DMII, CKD stage 3 presenting with worsening acute on chronic HSV infection with large vulvar mass on day 19 of 21 of foscarnet.

## 2021-06-14 NOTE — CHART NOTE - NSCHARTNOTEFT_GEN_A_CORE
Nutrition Follow Up Note  Patient seen for: length of stay follow up.    Chart reviewed, events noted. Pt is a 51yoF PMH HIV, DMII, CKD stage 3 p/w worsening acute on chronic HSV infection with large vulvar mass.     Source: [x] Patient       [x] EMR        [] RN        [] Family at bedside       [] Other:    -If unable to interview patient: [] Trach/Vent/BiPAP  [] Disoriented/confused/inappropriate to interview    Diet Order:   Diet, Consistent Carbohydrate w/Evening Snack:   No Concentrated Phosphorus  Supplement Feeding Modality:  Oral  Suplena Cans or Servings Per Day:  1       Frequency:  Daily (21)    - Is current order appropriate/adequate? [x] Yes  []  No:     - PO intake :   [x] >75%  Adequate    [] 50-75%  Fair       [] <50%  Poor    - Nutrition-related concerns: Pt reports good appetite and PO intake, eating >75% of meals and drinking 1 Suplena daily. Pt denies nausea, vomiting, diarrhea, or constipation.    GI:  Last BM today.   Bowel Regimen? [] Yes   [x] No    Weights:   Daily Weight in k.7 ()  no weight loss noted    Nutritionally Pertinent MEDICATIONS  (STANDING):  bictegravir 50 mG/emtricitabine 200 mG/tenofovir alafenamide 25 mG (BIKTARVY)  dextrose 40% Gel  dextrose 50% Injectable  dextrose 50% Injectable  foscarnet (Peripheral) IVPB  insulin glargine Injectable (LANTUS)  insulin lispro (ADMELOG) corrective regimen sliding scale  insulin lispro (ADMELOG) corrective regimen sliding scale  insulin lispro Injectable (ADMELOG)  sodium chloride 0.9% Bolus  sodium chloride 0.9%.    Pertinent Labs:  @ 06:38: Na 137, BUN 21, Cr 1.47<H>, <H>, K+ 3.7, Phos --, Mg 1.7, Alk Phos 267<H>, ALT/SGPT 16, AST/SGOT 15, HbA1c --    A1C with Estimated Average Glucose Result: 9.0 % (21 @ 15:31)    Finger Sticks:  POCT Blood Glucose.: 182 mg/dL ( @ 12:20)  POCT Blood Glucose.: 144 mg/dL ( @ 08:17)  POCT Blood Glucose.: 139 mg/dL ( @ 20:56)  POCT Blood Glucose.: 178 mg/dL ( @ 17:30)      Skin per nursing documentation: no pressure injuries noted.  Edema: No noted edema as per flow sheets.     Estimated Needs:   [x] no change since previous assessment  [] recalculated:     Previous Nutrition Diagnosis: Altered Nutrition Related Lab Values  Nutrition Diagnosis is: [x] ongoing  [] resolved [] not applicable     New Nutrition Diagnosis: [x] Not applicable    Nutrition Care Plan:  [x] In Progress  [] Achieved  [] Not applicable    Nutrition Interventions:     Education Provided:       [x] Yes: Reinforced T2DM education points, education on foods containing carbohydrates, foods containing proteins, and portion sizes. Reinforced low phosphorus diet, reviewed foods high in phosphorus and encouraged ongoing monitoring of phos intake and phos serum levels. List of phosphorus content of foods provided.    Recommendations:      1. Continue consistent carbohydrate diet w/ no concentrated phosphorus.  2. Continue Suplena x 1 daily.  3. Reinforce nutrition education as needed - RD remains available.     Monitoring and Evaluation:   Continue to monitor nutritional intake, tolerance to diet prescription, weights, labs, skin integrity    RD remains available upon request and will follow up per protocol    Kamila Steve MS, RD, CDN Pager# [phone]

## 2021-06-14 NOTE — PROGRESS NOTE ADULT - ASSESSMENT
50 y/o female PMHx Genital Herpes on valacyclovir x2 years, DM on insulin, HIV unknown last CD4 count/viral load now presenting to the ED with worsening labial mass not responding with oral valtrex    overall HIV infection, Resistant HSV 2 infection. + vulvar mass      Plan:   c/w foscarnet, clinically improving gradually, plan for 21 days of therapy, day 19/21 today.   c/w 500 ml bolus of NS prior to each foscarnet infusion q12h   c/w 50cc/hr of maintenance  fluid.   monitor CMP daily as it can lead to significant electrolyte abnormalities with mg   monitor CBC daily to monitor WBC, trending down gradually.   GC/Chl negative   syphilis screen negative   Gyn on board,   path with ulceration, chronic inflammation, squamous dysplasia.   s/p Gyn Oncology eval, follow up in a month.       HIV infection: well controlled   c/w bicktarvy       UTI: dysuria again   recheck U/A and urine cx          Plan discussed with Medicine Attending.       Nida Huitron  Pager: [phone]. If no response or past 5 pm call [phone].

## 2021-06-14 NOTE — PROGRESS NOTE ADULT - SUBJECTIVE AND OBJECTIVE BOX
[facility] Division of Hospital Medicine  Kellie Cummins MD  Pager (M-F, 1Y-3B): [phone]  Other Times:  [phone]    Patient is a 51y old  Female who presents with a chief complaint of Valacyclovir resistant HSV (13 Jun 2021 14:36)      SUBJECTIVE / OVERNIGHT EVENTS:  no acute events overnight  no n/v/d  does report recurrent urine urgency/frequency but no dysuria.  Had briefly resolved after UTI treatment last week but since recurred      ADDITIONAL REVIEW OF SYSTEMS:    MEDICATIONS  (STANDING):  bictegravir 50 mG/emtricitabine 200 mG/tenofovir alafenamide 25 mG (BIKTARVY) 1 Tablet(s) Oral daily  dextrose 40% Gel 15 Gram(s) Oral once  dextrose 50% Injectable 25 Gram(s) IV Push once  dextrose 50% Injectable 25 Gram(s) IV Push once  foscarnet (Peripheral) IVPB 2100 milliGRAM(s) IV Intermittent every 12 hours  heparin   Injectable 5000 Unit(s) SubCutaneous every 12 hours  insulin glargine Injectable (LANTUS) 24 Unit(s) SubCutaneous at bedtime  insulin lispro (ADMELOG) corrective regimen sliding scale   SubCutaneous three times a day before meals  insulin lispro (ADMELOG) corrective regimen sliding scale   SubCutaneous at bedtime  insulin lispro Injectable (ADMELOG) 4 Unit(s) SubCutaneous three times a day with meals  lidocaine 0.5%/epinephrine 1:200,000 Inj 20 milliLiter(s) Local Injection once  sodium chloride 0.9% Bolus 500 milliLiter(s) IV Bolus <User Schedule>  sodium chloride 0.9%. 1000 milliLiter(s) (50 mL/Hr) IV Continuous <Continuous>    MEDICATIONS  (PRN):  acetaminophen   Tablet .. 975 milliGRAM(s) Oral every 6 hours PRN Mild Pain (1 - 3)  ibuprofen  Tablet. 400 milliGRAM(s) Oral every 6 hours PRN Moderate Pain (4 - 6)  lidocaine 2% Gel 1 Application(s) Topical three times a day PRN Pain in labia region  polyethylene glycol 3350 17 Gram(s) Oral daily PRN Constipation      CAPILLARY BLOOD GLUCOSE      POCT Blood Glucose.: 182 mg/dL (14 Jun 2021 12:20)  POCT Blood Glucose.: 144 mg/dL (14 Jun 2021 08:17)  POCT Blood Glucose.: 139 mg/dL (13 Jun 2021 20:56)  POCT Blood Glucose.: 178 mg/dL (13 Jun 2021 17:30)    I&O's Summary    13 Jun 2021 07:01  -  14 Jun 2021 07:00  --------------------------------------------------------  IN: 2130 mL / OUT: 0 mL / NET: 2130 mL    14 Jun 2021 07:01  -  14 Jun 2021 15:09  --------------------------------------------------------  IN: 680 mL / OUT: 400 mL / NET: 280 mL        PHYSICAL EXAM:  Vital Signs Last 24 Hrs  T(C): 36.7 (14 Jun 2021 13:43), Max: 36.8 (13 Jun 2021 17:30)  T(F): 98.1 (14 Jun 2021 13:43), Max: 98.3 (13 Jun 2021 17:30)  HR: 75 (14 Jun 2021 13:43) (72 - 86)  BP: 126/80 (14 Jun 2021 13:43) (120/70 - 136/89)  BP(mean): --  RR: 18 (14 Jun 2021 13:43) (16 - 18)  SpO2: 97% (14 Jun 2021 13:43) (94% - 98%)  CONSTITUTIONAL: NAD, well-developed, well-groomed  RESPIRATORY: Normal respiratory effort; lungs are clear to auscultation bilaterally  CARDIOVASCULAR: Regular rate and rhythm, normal S1 and S2, no murmur/rub/gallop; No lower extremity edema  ABDOMEN: Nontender to palpation, normoactive bowel sounds, no rebound/guarding  MUSCULOSKELETAL: No clubbing or cyanosis of digits; no joint swelling or tenderness to palpation  PSYCH: A+O to person, place, and time; affect appropriate  NEUROLOGY: CN 2-12 are intact and symmetric; no gross sensory deficits   SKIN: No rashes; no palpable lesions    LABS:                        10.8   3.72  )-----------( 243      ( 14 Jun 2021 06:38 )             34.2     06-14    137  |  104  |  21  ----------------------------<  159<H>  3.7   |  21<L>  |  1.47<H>    Ca    10.0      14 Jun 2021 06:38  Phos  4.9     06-13  Mg     1.7     06-14    TPro  8.0  /  Alb  3.5  /  TBili  0.4  /  DBili  x   /  AST  15  /  ALT  16  /  AlkPhos  267<H>  06-14                RADIOLOGY & ADDITIONAL TESTS:  Results Reviewed:   Imaging Personally Reviewed:  Electrocardiogram Personally Reviewed:    COORDINATION OF CARE:  Care Discussed with Consultants/Other Providers [Y/N]:  Prior or Outpatient Records Reviewed [Y/N]:

## 2021-06-14 NOTE — PROGRESS NOTE ADULT - SUBJECTIVE AND OBJECTIVE BOX
51y old  Female who presents with a chief complaint of Valacyclovir resistant HSV (14 Jun 2021 15:09)      Interval history:  Afebrile, dysuria again.       Allergies:   No Known Allergies      Antimicrobials:  bictegravir 50 mG/emtricitabine 200 mG/tenofovir alafenamide 25 mG (BIKTARVY) 1 Tablet(s) Oral daily  foscarnet (Peripheral) IVPB 2100 milliGRAM(s) IV Intermittent every 12 hours      REVIEW OF SYSTEMS:  No chest pain   No SOB  No abdominal pain  No rash.       Vital Signs Last 24 Hrs  T(C): 36.7 (06-14-21 @ 13:43), Max: 36.8 (06-13-21 @ 17:30)  T(F): 98.1 (06-14-21 @ 13:43), Max: 98.3 (06-13-21 @ 17:30)  HR: 75 (06-14-21 @ 13:43) (72 - 86)  BP: 126/80 (06-14-21 @ 13:43) (120/70 - 136/89)  BP(mean): --  RR: 18 (06-14-21 @ 13:43) (16 - 18)  SpO2: 97% (06-14-21 @ 13:43) (94% - 98%)      PHYSICAL EXAM:  Patient in no acute distress. AAOX3.  No icterus, no oral ulcers.  Cardiovascular: S1S2 normal.  Lungs: + air entry B/L lung fields.  Gastrointestinal: soft, nontender, nondistended.  Extremities: no edema.  IV sites not inflamed.                             10.8   3.72  )-----------( 243      ( 14 Jun 2021 06:38 )             34.2   06-14    137  |  104  |  21  ----------------------------<  159<H>  3.7   |  21<L>  |  1.47<H>    Ca    10.0      14 Jun 2021 06:38  Phos  4.9     06-13  Mg     1.7     06-14    TPro  8.0  /  Alb  3.5  /  TBili  0.4  /  DBili  x   /  AST  15  /  ALT  16  /  AlkPhos  267<H>  06-14      LIVER FUNCTIONS - ( 14 Jun 2021 06:38 )  Alb: 3.5 g/dL / Pro: 8.0 g/dL / ALK PHOS: 267 U/L / ALT: 16 U/L / AST: 15 U/L / GGT: x               Radiology:  < from: MR Pelvis w/wo IV Cont (06.11.21 @ 21:20) >  IMPRESSION:  Bulky labia minora without easily delineated mass. No definite disruption of tissue planes.    Left inguinal lymphadenopathy.

## 2021-06-15 LAB
ALBUMIN SERPL ELPH-MCNC: 3.7 G/DL — SIGNIFICANT CHANGE UP (ref 3.3–5)
ALP SERPL-CCNC: 286 U/L — HIGH (ref 40–120)
ALT FLD-CCNC: 18 U/L — SIGNIFICANT CHANGE UP (ref 10–45)
ANION GAP SERPL CALC-SCNC: 13 MMOL/L — SIGNIFICANT CHANGE UP (ref 5–17)
AST SERPL-CCNC: 17 U/L — SIGNIFICANT CHANGE UP (ref 10–40)
BILIRUB SERPL-MCNC: 0.4 MG/DL — SIGNIFICANT CHANGE UP (ref 0.2–1.2)
BUN SERPL-MCNC: 19 MG/DL — SIGNIFICANT CHANGE UP (ref 7–23)
CALCIUM SERPL-MCNC: 10.1 MG/DL — SIGNIFICANT CHANGE UP (ref 8.4–10.5)
CHLORIDE SERPL-SCNC: 103 MMOL/L — SIGNIFICANT CHANGE UP (ref 96–108)
CO2 SERPL-SCNC: 19 MMOL/L — LOW (ref 22–31)
CREAT SERPL-MCNC: 1.44 MG/DL — HIGH (ref 0.5–1.3)
CULTURE RESULTS: SIGNIFICANT CHANGE UP
GLUCOSE BLDC GLUCOMTR-MCNC: 130 MG/DL — HIGH (ref 70–99)
GLUCOSE BLDC GLUCOMTR-MCNC: 151 MG/DL — HIGH (ref 70–99)
GLUCOSE BLDC GLUCOMTR-MCNC: 178 MG/DL — HIGH (ref 70–99)
GLUCOSE BLDC GLUCOMTR-MCNC: 222 MG/DL — HIGH (ref 70–99)
GLUCOSE SERPL-MCNC: 156 MG/DL — HIGH (ref 70–99)
HCT VFR BLD CALC: 34.8 % — SIGNIFICANT CHANGE UP (ref 34.5–45)
HGB BLD-MCNC: 11 G/DL — LOW (ref 11.5–15.5)
MAGNESIUM SERPL-MCNC: 1.7 MG/DL — SIGNIFICANT CHANGE UP (ref 1.6–2.6)
MCHC RBC-ENTMCNC: 29.4 PG — SIGNIFICANT CHANGE UP (ref 27–34)
MCHC RBC-ENTMCNC: 31.6 GM/DL — LOW (ref 32–36)
MCV RBC AUTO: 93 FL — SIGNIFICANT CHANGE UP (ref 80–100)
NRBC # BLD: 0 /100 WBCS — SIGNIFICANT CHANGE UP (ref 0–0)
PLATELET # BLD AUTO: 226 K/UL — SIGNIFICANT CHANGE UP (ref 150–400)
POTASSIUM SERPL-MCNC: 3.8 MMOL/L — SIGNIFICANT CHANGE UP (ref 3.5–5.3)
POTASSIUM SERPL-SCNC: 3.8 MMOL/L — SIGNIFICANT CHANGE UP (ref 3.5–5.3)
PROT SERPL-MCNC: 8.4 G/DL — HIGH (ref 6–8.3)
RBC # BLD: 3.74 M/UL — LOW (ref 3.8–5.2)
RBC # FLD: 15 % — HIGH (ref 10.3–14.5)
SODIUM SERPL-SCNC: 135 MMOL/L — SIGNIFICANT CHANGE UP (ref 135–145)
SPECIMEN SOURCE: SIGNIFICANT CHANGE UP
WBC # BLD: 3.44 K/UL — LOW (ref 3.8–10.5)
WBC # FLD AUTO: 3.44 K/UL — LOW (ref 3.8–10.5)

## 2021-06-15 PROCEDURE — 99233 SBSQ HOSP IP/OBS HIGH 50: CPT

## 2021-06-15 PROCEDURE — 99232 SBSQ HOSP IP/OBS MODERATE 35: CPT

## 2021-06-15 RX ORDER — INSULIN LISPRO 100/ML
5 VIAL (ML) SUBCUTANEOUS
Refills: 0 | Status: DISCONTINUED | OUTPATIENT
Start: 2021-06-15 | End: 2021-06-16

## 2021-06-15 RX ADMIN — Medication 5 UNIT(S): at 18:33

## 2021-06-15 RX ADMIN — Medication 4 UNIT(S): at 08:17

## 2021-06-15 RX ADMIN — SODIUM CHLORIDE 50 MILLILITER(S): 9 INJECTION INTRAMUSCULAR; INTRAVENOUS; SUBCUTANEOUS at 05:46

## 2021-06-15 RX ADMIN — BICTEGRAVIR SODIUM, EMTRICITABINE, AND TENOFOVIR ALAFENAMIDE FUMARATE 1 TABLET(S): 30; 120; 15 TABLET ORAL at 13:00

## 2021-06-15 RX ADMIN — SODIUM CHLORIDE 1000 MILLILITER(S): 9 INJECTION INTRAMUSCULAR; INTRAVENOUS; SUBCUTANEOUS at 11:49

## 2021-06-15 RX ADMIN — HEPARIN SODIUM 5000 UNIT(S): 5000 INJECTION INTRAVENOUS; SUBCUTANEOUS at 05:38

## 2021-06-15 RX ADMIN — FOSCARNET SODIUM 175 MILLIGRAM(S): 24 INJECTION, SOLUTION INTRAVENOUS at 00:20

## 2021-06-15 RX ADMIN — HEPARIN SODIUM 5000 UNIT(S): 5000 INJECTION INTRAVENOUS; SUBCUTANEOUS at 18:32

## 2021-06-15 RX ADMIN — Medication 2: at 08:16

## 2021-06-15 RX ADMIN — Medication 5 UNIT(S): at 14:11

## 2021-06-15 RX ADMIN — INSULIN GLARGINE 24 UNIT(S): 100 INJECTION, SOLUTION SUBCUTANEOUS at 21:23

## 2021-06-15 RX ADMIN — SODIUM CHLORIDE 1000 MILLILITER(S): 9 INJECTION INTRAMUSCULAR; INTRAVENOUS; SUBCUTANEOUS at 23:30

## 2021-06-15 RX ADMIN — Medication 4: at 18:32

## 2021-06-15 RX ADMIN — FOSCARNET SODIUM 175 MILLIGRAM(S): 24 INJECTION, SOLUTION INTRAVENOUS at 11:56

## 2021-06-15 NOTE — PROGRESS NOTE ADULT - ASSESSMENT
52 yo female with PMH HIV, DMII, CKD stage 3 presenting with worsening acute on chronic HSV infection with large vulvar mass on day 20 of 21 of foscarnet.

## 2021-06-15 NOTE — PROGRESS NOTE ADULT - PROBLEM SELECTOR PLAN 1
Medicare Wellness Visit, Female     The best way to live healthy is to have a lifestyle where you eat a well-balanced diet, exercise regularly, limit alcohol use, and quit all forms of tobacco/nicotine, if applicable. Regular preventive services are another way to keep healthy. Preventive services (vaccines, screening tests, monitoring & exams) can help personalize your care plan, which helps you manage your own care. Screening tests can find health problems at the earliest stages, when they are easiest to treat. [facility] follows the current, evidence-based guidelines published by the [facility]el ([facility]) when recommending preventive services for our patients. Because we follow these guidelines, sometimes recommendations change over time as research supports it. (For example, mammograms used to be recommended annually. Even though Medicare will still pay for an annual mammogram, the newer guidelines recommend a mammogram every two years for women of average risk). Of course, you and your doctor may decide to screen more often for some diseases, based on your risk and your co-morbidities (chronic disease you are already diagnosed with). Preventive services for you include:  - Medicare offers their members a free annual wellness visit, which is time for you and your primary care provider to discuss and plan for your preventive service needs. Take advantage of this benefit every year!  -All adults over the age of 72 should receive the recommended pneumonia vaccines. Current USPSTF guidelines recommend a series of two vaccines for the best pneumonia protection.   -All adults should have a flu vaccine yearly and a tetanus vaccine every 10 years.   -All adults age 48 and older should receive the shingles vaccines (series of two vaccines).       -All adults age 38-68 who are overweight should have a diabetes screening test once every three years.   -All adults born between [birth year] and 1965 should be screened once for Hepatitis C.  -Other screening tests and preventive services for persons with diabetes include: an eye exam to screen for diabetic retinopathy, a kidney function test, a foot exam, and stricter control over your cholesterol.   -Cardiovascular screening for adults with routine risk involves an electrocardiogram (ECG) at intervals determined by your doctor.   -Colorectal cancer screenings should be done for adults age 54-65 with no increased risk factors for colorectal cancer. There are a number of acceptable methods of screening for this type of cancer. Each test has its own benefits and drawbacks. Discuss with your doctor what is most appropriate for you during your annual wellness visit. The different tests include: colonoscopy (considered the best screening method), a fecal occult blood test, a fecal DNA test, and sigmoidoscopy.    -A bone mass density test is recommended when a woman turns 65 to screen for osteoporosis. This test is only recommended one time, as a screening. Some providers will use this same test as a disease monitoring tool if you already have osteoporosis. -Breast cancer screenings are recommended every other year for women of normal risk, age 54-69.  -Cervical cancer screenings for women over age 72 are only recommended with certain risk factors.      Here is a list of your current Health Maintenance items (your personalized list of preventive services) with a due date:  Health Maintenance Due   Topic Date Due    Hepatitis C Test  Never done    COVID-19 Vaccine (1) Never done    DTaP/Tdap/Td  (1 - Tdap) Never done    Shingles Vaccine (1 of 2) Never done    Bone Mineral Density   Never done    Pneumococcal Vaccine (1 of 1 - PPSV23) Never done - On foscarnet for resistant HSV infection ; confirmed HSV 2, sensitivities and final pathology pending   - GC/chl, syphillis screen negative  - Vaginal/pelvic US performed on admission, no fluid collections or adnexal masses  - Path: 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   - GYN-ONC eval (patient follows Dr. Eliana Yao outpatient): MRI A+P w david-Bulky labia minora without easily delineated mass. No definite disruption of tissue plane- FU on further recs  - NS 500mL bolus q12 while on foscarnet, monitor CMP. Also on maintenance IVF NS at 50cc/hr   - Per ID: will need to continue Foscarnet inpatient to finish 21 total days (through 6/16).    #Bladder pain, UTI    Patient endorsed new bladder pain when urinating 6/7, also difficulty emptying bladder, no hematuria  Bladder scans negative for retention. UA + , urine cx >100K strep agalactiae grp b, grp b strep susceptible to penicillin, cefazolin. Received Ceftriaxone x2days, Augmentin x1day. Appreciate ID recs.  now with recurrent urgency/frequency-repeat UA negative.  Hold further abx

## 2021-06-15 NOTE — PROGRESS NOTE ADULT - SUBJECTIVE AND OBJECTIVE BOX
[facility] Division of Hospital Medicine  Kellie Cummins MD  Pager (M-F, 6T-7B): [phone]  Other Times:  [phone]    Patient is a 51y old  Female who presents with a chief complaint of Valacyclovir resistant HSV (2021 15:42)      SUBJECTIVE / OVERNIGHT EVENTS:  no acute events overnight  feels well overall  no dysuria, just constantly peeing but also on maintenance ivf  reports only one remaining vulva lesion, no pain     ADDITIONAL REVIEW OF SYSTEMS:    MEDICATIONS  (STANDING):  bictegravir 50 mG/emtricitabine 200 mG/tenofovir alafenamide 25 mG (BIKTARVY) 1 Tablet(s) Oral daily  dextrose 40% Gel 15 Gram(s) Oral once  dextrose 50% Injectable 25 Gram(s) IV Push once  dextrose 50% Injectable 25 Gram(s) IV Push once  foscarnet (Peripheral) IVPB 2100 milliGRAM(s) IV Intermittent every 12 hours  heparin   Injectable 5000 Unit(s) SubCutaneous every 12 hours  insulin glargine Injectable (LANTUS) 24 Unit(s) SubCutaneous at bedtime  insulin lispro (ADMELOG) corrective regimen sliding scale   SubCutaneous at bedtime  insulin lispro (ADMELOG) corrective regimen sliding scale   SubCutaneous three times a day before meals  insulin lispro Injectable (ADMELOG) 4 Unit(s) SubCutaneous three times a day with meals  lidocaine 0.5%/epinephrine 1:200,000 Inj 20 milliLiter(s) Local Injection once  sodium chloride 0.9% Bolus 500 milliLiter(s) IV Bolus <User Schedule>  sodium chloride 0.9%. 1000 milliLiter(s) (50 mL/Hr) IV Continuous <Continuous>    MEDICATIONS  (PRN):  acetaminophen   Tablet .. 975 milliGRAM(s) Oral every 6 hours PRN Mild Pain (1 - 3)  lidocaine 2% Gel 1 Application(s) Topical three times a day PRN Pain in labia region  polyethylene glycol 3350 17 Gram(s) Oral daily PRN Constipation      CAPILLARY BLOOD GLUCOSE      POCT Blood Glucose.: 130 mg/dL (15 Marcelino 2021 13:43)  POCT Blood Glucose.: 151 mg/dL (15 Marcelino 2021 07:55)  POCT Blood Glucose.: 253 mg/dL (2021 21:29)  POCT Blood Glucose.: 250 mg/dL (2021 18:15)    I&O's Summary    2021 07:01  -  15 Marcelino 2021 07:00  --------------------------------------------------------  IN: 3270 mL / OUT: 400 mL / NET: 2870 mL        PHYSICAL EXAM:  Vital Signs Last 24 Hrs  T(C): 36.8 (15 Marcelino 2021 13:18), Max: 36.9 (15 Marcelino 2021 10:39)  T(F): 98.3 (15 Marcelino 2021 13:18), Max: 98.4 (15 Marcelino 2021 10:39)  HR: 64 (15 Marcelino 2021 13:18) (63 - 89)  BP: 128/84 (15 Marcelino 2021 13:18) (124/80 - 143/83)  BP(mean): --  RR: 18 (15 Marcelino 2021 13:18) (18 - 18)  SpO2: 98% (15 Marcelino 2021 13:18) (95% - 98%)  CONSTITUTIONAL: NAD, well-developed, well-groomed  RESPIRATORY: Normal respiratory effort; lungs are clear to auscultation bilaterally  CARDIOVASCULAR: Regular rate and rhythm, normal S1 and S2, no murmur/rub/gallop; No lower extremity edema  ABDOMEN: Nontender to palpation, normoactive bowel sounds, no rebound/guarding  MUSCULOSKELETAL: No clubbing or cyanosis of digits; no joint swelling or tenderness to palpation  PSYCH: A+O to person, place, and time; affect appropriate  NEUROLOGY: CN 2-12 are intact and symmetric; no gross sensory deficits   SKIN: No rashes; no palpable lesions    LABS:                        11.0   3.44  )-----------( 226      ( 15 Marcelino 2021 07:04 )             34.8     06-15    135  |  103  |  19  ----------------------------<  156<H>  3.8   |  19<L>  |  1.44<H>    Ca    10.1      15 Marcelino 2021 07:01  Mg     1.7     06-15    TPro  8.4<H>  /  Alb  3.7  /  TBili  0.4  /  DBili  x   /  AST  17  /  ALT  18  /  AlkPhos  286<H>  06-15          Urinalysis Basic - ( 2021 18:40 )    Color: Light Yellow / Appearance: Clear / S.014 / pH: x  Gluc: x / Ketone: Negative  / Bili: Negative / Urobili: Negative   Blood: x / Protein: 30 mg/dL / Nitrite: Negative   Leuk Esterase: Negative / RBC: 0 /hpf / WBC 1 /HPF   Sq Epi: x / Non Sq Epi: 0 /hpf / Bacteria: Negative          RADIOLOGY & ADDITIONAL TESTS:  Results Reviewed:   Imaging Personally Reviewed:  Electrocardiogram Personally Reviewed:    COORDINATION OF CARE:  Care Discussed with Consultants/Other Providers [Y/N]:  Prior or Outpatient Records Reviewed [Y/N]:   Surgical Defect Width In Cm (Optional): 1.3

## 2021-06-15 NOTE — PROGRESS NOTE ADULT - SUBJECTIVE AND OBJECTIVE BOX
51y old  Female who presents with a chief complaint of Valacyclovir resistant HSV (15 Marcelino 2021 13:44)      Interval history:  Afebrile, feels well, denies dysuria today.       Allergies:   No Known Allergies      Antimicrobials:  bictegravir 50 mG/emtricitabine 200 mG/tenofovir alafenamide 25 mG (BIKTARVY) 1 Tablet(s) Oral daily  foscarnet (Peripheral) IVPB 2100 milliGRAM(s) IV Intermittent every 12 hours        REVIEW OF SYSTEMS:  No chest pain   No SOB  No abdominal pain  No dysuria  No new rash.       Vital Signs Last 24 Hrs  T(C): 36.8 (06-15-21 @ 13:18), Max: 36.9 (06-15-21 @ 10:39)  T(F): 98.3 (06-15-21 @ 13:18), Max: 98.4 (06-15-21 @ 10:39)  HR: 64 (06-15-21 @ 13:18) (63 - 89)  BP: 128/84 (06-15-21 @ 13:18) (124/80 - 143/83)  BP(mean): --  RR: 18 (06-15-21 @ 13:18) (18 - 18)  SpO2: 98% (06-15-21 @ 13:18) (95% - 98%)      PHYSICAL EXAM:  Patient in no acute distress. AAOX3.  No icterus, no oral ulcers.  Cardiovascular: S1S2 normal.  Lungs: + air entry B/L lung fields.  Gastrointestinal: soft, nontender, nondistended.  Extremities: no edema.  IV sites not inflamed.                             11.0   3.44  )-----------( 226      ( 15 Marcelino 2021 07:04 )             34.8   06-15    135  |  103  |  19  ----------------------------<  156<H>  3.8   |  19<L>  |  1.44<H>    Ca    10.1      15 Marcelino 2021 07:01  Mg     1.7     06-15    TPro  8.4<H>  /  Alb  3.7  /  TBili  0.4  /  DBili  x   /  AST  17  /  ALT  18  /  AlkPhos  286<H>  06-15      LIVER FUNCTIONS - ( 15 Marcelino 2021 07:01 )  Alb: 3.7 g/dL / Pro: 8.4 g/dL / ALK PHOS: 286 U/L / ALT: 18 U/L / AST: 17 U/L / GGT: x

## 2021-06-15 NOTE — PROGRESS NOTE ADULT - ASSESSMENT
52 y/o female PMHx Genital Herpes on valacyclovir x2 years, DM on insulin, HIV unknown last CD4 count/viral load now presenting to the ED with worsening labial mass not responding with oral valtrex    overall HIV infection, Resistant HSV 2 infection. + vulvar mass      Plan:   c/w foscarnet, clinically improving gradually, plan for 21 days of therapy, day 20/21 today.   Pt to receive last dose of foscarnet tomorrow afternoon and then can be discharged after.   c/w 500 ml bolus of NS prior to each foscarnet infusion q12h   c/w 50cc/hr of maintenance  fluid.   monitor CMP with Mg daily as it can lead to significant electrolyte abnormalities  monitor CBC daily to monitor WBC, trending down gradually.   GC/Chl negative   syphilis screen negative   Gyn on board,   path with ulceration, chronic inflammation, squamous dysplasia.   s/p Gyn Oncology eval, follow up in a month.       HIV infection: well controlled   c/w bicktarvy       UTI: dysuria again   U/A with no pyuria.          Plan discussed with Medicine NP        Nida Huitron  Pager: [phone]. If no response or past 5 pm call [phone].     52 y/o female PMHx Genital Herpes on valacyclovir x2 years, DM on insulin, HIV unknown last CD4 count/viral load now presenting to the ED with worsening labial mass not responding with oral valtrex    overall HIV infection, Resistant HSV 2 infection. + vulvar mass      Plan:   c/w foscarnet, clinically improving gradually, plan for 21 days of therapy, day 20/21 today.   Pt to receive last dose of foscarnet tomorrow afternoon and then can be discharged after.   c/w 500 ml bolus of NS prior to each foscarnet infusion q12h   c/w 50cc/hr of maintenance  fluid.   monitor CMP with Mg daily as it can lead to significant electrolyte abnormalities  monitor CBC daily to monitor WBC, trending down gradually.   GC/Chl negative   syphilis screen negative   Gyn on board,   path with ulceration, chronic inflammation, squamous dysplasia.   s/p Gyn Oncology eval, follow up in a month as outpt       HIV infection: well controlled   c/w bicktarvy       UTI: dysuria again   U/A with no pyuria.   urine cx in lab        Plan discussed with Medicine NP  pt to follow up in ID clinic in 2 weeks with Mikhail Huitron  Pager: [phone]. If no response or past 5 pm call [phone].

## 2021-06-16 ENCOUNTER — TRANSCRIPTION ENCOUNTER (OUTPATIENT)
Age: 51
End: 2021-06-16

## 2021-06-16 VITALS
DIASTOLIC BLOOD PRESSURE: 84 MMHG | OXYGEN SATURATION: 96 % | HEART RATE: 81 BPM | TEMPERATURE: 98 F | RESPIRATION RATE: 17 BRPM | SYSTOLIC BLOOD PRESSURE: 136 MMHG

## 2021-06-16 LAB
GLUCOSE BLDC GLUCOMTR-MCNC: 110 MG/DL — HIGH (ref 70–99)
GLUCOSE BLDC GLUCOMTR-MCNC: 136 MG/DL — HIGH (ref 70–99)

## 2021-06-16 PROCEDURE — 87536 HIV-1 QUANT&REVRSE TRNSCRPJ: CPT

## 2021-06-16 PROCEDURE — 86359 T CELLS TOTAL COUNT: CPT

## 2021-06-16 PROCEDURE — 80053 COMPREHEN METABOLIC PANEL: CPT

## 2021-06-16 PROCEDURE — 85018 HEMOGLOBIN: CPT

## 2021-06-16 PROCEDURE — 82947 ASSAY GLUCOSE BLOOD QUANT: CPT

## 2021-06-16 PROCEDURE — 84702 CHORIONIC GONADOTROPIN TEST: CPT

## 2021-06-16 PROCEDURE — 82565 ASSAY OF CREATININE: CPT

## 2021-06-16 PROCEDURE — 85027 COMPLETE CBC AUTOMATED: CPT

## 2021-06-16 PROCEDURE — 87591 N.GONORRHOEAE DNA AMP PROB: CPT

## 2021-06-16 PROCEDURE — 82330 ASSAY OF CALCIUM: CPT

## 2021-06-16 PROCEDURE — 99285 EMERGENCY DEPT VISIT HI MDM: CPT | Mod: 25

## 2021-06-16 PROCEDURE — 81025 URINE PREGNANCY TEST: CPT

## 2021-06-16 PROCEDURE — U0005: CPT

## 2021-06-16 PROCEDURE — 86780 TREPONEMA PALLIDUM: CPT

## 2021-06-16 PROCEDURE — 85025 COMPLETE CBC W/AUTO DIFF WBC: CPT

## 2021-06-16 PROCEDURE — 84295 ASSAY OF SERUM SODIUM: CPT

## 2021-06-16 PROCEDURE — U0003: CPT

## 2021-06-16 PROCEDURE — 93975 VASCULAR STUDY: CPT

## 2021-06-16 PROCEDURE — 72197 MRI PELVIS W/O & W/DYE: CPT

## 2021-06-16 PROCEDURE — 83735 ASSAY OF MAGNESIUM: CPT

## 2021-06-16 PROCEDURE — 87491 CHLMYD TRACH DNA AMP PROBE: CPT

## 2021-06-16 PROCEDURE — 81001 URINALYSIS AUTO W/SCOPE: CPT

## 2021-06-16 PROCEDURE — 99239 HOSP IP/OBS DSCHRG MGMT >30: CPT

## 2021-06-16 PROCEDURE — 86360 T CELL ABSOLUTE COUNT/RATIO: CPT

## 2021-06-16 PROCEDURE — 87255 GENET VIRUS ISOLATE HSV: CPT

## 2021-06-16 PROCEDURE — 74183 MRI ABD W/O CNTR FLWD CNTR: CPT

## 2021-06-16 PROCEDURE — 87529 HSV DNA AMP PROBE: CPT

## 2021-06-16 PROCEDURE — 80074 ACUTE HEPATITIS PANEL: CPT

## 2021-06-16 PROCEDURE — 82435 ASSAY OF BLOOD CHLORIDE: CPT

## 2021-06-16 PROCEDURE — 83036 HEMOGLOBIN GLYCOSYLATED A1C: CPT

## 2021-06-16 PROCEDURE — 84132 ASSAY OF SERUM POTASSIUM: CPT

## 2021-06-16 PROCEDURE — 84100 ASSAY OF PHOSPHORUS: CPT

## 2021-06-16 PROCEDURE — 76830 TRANSVAGINAL US NON-OB: CPT

## 2021-06-16 PROCEDURE — 83605 ASSAY OF LACTIC ACID: CPT

## 2021-06-16 PROCEDURE — 82803 BLOOD GASES ANY COMBINATION: CPT

## 2021-06-16 PROCEDURE — 85014 HEMATOCRIT: CPT

## 2021-06-16 PROCEDURE — 82962 GLUCOSE BLOOD TEST: CPT

## 2021-06-16 PROCEDURE — 87086 URINE CULTURE/COLONY COUNT: CPT

## 2021-06-16 PROCEDURE — 87798 DETECT AGENT NOS DNA AMP: CPT

## 2021-06-16 PROCEDURE — 86769 SARS-COV-2 COVID-19 ANTIBODY: CPT

## 2021-06-16 PROCEDURE — A9585: CPT

## 2021-06-16 PROCEDURE — 88305 TISSUE EXAM BY PATHOLOGIST: CPT

## 2021-06-16 RX ORDER — BICTEGRAVIR SODIUM, EMTRICITABINE, AND TENOFOVIR ALAFENAMIDE FUMARATE 30; 120; 15 MG/1; MG/1; MG/1
1 TABLET ORAL
Qty: 0 | Refills: 0 | DISCHARGE

## 2021-06-16 RX ORDER — POLYETHYLENE GLYCOL 3350 17 G/17G
17 POWDER, FOR SOLUTION ORAL
Qty: 0 | Refills: 0 | DISCHARGE
Start: 2021-06-16

## 2021-06-16 RX ORDER — BICTEGRAVIR SODIUM, EMTRICITABINE, AND TENOFOVIR ALAFENAMIDE FUMARATE 30; 120; 15 MG/1; MG/1; MG/1
1 TABLET ORAL
Qty: 0 | Refills: 0 | DISCHARGE
Start: 2021-06-16

## 2021-06-16 RX ADMIN — FOSCARNET SODIUM 175 MILLIGRAM(S): 24 INJECTION, SOLUTION INTRAVENOUS at 05:00

## 2021-06-16 RX ADMIN — Medication 5 UNIT(S): at 08:22

## 2021-06-16 RX ADMIN — BICTEGRAVIR SODIUM, EMTRICITABINE, AND TENOFOVIR ALAFENAMIDE FUMARATE 1 TABLET(S): 30; 120; 15 TABLET ORAL at 11:38

## 2021-06-16 RX ADMIN — HEPARIN SODIUM 5000 UNIT(S): 5000 INJECTION INTRAVENOUS; SUBCUTANEOUS at 05:45

## 2021-06-16 NOTE — DISCHARGE NOTE PROVIDER - CARE PROVIDER_API CALL
Mariajose Bardales  GYNECOLOGIC ONCOLOGY  [address] [place] [address]  Phone: [phone]  Fax: [phone]  Follow Up Time: 1 month    Nida Henderson)  Infectious Disease; Internal Medicine  [address] [place] [id number]  Phone: [phone]  Fax: [phone]  Follow Up Time: 2 weeks

## 2021-06-16 NOTE — PROGRESS NOTE ADULT - PROBLEM SELECTOR PLAN 2
- uncontrolled, hbg A1c 9.0  - Endocrinologist Dr. Wilkes - last seen 4/2021. As per last progress note was supposed to be on Lantus 24, humalog 8U w/ meals, statin, hctz but patient only endorses lantus 20U + 10Uhumalog w/ meals  - Continue Lantus and increase mealtime lispro for uncontrolled sugars  - Will adjust Insulin regimen prn  - Continue insulin sliding scale  - Carb controlled diet
- uncontrolled, hbg A1c 9.0  - Endocrinologist Dr. Wilkes - last seen 4/2021. As per last progress note was supposed to be on Lantus 24, humalog 8U w/ meals, statin, hctz but patient only endorses lantus 20U + humalog   - improved after addition of premeal insulin. Continue Lantus 24U, Admelog pre meal TID 4U  - Will adjust Insulin regimen prn  - Continue insulin sliding scale  - Carb controlled diet
- uncontrolled, hbg A1c 9.0  - Endocrinologist Dr. Wilkes - last seen 4/2021. As per last progress note was supposed to be on Lantus 24, humalog 8U w/ meals, statin, hctz but patient only endorses lantus 20U + 10Uhumalog w/ meals  - Continue Lantus and Admelog.   - Will adjust Insulin regimen prn  - Continue insulin sliding scale  - Carb controlled diet
- uncontrolled, hbg A1c 9.0  - Endocrinologist Dr. Wilkes - last seen 4/2021. As per last progress note was supposed to be on Lantus 24, humalog 8U w/ meals, statin, hctz but patient only endorses lantus 20U + 10Uhumalog w/ meals  - Continue Lantus and Admelog.   - Will adjust Insulin regimen prn  - Continue insulin sliding scale  - Carb controlled diet
- uncontrolled, hbg A1c 9.0  - Endocrinologist Dr. Wilkes - last seen 4/2021. As per last progress note was supposed to be on lantus 24, humalog 8U w/ meals, statin, hctz but patient only endorses lantus 20U + humalog   - FS well controlled  - c/w Lantus 20U qhs   - c/w insulin scale  - carb controlled diet
- uncontrolled, hbg A1c 9.0  - Endocrinologist Dr. Wilkes - last seen 4/2021. As per last progress note was supposed to be on lantus 24, humalog 8U w/ meals, statin, hctz but patient only endorses lantus + humalog   - c/w home lantus 20U, insulin scale  - carb controlled diet
- uncontrolled, hbg A1c 9.0  - Endocrinologist Dr. Wilkes - last seen 4/2021. As per last progress note was supposed to be on lantus 24, humalog 8U w/ meals, statin, hctz but patient only endorses lantus 20U + humalog   - improved after addition of premeal. will adjust Insulin regimen prn  - c/w insulin scale  - carb controlled diet
- uncontrolled, hbg A1c 9.0  - Endocrinologist Dr. Wilkes - last seen 4/2021. As per last progress note was supposed to be on Lantus 24, humalog 8U w/ meals, statin, hctz but patient only endorses lantus 20U + 10Uhumalog w/ meals  - Continue Lantus and Admelog.   - Will adjust Insulin regimen prn  - Continue insulin sliding scale  - Carb controlled diet
- uncontrolled, hbg A1c 9.0  - Endocrinologist Dr. Wilkes - last seen 4/2021. As per last progress note was supposed to be on lantus 24, humalog 8U w/ meals, statin, hctz but patient only endorses lantus 20U + humalog   - FS this  ; c/w Lantus 20U qhs for now  - if AM FS persistently > 200 will increase Lantus to 22U qhs  - c/w insulin scale  - carb controlled diet
- uncontrolled, hbg A1c 9.0  - Endocrinologist Dr. Wilkes - last seen 4/2021. As per last progress note was supposed to be on Lantus 24, humalog 8U w/ meals, statin, hctz but patient only endorses lantus 20U + 10Uhumalog w/ meals  - Continue Lantus 24U, Admelog pre meal TID 4U  - Will adjust Insulin regimen prn  - Continue insulin sliding scale  - Carb controlled diet
- uncontrolled, hbg A1c 9.0  - Endocrinologist Dr. Wilkes - last seen 4/2021. As per last progress note was supposed to be on lantus 24, humalog 8U w/ meals, statin, hctz but patient only endorses lantus 20U + humalog   - improved after addition of premeal. will adjust Insulin regimen prn  - c/w insulin scale  - carb controlled diet
- uncontrolled, hbg A1c 9.0  - Endocrinologist Dr. Wilkes - last seen 4/2021. As per last progress note was supposed to be on lantus 24, humalog 8U w/ meals, statin, hctz but patient only endorses lantus 20U + humalog   - will adjust Insulin regimen for better control  - c/w insulin scale  - carb controlled diet
- uncontrolled, hbg A1c 9.0  - Endocrinologist Dr. Wilkes - last seen 4/2021. As per last progress note was supposed to be on Lantus 24, humalog 8U w/ meals, statin, hctz but patient only endorses lantus 20U + 10Uhumalog w/ meals  - Continue Lantus 24U, Admelog pre meal TID 4U  - Will adjust Insulin regimen prn  - Continue insulin sliding scale  - Carb controlled diet
- uncontrolled, hbg A1c 9.0  - Endocrinologist Dr. Wilkes - last seen 4/2021. As per last progress note was supposed to be on Lantus 24, humalog 8U w/ meals, statin, hctz but patient only endorses lantus 20U + 10Uhumalog w/ meals  - Continue Lantus and increase mealtime lispro for uncontrolled sugars  - Will adjust Insulin regimen prn  - Continue insulin sliding scale  - Carb controlled diet
- uncontrolled, hbg A1c 9.0  - Endocrinologist Dr. Wilkes - last seen 4/2021. As per last progress note was supposed to be on lantus 24, humalog 8U w/ meals, statin, hctz but patient only endorses lantus 20U + humalog   - c/w home lantus 20U, insulin scale  - carb controlled diet
- uncontrolled, hbg A1c 9.0  - Endocrinologist Dr. Wilkes - last seen 4/2021. As per last progress note was supposed to be on lantus 24, humalog 8U w/ meals, statin, hctz but patient only endorses lantus 20U + humalog   - improved after addition of premeal. will adjust Insulin regimen prn  - c/w insulin scale  - carb controlled diet
- uncontrolled, hbg A1c 9.0  - Endocrinologist Dr. Wilkes - last seen 4/2021. As per last progress note was supposed to be on Lantus 24, humalog 8U w/ meals, statin, hctz but patient only endorses lantus 20U + humalog   - improved after addition of premeal. Increase Lantus to 22U, Admelog pre meal TID to 4U  - Will adjust Insulin regimen prn  - c/w insulin sliding scale  - carb controlled diet
- uncontrolled, hbg A1c 9.0  - Endocrinologist Dr. Wilkes - last seen 4/2021. As per last progress note was supposed to be on Lantus 24, humalog 8U w/ meals, statin, hctz but patient only endorses lantus 20U + 10Uhumalog w/ meals  - Continue Lantus 24U, Admelog pre meal TID 4U  - Will adjust Insulin regimen prn  - Continue insulin sliding scale  - Carb controlled diet
- uncontrolled, hbg A1c 9.0  - Endocrinologist Dr. Wilkes - last seen 4/2021. As per last progress note was supposed to be on Lantus 24, humalog 8U w/ meals, statin, hctz but patient only endorses lantus 20U + humalog   - improved after addition of premeal. Increase Lantus to 24U, continue Admelog pre meal TID 4U  - Will adjust Insulin regimen prn  - Continue insulin sliding scale  - Carb controlled diet
- uncontrolled, hbg A1c 9.0  - Endocrinologist Dr. Wilkes - last seen 4/2021. As per last progress note was supposed to be on Lantus 24, humalog 8U w/ meals, statin, hctz but patient only endorses lantus 20U + 10Uhumalog w/ meals  - Continue Lantus 24U, Admelog pre meal TID 4U  - Will adjust Insulin regimen prn  - Continue insulin sliding scale  - Carb controlled diet

## 2021-06-16 NOTE — DISCHARGE NOTE PROVIDER - NSDCFUADDAPPT_GEN_ALL_CORE_FT
Follow up ID clinic in 2 weeks with Mikhail Huang.   Follow up with Dr. Mariajose Bardaels in 3-4 weeks after discharge for examination and surgical planning

## 2021-06-16 NOTE — DISCHARGE NOTE PROVIDER - HOSPITAL COURSE
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 GC/chl, syphillis screen negative, Vaginal/pelvic US performed on admission, no fluid collections or adnexal masses. 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 GYN-ONC eval (patient follows Dr. Eliana Yao outpatient): MRI A+P w david-Bulky labia minora without easily delineated mass. No definite disruption of tissue plane- FU on further recs. Seen by ID, recs Foscarnet inpatient to finish 21 total days through 6/16/ Pt also had bladder pain when urinating 6/7, also difficulty emptying bladder, no hematuria. Bladder scans negative for retention. UA + , urine cx >100K strep agalactiae grp b, grp b strep susceptible to penicillin, cefazolin. Received Ceftriaxone x2days, Augmentin x1day. No further abx required. Pt is medically stable and optimized to DC home w/ outpatient follow up. Pt is s/p pfizer x2 as outpatient.

## 2021-06-16 NOTE — PROGRESS NOTE ADULT - ASSESSMENT
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

## 2021-06-16 NOTE — DISCHARGE NOTE PROVIDER - NSDCCPCAREPLAN_GEN_ALL_CORE_FT
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 Eliana Yao outpatient): MRI shows david-Bulky labia minora without easily delineated mass  - No definite disruption of tissue plane  - Seen by cj ACEVEDO Foscarnet inpatient to finish 21 total days through 6/16  - Outpatient follow up      SECONDARY DISCHARGE DIAGNOSES  Diagnosis: HIV (human immunodeficiency virus infection)  Assessment and Plan of Treatment: - Continue Biktarvy, well controlled - HIV viral load undetectable.    Diagnosis: Chronic kidney disease (CKD), stage III (moderate)  Assessment and Plan of Treatment: - Outpatiet follow up for monitoring Cr    Diagnosis: Diabetes mellitus  Assessment and Plan of Treatment: HgA1C this admission 9.0  Make sure you get your HgA1c checked every three months.  If you take oral diabetes medications, check your blood glucose two times a day.  If you take insulin, check your blood glucose before meals and at bedtime.  It's important not to skip any meals.  Keep a log of your blood glucose results and always take it with you to your doctor appointments.  Keep a list of your current medications including injectables and over the counter medications and bring this medication list with you to all your doctor appointments.  If you have not seen your ophthalmologist this year call for appointment.  Check your feet daily for redness, sores, or openings. Do not self treat. If no improvement in two days call your primary care physician for an appointment.  Low blood sugar (hypoglycemia) is a blood sugar below 70mg/dl. Check your blood sugar if you feel signs/symptoms of hypoglycemia. If your blood sugar is below 70 take 15 grams of carbohydrates (ex 4 oz of apple juice, 3-4 glucose tablets, or 4-6 oz of regular soda) wait 15 minutes and repeat blood sugar to make sure it comes up above 70.  If your blood sugar is above 70 and you are due for a meal, have a meal.  If you are not due for a meal have a snack.  This snack helps keeps your blood sugar at a safe range.      Diagnosis: UTI (urinary tract infection)  Assessment and Plan of Treatment: - Bladder scans negative for retention  - Urine culture shows >100K strep agalactiae grp b, grp b strep susceptible to penicillin, cefazolin  - Received Ceftriaxone x2days, Augmentin x1day.   - No further antibiotic needed  - Outpatient follow up

## 2021-06-16 NOTE — PROGRESS NOTE ADULT - NSPROGADDITIONALINFOA_GEN_ALL_CORE
reviewed with DORI Nunn
discharge time: 45 min. Reviewed plan with patient at bedside and with DORI Petersen
d/w DORI Nunn
Discussed with Rick Lira ACP

## 2021-06-16 NOTE — DISCHARGE NOTE PROVIDER - NSDCMRMEDTOKEN_GEN_ALL_CORE_FT
bictegravir/emtricitabine/tenofovir 50 mg-200 mg-25 mg oral tablet: 1 tab(s) orally once a day  HumaLOG KwikPen 100 units/mL injectable solution: 10  injectable 3 times a day, As Needed  Lantus 100 units/mL subcutaneous solution: 20 unit(s) subcutaneous once a day (at bedtime)  polyethylene glycol 3350 oral powder for reconstitution: 17 gram(s) orally once a day, As needed, Constipation

## 2021-06-16 NOTE — PROGRESS NOTE ADULT - PROBLEM SELECTOR PLAN 7
DVT ppx: hep subq        Case and plan discussed with ACP: Feliciano
DVT ppx: hep subq        Case and plan discussed with ACP: Deandra
DVT ppx: hep subq
DVT ppx: hep subq
DVT ppx: hep subq        Case and plan discussed with ACP: Ashley
DVT ppx: hep subq        Case and plan discussed with ACP: Beba
DVT ppx: hep subq        Case and plan discussed with ACP: Ashley
DVT ppx: hep subq
DVT ppx: hep subq        Discussed with ACP
DVT ppx: hep subq
DVT ppx: hep subq        Case and plan discussed with ACP: Renetta

## 2021-06-16 NOTE — DISCHARGE NOTE PROVIDER - PROVIDER TOKENS
PROVIDER:[TOKEN:[[id number]],FOLLOWUP:[1 month]],PROVIDER:[TOKEN:[[id number]],FOLLOWUP:[2 weeks]]

## 2021-06-16 NOTE — PROGRESS NOTE ADULT - PROVIDER SPECIALTY LIST ADULT
Hospitalist
Hospitalist
Infectious Disease
Hospitalist
Infectious Disease
GYN
GYN
Hospitalist
Hospitalist
Infectious Disease
Infectious Disease
Hospitalist
Infectious Disease
Hospitalist

## 2021-06-16 NOTE — DISCHARGE NOTE PROVIDER - NSDCFUSCHEDAPPT_GEN_ALL_CORE_FT
TIEN SHARPE Y ; 07/14/2021 ; NPP Gynonc [address] [place] TIEN Delgado Y ; 07/27/2021 ; NPP Med Endocr [address] [facility] TIEN SHARPE Y ; 09/14/2021 ; NPP Med Endocr [address]  TIEN SHARPE Y ; 09/15/2021 ; NPP Gynonc [address] [place]

## 2021-06-16 NOTE — PROGRESS NOTE ADULT - PROBLEM SELECTOR PLAN 6
- Continue Biktarvy, well controlled - HIV viral load undetectable

## 2021-06-16 NOTE — PROGRESS NOTE ADULT - NSICDXPILOT_GEN_ALL_CORE
[place]

## 2021-06-16 NOTE — DISCHARGE NOTE PROVIDER - CARE PROVIDERS DIRECT ADDRESSES
[email or web address]

## 2021-06-16 NOTE — PROGRESS NOTE ADULT - PROBLEM SELECTOR PLAN 4
Discussed with pt that she will need outpatient Renal f/u within 2 weeks of discharge.   Nutrition consult for low phos diet  Trend P
Discussed with pt that she will need outpatient Renal f/u within 2 weeks of discharge.   Nutrition consult for low phos diet
d/w pt that she will need outpt Renal f/u within 2 weeks of discharge. nutrition consult for low phos diet ordered
Discussed with pt that she will need outpatient Renal f/u within 2 weeks of discharge.   Nutrition consult for low phos diet
Discussed with pt that she will need outpatient Renal f/u within 2 weeks of discharge.   Nutrition consult for low phos diet  Trend P
- continue Biktarvy, well controlled - HIV viral load undetectable
- continue Biktarvy, follow up HIV viral load
- continue Biktarvy, well controlled - HIV viral load undetectable
Discussed with pt that she will need outpatient Renal f/u within 2 weeks of discharge.   Nutrition consult for low phos diet
Discussed with pt that she will need outpatient Renal f/u within 2 weeks of discharge.   Nutrition consult for low phos diet  Trend P
- continue Biktarvy, well controlled - HIV viral load undetectable
Discussed with pt that she will need outpatient Renal f/u within 2 weeks of discharge.   Nutrition consult for low phos diet
- continue Biktarvy, well controlled - HIV viral load undetectable
Discussed with pt that she will need outpatient Renal f/u within 2 weeks of discharge.   Nutrition consult for low phos diet
- continue Biktarvy, well controlled - HIV viral load undetectable
- continue Biktarvy, well controlled - HIV viral load undetectable
Discussed with pt that she will need outpatient Renal f/u within 2 weeks of discharge.   Nutrition consult for low phos diet
Discussed with pt that she will need outpatient Renal f/u within 2 weeks of discharge.   Nutrition consult for low phos diet  Trend P
Discussed with pt that she will need outpatient Renal f/u within 2 weeks of discharge.   Nutrition consult for low phos diet  Trend P
d/w pt that she will need outpt Renal f/u within 2 weeks of discharge. nutrition consult for low phos diet

## 2021-06-16 NOTE — PROGRESS NOTE ADULT - SUBJECTIVE AND OBJECTIVE BOX
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)      SUBJECTIVE / OVERNIGHT EVENTS:  no acute events overnight but dose of foscarnet was delayed  not willing to stay for afternoon dosage  says lesion is now small and has f/u with gyn onc already arranged  no n/v/d    ADDITIONAL REVIEW OF SYSTEMS:    MEDICATIONS  (STANDING):  bictegravir 50 mG/emtricitabine 200 mG/tenofovir alafenamide 25 mG (BIKTARVY) 1 Tablet(s) Oral daily  dextrose 40% Gel 15 Gram(s) Oral once  dextrose 50% Injectable 25 Gram(s) IV Push once  dextrose 50% Injectable 25 Gram(s) IV Push once  foscarnet (Peripheral) IVPB 2100 milliGRAM(s) IV Intermittent every 12 hours  heparin   Injectable 5000 Unit(s) SubCutaneous every 12 hours  insulin glargine Injectable (LANTUS) 24 Unit(s) SubCutaneous at bedtime  insulin lispro (ADMELOG) corrective regimen sliding scale   SubCutaneous at bedtime  insulin lispro (ADMELOG) corrective regimen sliding scale   SubCutaneous three times a day before meals  insulin lispro Injectable (ADMELOG) 5 Unit(s) SubCutaneous three times a day with meals  lidocaine 0.5%/epinephrine 1:200,000 Inj 20 milliLiter(s) Local Injection once  sodium chloride 0.9% Bolus 500 milliLiter(s) IV Bolus <User Schedule>  sodium chloride 0.9%. 1000 milliLiter(s) (50 mL/Hr) IV Continuous <Continuous>    MEDICATIONS  (PRN):  acetaminophen   Tablet .. 975 milliGRAM(s) Oral every 6 hours PRN Mild Pain (1 - 3)  lidocaine 2% Gel 1 Application(s) Topical three times a day PRN Pain in labia region  polyethylene glycol 3350 17 Gram(s) Oral daily PRN Constipation      CAPILLARY BLOOD GLUCOSE      POCT Blood Glucose.: 110 mg/dL (2021 12:08)  POCT Blood Glucose.: 136 mg/dL (2021 07:50)  POCT Blood Glucose.: 178 mg/dL (15 Marcelino 2021 21:12)  POCT Blood Glucose.: 222 mg/dL (15 Marcelino 2021 17:59)  POCT Blood Glucose.: 130 mg/dL (15 Marcelino 2021 13:43)    I&O's Summary    15 Marcelino 2021 07:01  -  2021 07:00  --------------------------------------------------------  IN: 2460 mL / OUT: 0 mL / NET: 2460 mL    2021 07:01  -  2021 13:24  --------------------------------------------------------  IN: 780 mL / OUT: 0 mL / NET: 780 mL        PHYSICAL EXAM:  Vital Signs Last 24 Hrs  T(C): 36.9 (2021 12:49), Max: 36.9 (2021 12:49)  T(F): 98.4 (2021 12:49), Max: 98.4 (2021 12:49)  HR: 81 (2021 12:49) (72 - 95)  BP: 136/84 (2021 12:49) (132/86 - 143/84)  BP(mean): --  RR: 17 (2021 12:49) (17 - 18)  SpO2: 96% (2021 12:49) (94% - 99%)  CONSTITUTIONAL: NAD, well-developed, well-groomed  RESPIRATORY: Normal respiratory effort; lungs are clear to auscultation bilaterally  CARDIOVASCULAR: Regular rate and rhythm, normal S1 and S2, no murmur/rub/gallop; No lower extremity edema  ABDOMEN: Nontender to palpation, normoactive bowel sounds, no rebound/guarding  MUSCULOSKELETAL: No clubbing or cyanosis of digits; no joint swelling or tenderness to palpation  PSYCH: A+O to person, place, and time; affect appropriate  NEUROLOGY: CN 2-12 are intact and symmetric; no gross sensory deficits   SKIN: No rashes; no palpable lesions    LABS:                        11.0   3.44  )-----------( 226      ( 15 Marcelino 2021 07:04 )             34.8     06-15    135  |  103  |  19  ----------------------------<  156<H>  3.8   |  19<L>  |  1.44<H>    Ca    10.1      15 Marcelino 2021 07:01  Mg     1.7     -15    TPro  8.4<H>  /  Alb  3.7  /  TBili  0.4  /  DBili  x   /  AST  17  /  ALT  18  /  AlkPhos  286<H>  06-15          Urinalysis Basic - ( 2021 18:40 )    Color: Light Yellow / Appearance: Clear / S.014 / pH: x  Gluc: x / Ketone: Negative  / Bili: Negative / Urobili: Negative   Blood: x / Protein: 30 mg/dL / Nitrite: Negative   Leuk Esterase: Negative / RBC: 0 /hpf / WBC 1 /HPF   Sq Epi: x / Non Sq Epi: 0 /hpf / Bacteria: Negative        Culture - Urine (collected 2021 22:07)  Source: .Urine Clean Catch (Midstream)  Final Report (15 Marcelino 2021 18:53):    <10,000 CFU/mL Normal Urogenital Eufemia        RADIOLOGY & ADDITIONAL TESTS:  Results Reviewed:   Imaging Personally Reviewed:  Electrocardiogram Personally Reviewed:    COORDINATION OF CARE:  Care Discussed with Consultants/Other Providers [Y/N]:  Prior or Outpatient Records Reviewed [Y/N]:

## 2021-06-16 NOTE — PROVIDER CONTACT NOTE (OTHER) - SITUATION
foscarnet antiviral given late
- Eliza Lomeli- notifying RN of detection of HSV2 on culture sent on 5/28.

## 2021-06-16 NOTE — DISCHARGE NOTE NURSING/CASE MANAGEMENT/SOCIAL WORK - NSDCVIVACCINE_GEN_ALL_CORE_FT
[address] [facility]                              CARDIAC STRESS TEST    PATIENT NAME: Cathi Vasquez                    :        1963  MED REC NO:   [id number]                             ROOM:       [id number]  ACCOUNT NO:   [de-identified]                           ADMIT DATE: 2021  PROVIDER:     Jose Bonilla    DATE OF STUDY:  2021    ORDERING PROVIDER:  Dr. Kristi Rodriguez PROVIDER:  KEANU Kennedy-FELIX    INTERPRETING PHYSICIAN:  Dr. Eduardo Clark STUDY:    Indication: Angina  Procedure explained and consent signed. Medications: Lexiscan, 0.4 mg  Resting heart rate: 71 bpm  Resting blood pressure:  101/79 mm/Hg  Infusion heart rate:  83 bpm  Infusion blood pressure:  114/79 mm/Hg  Resting EKG: Normal  Stress heart response: Normal response  Stress BP response: appropriate  Stress EKG(s): No changes seen  Chest discomfort: None  Ischemic EKG changes: None    IMPRESSION:  Electrocardiographically Negative Lexiscan stress study. Radio-isotope  results to follow from the department of Nuclear Medicine.         Sima Dailey    D: 2021 14:16:17       T: 2021 14:17:28     [id number]  Job#: [id number]     Doc#: Unknown    CC:
No Vaccines Administered.

## 2021-06-16 NOTE — DISCHARGE NOTE NURSING/CASE MANAGEMENT/SOCIAL WORK - PATIENT PORTAL LINK FT
You can access the FollowMyHealth Patient Portal offered by [facility] by registering at the following website: [email or web address]. By joining [facility]’s FollowMyHealth portal, you will also be able to view your health information using other applications (apps) compatible with our system.

## 2021-06-16 NOTE — PROGRESS NOTE ADULT - PROBLEM SELECTOR PROBLEM 4
Hyperphosphatemia
HIV (human immunodeficiency virus infection)
Hyperphosphatemia
HIV (human immunodeficiency virus infection)
Hyperphosphatemia
HIV (human immunodeficiency virus infection)
Hyperphosphatemia
HIV (human immunodeficiency virus infection)
Hyperphosphatemia
HIV (human immunodeficiency virus infection)
Hyperphosphatemia
Hyperphosphatemia

## 2021-06-16 NOTE — PROGRESS NOTE ADULT - PROBLEM SELECTOR PROBLEM 5
Prophylactic measure
Alkaline phosphatase elevation
Prophylactic measure
Alkaline phosphatase elevation
Prophylactic measure
Alkaline phosphatase elevation
Prophylactic measure
Prophylactic measure
Alkaline phosphatase elevation

## 2021-06-16 NOTE — PROGRESS NOTE ADULT - PROBLEM SELECTOR PLAN 3
- baseline cr 1.6 from 4/2020  - Cr stable at 1.4 ;  continue to monitor daily
- baseline cr 1.6 from 4/2020  - Cr stable at 1.4>1.5>1.46 ;  continue to monitor daily  - need for outpt f/u d/w pt
- baseline cr 1.6 from 4/2020  - Cr stable ;  continue to monitor daily  - need for outpt f/u d/w pt  - Continue IVF protocol as above  - Monitor Is/Os
- baseline cr 1.6 from 4/2020  - Cr stable at 1.4 ;  continue to monitor daily
- baseline cr 1.6  - Cr 1.5 this AM, continue to monitor daily
- baseline cr 1.6 from 4/2020  - Cr stable at 1.36 this AM, continue to monitor daily
- baseline cr 1.6 from 4/2020  - Cr stable at 1.4 ;  continue to monitor daily
- baseline cr 1.6 from 4/2020  - Cr stable ;  continue to monitor daily  - need for outpt f/u d/w pt  - Continue IVF protocol as above  - Monitor Is/Os
- baseline cr 1.6 from 4/2020  - Cr stable at 1.4>1.5>1.46>1.38>1.43 ;  continue to monitor daily  - need for outpt f/u d/w pt  - Continue IVF protocol as above  - Monitor Is/Os
- baseline cr 1.6 from 4/2020  - Cr stable at 1.4>1.5>1.46>1.38>1.43 ;  continue to monitor daily  - need for outpt f/u d/w pt  - Continue IVF protocol as above  - Monitor Is/Os
- baseline cr 1.6 from 4/2020  - Cr stable at 1.4>1.5>1.46 ;  continue to monitor daily  - need for outpt f/u d/w pt
- baseline cr 1.6 from 4/2020  - Cr stable ;  continue to monitor daily  - need for outpt f/u d/w pt  - Continue IVF protocol as above  - Monitor Is/Os
- baseline cr 1.6 from 4/2020  - Cr 1.36 this AM, continue to monitor daily
- baseline cr 1.6 from 4/2020  - Cr stable ;  continue to monitor daily  - need for outpt f/u d/w pt  - Continue IVF protocol as above  - Monitor Is/Os
- baseline cr 1.6 from 4/2020  - Cr stable ;  continue to monitor daily  - need for outpt f/u d/w pt  - Continue IVF protocol as above  - Monitor Is/Os
- baseline cr 1.6 from 4/2020  - Cr stable at 1.4 ;  continue to monitor daily
- baseline cr 1.6 from 4/2020  - Cr stable ;  continue to monitor daily  - need for outpt f/u d/w pt  - Continue IVF protocol as above  - Monitor Is/Os
- baseline cr 1.6 from 4/2020  - Cr stable at 1.4>1.5 ;  continue to monitor daily  - need for outpt f/u d/w pt
- baseline cr 1.6 from 4/2020  - Cr stable at 1.4>1.5>1.46>1.38 ;  continue to monitor daily  - need for outpt f/u d/w pt
- baseline cr 1.6 from 4/2020  - Cr stable at 1.4 ;  continue to monitor daily  - need for outpt f/u d/w pt

## 2021-06-16 NOTE — PROGRESS NOTE ADULT - PROBLEM SELECTOR PROBLEM 1
Labial lesion

## 2021-06-16 NOTE — PROVIDER CONTACT NOTE (OTHER) - BACKGROUND
pt a&o4 present w/ labia mass 2ndary to herpes. Biopsy showed high grade keratinizing squamous-gyn oncology rec foscarnet.

## 2021-06-16 NOTE — PROGRESS NOTE ADULT - PROBLEM SELECTOR PROBLEM 6
HIV (human immunodeficiency virus infection)

## 2021-06-16 NOTE — PROVIDER CONTACT NOTE (OTHER) - ASSESSMENT
Pt a&o4 vss. Medication setup to be given at 12am w alaris pump, Pt called rn at 2am stating medication has not ran. RN attempted to admin medication again, was working before leaving the room. Pt notified RN at 5am stating med has not been running. Antiviral given late.

## 2021-06-16 NOTE — PROGRESS NOTE ADULT - REASON FOR ADMISSION
Valacyclovir resistant HSV

## 2021-06-16 NOTE — PROVIDER CONTACT NOTE (OTHER) - ACTION/TREATMENT ORDERED:
DORI Petersen notified, no interventions at this time. will continue to monitor.
BELEN Ramirez aware- no interventions at this time. Pt to remain on current regimen.

## 2021-06-16 NOTE — PROGRESS NOTE ADULT - PROBLEM SELECTOR PROBLEM 3
Chronic kidney disease (CKD), stage III (moderate)

## 2021-06-16 NOTE — PROGRESS NOTE ADULT - PROBLEM SELECTOR PROBLEM 2
Diabetes mellitus

## 2021-06-16 NOTE — PROGRESS NOTE ADULT - PROBLEM SELECTOR PLAN 1
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   - GYN-ONC eval (patient follows Dr. Eliana Yao outpatient): MRI A+P w david-Bulky labia minora without easily delineated mass. No definite disruption of tissue plane- FU on further recs  - NS 500mL bolus q12 while on foscarnet, monitor CMP. Also on maintenance IVF NS at 50cc/hr   - Per ID: will need to continue Foscarnet inpatient to finish 21 total days (through 6/16).  Discussed with ID, not willing to stay for afternoon dose.  Ok for d/c as she has close outpatient f/u    #Bladder pain, UTI    Patient endorsed new bladder pain when urinating 6/7, also difficulty emptying bladder, no hematuria  Bladder scans negative for retention. UA + , urine cx >100K strep agalactiae grp b, grp b strep susceptible to penicillin, cefazolin. Received Ceftriaxone x2days, Augmentin x1day. Appreciate ID recs.  now with recurrent urgency/frequency-repeat UA negative.  Hold further abx

## 2021-06-23 ENCOUNTER — NON-APPOINTMENT (OUTPATIENT)
Age: 51
End: 2021-06-23

## 2021-06-25 ENCOUNTER — NON-APPOINTMENT (OUTPATIENT)
Age: 51
End: 2021-06-25

## 2021-06-25 NOTE — PHYSICAL EXAM
Patient made aware of 24/7 emergency services. [Labia Minora Erythema] : erythema of the labia minora [Location: ___] : [unfilled] [Vulva] : on the vulva [Indurated] : non-indurated [Tender] : tender [Erythema] : erythema [Tenderness] : tenderness

## 2021-07-06 ENCOUNTER — NON-APPOINTMENT (OUTPATIENT)
Age: 51
End: 2021-07-06

## 2021-07-13 PROBLEM — Z76.89 ESTABLISHING CARE WITH NEW DOCTOR, ENCOUNTER FOR: Status: ACTIVE | Noted: 2021-07-13

## 2021-07-13 PROBLEM — B00.9 HERPES SIMPLEX: Status: ACTIVE | Noted: 2021-07-13

## 2021-07-13 PROBLEM — N90.3 VULVAR DYSPLASIA: Status: ACTIVE | Noted: 2021-07-13

## 2021-07-14 ENCOUNTER — APPOINTMENT (OUTPATIENT)
Dept: GYNECOLOGIC ONCOLOGY | Facility: CLINIC | Age: 51
End: 2021-07-14
Payer: COMMERCIAL

## 2021-07-14 VITALS
BODY MASS INDEX: 31.5 KG/M2 | DIASTOLIC BLOOD PRESSURE: 89 MMHG | HEART RATE: 89 BPM | WEIGHT: 225 LBS | SYSTOLIC BLOOD PRESSURE: 147 MMHG | HEIGHT: 71 IN

## 2021-07-14 DIAGNOSIS — Z76.89 PERSONS ENCOUNTERING HEALTH SERVICES IN OTHER SPECIFIED CIRCUMSTANCES: ICD-10-CM

## 2021-07-14 DIAGNOSIS — B00.9 HERPESVIRAL INFECTION, UNSPECIFIED: ICD-10-CM

## 2021-07-14 DIAGNOSIS — N90.3 DYSPLASIA OF VULVA, UNSPECIFIED: ICD-10-CM

## 2021-07-14 DIAGNOSIS — Z78.9 OTHER SPECIFIED HEALTH STATUS: ICD-10-CM

## 2021-07-14 PROCEDURE — 56605 BIOPSY OF VULVA/PERINEUM: CPT

## 2021-07-14 PROCEDURE — 99215 OFFICE O/P EST HI 40 MIN: CPT | Mod: 25

## 2021-07-14 RX ORDER — FLUCONAZOLE 150 MG/1
150 TABLET ORAL
Qty: 1 | Refills: 1 | Status: DISCONTINUED | COMMUNITY
Start: 2021-04-27 | End: 2021-07-14

## 2021-07-14 RX ORDER — ATORVASTATIN CALCIUM 20 MG/1
20 TABLET, FILM COATED ORAL
Qty: 90 | Refills: 3 | Status: DISCONTINUED | COMMUNITY
Start: 2019-10-22 | End: 2021-07-14

## 2021-07-14 RX ORDER — BLOOD-GLUCOSE METER
W/DEVICE KIT MISCELLANEOUS
Qty: 1 | Refills: 0 | Status: DISCONTINUED | COMMUNITY
Start: 2017-01-26 | End: 2021-07-14

## 2021-07-14 RX ORDER — CEPHALEXIN 500 MG/1
500 TABLET ORAL
Qty: 10 | Refills: 0 | Status: DISCONTINUED | COMMUNITY
Start: 2021-04-27 | End: 2021-07-14

## 2021-07-14 RX ORDER — LANCETS
EACH MISCELLANEOUS
Qty: 4 | Refills: 3 | Status: DISCONTINUED | COMMUNITY
Start: 2018-12-11 | End: 2021-07-14

## 2021-07-14 RX ORDER — FLUCONAZOLE 150 MG/1
150 TABLET ORAL
Qty: 2 | Refills: 1 | Status: DISCONTINUED | COMMUNITY
Start: 2020-01-09 | End: 2021-07-14

## 2021-07-14 RX ORDER — LANCETS
EACH MISCELLANEOUS
Qty: 1 | Refills: 3 | Status: DISCONTINUED | COMMUNITY
Start: 2017-01-26 | End: 2021-07-14

## 2021-07-14 RX ORDER — BLOOD-GLUCOSE METER
W/DEVICE EACH MISCELLANEOUS
Qty: 1 | Refills: 0 | Status: DISCONTINUED | COMMUNITY
Start: 2018-12-11 | End: 2021-07-14

## 2021-07-14 RX ORDER — NYSTATIN AND TRIAMCINOLONE ACETONIDE 100000; 1 MG/G; MG/G
100000-0.1 CREAM TOPICAL TWICE DAILY
Qty: 1 | Refills: 1 | Status: DISCONTINUED | COMMUNITY
Start: 2020-11-05 | End: 2021-07-14

## 2021-07-14 RX ORDER — URSODIOL 500 MG/1
500 TABLET ORAL TWICE DAILY
Qty: 60 | Refills: 2 | Status: DISCONTINUED | COMMUNITY
Start: 2020-02-04 | End: 2021-07-14

## 2021-07-14 RX ORDER — BLOOD SUGAR DIAGNOSTIC
STRIP MISCELLANEOUS
Qty: 4 | Refills: 3 | Status: DISCONTINUED | COMMUNITY
Start: 2018-12-11 | End: 2021-07-14

## 2021-07-14 RX ORDER — BLOOD SUGAR DIAGNOSTIC
STRIP MISCELLANEOUS
Qty: 100 | Refills: 3 | Status: DISCONTINUED | COMMUNITY
Start: 2017-01-26 | End: 2021-07-14

## 2021-07-14 RX ORDER — BLOOD-GLUCOSE METER
70 EACH MISCELLANEOUS
Qty: 1 | Refills: 3 | Status: DISCONTINUED | COMMUNITY
Start: 2017-01-26 | End: 2021-07-14

## 2021-07-14 RX ORDER — CLOTRIMAZOLE AND BETAMETHASONE DIPROPIONATE 10; .5 MG/G; MG/G
1-0.05 CREAM TOPICAL TWICE DAILY
Qty: 1 | Refills: 0 | Status: DISCONTINUED | COMMUNITY
Start: 2020-05-28 | End: 2021-07-14

## 2021-07-20 ENCOUNTER — NON-APPOINTMENT (OUTPATIENT)
Age: 51
End: 2021-07-20

## 2021-07-27 ENCOUNTER — APPOINTMENT (OUTPATIENT)
Dept: ENDOCRINOLOGY | Facility: CLINIC | Age: 51
End: 2021-07-27

## 2021-07-27 ENCOUNTER — NON-APPOINTMENT (OUTPATIENT)
Age: 51
End: 2021-07-27

## 2021-07-27 RX ORDER — VALACYCLOVIR 500 MG/1
500 TABLET, FILM COATED ORAL
Qty: 120 | Refills: 8 | Status: DISCONTINUED | COMMUNITY
Start: 2017-07-21 | End: 2021-07-27

## 2021-07-27 RX ORDER — FAMCICLOVIR 500 MG/1
500 TABLET, FILM COATED ORAL
Qty: 14 | Refills: 0 | Status: COMPLETED | COMMUNITY
Start: 2020-02-24 | End: 2021-07-27

## 2021-07-30 ENCOUNTER — NON-APPOINTMENT (OUTPATIENT)
Age: 51
End: 2021-07-30

## 2021-08-06 ENCOUNTER — NON-APPOINTMENT (OUTPATIENT)
Age: 51
End: 2021-08-06

## 2021-08-18 ENCOUNTER — APPOINTMENT (OUTPATIENT)
Dept: GYNECOLOGIC ONCOLOGY | Facility: CLINIC | Age: 51
End: 2021-08-18

## 2021-08-18 ENCOUNTER — NON-APPOINTMENT (OUTPATIENT)
Age: 51
End: 2021-08-18

## 2021-08-21 ENCOUNTER — NON-APPOINTMENT (OUTPATIENT)
Age: 51
End: 2021-08-21

## 2021-08-25 ENCOUNTER — APPOINTMENT (OUTPATIENT)
Dept: INFECTIOUS DISEASE | Facility: CLINIC | Age: 51
End: 2021-08-25

## 2021-08-26 ENCOUNTER — NON-APPOINTMENT (OUTPATIENT)
Age: 51
End: 2021-08-26

## 2021-08-30 ENCOUNTER — NON-APPOINTMENT (OUTPATIENT)
Age: 51
End: 2021-08-30

## 2021-09-14 ENCOUNTER — APPOINTMENT (OUTPATIENT)
Dept: ENDOCRINOLOGY | Facility: CLINIC | Age: 51
End: 2021-09-14
Payer: COMMERCIAL

## 2021-09-14 VITALS
SYSTOLIC BLOOD PRESSURE: 122 MMHG | BODY MASS INDEX: 32.64 KG/M2 | OXYGEN SATURATION: 98 % | DIASTOLIC BLOOD PRESSURE: 88 MMHG | HEART RATE: 100 BPM | TEMPERATURE: 98.2 F | WEIGHT: 234 LBS

## 2021-09-14 DIAGNOSIS — E04.1 NONTOXIC SINGLE THYROID NODULE: ICD-10-CM

## 2021-09-14 DIAGNOSIS — E78.00 PURE HYPERCHOLESTEROLEMIA, UNSPECIFIED: ICD-10-CM

## 2021-09-14 DIAGNOSIS — E11.65 TYPE 2 DIABETES MELLITUS WITH HYPERGLYCEMIA: ICD-10-CM

## 2021-09-14 DIAGNOSIS — I10 ESSENTIAL (PRIMARY) HYPERTENSION: ICD-10-CM

## 2021-09-14 LAB — HBA1C MFR BLD HPLC: 9.2

## 2021-09-14 PROCEDURE — 95251 CONT GLUC MNTR ANALYSIS I&R: CPT

## 2021-09-14 PROCEDURE — 99215 OFFICE O/P EST HI 40 MIN: CPT | Mod: 25

## 2021-09-14 PROCEDURE — 83036 HEMOGLOBIN GLYCOSYLATED A1C: CPT | Mod: QW

## 2021-09-14 NOTE — ASSESSMENT
[Importance of Diet and Exercise] : importance of diet and exercise to improve glycemic control, achieve weight loss and improve cardiovascular health [Retinopathy Screening] : Patient was referred to ophthalmology for retinopathy screening [FreeTextEntry1] : 51 year old woman with uncontrolled DM2, HTN, HLD, thyroid nodules, HIV\par \par 1. DM2, uncontrolled:\par - A1c now 9.2%\par - Katy (CGM) data for the last 2 weeks reviewed with patient - BG in range only 13% of the time, otherwise high - BG high during the day and remains high overnight \par - c/w Trulicity 1.5 mg/week; increase Lantus to 24 units qhs and c/w Humalog 15 units before meals\par - instructed to call office in 2 weeks so that Katy data can be reviewed and further adjustments can be made - consider increasing dose of Trulicity at that time \par - she will continue to use Freestyle Katy to monitor BG\par - recommended optho follow up for screening for retinopathy\par - has CKD 3 and proteinuria, start Lisinopril 5 mg daily \par - c/w dietary changes\par \par 2. HTN: BP at goal, however given proteinuria, start Lisinopril 5 mg daily. Recheck CMP in one week.\par \par 3. HLD: resume Lipitor 20 mg qhs, will recheck lipid panel at next visit \par \par 4. Thyroid nodules: s/p FNA of 2.3 cm right nodule in 3/2021, benign. Will repeat official thyroid ultrasound at next visit.\par \par Return visit in 3 months. \par \par

## 2021-09-14 NOTE — HISTORY OF PRESENT ILLNESS
[FreeTextEntry1] : chief complaint: DM2\par \par Patient is a 51 year old woman with HIV and uncontrolled DM2 complicated by CKD, HTN, HLD, vitamin D deficiency, here for follow up of DM2. Hba1c today is 9.2%.She has been retired and has been able to follow a diet and has been taking her medications consistently. DM2 was initially diagnosed in 2010, during a hospitalization. Started on insulin at that time. Now on Trulicity 1.5 mg/week, taking on Wednesdays (adherent). Also on Humalog 15 units before meals as well as Lantus 15 units - she was discharged on these doses from [facility] in 5/2021 when she was hospitalized for 22 days. She uses the Freestyle Katy to check her BG and Katy data today reveals the following: BG in target range 13% of the time, BG high 40% of the time and very high 47% of the time. Average  mg/dL. When BG is high at bedtime, she takes extra dose of Humalog 15 and this caused overnight hypoglycemia one time. \par \par Breakfast: tea and a bagel\par Lunch: sandwich or two francs on a bun\par Dinner: baked chicken or pork chops, chili\par Does not drink soda or juice.\par Drinks a lot of water.\par Not on steroids.\par Last dilated eye exam was about 2 years ago, no known retinopathy. No sx of neuropathy. She has CKD 3 and urine microalbumin/creatinine was elevated in 4/2021. Had hip surgery in the summer of 2020. \par \par For HTN, not on medications. For HLD, she was on Lipitor but reports it was stopped at some point. \par \par Also with thyroid nodules, thyroid ultrasound in 12/2019 revealed the following: right 2.3 cm nodule, unchanged from prior as well as left 1.1 cm cystic nodule. She went for FNA in 3/2021, results were benign. Not on thyroidal medications. TSH was 0.76 in 11/2020. No neck complaints. \par

## 2021-09-14 NOTE — REVIEW OF SYSTEMS
[Polyuria] : polyuria [All other systems negative] : All other systems negative [Fever] : no fever [Chills] : no chills [Blurred Vision] : no blurred vision [Dysphagia] : no dysphagia [Neck Pain] : no neck pain [Dysphonia] : no dysphonia [Chest Pain] : no chest pain [Palpitations] : no palpitations [Lower Ext Edema] : no lower extremity edema [Shortness Of Breath] : no shortness of breath [Cough] : no cough [Nausea] : no nausea [Constipation] : no constipation [Abdominal Pain] : no abdominal pain [Vomiting] : no vomiting [Diarrhea] : no diarrhea [Polydipsia] : no polydipsia

## 2021-09-14 NOTE — PHYSICAL EXAM
[Alert] : alert [Well Nourished] : well nourished [Healthy Appearance] : healthy appearance [No Acute Distress] : no acute distress [Normal Sclera/Conjunctiva] : normal sclera/conjunctiva [No Proptosis] : no proptosis [Normal Outer Ear/Nose] : the ears and nose were normal in appearance [Normal Hearing] : hearing was normal [No Neck Mass] : no neck mass was observed [Supple] : the neck was supple [No Respiratory Distress] : no respiratory distress [No Accessory Muscle Use] : no accessory muscle use [Normal Rate and Effort] : normal respiratory rate and effort [Clear to Auscultation] : lungs were clear to auscultation bilaterally [Normal S1, S2] : normal S1 and S2 [Normal Rate] : heart rate was normal [Regular Rhythm] : with a regular rhythm [Normal Bowel Sounds] : normal bowel sounds [No Edema] : no peripheral edema [Not Tender] : non-tender [Not Distended] : not distended [Soft] : abdomen soft [Normal Gait] : normal gait [No Clubbing, Cyanosis] : no clubbing  or cyanosis of the fingernails [No Involuntary Movements] : no involuntary movements were seen [Oriented x3] : oriented to person, place, and time [Normal Affect] : the affect was normal [Normal Mood] : the mood was normal [de-identified] : + right thyroid nodule

## 2021-09-15 ENCOUNTER — APPOINTMENT (OUTPATIENT)
Dept: GYNECOLOGIC ONCOLOGY | Facility: CLINIC | Age: 51
End: 2021-09-15
Payer: COMMERCIAL

## 2021-09-15 VITALS
SYSTOLIC BLOOD PRESSURE: 147 MMHG | BODY MASS INDEX: 32.53 KG/M2 | WEIGHT: 232.38 LBS | DIASTOLIC BLOOD PRESSURE: 88 MMHG | OXYGEN SATURATION: 96 % | HEART RATE: 100 BPM | HEIGHT: 71 IN

## 2021-09-15 DIAGNOSIS — N90.89 OTHER SPECIFIED NONINFLAMMATORY DISORDERS OF VULVA AND PERINEUM: ICD-10-CM

## 2021-09-15 LAB — CORE LAB BIOPSY: NORMAL

## 2021-09-15 PROCEDURE — 99214 OFFICE O/P EST MOD 30 MIN: CPT

## 2021-09-17 RX ORDER — LISINOPRIL 5 MG/1
5 TABLET ORAL DAILY
Qty: 1 | Refills: 1 | Status: ACTIVE | COMMUNITY
Start: 2021-09-14 | End: 1900-01-01

## 2021-09-17 RX ORDER — INSULIN LISPRO 100 [IU]/ML
100 INJECTION, SOLUTION INTRAVENOUS; SUBCUTANEOUS
Qty: 3 | Refills: 3 | Status: ACTIVE | COMMUNITY
Start: 2020-01-10 | End: 1900-01-01

## 2021-09-17 RX ORDER — DULAGLUTIDE 1.5 MG/.5ML
1.5 INJECTION, SOLUTION SUBCUTANEOUS
Qty: 3 | Refills: 3 | Status: ACTIVE | COMMUNITY
Start: 2020-04-29 | End: 1900-01-01

## 2021-09-17 RX ORDER — CLOBETASOL PROPIONATE 0.5 MG/G
0.05 CREAM TOPICAL TWICE DAILY
Qty: 1 | Refills: 1 | Status: ACTIVE | COMMUNITY
Start: 2021-09-17 | End: 1900-01-01

## 2021-09-17 RX ORDER — INSULIN GLARGINE 100 [IU]/ML
100 INJECTION, SOLUTION SUBCUTANEOUS
Qty: 2 | Refills: 2 | Status: ACTIVE | COMMUNITY
Start: 2018-10-15 | End: 1900-01-01

## 2021-09-22 ENCOUNTER — NON-APPOINTMENT (OUTPATIENT)
Age: 51
End: 2021-09-22

## 2021-09-22 RX ORDER — FLASH GLUCOSE SENSOR
KIT MISCELLANEOUS
Qty: 6 | Refills: 3 | Status: ACTIVE | COMMUNITY
Start: 2019-08-05 | End: 1900-01-01

## 2021-09-22 RX ORDER — PEN NEEDLE, DIABETIC 29 G X1/2"
31G X 5 MM NEEDLE, DISPOSABLE MISCELLANEOUS
Qty: 1 | Refills: 0 | Status: ACTIVE | COMMUNITY
Start: 2017-03-06 | End: 1900-01-01

## 2021-09-24 ENCOUNTER — NON-APPOINTMENT (OUTPATIENT)
Age: 51
End: 2021-09-24

## 2021-09-30 RX ORDER — VALACYCLOVIR 1 G/1
1 TABLET, FILM COATED ORAL 3 TIMES DAILY
Qty: 30 | Refills: 0 | Status: DISCONTINUED | COMMUNITY
Start: 2021-09-24 | End: 2021-09-30

## 2021-10-05 ENCOUNTER — NON-APPOINTMENT (OUTPATIENT)
Age: 51
End: 2021-10-05

## 2021-10-15 NOTE — PROGRESS NOTE ADULT - PROBLEM SELECTOR PLAN 5
Will need outpatient f/u with PMD within 2 weeks of discharge
Will need outpt f/u with PMD within 2 weeks of discharge
DVT ppx: hep subq        Discussed with ACP
Will need outpatient f/u with PMD within 2 weeks of discharge
Will need outpatient f/u with PMD within 2 weeks of discharge
DVT ppx: hep subq        Discussed with ROCKY Ramirez.
DVT ppx: hep subq    PCP notified of admission.    Discussed with ACP Tyler.
DVT ppx: hep subq        Discussed with ROCKY Ramirez.
Will need outpatient f/u with PMD within 2 weeks of discharge
DVT ppx: hep subq        Discussed with ACP
DVT ppx: hep subq        Discussed with ACP
DVT ppx: hep subq    PCP notified of admission 5/28.    Discussed with ACP Ashley.
Will need outpatient f/u with PMD within 2 weeks of discharge
Will need outpatient f/u with PMD within 2 weeks of discharge
will need outpt f/u with PMD within 2 weeks of discharge
Will need outpatient f/u with PMD within 2 weeks of discharge
3

## 2021-10-21 ENCOUNTER — APPOINTMENT (OUTPATIENT)
Dept: ORTHOPEDIC SURGERY | Facility: CLINIC | Age: 51
End: 2021-10-21
Payer: COMMERCIAL

## 2021-10-21 ENCOUNTER — APPOINTMENT (OUTPATIENT)
Dept: OBGYN | Facility: CLINIC | Age: 51
End: 2021-10-21
Payer: COMMERCIAL

## 2021-10-21 ENCOUNTER — APPOINTMENT (OUTPATIENT)
Dept: INFECTIOUS DISEASE | Facility: CLINIC | Age: 51
End: 2021-10-21

## 2021-10-21 VITALS — HEIGHT: 71 IN | BODY MASS INDEX: 32.2 KG/M2 | WEIGHT: 230 LBS

## 2021-10-21 VITALS — DIASTOLIC BLOOD PRESSURE: 82 MMHG | SYSTOLIC BLOOD PRESSURE: 140 MMHG

## 2021-10-21 DIAGNOSIS — A60.04 HERPESVIRAL VULVOVAGINITIS: ICD-10-CM

## 2021-10-21 DIAGNOSIS — M16.9 OSTEOARTHRITIS OF HIP, UNSPECIFIED: ICD-10-CM

## 2021-10-21 DIAGNOSIS — M25.551 PAIN IN RIGHT HIP: ICD-10-CM

## 2021-10-21 PROCEDURE — 99214 OFFICE O/P EST MOD 30 MIN: CPT

## 2021-10-21 PROCEDURE — 73562 X-RAY EXAM OF KNEE 3: CPT | Mod: RT

## 2021-10-21 PROCEDURE — 99213 OFFICE O/P EST LOW 20 MIN: CPT

## 2021-10-22 PROBLEM — M16.9 HIP OSTEOARTHRITIS: Status: ACTIVE | Noted: 2020-03-12

## 2021-10-22 PROBLEM — A60.04 HERPES SIMPLEX VULVOVAGINITIS: Status: ACTIVE | Noted: 2021-10-22

## 2021-10-22 NOTE — HISTORY OF PRESENT ILLNESS
[de-identified] : Patient returns to the office for evaluation of right hip pain. She has been having pain in the right lower extremity for the past month. She has had pain in the proximal thigh/hip area and also pain in the posterior right calf with swelling in the ankle. The pain is intermittent and not associated with any particular activities. It goes away when she flexes her knee completely. She doesn't take any medications to alleviate her pain. She presents today for further treatment options. [2] : a minimum pain level of 2/10 [9] : a maximum pain level of 9/10 [Acetaminophen] : not relieved by acetaminophen [Exercise Regimen] : not relieved by exercise regimen [NSAIDs] : not relieved by nonsteroidal anti-inflammatory drugs

## 2021-10-22 NOTE — REVIEW OF SYSTEMS
[Fever] : no fever [Incontinence] : no incontinence [Pelvic pain] : no pelvic pain [Genital Rash/Irritation] : genital rash/irritation [Myalgias] : no myalgias [Negative] : Heme/Lymph

## 2021-10-22 NOTE — PHYSICAL EXAM
[Appropriately responsive] : appropriately responsive [Soft] : soft [Non-tender] : non-tender [Oriented x3] : oriented x3 [FreeTextEntry1] : see pic--signif improvement of  vulvar lesions from hospital stay, new right clitoral and  low vulvar/fouchet lesion

## 2021-10-22 NOTE — PLAN
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

## 2021-10-22 NOTE — PHYSICAL EXAM
[Antalgic] : antalgic [Normal RUE] : Right Upper Extremity: No scars, rashes, lesions, ulcers, skin intact [Normal Finger/nose] : finger to nose coordination [Poor Appearance] : well-appearing [Acute Distress] : not in acute distress [Obese] : obese [Normal] : no peripheral adenopathy appreciated [de-identified] : Patient appears stated age in no acute respiratory distress. Patient is alert oriented x3. Patient has normal mood and affect. \par Bilateral knee exam\par Range of motion of the knee is 0-120°. \par Skin is normal.  No rash.\par There is no effusion. No medial or lateral joint line tenderness. No swelling, no pitting edema.\par Overall alignment of the knee is then slight varus. Good anterior posterior stability. Firm endpoints on anterior and posterior drawer. \par Medial lateral stability is intact. Firm endpoint on medial and lateral stress testing .\par Magdalena test is negative.\par Quadriceps strength 5/ 5. There is no loss of muscle volume in the thigh. \par Good anterior posterior and mediolateral stability.\par Sensation in the extremities intact. \par Discrimination is intact. Good DP and PT pulses.\par 		\par \par Left hip exam\par On inspection of the hip shows skin is normal. No evidence of rash. \par No loss of muscle.  Abductor strength is 5 out of 5. Hip flexor strength is 5.\par Range of motion of the hip at 90° flexion internal rotation is 15° external rotation is 30° pain-free. \par Hip has good stability in anterior and posterior direction. \par On lateral decubitus  examination there is no tenderness in the greater trochanter. \par Lower Extremity Examination \par Bilateral lower extremity skin is normal. There is no rash. There is no edema and lymphadenopathy.  DP and PT pulses intact. Sensation is intact.\par \par Right hip exam\par The hip is mildly painful at the groin on log roll. \par At 70° flexion patient has minimal discomfort in the groin. At 90° flexion internal rotation is limited to less than 10°. External rotation is 25 no pain.\par Skin is normal. \par There is no loss of muscle wall remained the extremity. On lateral decubitus examination there is no tenderness in the greater trochanter. \par Resisted abduction is 4-5. There is no pain on abduction.\par Straight leg raise up to his 80° pain-free. No pain in the lower back.\par There is no adduction contracture. [de-identified] : X-ray of the right knee shows moderate to severe arthritis

## 2021-10-22 NOTE — DISCUSSION/SUMMARY
[de-identified] : Status post left hip replacement patient is doing well continue to do as tolerated.  Moderate arthritis of the right knee try conservative treatment NSAIDs therapy strengthening follow-up in 3 months

## 2021-10-22 NOTE — HISTORY OF PRESENT ILLNESS
[FreeTextEntry1] : 52yo LMP ~ 2  months ago w know antiviral resistant herpes in setting of HIV and DM2\par recnet IV treament for same\par return of new lesions\par \par s/p GYN Onc f/u for VAIN + biopsy\par

## 2021-11-04 ENCOUNTER — NON-APPOINTMENT (OUTPATIENT)
Age: 51
End: 2021-11-04

## 2021-11-04 LAB — BACTERIA UR CULT: NORMAL

## 2021-11-05 RX ORDER — LIDOCAINE HYDROCHLORIDE 30 MG/G
3 CREAM TOPICAL
Qty: 85 | Refills: 2 | Status: ACTIVE | COMMUNITY
Start: 2018-04-11 | End: 1900-01-01

## 2021-11-08 RX ORDER — VALACYCLOVIR 500 MG/1
500 TABLET, FILM COATED ORAL
Qty: 360 | Refills: 1 | Status: ACTIVE | COMMUNITY
Start: 2021-07-27 | End: 1900-01-01

## 2021-11-23 ENCOUNTER — NON-APPOINTMENT (OUTPATIENT)
Age: 51
End: 2021-11-23

## 2021-11-23 RX ORDER — FLUCONAZOLE 150 MG/1
150 TABLET ORAL
Qty: 2 | Refills: 1 | Status: ACTIVE | COMMUNITY
Start: 2021-11-23 | End: 1900-01-01

## 2021-12-08 ENCOUNTER — APPOINTMENT (OUTPATIENT)
Dept: GYNECOLOGIC ONCOLOGY | Facility: CLINIC | Age: 51
End: 2021-12-08

## 2021-12-08 DIAGNOSIS — Z09 ENCOUNTER FOR FOLLOW-UP EXAMINATION AFTER COMPLETED TREATMENT FOR CONDITIONS OTHER THAN MALIGNANT NEOPLASM: ICD-10-CM

## 2021-12-08 DIAGNOSIS — L90.0 LICHEN SCLEROSUS ET ATROPHICUS: ICD-10-CM

## 2021-12-08 DIAGNOSIS — R87.810 CERVICAL HIGH RISK HUMAN PAPILLOMAVIRUS (HPV) DNA TEST POSITIVE: ICD-10-CM

## 2021-12-09 ENCOUNTER — APPOINTMENT (OUTPATIENT)
Dept: ENDOCRINOLOGY | Facility: CLINIC | Age: 51
End: 2021-12-09

## 2021-12-23 ENCOUNTER — NON-APPOINTMENT (OUTPATIENT)
Age: 51
End: 2021-12-23

## 2021-12-27 ENCOUNTER — FORM ENCOUNTER (OUTPATIENT)
Age: 51
End: 2021-12-27

## 2021-12-28 ENCOUNTER — APPOINTMENT (OUTPATIENT)
Dept: INFECTIOUS DISEASE | Facility: CLINIC | Age: 51
End: 2021-12-28
Payer: COMMERCIAL

## 2021-12-28 VITALS
HEART RATE: 109 BPM | TEMPERATURE: 97.3 F | HEIGHT: 71 IN | OXYGEN SATURATION: 95 % | SYSTOLIC BLOOD PRESSURE: 150 MMHG | WEIGHT: 236 LBS | DIASTOLIC BLOOD PRESSURE: 83 MMHG | BODY MASS INDEX: 33.04 KG/M2

## 2021-12-28 PROCEDURE — 99213 OFFICE O/P EST LOW 20 MIN: CPT

## 2021-12-28 NOTE — HISTORY OF PRESENT ILLNESS
[FreeTextEntry1] : 12/28/2021----TIEN SHARPE is a 51 year old female being seen for a follow-up visit. States worsening genital herpes despite increased valtrex, concern over renal function. Needs admission and will come into [facility] ED on 12/29/21. Will need IV treatment. Worsening pain.\par Continue Biktarvy, May switch to Juluca due to renal function. \par Recent left hip replacement and doing well. No longer using cane\par She has a new PCP , Dr. Orozco\par  Pt stopped tivicay and evotaz we then started on Biktarvy . \par Recent mammogram going back in October 2020 and biopsy, 2018. Seen by GYN in Jan 2021. \par I switch valtex to famcyclovir for breakout.\par Continued concern over diabetes and elevated LFT's\par Declines flu vaccine for 2020\par plan 12/28/2021: \par 1) continue Biktarvy and famcyclovir and well as diabetic medications.\par 2) Needs admission and will come into [facility] ED on 12/29/21. Will need IV treatment. Worsening pain. \par 3) labs today see in 3 months\par 4) check for UTI and pt would like to hold off on colon cancer screening\par \par .History of Present Illness\par Reason For Visit\par . Pt [facility] on [address] for burning left thigh pain. . needs liver ultrasound andf all liver tests...see in 3 months. Pt was off insulin a year ago and was only diet controlled. Then seen in ER for elevated glucose...now once again under endo care and taking levemir 30 daily and amaryl 2mg daily. also taking levemir 30 units daily, and glimepiride 2 mg daily. and bactrim . and vit D3 2000units daily and atorvastatin and enalapril 5. \par \par

## 2021-12-29 ENCOUNTER — INPATIENT (INPATIENT)
Facility: HOSPITAL | Age: 51
LOS: 20 days | Discharge: HOME | DRG: 977 | End: 2022-01-19
Attending: INTERNAL MEDICINE | Admitting: INTERNAL MEDICINE
Payer: COMMERCIAL

## 2021-12-29 ENCOUNTER — NON-APPOINTMENT (OUTPATIENT)
Age: 51
End: 2021-12-29

## 2021-12-29 VITALS
WEIGHT: 235.01 LBS | OXYGEN SATURATION: 94 % | HEIGHT: 71 IN | TEMPERATURE: 98 F | RESPIRATION RATE: 18 BRPM | DIASTOLIC BLOOD PRESSURE: 104 MMHG | HEART RATE: 101 BPM | SYSTOLIC BLOOD PRESSURE: 159 MMHG

## 2021-12-29 DIAGNOSIS — B20 HUMAN IMMUNODEFICIENCY VIRUS [HIV] DISEASE: ICD-10-CM

## 2021-12-29 DIAGNOSIS — B00.9 HERPESVIRAL INFECTION, UNSPECIFIED: ICD-10-CM

## 2021-12-29 DIAGNOSIS — E11.9 TYPE 2 DIABETES MELLITUS WITHOUT COMPLICATIONS: ICD-10-CM

## 2021-12-29 DIAGNOSIS — Z98.82 BREAST IMPLANT STATUS: Chronic | ICD-10-CM

## 2021-12-29 DIAGNOSIS — A60.00 HERPESVIRAL INFECTION OF UROGENITAL SYSTEM, UNSPECIFIED: ICD-10-CM

## 2021-12-29 LAB
25(OH)D3 SERPL-MCNC: 19.7 NG/ML
ALBUMIN SERPL ELPH-MCNC: 3.8 G/DL — SIGNIFICANT CHANGE UP (ref 3.3–5)
ALBUMIN SERPL ELPH-MCNC: 4.1 G/DL
ALP BLD-CCNC: 413 U/L
ALP SERPL-CCNC: 360 U/L — HIGH (ref 40–120)
ALT FLD-CCNC: 15 U/L — SIGNIFICANT CHANGE UP (ref 10–45)
ALT SERPL-CCNC: 14 U/L
ANION GAP SERPL CALC-SCNC: 12 MMOL/L — SIGNIFICANT CHANGE UP (ref 5–17)
ANION GAP SERPL CALC-SCNC: 15 MMOL/L
APPEARANCE UR: CLEAR — SIGNIFICANT CHANGE UP
APPEARANCE: ABNORMAL
AST SERPL-CCNC: 16 U/L
AST SERPL-CCNC: 16 U/L — SIGNIFICANT CHANGE UP (ref 10–40)
BACTERIA # UR AUTO: NEGATIVE — SIGNIFICANT CHANGE UP
BACTERIA: NEGATIVE
BASOPHILS # BLD AUTO: 0.05 K/UL
BASOPHILS # BLD AUTO: 0.07 K/UL — SIGNIFICANT CHANGE UP (ref 0–0.2)
BASOPHILS NFR BLD AUTO: 1.1 %
BASOPHILS NFR BLD AUTO: 1.4 % — SIGNIFICANT CHANGE UP (ref 0–2)
BILIRUB SERPL-MCNC: 0.4 MG/DL
BILIRUB SERPL-MCNC: 0.6 MG/DL — SIGNIFICANT CHANGE UP (ref 0.2–1.2)
BILIRUB UR-MCNC: NEGATIVE — SIGNIFICANT CHANGE UP
BILIRUBIN URINE: NEGATIVE
BLOOD URINE: ABNORMAL
BUN SERPL-MCNC: 16 MG/DL
BUN SERPL-MCNC: 16 MG/DL — SIGNIFICANT CHANGE UP (ref 7–23)
C TRACH RRNA SPEC QL NAA+PROBE: NOT DETECTED
CALCIUM SERPL-MCNC: 9.4 MG/DL — SIGNIFICANT CHANGE UP (ref 8.4–10.5)
CALCIUM SERPL-MCNC: 9.9 MG/DL
CD3 CELLS # BLD: 1117 /UL
CD3 CELLS NFR BLD: 84 %
CD3+CD4+ CELLS # BLD: 378 /UL
CD3+CD4+ CELLS NFR BLD: 29 %
CD3+CD4+ CELLS/CD3+CD8+ CLL SPEC: 0.58 RATIO
CD3+CD8+ CELLS # SPEC: 652 /UL
CD3+CD8+ CELLS NFR BLD: 49 %
CHLORIDE SERPL-SCNC: 101 MMOL/L — SIGNIFICANT CHANGE UP (ref 96–108)
CHLORIDE SERPL-SCNC: 102 MMOL/L
CHOLEST SERPL-MCNC: 354 MG/DL
CO2 SERPL-SCNC: 20 MMOL/L
CO2 SERPL-SCNC: 21 MMOL/L — LOW (ref 22–31)
COLOR SPEC: SIGNIFICANT CHANGE UP
COLOR: YELLOW
CREAT SERPL-MCNC: 1.51 MG/DL — HIGH (ref 0.5–1.3)
CREAT SERPL-MCNC: 1.63 MG/DL
CYSTATIN C SERPL-MCNC: 1.63 MG/L
DIFF PNL FLD: ABNORMAL
EOSINOPHIL # BLD AUTO: 0.52 K/UL — HIGH (ref 0–0.5)
EOSINOPHIL # BLD AUTO: 0.53 K/UL
EOSINOPHIL NFR BLD AUTO: 10.6 % — HIGH (ref 0–6)
EOSINOPHIL NFR BLD AUTO: 11.8 %
EPI CELLS # UR: 2 /HPF — SIGNIFICANT CHANGE UP
ESTIMATED AVERAGE GLUCOSE: 214 MG/DL
GFR/BSA.PRED SERPLBLD CYS-BASED-ARV: 39 ML/MIN
GLUCOSE BLDC GLUCOMTR-MCNC: 211 MG/DL — HIGH (ref 70–99)
GLUCOSE BLDC GLUCOMTR-MCNC: 230 MG/DL — HIGH (ref 70–99)
GLUCOSE QUALITATIVE U: ABNORMAL
GLUCOSE SERPL-MCNC: 221 MG/DL
GLUCOSE SERPL-MCNC: 221 MG/DL — HIGH (ref 70–99)
GLUCOSE UR QL: NEGATIVE — SIGNIFICANT CHANGE UP
HBA1C MFR BLD HPLC: 9.1 %
HCG UR QL: NEGATIVE — SIGNIFICANT CHANGE UP
HCT VFR BLD CALC: 40.9 % — SIGNIFICANT CHANGE UP (ref 34.5–45)
HCT VFR BLD CALC: 45.2 %
HDLC SERPL-MCNC: 52 MG/DL
HGB BLD-MCNC: 13.3 G/DL — SIGNIFICANT CHANGE UP (ref 11.5–15.5)
HGB BLD-MCNC: 14.3 G/DL
HIV1 RNA # SERPL NAA+PROBE: ABNORMAL
HIV1 RNA # SERPL NAA+PROBE: ABNORMAL COPIES/ML
HYALINE CASTS # UR AUTO: 1 /LPF — SIGNIFICANT CHANGE UP (ref 0–2)
HYALINE CASTS: 1 /LPF
IMM GRANULOCYTES NFR BLD AUTO: 0.2 %
IMM GRANULOCYTES NFR BLD AUTO: 0.2 % — SIGNIFICANT CHANGE UP (ref 0–1.5)
KETONES UR-MCNC: NEGATIVE — SIGNIFICANT CHANGE UP
KETONES URINE: NEGATIVE
LDLC SERPL CALC-MCNC: 255 MG/DL
LEUKOCYTE ESTERASE UR-ACNC: ABNORMAL
LEUKOCYTE ESTERASE URINE: ABNORMAL
LYMPHOCYTES # BLD AUTO: 1.38 K/UL
LYMPHOCYTES # BLD AUTO: 1.44 K/UL — SIGNIFICANT CHANGE UP (ref 1–3.3)
LYMPHOCYTES # BLD AUTO: 29.3 % — SIGNIFICANT CHANGE UP (ref 13–44)
LYMPHOCYTES NFR BLD AUTO: 30.6 %
MAGNESIUM SERPL-MCNC: 1.6 MG/DL — SIGNIFICANT CHANGE UP (ref 1.6–2.6)
MAN DIFF?: NORMAL
MCHC RBC-ENTMCNC: 31.4 PG
MCHC RBC-ENTMCNC: 31.6 GM/DL
MCHC RBC-ENTMCNC: 31.6 PG — SIGNIFICANT CHANGE UP (ref 27–34)
MCHC RBC-ENTMCNC: 32.5 GM/DL — SIGNIFICANT CHANGE UP (ref 32–36)
MCV RBC AUTO: 97.1 FL — SIGNIFICANT CHANGE UP (ref 80–100)
MCV RBC AUTO: 99.1 FL
MICROSCOPIC-UA: NORMAL
MONOCYTES # BLD AUTO: 0.34 K/UL
MONOCYTES # BLD AUTO: 0.38 K/UL — SIGNIFICANT CHANGE UP (ref 0–0.9)
MONOCYTES NFR BLD AUTO: 7.5 %
MONOCYTES NFR BLD AUTO: 7.7 % — SIGNIFICANT CHANGE UP (ref 2–14)
N GONORRHOEA RRNA SPEC QL NAA+PROBE: NOT DETECTED
NEUTROPHILS # BLD AUTO: 2.2 K/UL
NEUTROPHILS # BLD AUTO: 2.49 K/UL — SIGNIFICANT CHANGE UP (ref 1.8–7.4)
NEUTROPHILS NFR BLD AUTO: 48.8 %
NEUTROPHILS NFR BLD AUTO: 50.8 % — SIGNIFICANT CHANGE UP (ref 43–77)
NITRITE UR-MCNC: NEGATIVE — SIGNIFICANT CHANGE UP
NITRITE URINE: NEGATIVE
NONHDLC SERPL-MCNC: 302 MG/DL
NRBC # BLD: 0 /100 WBCS — SIGNIFICANT CHANGE UP (ref 0–0)
PH UR: 6 — SIGNIFICANT CHANGE UP (ref 5–8)
PH URINE: 6
PLATELET # BLD AUTO: 219 K/UL — SIGNIFICANT CHANGE UP (ref 150–400)
PLATELET # BLD AUTO: 258 K/UL
POTASSIUM SERPL-MCNC: 3.9 MMOL/L — SIGNIFICANT CHANGE UP (ref 3.5–5.3)
POTASSIUM SERPL-SCNC: 3.9 MMOL/L — SIGNIFICANT CHANGE UP (ref 3.5–5.3)
POTASSIUM SERPL-SCNC: 4 MMOL/L
PROT SERPL-MCNC: 8.6 G/DL
PROT SERPL-MCNC: 8.6 G/DL — HIGH (ref 6–8.3)
PROT UR-MCNC: ABNORMAL
PROTEIN URINE: ABNORMAL
PSA SERPL-MCNC: <0.01 NG/ML
RBC # BLD: 4.21 M/UL — SIGNIFICANT CHANGE UP (ref 3.8–5.2)
RBC # BLD: 4.56 M/UL
RBC # FLD: 15 %
RBC # FLD: 15 % — HIGH (ref 10.3–14.5)
RBC CASTS # UR COMP ASSIST: 18 /HPF — HIGH (ref 0–4)
RED BLOOD CELLS URINE: 8 /HPF
SARS-COV-2 RNA SPEC QL NAA+PROBE: SIGNIFICANT CHANGE UP
SODIUM SERPL-SCNC: 134 MMOL/L — LOW (ref 135–145)
SODIUM SERPL-SCNC: 138 MMOL/L
SOURCE AMPLIFICATION: NORMAL
SP GR SPEC: 1.02 — SIGNIFICANT CHANGE UP (ref 1.01–1.02)
SPECIFIC GRAVITY URINE: 1.02
SQUAMOUS EPITHELIAL CELLS: 1 /HPF
T PALLIDUM AB SER QL IA: NEGATIVE
TRIGL SERPL-MCNC: 234 MG/DL
TSH SERPL-ACNC: 0.77 UIU/ML
UROBILINOGEN FLD QL: NEGATIVE — SIGNIFICANT CHANGE UP
UROBILINOGEN URINE: NORMAL
VIRAL LOAD INTERP: NORMAL
VIRAL LOAD LOG: ABNORMAL LG COP/ML
WBC # BLD: 4.91 K/UL — SIGNIFICANT CHANGE UP (ref 3.8–10.5)
WBC # FLD AUTO: 4.51 K/UL
WBC # FLD AUTO: 4.91 K/UL — SIGNIFICANT CHANGE UP (ref 3.8–10.5)
WBC UR QL: 78 /HPF — HIGH (ref 0–5)
WHITE BLOOD CELLS URINE: 272 /HPF

## 2021-12-29 PROCEDURE — 99285 EMERGENCY DEPT VISIT HI MDM: CPT

## 2021-12-29 PROCEDURE — 99223 1ST HOSP IP/OBS HIGH 75: CPT | Mod: GC

## 2021-12-29 RX ORDER — SODIUM CHLORIDE 9 MG/ML
1000 INJECTION, SOLUTION INTRAVENOUS
Refills: 0 | Status: DISCONTINUED | OUTPATIENT
Start: 2021-12-29 | End: 2022-01-19

## 2021-12-29 RX ORDER — FOSCARNET SODIUM 24 MG/ML
3000 INJECTION, SOLUTION INTRAVENOUS ONCE
Refills: 0 | Status: COMPLETED | OUTPATIENT
Start: 2021-12-29 | End: 2021-12-29

## 2021-12-29 RX ORDER — DEXTROSE 50 % IN WATER 50 %
25 SYRINGE (ML) INTRAVENOUS ONCE
Refills: 0 | Status: DISCONTINUED | OUTPATIENT
Start: 2021-12-29 | End: 2022-01-19

## 2021-12-29 RX ORDER — CEFTRIAXONE 500 MG/1
1000 INJECTION, POWDER, FOR SOLUTION INTRAMUSCULAR; INTRAVENOUS ONCE
Refills: 0 | Status: COMPLETED | OUTPATIENT
Start: 2021-12-29 | End: 2021-12-29

## 2021-12-29 RX ORDER — SODIUM CHLORIDE 9 MG/ML
1000 INJECTION INTRAMUSCULAR; INTRAVENOUS; SUBCUTANEOUS ONCE
Refills: 0 | Status: COMPLETED | OUTPATIENT
Start: 2021-12-29 | End: 2021-12-29

## 2021-12-29 RX ORDER — INSULIN LISPRO 100/ML
10 VIAL (ML) SUBCUTANEOUS
Refills: 0 | Status: DISCONTINUED | OUTPATIENT
Start: 2021-12-29 | End: 2022-01-02

## 2021-12-29 RX ORDER — DEXTROSE 50 % IN WATER 50 %
15 SYRINGE (ML) INTRAVENOUS ONCE
Refills: 0 | Status: DISCONTINUED | OUTPATIENT
Start: 2021-12-29 | End: 2022-01-19

## 2021-12-29 RX ORDER — DEXTROSE 50 % IN WATER 50 %
12.5 SYRINGE (ML) INTRAVENOUS ONCE
Refills: 0 | Status: DISCONTINUED | OUTPATIENT
Start: 2021-12-29 | End: 2022-01-19

## 2021-12-29 RX ORDER — INSULIN LISPRO 100/ML
VIAL (ML) SUBCUTANEOUS AT BEDTIME
Refills: 0 | Status: DISCONTINUED | OUTPATIENT
Start: 2021-12-29 | End: 2022-01-19

## 2021-12-29 RX ORDER — FOSCARNET SODIUM 24 MG/ML
3000 INJECTION, SOLUTION INTRAVENOUS EVERY 12 HOURS
Refills: 0 | Status: DISCONTINUED | OUTPATIENT
Start: 2021-12-29 | End: 2022-01-04

## 2021-12-29 RX ORDER — HEPARIN SODIUM 5000 [USP'U]/ML
5000 INJECTION INTRAVENOUS; SUBCUTANEOUS EVERY 12 HOURS
Refills: 0 | Status: DISCONTINUED | OUTPATIENT
Start: 2021-12-29 | End: 2022-01-19

## 2021-12-29 RX ORDER — GLUCAGON INJECTION, SOLUTION 0.5 MG/.1ML
1 INJECTION, SOLUTION SUBCUTANEOUS ONCE
Refills: 0 | Status: DISCONTINUED | OUTPATIENT
Start: 2021-12-29 | End: 2022-01-19

## 2021-12-29 RX ORDER — INSULIN LISPRO 100/ML
VIAL (ML) SUBCUTANEOUS
Refills: 0 | Status: DISCONTINUED | OUTPATIENT
Start: 2021-12-29 | End: 2022-01-19

## 2021-12-29 RX ORDER — KETOROLAC TROMETHAMINE 30 MG/ML
15 SYRINGE (ML) INJECTION ONCE
Refills: 0 | Status: DISCONTINUED | OUTPATIENT
Start: 2021-12-29 | End: 2021-12-29

## 2021-12-29 RX ORDER — BICTEGRAVIR SODIUM, EMTRICITABINE, AND TENOFOVIR ALAFENAMIDE FUMARATE 30; 120; 15 MG/1; MG/1; MG/1
1 TABLET ORAL DAILY
Refills: 0 | Status: DISCONTINUED | OUTPATIENT
Start: 2021-12-29 | End: 2022-01-19

## 2021-12-29 RX ORDER — INFLUENZA VIRUS VACCINE 15; 15; 15; 15 UG/.5ML; UG/.5ML; UG/.5ML; UG/.5ML
0.5 SUSPENSION INTRAMUSCULAR ONCE
Refills: 0 | Status: DISCONTINUED | OUTPATIENT
Start: 2021-12-29 | End: 2022-01-19

## 2021-12-29 RX ORDER — INSULIN GLARGINE 100 [IU]/ML
20 INJECTION, SOLUTION SUBCUTANEOUS AT BEDTIME
Refills: 0 | Status: DISCONTINUED | OUTPATIENT
Start: 2021-12-29 | End: 2022-01-19

## 2021-12-29 RX ORDER — SODIUM CHLORIDE 9 MG/ML
500 INJECTION INTRAMUSCULAR; INTRAVENOUS; SUBCUTANEOUS
Refills: 0 | Status: DISCONTINUED | OUTPATIENT
Start: 2021-12-29 | End: 2022-01-19

## 2021-12-29 RX ADMIN — FOSCARNET SODIUM 250 MILLIGRAM(S): 24 INJECTION, SOLUTION INTRAVENOUS at 23:36

## 2021-12-29 RX ADMIN — Medication 15 MILLIGRAM(S): at 10:21

## 2021-12-29 RX ADMIN — FOSCARNET SODIUM 250 MILLIGRAM(S): 24 INJECTION, SOLUTION INTRAVENOUS at 16:56

## 2021-12-29 RX ADMIN — HEPARIN SODIUM 5000 UNIT(S): 5000 INJECTION INTRAVENOUS; SUBCUTANEOUS at 22:21

## 2021-12-29 RX ADMIN — SODIUM CHLORIDE 1000 MILLILITER(S): 9 INJECTION INTRAMUSCULAR; INTRAVENOUS; SUBCUTANEOUS at 13:19

## 2021-12-29 RX ADMIN — SODIUM CHLORIDE 1000 MILLILITER(S): 9 INJECTION INTRAMUSCULAR; INTRAVENOUS; SUBCUTANEOUS at 10:16

## 2021-12-29 RX ADMIN — Medication 2: at 17:44

## 2021-12-29 RX ADMIN — CEFTRIAXONE 100 MILLIGRAM(S): 500 INJECTION, POWDER, FOR SOLUTION INTRAMUSCULAR; INTRAVENOUS at 13:19

## 2021-12-29 RX ADMIN — INSULIN GLARGINE 20 UNIT(S): 100 INJECTION, SOLUTION SUBCUTANEOUS at 22:21

## 2021-12-29 RX ADMIN — SODIUM CHLORIDE 1000 MILLILITER(S): 9 INJECTION INTRAMUSCULAR; INTRAVENOUS; SUBCUTANEOUS at 22:31

## 2021-12-29 RX ADMIN — Medication 15 MILLIGRAM(S): at 14:41

## 2021-12-29 NOTE — ED PROVIDER NOTE - PHYSICAL EXAMINATION
PHYSICAL EXAM:  GENERAL: NAD, lying in bed comfortably  HEAD:  Atraumatic, Normocephalic  EYES: EOMI, PERRLA, conjunctiva and sclera clear  ENT: No erythema/pallor/petechiae/lesions; TMs clear b/l  NECK: Supple, No JVD  LUNG: CTA b/l; no r/r/w  HEART: RRR, +S1/S2; No m/r/g  ABDOMEN: soft, NT/ND; BS audible   : multiple herpetic lesions noted on folds on labia with associated soft tissue swelling and erythema, no discharge noted  EXTREMITIES:  2+ Peripheral Pulses, brisk cap refill. No clubbing, cyanosis, or edema  NERVOUS SYSTEM:  AAOx3, speech clear. No sensory/motor deficits   MSK: FROM all 4 extremities, full and equal strength  SKIN: No rashes or lesions    Physical exam performed in presence of female RN chaperone.

## 2021-12-29 NOTE — ED PROVIDER NOTE - OBJECTIVE STATEMENT
Patient is a 51 year old female with past medical history significant for HIV (on HAART and well-controlled) and prior vaginal herpes presents with chief complaint of worsening vaginal herpes.  Patient states she is being followed by ID and OB/GYN, who have prescribed her valacyclovir 2g three times per day over the past week.  Patient states her symptoms have been worsening and she is in significant pain.  Patient states she was told by her ID doctor to present to the Emergency Department for IV treatment for her symptoms.  Patient denies any fever, cough, or other physical complaints.  No sick contacts or recent travel.

## 2021-12-29 NOTE — CONSULT NOTE ADULT - SUBJECTIVE AND OBJECTIVE BOX
Patient is a 51y old  Female who presents with a chief complaint of genital lesions    HPI:    51 F pmhx HIV on biktarvy, HSV2 genital lesions, CKD, DM2 on insulin presenting for worsening genital lesions. Patient was admitted in May with similar problems, was on valcyclovir which did not improve pain or symptoms and was admitted for IV Foscarnet which improved pain and lesions. Was feeling well until October when lesions started to pop again. Started taking valtrex in early november but went to ID Office yesterday. Lesions much worse and painful, leading towards difficulty with urination, painful itch. Patient takes valtrex with tylenol which relieves some of the pain however it was not improving so was recommended to come back to the hospital for IV management of HSV2 lesions. Patient currently without chest pain, sob, fevers, chills, nausea, vomiting. Has been taking biktarvy as prescribed.     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    Herpes genitalis    S/P Tubal Ligation    S/P bilateral breast implants        Allergies  No Known Allergies        MEDICATIONS  (STANDING):        ANTIMICROBIALS:    cefTRIAXone   IVPB 1000 once  foscarnet (Peripheral) IVPB 3000 Once  foscarnet (Peripheral) IVPB 3000 every 12 hours      OTHER MEDS: MEDICATIONS  (STANDING):      SOCIAL HISTORY:   hx smoking  non-smoker    FAMILY HISTORY:      REVIEW OF SYSTEMS  [  ] ROS unobtainable because:    [  ] All other systems negative except as noted below:	    Constitutional:  [ ] fever [ ] chills  [ ] weight loss  [ ] weakness  Skin:  [ ] rash [ ] phlebitis	  Eyes: [ ] icterus [ ] pain  [ ] discharge	  ENMT: [ ] sore throat  [ ] thrush [ ] ulcers [ ] exudates  Respiratory: [ ] dyspnea [ ] hemoptysis [ ] cough [ ] sputum	  Cardiovascular:  [ ] chest pain [ ] palpitations [ ] edema	  Gastrointestinal:  [ ] nausea [ ] vomiting [ ] diarrhea [ ] constipation [ ] pain	  Genitourinary:  [ ] dysuria [ ] frequency [ ] hematuria [ ] discharge [ ] flank pain  [ ] incontinence  Musculoskeletal:  [ ] myalgias [ ] arthralgias [ ] arthritis  [ ] back pain  Neurological:  [ ] headache [ ] seizures  [ ] confusion/altered mental status  Psychiatric:  [ ] anxiety [ ] depression	  Hematology/Lymphatics:  [ ] lymphadenopathy  Endocrine:  [ ] adrenal [ ] thyroid  Allergic/Immunologic:	 [ ] transplant [ ] seasonal    Vital Signs Last 24 Hrs  T(F): 97.9 (21 @ 10:29), Max: 98.3 (21 @ 08:22)    Vital Signs Last 24 Hrs  HR: 79 (21 @ 10:29) (79 - 101)  BP: 133/85 (21 @ 10:29) (133/85 - 159/104)  RR: 16 (21 @ 10:29)  SpO2: 98% (21 @ 10:29) (94% - 98%)  Wt(kg): --    PHYSICAL EXAM:  Constitutional: non-toxic, no distress  HEAD/EYES: anicteric, no conjunctival injection  ENT:  supple, no thrush  Cardiovascular:   normal S1, S2, no murmur, no edema  Respiratory:  clear BS bilaterally, no wheezes, no rales  GI:  soft, non-tender, normal bowel sounds  :  Numerous pustules and inflammation in labia   Musculoskeletal:  no synovitis, normal ROM  Neurologic: awake and alert, normal strength, no focal findings  Skin:  no rash, no erythema, no phlebitis  Heme/Onc: no lymphadenopathy   Psychiatric:  awake, alert, appropriate mood                                13.3   4.91  )-----------( 219      ( 29 Dec 2021 10:33 )             40.9         134<L>  |  101  |  16  ----------------------------<  221<H>  3.9   |  21<L>  |  1.51<H>    Ca    9.4      29 Dec 2021 10:33  Mg     1.6         TPro  8.6<H>  /  Alb  3.8  /  TBili  0.6  /  DBili  x   /  AST  16  /  ALT  15  /  AlkPhos  360<H>        Urinalysis Basic - ( 29 Dec 2021 10:33 )    Color: Light Yellow / Appearance: Clear / S.016 / pH: x  Gluc: x / Ketone: Negative  / Bili: Negative / Urobili: Negative   Blood: x / Protein: 100 mg/dL / Nitrite: Negative   Leuk Esterase: Large / RBC: 18 /hpf / WBC 78 /HPF   Sq Epi: x / Non Sq Epi: 2 /hpf / Bacteria: Negative        MICROBIOLOGY:                HIV-1 RNA Quantitative, Viral Load Log: NOT DET. lg /mL (21 @ 17:01)              RADIOLOGY:  imaging below personally reviewed Patient is a 51y old  Female who presents with a chief complaint of genital lesions    HPI:    51 F pmhx HIV on biktarvy (21: Cd4= 378-29%; 21 HIV viral Load UNDETECTABLE <12 copies/mL), HSV2 genital lesions, CKD, obesity (BMI= 32.8), DM2  on insulin (21 HgbA1C = 9.0),  admitted today 21 for worsening genital lesions. Patient was admitted in May with HSV2 despited valacylclovir and repsonded to Foscaret,   S he w as feeling well until October when lesions started to pop again. Started taking valtrex in early november but went to ID Office yesterday. Lesions much worse and painful, leading towards difficulty with urination, painful itch.   She has marked pain and discomfort with waling, ceertaiin positions and has had severe sleep disturbance.  Patient takes valtrex with tylenol which relieves some of the pain however it was not improving so was recommended to come back to the hospital for IV management of HSV2 lesions. Patient currently without chest pain, sob, fevers, chills, nausea, vomiting. Has been taking biktarvy as prescribed.     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    Herpes genitalis    S/P Tubal Ligation    S/P bilateral breast implants      Allergies  No Known Allergies        MEDICATIONS  (STANDING):        ANTIMICROBIALS:    cefTRIAXone   IVPB 1000 once  foscarnet (Peripheral) IVPB 3000 Once  foscarnet (Peripheral) IVPB 3000 every 12 hours      OTHER MEDS: MEDICATIONS  (STANDING):      SOCIAL HISTORY:   hx smoking  non-smoker    FAMILY HISTORY:      REVIEW OF SYSTEMS  [  ] ROS unobtainable because:    [  ] All other systems negative except as noted below:	    Constitutional:  [ ] fever [ ] chills  [ ] weight loss  [ ] weakness  Skin:  [ ] rash [ ] phlebitis	  Eyes: [ ] icterus [ ] pain  [ ] discharge	  ENMT: [ ] sore throat  [ ] thrush [ ] ulcers [ ] exudates  Respiratory: [ ] dyspnea [ ] hemoptysis [ ] cough [ ] sputum	  Cardiovascular:  [ ] chest pain [ ] palpitations [ ] edema	  Gastrointestinal:  [ ] nausea [ ] vomiting [ ] diarrhea [ ] constipation [ ] pain	  Genitourinary:  [ ] dysuria [ ] frequency [ ] hematuria [ ] discharge [ ] flank pain  [ ] incontinence  Musculoskeletal:  [ ] myalgias [ ] arthralgias [ ] arthritis  [ ] back pain  Neurological:  [ ] headache [ ] seizures  [ ] confusion/altered mental status  Psychiatric:  [ ] anxiety [ ] depression	  Hematology/Lymphatics:  [ ] lymphadenopathy  Endocrine:  [ ] adrenal [ ] thyroid  Allergic/Immunologic:	 [ ] transplant [ ] seasonal    Vital Signs Last 24 Hrs  T(F): 97.9 (21 @ 10:29), Max: 98.3 (21 @ 08:22)    Vital Signs Last 24 Hrs  HR: 79 (21 @ 10:29) (79 - 101)  BP: 133/85 (21 @ 10:29) (133/85 - 159/104)  RR: 16 (21 @ 10:29)  SpO2: 98% (21 @ 10:29) (94% - 98%)  Wt(kg): --    PHYSICAL EXAM:  Constitutional: non-toxic, no distress  HEAD/EYES: anicteric, no conjunctival injection  ENT:  supple, no thrush  Cardiovascular:   normal S1, S2, no murmur, no edema  Respiratory:  clear BS bilaterally, no wheezes, no rales  GI:  soft, non-tender, normal bowel sounds  :  swelling and inflammation in labia with superficial ulceration  Musculoskeletal:  no synovitis, normal ROM  Neurologic: awake and alert, normal strength, no focal findings  Skin:  no rash, no erythema, no phlebitis  Heme/Onc: no lymphadenopathy   Psychiatric:  awake, alert, appropriate mood                          13.3   4.91  )-----------( 219      ( 29 Dec 2021 10:33 )             40.9         134<L>  |  101  |  16  ----------------------------<  221<H>  3.9   |  21<L>  |  1.51<H>    Ca    9.4      29 Dec 2021 10:33  Mg     1.6         TPro  8.6<H>  /  Alb  3.8  /  TBili  0.6  /  DBili  x   /  AST  16  /  ALT  15  /  AlkPhos  360<H>        Urinalysis Basic - ( 29 Dec 2021 10:33 )    Color: Light Yellow / Appearance: Clear / S.016 / pH: x  Gluc: x / Ketone: Negative  / Bili: Negative / Urobili: Negative   Blood: x / Protein: 100 mg/dL / Nitrite: Negative   Leuk Esterase: Large / RBC: 18 /hpf / WBC 78 /HPF   Sq Epi: x / Non Sq Epi: 2 /hpf / Bacteria: Negative    (21 @ 15:31)   A1C with Estimated Average Glucose Result: 9.0:    MICROBIOLOGY:      HIV-1 RNA Quantitative, Viral Load Log: NOT DET. lg /mL (21 @ 17:01)              RADIOLOGY:  imaging below personally reviewed

## 2021-12-29 NOTE — ED ADULT NURSE NOTE - OBJECTIVE STATEMENT
Patient is a 51 yr old female c/o vaginal herpes outbreak since September. States her last outbreak was in May ([facility]) and she was admitted/hospitalized for 23 days. States this outbreak started in September, she saw her GYN who increased her Valtrex dose to 500 mg (4 pills 3x a day). States it has worsened, she has swelling to her labia and it is pink so she called ID and they wanted her to come to ED to be admitted. Patient with PMHx of HIV, DM, is Covid vaccinated. Reports recent exposure to her grandchildren who tested +Covid. Patient denies abd pain, n/v, fever, chills. Also endorses +vaginal discharge--thinks she has a yeast infection (has hx of frequent yeast infections) and has urinary urgency (thinks she has a UTI). Denies dysuria/hematuria. Patient has been taking Tylenol but states it is no longer working for her. Patient examined by Dr. Bentley. Labs sent, pain meds administered as ordered. ID consult placed. Patient otherwise well appearing, resting comfortably in NAD. Lungs CTA B/L. Will continue to monitor.

## 2021-12-29 NOTE — ED PROVIDER NOTE - CARE PLAN
1 Principal Discharge DX:	Herpes infection  Secondary Diagnosis:	HIV disease   Principal Discharge DX:	Herpes infection  Secondary Diagnosis:	HIV disease  Secondary Diagnosis:	Urinary tract infection

## 2021-12-29 NOTE — ED PROVIDER NOTE - PROGRESS NOTE DETAILS
DO Shreyas: patient re-assessed and resting in no acute distress.  patient reports mildly improved symptoms at this time.  ID consulted and will evaluate the patient. DO Shreyas: patient re-assessed and resting in no acute distress.  patient reports mildly improved symptoms at this time.  ID consulted and will evaluate the patient.  awaiting ID recommendations for IV therapy and antibiotics. DO Shreyas: patient re-assessed and resting in no acute distress.  patient reports mildly improved symptoms at this time.  ID consulted and will evaluate the patient.  patient initiated on Ceftriaxone for acute urinary tract infection.

## 2021-12-29 NOTE — ED PROVIDER NOTE - NS ED ROS FT
CONSTITUTIONAL: No weakness, fevers or chills  EYES/ENT: No visual changes;  No vertigo or throat pain   NECK: No pain or stiffness  RESPIRATORY: No cough, wheezing, hemoptysis; No shortness of breath  CARDIOVASCULAR: No chest pain or palpitations  GASTROINTESTINAL: No abdominal or epigastric pain. No nausea, vomiting, or hematemesis; No diarrhea or constipation. No melena or hematochezia.  GENITOURINARY: + dysuria, vaginal pain/lesions  NEUROLOGICAL: No numbness or weakness  SKIN: No itching, burning, rashes, or lesions   All other review of systems is negative unless indicated above.

## 2021-12-29 NOTE — PATIENT PROFILE ADULT - FALL HARM RISK - UNIVERSAL INTERVENTIONS
Home
Bed in lowest position, wheels locked, appropriate side rails in place/Call bell, personal items and telephone in reach/Instruct patient to call for assistance before getting out of bed or chair/Non-slip footwear when patient is out of bed/Gregory to call system/Physically safe environment - no spills, clutter or unnecessary equipment/Purposeful Proactive Rounding/Room/bathroom lighting operational, light cord in reach

## 2021-12-29 NOTE — ED PROVIDER NOTE - CLINICAL SUMMARY MEDICAL DECISION MAKING FREE TEXT BOX
Patient is a 51 year old female with past medical history significant for HIV (on HAART and well-controlled) and prior vaginal herpes presents with chief complaint of worsening vaginal herpes.  Patient was evaluated for infectious etiology of symptoms.  Patient received labs and infectious disease was consulted.  Patient was given Toradol and IV fluids for symptomatic relief. Patient is a 51 year old female with past medical history significant for HIV (on HAART and well-controlled) and prior vaginal herpes presents with chief complaint of worsening vaginal herpes.  Patient was evaluated for infectious etiology of symptoms.  Patient received labs and infectious disease was consulted.  Patient was given Toradol and IV fluids for symptomatic relief.  Infectious disease evaluated patient at bedside and advised awaiting their recommendations before initiating IV therapy.  Patient agreed with decision for admission and was admitted to the hospital. Patient is a 51 year old female with past medical history significant for HIV (on HAART and well-controlled) and prior vaginal herpes presents with chief complaint of worsening vaginal herpes.  Patient was evaluated for infectious etiology of symptoms.  Patient received labs and infectious disease was consulted.  Patient was given Toradol and IV fluids for symptomatic relief.  Infectious disease evaluated patient at bedside and advised awaiting their recommendations before initiating IV therapy.  Patient agreed with decision for admission and was admitted to the hospital.    Sheree: 51 year old female with pmhx of HIV on HAARt with prior vaginal herpes resistant to multiple treatment here with worsening herpes over the past week.  has been on medications with no relief. seen by ID and advised to come to ER. no fever, no chills, no cough.  will get labs, id consult, pain control, likely will need to admit for iv traetment.

## 2021-12-29 NOTE — ED PROVIDER NOTE - CADM POA PRESS ULCER
[facility]   [place]  [place] [address]  Phone: [phone]  Fax: [phone]                  Shanelle Schofield   5/15/2017 9:20 AM   Office Visit    Description:  Female : 1936   Provider:  Jaskaran Alexis MD   Department:  [facility]           Reason for Visit     Edema           Diagnoses this Visit        Comments    Essential hypertension    -  Primary     Leg swelling         Need for vaccination against Streptococcus pneumoniae                To Do List           Future Appointments        Provider Department Dept Phone    5/15/2017 10:00 AM [facility] [phone]      Goals (5 Years of Data)     None      [facility]     [facility] Nurse Care Line -  Assistance  Unless otherwise directed by your provider, please contact [facility], our nurse care line that is available for  assistance.     Registered nurses in the [facility] provide: appointment scheduling, clinical advisement, health education, and other advisory services.  Call: [phone] (toll free)               Medications           Message regarding Medications     Verify the changes and/or additions to your medication regime listed below are the same as discussed with your clinician today.  If any of these changes or additions are incorrect, please notify your healthcare provider.             Verify that the below list of medications is an accurate representation of the medications you are currently taking.  If none reported, the list may be blank. If incorrect, please contact your healthcare provider. Carry this list with you in case of emergency.           Current Medications     amlodipine (NORVASC) 10 MG tablet TAKE ONE TABLET BY MOUTH ONE TIME DAILY IN THE P.M.     aspirin (ECOTRIN) 81 MG EC tablet Take 81 mg by mouth once daily.      B2/VITS A,C,E/LUT/ZEAXANTH/MIN (ICAPS ORAL) Take 1 tablet by mouth once daily.     "calcium carbonate (OS-AUGUSTINE) 600 mg (1,500 mg) Tab Take 600 mg by mouth once daily.      cycloSPORINE (RESTASIS) 0.05 % ophthalmic emulsion Place 1 drop into both eyes 2 (two) times daily.      ketoconazole (NIZORAL) 2 % shampoo Wash bid    PROPYLENE GLYCOL/ (SYSTANE OPHT) Apply 1 drop to eye 2 (two) times daily.    ramipril (ALTACE) 10 MG capsule TAKE ONE CAPSULE BY MOUTH ONE TIME DAILY            Clinical Reference Information           Your Vitals Were     BP Pulse Height Weight BMI    120/60 (BP Location: Right arm, Patient Position: Sitting, BP Method: Manual) 68 5' 6" (1.676 m) 59 kg (130 lb 1.1 oz) 20.99 kg/m2      Blood Pressure          Most Recent Value    BP  120/60      Allergies as of 5/15/2017     No Known Allergies      Immunizations Administered on Date of Encounter - 5/15/2017     Name Date Dose VIS Date Route    Pneumococcal Polysaccharide - 23 Valent  Incomplete 0.5 mL 4/24/2015 Intramuscular      Orders Placed During Today's Visit      Normal Orders This Visit    Pneumococcal Polysaccharide Vaccine (23 Valent) (SQ/IM)     Future Labs/Procedures Expected by Expires    Basic metabolic panel  5/15/2017 8/13/2017    Brain natriuretic peptide  5/15/2017 8/13/2017    TSH  5/15/2017 8/13/2017      Smoking Cessation     If you would like to quit smoking:   You may be eligible for free services if you are a [place] resident and started smoking cigarettes before September 1, 1988.  Call the Smoking Cessation Trust ([facility]) toll free at ([phone] or ([phone] 443-6115.   Call 1-800-QUIT-NOW if you do not meet the above criteria.   Contact us via email: [email or web address]   View our website for more information: [email or web address]        Language Assistance Services     ATTENTION: Language assistance services are available, free of charge. Please call [phone].      ATENCIÓN: Si habla español, tiene a abraham disposición servicios gratuitos de asistencia lingüística. Llame al " [phone].     GENEVA Ý: N?u b?n nói Ti?ng Vi?t, có các d?ch v? h? tr? ngôn ng? mi?n phí dành cho b?n. G?i s? [phone].         [facility] complies with applicable Federal civil rights laws and does not discriminate on the basis of race, color, national origin, age, disability, or sex.         No

## 2021-12-29 NOTE — H&P ADULT - HISTORY OF PRESENT ILLNESS
51 F pmhx HIV on biktarvy (12/28/21: Cd4= 378-29%; 5/27/21 HIV viral Load UNDETECTABLE <12 copies/mL), HSV2 genital lesions, CKD, obesity (BMI= 32.8), DM2  on insulin (5/27/21 HgbA1C = 9.0),  admitted today 12/29/21 for worsening genital lesions. Patient was admitted in May with HSV2 despite valacylclovir and responded to Foscaret,   S he w as feeling well until October when lesions started to pop again. Started taking valtrex in early november but went to ID Office yesterday. Lesions much worse and painful, leading towards difficulty with urination, painful itch.   She has marked pain and discomfort with waling, certain positions and has had severe sleep disturbance.  Patient takes valtrex with tylenol which relieves some of the pain however it was not improving so was recommended to come back to the hospital for IV management of HSV2 lesions. Patient currently without chest pain, sob, fevers, chills, nausea, vomiting. Has been taking biktarvy as prescribed. See by ID in ER

## 2021-12-30 LAB
A1C WITH ESTIMATED AVERAGE GLUCOSE RESULT: 9 % — HIGH (ref 4–5.6)
ANION GAP SERPL CALC-SCNC: 9 MMOL/L — SIGNIFICANT CHANGE UP (ref 5–17)
APPEARANCE UR: CLEAR — SIGNIFICANT CHANGE UP
BACTERIA # UR AUTO: NEGATIVE — SIGNIFICANT CHANGE UP
BILIRUB UR-MCNC: NEGATIVE — SIGNIFICANT CHANGE UP
BUN SERPL-MCNC: 15 MG/DL — SIGNIFICANT CHANGE UP (ref 7–23)
CALCIUM SERPL-MCNC: 8.9 MG/DL — SIGNIFICANT CHANGE UP (ref 8.4–10.5)
CHLORIDE SERPL-SCNC: 107 MMOL/L — SIGNIFICANT CHANGE UP (ref 96–108)
CO2 SERPL-SCNC: 22 MMOL/L — SIGNIFICANT CHANGE UP (ref 22–31)
COLOR SPEC: SIGNIFICANT CHANGE UP
CREAT SERPL-MCNC: 1.4 MG/DL — HIGH (ref 0.5–1.3)
CULTURE RESULTS: SIGNIFICANT CHANGE UP
DIFF PNL FLD: NEGATIVE — SIGNIFICANT CHANGE UP
EPI CELLS # UR: 0 /HPF — SIGNIFICANT CHANGE UP
ESTIMATED AVERAGE GLUCOSE: 212 MG/DL — HIGH (ref 68–114)
GLUCOSE BLDC GLUCOMTR-MCNC: 100 MG/DL — HIGH (ref 70–99)
GLUCOSE BLDC GLUCOMTR-MCNC: 145 MG/DL — HIGH (ref 70–99)
GLUCOSE BLDC GLUCOMTR-MCNC: 146 MG/DL — HIGH (ref 70–99)
GLUCOSE BLDC GLUCOMTR-MCNC: 202 MG/DL — HIGH (ref 70–99)
GLUCOSE SERPL-MCNC: 158 MG/DL — HIGH (ref 70–99)
GLUCOSE UR QL: NEGATIVE — SIGNIFICANT CHANGE UP
HCT VFR BLD CALC: 36.3 % — SIGNIFICANT CHANGE UP (ref 34.5–45)
HGB BLD-MCNC: 12 G/DL — SIGNIFICANT CHANGE UP (ref 11.5–15.5)
HYALINE CASTS # UR AUTO: 0 /LPF — SIGNIFICANT CHANGE UP (ref 0–2)
KETONES UR-MCNC: NEGATIVE — SIGNIFICANT CHANGE UP
LEUKOCYTE ESTERASE UR-ACNC: ABNORMAL
MAGNESIUM SERPL-MCNC: 1.8 MG/DL — SIGNIFICANT CHANGE UP (ref 1.6–2.6)
MCHC RBC-ENTMCNC: 32.4 PG — SIGNIFICANT CHANGE UP (ref 27–34)
MCHC RBC-ENTMCNC: 33.1 GM/DL — SIGNIFICANT CHANGE UP (ref 32–36)
MCV RBC AUTO: 98.1 FL — SIGNIFICANT CHANGE UP (ref 80–100)
NITRITE UR-MCNC: NEGATIVE — SIGNIFICANT CHANGE UP
NRBC # BLD: 0 /100 WBCS — SIGNIFICANT CHANGE UP (ref 0–0)
PH UR: 6.5 — SIGNIFICANT CHANGE UP (ref 5–8)
PHOSPHATE SERPL-MCNC: 3.2 MG/DL — SIGNIFICANT CHANGE UP (ref 2.5–4.5)
PLATELET # BLD AUTO: 210 K/UL — SIGNIFICANT CHANGE UP (ref 150–400)
POTASSIUM SERPL-MCNC: 3.9 MMOL/L — SIGNIFICANT CHANGE UP (ref 3.5–5.3)
POTASSIUM SERPL-SCNC: 3.9 MMOL/L — SIGNIFICANT CHANGE UP (ref 3.5–5.3)
PROT UR-MCNC: ABNORMAL
RBC # BLD: 3.7 M/UL — LOW (ref 3.8–5.2)
RBC # FLD: 15.1 % — HIGH (ref 10.3–14.5)
RBC CASTS # UR COMP ASSIST: 1 /HPF — SIGNIFICANT CHANGE UP (ref 0–4)
SODIUM SERPL-SCNC: 138 MMOL/L — SIGNIFICANT CHANGE UP (ref 135–145)
SP GR SPEC: 1.01 — SIGNIFICANT CHANGE UP (ref 1.01–1.02)
SPECIMEN SOURCE: SIGNIFICANT CHANGE UP
UROBILINOGEN FLD QL: NEGATIVE — SIGNIFICANT CHANGE UP
WBC # BLD: 3.54 K/UL — LOW (ref 3.8–10.5)
WBC # FLD AUTO: 3.54 K/UL — LOW (ref 3.8–10.5)
WBC UR QL: 7 /HPF — HIGH (ref 0–5)

## 2021-12-30 PROCEDURE — 99232 SBSQ HOSP IP/OBS MODERATE 35: CPT

## 2021-12-30 RX ORDER — IBUPROFEN 200 MG
400 TABLET ORAL EVERY 8 HOURS
Refills: 0 | Status: DISCONTINUED | OUTPATIENT
Start: 2021-12-30 | End: 2022-01-02

## 2021-12-30 RX ADMIN — Medication 10 UNIT(S): at 17:41

## 2021-12-30 RX ADMIN — FOSCARNET SODIUM 250 MILLIGRAM(S): 24 INJECTION, SOLUTION INTRAVENOUS at 11:23

## 2021-12-30 RX ADMIN — Medication 400 MILLIGRAM(S): at 22:20

## 2021-12-30 RX ADMIN — FOSCARNET SODIUM 250 MILLIGRAM(S): 24 INJECTION, SOLUTION INTRAVENOUS at 22:31

## 2021-12-30 RX ADMIN — Medication 10 UNIT(S): at 12:39

## 2021-12-30 RX ADMIN — HEPARIN SODIUM 5000 UNIT(S): 5000 INJECTION INTRAVENOUS; SUBCUTANEOUS at 06:00

## 2021-12-30 RX ADMIN — Medication 400 MILLIGRAM(S): at 21:50

## 2021-12-30 RX ADMIN — BICTEGRAVIR SODIUM, EMTRICITABINE, AND TENOFOVIR ALAFENAMIDE FUMARATE 1 TABLET(S): 30; 120; 15 TABLET ORAL at 12:38

## 2021-12-30 RX ADMIN — Medication 0: at 22:32

## 2021-12-30 RX ADMIN — HEPARIN SODIUM 5000 UNIT(S): 5000 INJECTION INTRAVENOUS; SUBCUTANEOUS at 18:35

## 2021-12-30 RX ADMIN — INSULIN GLARGINE 20 UNIT(S): 100 INJECTION, SOLUTION SUBCUTANEOUS at 22:32

## 2021-12-30 RX ADMIN — SODIUM CHLORIDE 1000 MILLILITER(S): 9 INJECTION INTRAMUSCULAR; INTRAVENOUS; SUBCUTANEOUS at 21:39

## 2021-12-30 RX ADMIN — SODIUM CHLORIDE 1000 MILLILITER(S): 9 INJECTION INTRAMUSCULAR; INTRAVENOUS; SUBCUTANEOUS at 10:46

## 2021-12-30 RX ADMIN — Medication 10 UNIT(S): at 08:48

## 2021-12-31 LAB
4/8 RATIO: 0.66 RATIO — LOW (ref 0.9–3.6)
ABS CD8: 583 /UL — SIGNIFICANT CHANGE UP (ref 142–740)
ANION GAP SERPL CALC-SCNC: 11 MMOL/L — SIGNIFICANT CHANGE UP (ref 5–17)
BUN SERPL-MCNC: 15 MG/DL — SIGNIFICANT CHANGE UP (ref 7–23)
CALCIUM SERPL-MCNC: 9.1 MG/DL — SIGNIFICANT CHANGE UP (ref 8.4–10.5)
CD3 BLASTS SPEC-ACNC: 1063 /UL — SIGNIFICANT CHANGE UP (ref 672–1870)
CD3 BLASTS SPEC-ACNC: 82 % — SIGNIFICANT CHANGE UP (ref 59–83)
CD4 %: 30 % — SIGNIFICANT CHANGE UP (ref 30–62)
CD8 %: 45 % — HIGH (ref 12–36)
CHLORIDE SERPL-SCNC: 106 MMOL/L — SIGNIFICANT CHANGE UP (ref 96–108)
CO2 SERPL-SCNC: 19 MMOL/L — LOW (ref 22–31)
CREAT SERPL-MCNC: 1.5 MG/DL — HIGH (ref 0.5–1.3)
CULTURE RESULTS: NO GROWTH — SIGNIFICANT CHANGE UP
GLUCOSE BLDC GLUCOMTR-MCNC: 118 MG/DL — HIGH (ref 70–99)
GLUCOSE BLDC GLUCOMTR-MCNC: 158 MG/DL — HIGH (ref 70–99)
GLUCOSE BLDC GLUCOMTR-MCNC: 174 MG/DL — HIGH (ref 70–99)
GLUCOSE BLDC GLUCOMTR-MCNC: 76 MG/DL — SIGNIFICANT CHANGE UP (ref 70–99)
GLUCOSE SERPL-MCNC: 95 MG/DL — SIGNIFICANT CHANGE UP (ref 70–99)
HCT VFR BLD CALC: 39.4 % — SIGNIFICANT CHANGE UP (ref 34.5–45)
HGB BLD-MCNC: 12.8 G/DL — SIGNIFICANT CHANGE UP (ref 11.5–15.5)
HSV+VZV DNA SPEC QL NAA+PROBE: ABNORMAL
MAGNESIUM SERPL-MCNC: 1.7 MG/DL — SIGNIFICANT CHANGE UP (ref 1.6–2.6)
MCHC RBC-ENTMCNC: 31.8 PG — SIGNIFICANT CHANGE UP (ref 27–34)
MCHC RBC-ENTMCNC: 32.5 GM/DL — SIGNIFICANT CHANGE UP (ref 32–36)
MCV RBC AUTO: 98 FL — SIGNIFICANT CHANGE UP (ref 80–100)
NRBC # BLD: 0 /100 WBCS — SIGNIFICANT CHANGE UP (ref 0–0)
PHOSPHATE SERPL-MCNC: 3.7 MG/DL — SIGNIFICANT CHANGE UP (ref 2.5–4.5)
PLATELET # BLD AUTO: 225 K/UL — SIGNIFICANT CHANGE UP (ref 150–400)
POTASSIUM SERPL-MCNC: 3.9 MMOL/L — SIGNIFICANT CHANGE UP (ref 3.5–5.3)
POTASSIUM SERPL-SCNC: 3.9 MMOL/L — SIGNIFICANT CHANGE UP (ref 3.5–5.3)
RBC # BLD: 4.02 M/UL — SIGNIFICANT CHANGE UP (ref 3.8–5.2)
RBC # FLD: 14.9 % — HIGH (ref 10.3–14.5)
SODIUM SERPL-SCNC: 136 MMOL/L — SIGNIFICANT CHANGE UP (ref 135–145)
SPECIMEN SOURCE: SIGNIFICANT CHANGE UP
SPECIMEN SOURCE: SIGNIFICANT CHANGE UP
T-CELL CD4 SUBSET PNL BLD: 386 /UL — LOW (ref 489–1457)
WBC # BLD: 3.53 K/UL — LOW (ref 3.8–10.5)
WBC # FLD AUTO: 3.53 K/UL — LOW (ref 3.8–10.5)

## 2021-12-31 RX ORDER — SODIUM CHLORIDE 9 MG/ML
250 INJECTION INTRAMUSCULAR; INTRAVENOUS; SUBCUTANEOUS
Refills: 0 | Status: DISCONTINUED | OUTPATIENT
Start: 2021-12-31 | End: 2021-12-31

## 2021-12-31 RX ADMIN — Medication 1: at 17:16

## 2021-12-31 RX ADMIN — FOSCARNET SODIUM 250 MILLIGRAM(S): 24 INJECTION, SOLUTION INTRAVENOUS at 22:22

## 2021-12-31 RX ADMIN — SODIUM CHLORIDE 1000 MILLILITER(S): 9 INJECTION INTRAMUSCULAR; INTRAVENOUS; SUBCUTANEOUS at 10:11

## 2021-12-31 RX ADMIN — SODIUM CHLORIDE 1000 MILLILITER(S): 9 INJECTION INTRAMUSCULAR; INTRAVENOUS; SUBCUTANEOUS at 21:34

## 2021-12-31 RX ADMIN — HEPARIN SODIUM 5000 UNIT(S): 5000 INJECTION INTRAVENOUS; SUBCUTANEOUS at 17:16

## 2021-12-31 RX ADMIN — Medication 0: at 21:33

## 2021-12-31 RX ADMIN — BICTEGRAVIR SODIUM, EMTRICITABINE, AND TENOFOVIR ALAFENAMIDE FUMARATE 1 TABLET(S): 30; 120; 15 TABLET ORAL at 12:13

## 2021-12-31 RX ADMIN — INSULIN GLARGINE 20 UNIT(S): 100 INJECTION, SOLUTION SUBCUTANEOUS at 21:34

## 2021-12-31 RX ADMIN — FOSCARNET SODIUM 250 MILLIGRAM(S): 24 INJECTION, SOLUTION INTRAVENOUS at 11:01

## 2021-12-31 RX ADMIN — Medication 400 MILLIGRAM(S): at 21:34

## 2021-12-31 RX ADMIN — Medication 400 MILLIGRAM(S): at 22:04

## 2021-12-31 RX ADMIN — Medication 10 UNIT(S): at 08:38

## 2021-12-31 RX ADMIN — HEPARIN SODIUM 5000 UNIT(S): 5000 INJECTION INTRAVENOUS; SUBCUTANEOUS at 05:20

## 2021-12-31 RX ADMIN — Medication 10 UNIT(S): at 17:17

## 2022-01-01 LAB
ALBUMIN SERPL ELPH-MCNC: 3.4 G/DL — SIGNIFICANT CHANGE UP (ref 3.3–5)
ALP SERPL-CCNC: 313 U/L — HIGH (ref 40–120)
ALT FLD-CCNC: 12 U/L — SIGNIFICANT CHANGE UP (ref 10–45)
ANION GAP SERPL CALC-SCNC: 11 MMOL/L — SIGNIFICANT CHANGE UP (ref 5–17)
AST SERPL-CCNC: 16 U/L — SIGNIFICANT CHANGE UP (ref 10–40)
BILIRUB SERPL-MCNC: 0.5 MG/DL — SIGNIFICANT CHANGE UP (ref 0.2–1.2)
BUN SERPL-MCNC: 15 MG/DL — SIGNIFICANT CHANGE UP (ref 7–23)
CALCIUM SERPL-MCNC: 9.5 MG/DL — SIGNIFICANT CHANGE UP (ref 8.4–10.5)
CHLORIDE SERPL-SCNC: 107 MMOL/L — SIGNIFICANT CHANGE UP (ref 96–108)
CO2 SERPL-SCNC: 20 MMOL/L — LOW (ref 22–31)
CREAT SERPL-MCNC: 1.63 MG/DL — HIGH (ref 0.5–1.3)
GLUCOSE BLDC GLUCOMTR-MCNC: 111 MG/DL — HIGH (ref 70–99)
GLUCOSE BLDC GLUCOMTR-MCNC: 130 MG/DL — HIGH (ref 70–99)
GLUCOSE BLDC GLUCOMTR-MCNC: 231 MG/DL — HIGH (ref 70–99)
GLUCOSE BLDC GLUCOMTR-MCNC: 70 MG/DL — SIGNIFICANT CHANGE UP (ref 70–99)
GLUCOSE SERPL-MCNC: 115 MG/DL — HIGH (ref 70–99)
HCT VFR BLD CALC: 38.6 % — SIGNIFICANT CHANGE UP (ref 34.5–45)
HGB BLD-MCNC: 12.8 G/DL — SIGNIFICANT CHANGE UP (ref 11.5–15.5)
HIV-1 VIRAL LOAD RESULT: SIGNIFICANT CHANGE UP
HIV1 RNA # SERPL NAA+PROBE: SIGNIFICANT CHANGE UP COPIES/ML
HIV1 RNA SER-IMP: SIGNIFICANT CHANGE UP
HIV1 RNA SERPL NAA+PROBE-ACNC: SIGNIFICANT CHANGE UP
HIV1 RNA SERPL NAA+PROBE-LOG#: SIGNIFICANT CHANGE UP LG COP/ML
MAGNESIUM SERPL-MCNC: 1.7 MG/DL — SIGNIFICANT CHANGE UP (ref 1.6–2.6)
MCHC RBC-ENTMCNC: 31.6 PG — SIGNIFICANT CHANGE UP (ref 27–34)
MCHC RBC-ENTMCNC: 33.2 GM/DL — SIGNIFICANT CHANGE UP (ref 32–36)
MCV RBC AUTO: 95.3 FL — SIGNIFICANT CHANGE UP (ref 80–100)
NRBC # BLD: 0 /100 WBCS — SIGNIFICANT CHANGE UP (ref 0–0)
PLATELET # BLD AUTO: 213 K/UL — SIGNIFICANT CHANGE UP (ref 150–400)
POTASSIUM SERPL-MCNC: 3.7 MMOL/L — SIGNIFICANT CHANGE UP (ref 3.5–5.3)
POTASSIUM SERPL-SCNC: 3.7 MMOL/L — SIGNIFICANT CHANGE UP (ref 3.5–5.3)
PROT SERPL-MCNC: 7.8 G/DL — SIGNIFICANT CHANGE UP (ref 6–8.3)
RBC # BLD: 4.05 M/UL — SIGNIFICANT CHANGE UP (ref 3.8–5.2)
RBC # FLD: 14.7 % — HIGH (ref 10.3–14.5)
SODIUM SERPL-SCNC: 138 MMOL/L — SIGNIFICANT CHANGE UP (ref 135–145)
WBC # BLD: 3.77 K/UL — LOW (ref 3.8–10.5)
WBC # FLD AUTO: 3.77 K/UL — LOW (ref 3.8–10.5)

## 2022-01-01 RX ADMIN — SODIUM CHLORIDE 1000 MILLILITER(S): 9 INJECTION INTRAMUSCULAR; INTRAVENOUS; SUBCUTANEOUS at 21:41

## 2022-01-01 RX ADMIN — FOSCARNET SODIUM 250 MILLIGRAM(S): 24 INJECTION, SOLUTION INTRAVENOUS at 22:22

## 2022-01-01 RX ADMIN — INSULIN GLARGINE 20 UNIT(S): 100 INJECTION, SOLUTION SUBCUTANEOUS at 22:21

## 2022-01-01 RX ADMIN — SODIUM CHLORIDE 1000 MILLILITER(S): 9 INJECTION INTRAMUSCULAR; INTRAVENOUS; SUBCUTANEOUS at 09:48

## 2022-01-01 RX ADMIN — HEPARIN SODIUM 5000 UNIT(S): 5000 INJECTION INTRAVENOUS; SUBCUTANEOUS at 18:20

## 2022-01-01 RX ADMIN — BICTEGRAVIR SODIUM, EMTRICITABINE, AND TENOFOVIR ALAFENAMIDE FUMARATE 1 TABLET(S): 30; 120; 15 TABLET ORAL at 11:55

## 2022-01-01 RX ADMIN — FOSCARNET SODIUM 250 MILLIGRAM(S): 24 INJECTION, SOLUTION INTRAVENOUS at 10:36

## 2022-01-01 RX ADMIN — Medication 10 UNIT(S): at 08:40

## 2022-01-01 RX ADMIN — HEPARIN SODIUM 5000 UNIT(S): 5000 INJECTION INTRAVENOUS; SUBCUTANEOUS at 06:19

## 2022-01-01 RX ADMIN — Medication 10 UNIT(S): at 12:46

## 2022-01-02 LAB
ALBUMIN SERPL ELPH-MCNC: 3.6 G/DL — SIGNIFICANT CHANGE UP (ref 3.3–5)
ALP SERPL-CCNC: 332 U/L — HIGH (ref 40–120)
ALT FLD-CCNC: 16 U/L — SIGNIFICANT CHANGE UP (ref 10–45)
ANION GAP SERPL CALC-SCNC: 13 MMOL/L — SIGNIFICANT CHANGE UP (ref 5–17)
AST SERPL-CCNC: 16 U/L — SIGNIFICANT CHANGE UP (ref 10–40)
BILIRUB SERPL-MCNC: 0.7 MG/DL — SIGNIFICANT CHANGE UP (ref 0.2–1.2)
BUN SERPL-MCNC: 17 MG/DL — SIGNIFICANT CHANGE UP (ref 7–23)
CALCIUM SERPL-MCNC: 9.7 MG/DL — SIGNIFICANT CHANGE UP (ref 8.4–10.5)
CHLORIDE SERPL-SCNC: 105 MMOL/L — SIGNIFICANT CHANGE UP (ref 96–108)
CO2 SERPL-SCNC: 19 MMOL/L — LOW (ref 22–31)
CREAT SERPL-MCNC: 1.65 MG/DL — HIGH (ref 0.5–1.3)
GLUCOSE BLDC GLUCOMTR-MCNC: 105 MG/DL — HIGH (ref 70–99)
GLUCOSE BLDC GLUCOMTR-MCNC: 152 MG/DL — HIGH (ref 70–99)
GLUCOSE BLDC GLUCOMTR-MCNC: 158 MG/DL — HIGH (ref 70–99)
GLUCOSE BLDC GLUCOMTR-MCNC: 169 MG/DL — HIGH (ref 70–99)
GLUCOSE BLDC GLUCOMTR-MCNC: 181 MG/DL — HIGH (ref 70–99)
GLUCOSE SERPL-MCNC: 176 MG/DL — HIGH (ref 70–99)
HCT VFR BLD CALC: 38.9 % — SIGNIFICANT CHANGE UP (ref 34.5–45)
HGB BLD-MCNC: 13.3 G/DL — SIGNIFICANT CHANGE UP (ref 11.5–15.5)
MAGNESIUM SERPL-MCNC: 1.7 MG/DL — SIGNIFICANT CHANGE UP (ref 1.6–2.6)
MCHC RBC-ENTMCNC: 32 PG — SIGNIFICANT CHANGE UP (ref 27–34)
MCHC RBC-ENTMCNC: 34.2 GM/DL — SIGNIFICANT CHANGE UP (ref 32–36)
MCV RBC AUTO: 93.5 FL — SIGNIFICANT CHANGE UP (ref 80–100)
NRBC # BLD: 0 /100 WBCS — SIGNIFICANT CHANGE UP (ref 0–0)
PLATELET # BLD AUTO: 219 K/UL — SIGNIFICANT CHANGE UP (ref 150–400)
POTASSIUM SERPL-MCNC: 3.9 MMOL/L — SIGNIFICANT CHANGE UP (ref 3.5–5.3)
POTASSIUM SERPL-SCNC: 3.9 MMOL/L — SIGNIFICANT CHANGE UP (ref 3.5–5.3)
PROT SERPL-MCNC: 8.3 G/DL — SIGNIFICANT CHANGE UP (ref 6–8.3)
RBC # BLD: 4.16 M/UL — SIGNIFICANT CHANGE UP (ref 3.8–5.2)
RBC # FLD: 14.8 % — HIGH (ref 10.3–14.5)
SODIUM SERPL-SCNC: 137 MMOL/L — SIGNIFICANT CHANGE UP (ref 135–145)
WBC # BLD: 4.39 K/UL — SIGNIFICANT CHANGE UP (ref 3.8–10.5)
WBC # FLD AUTO: 4.39 K/UL — SIGNIFICANT CHANGE UP (ref 3.8–10.5)

## 2022-01-02 RX ORDER — ACETAMINOPHEN 500 MG
650 TABLET ORAL ONCE
Refills: 0 | Status: COMPLETED | OUTPATIENT
Start: 2022-01-02 | End: 2022-01-02

## 2022-01-02 RX ORDER — AMLODIPINE BESYLATE 2.5 MG/1
5 TABLET ORAL DAILY
Refills: 0 | Status: DISCONTINUED | OUTPATIENT
Start: 2022-01-02 | End: 2022-01-19

## 2022-01-02 RX ORDER — ACETAMINOPHEN 500 MG
650 TABLET ORAL EVERY 6 HOURS
Refills: 0 | Status: DISCONTINUED | OUTPATIENT
Start: 2022-01-02 | End: 2022-01-19

## 2022-01-02 RX ORDER — INSULIN LISPRO 100/ML
5 VIAL (ML) SUBCUTANEOUS
Refills: 0 | Status: DISCONTINUED | OUTPATIENT
Start: 2022-01-02 | End: 2022-01-19

## 2022-01-02 RX ADMIN — Medication 650 MILLIGRAM(S): at 01:30

## 2022-01-02 RX ADMIN — HEPARIN SODIUM 5000 UNIT(S): 5000 INJECTION INTRAVENOUS; SUBCUTANEOUS at 06:32

## 2022-01-02 RX ADMIN — Medication 650 MILLIGRAM(S): at 10:53

## 2022-01-02 RX ADMIN — Medication 650 MILLIGRAM(S): at 23:00

## 2022-01-02 RX ADMIN — FOSCARNET SODIUM 250 MILLIGRAM(S): 24 INJECTION, SOLUTION INTRAVENOUS at 21:41

## 2022-01-02 RX ADMIN — Medication 5 UNIT(S): at 18:29

## 2022-01-02 RX ADMIN — Medication 1: at 18:29

## 2022-01-02 RX ADMIN — Medication 650 MILLIGRAM(S): at 16:25

## 2022-01-02 RX ADMIN — Medication 1: at 08:41

## 2022-01-02 RX ADMIN — SODIUM CHLORIDE 1000 MILLILITER(S): 9 INJECTION INTRAMUSCULAR; INTRAVENOUS; SUBCUTANEOUS at 21:40

## 2022-01-02 RX ADMIN — Medication 10 UNIT(S): at 08:42

## 2022-01-02 RX ADMIN — Medication 5 UNIT(S): at 14:39

## 2022-01-02 RX ADMIN — AMLODIPINE BESYLATE 5 MILLIGRAM(S): 2.5 TABLET ORAL at 06:47

## 2022-01-02 RX ADMIN — FOSCARNET SODIUM 250 MILLIGRAM(S): 24 INJECTION, SOLUTION INTRAVENOUS at 10:53

## 2022-01-02 RX ADMIN — Medication 650 MILLIGRAM(S): at 22:26

## 2022-01-02 RX ADMIN — HEPARIN SODIUM 5000 UNIT(S): 5000 INJECTION INTRAVENOUS; SUBCUTANEOUS at 18:30

## 2022-01-02 RX ADMIN — Medication 650 MILLIGRAM(S): at 00:54

## 2022-01-02 RX ADMIN — BICTEGRAVIR SODIUM, EMTRICITABINE, AND TENOFOVIR ALAFENAMIDE FUMARATE 1 TABLET(S): 30; 120; 15 TABLET ORAL at 14:39

## 2022-01-02 RX ADMIN — SODIUM CHLORIDE 1000 MILLILITER(S): 9 INJECTION INTRAMUSCULAR; INTRAVENOUS; SUBCUTANEOUS at 09:43

## 2022-01-02 NOTE — PROVIDER CONTACT NOTE (OTHER) - SITUATION
attempted to reach core lab x 2, for instructions for collection of hsv
/97. Pt w/ headache.
Phoned [facility] for instructions to send specimen

## 2022-01-02 NOTE — PROVIDER CONTACT NOTE (OTHER) - BACKGROUND
52 yo female admitted with herpes virus infection
50 yo female admitted with herpes virus infec
Pt admitted w/ herpesvirus infection. PMH HIV, CKD, DM.

## 2022-01-02 NOTE — PROVIDER CONTACT NOTE (OTHER) - ASSESSMENT
as per [place] [facility] lab, lab will send swab with instructions to collect specimen
Pt A&OX4. Experiencing mild headache-4/10. Denies dizziness or any other issues
unable to reach core lab for instructions

## 2022-01-03 LAB
ALBUMIN SERPL ELPH-MCNC: 3.7 G/DL — SIGNIFICANT CHANGE UP (ref 3.3–5)
ALP SERPL-CCNC: 303 U/L — HIGH (ref 40–120)
ALT FLD-CCNC: 19 U/L — SIGNIFICANT CHANGE UP (ref 10–45)
ANION GAP SERPL CALC-SCNC: 16 MMOL/L — SIGNIFICANT CHANGE UP (ref 5–17)
AST SERPL-CCNC: 18 U/L — SIGNIFICANT CHANGE UP (ref 10–40)
BILIRUB SERPL-MCNC: 0.6 MG/DL — SIGNIFICANT CHANGE UP (ref 0.2–1.2)
BUN SERPL-MCNC: 17 MG/DL — SIGNIFICANT CHANGE UP (ref 7–23)
CALCIUM SERPL-MCNC: 9.8 MG/DL — SIGNIFICANT CHANGE UP (ref 8.4–10.5)
CHLORIDE SERPL-SCNC: 101 MMOL/L — SIGNIFICANT CHANGE UP (ref 96–108)
CO2 SERPL-SCNC: 19 MMOL/L — LOW (ref 22–31)
CREAT SERPL-MCNC: 1.63 MG/DL — HIGH (ref 0.5–1.3)
GLUCOSE BLDC GLUCOMTR-MCNC: 100 MG/DL — HIGH (ref 70–99)
GLUCOSE BLDC GLUCOMTR-MCNC: 138 MG/DL — HIGH (ref 70–99)
GLUCOSE BLDC GLUCOMTR-MCNC: 199 MG/DL — HIGH (ref 70–99)
GLUCOSE BLDC GLUCOMTR-MCNC: 202 MG/DL — HIGH (ref 70–99)
GLUCOSE BLDC GLUCOMTR-MCNC: 219 MG/DL — HIGH (ref 70–99)
GLUCOSE SERPL-MCNC: 165 MG/DL — HIGH (ref 70–99)
MAGNESIUM SERPL-MCNC: 1.6 MG/DL — SIGNIFICANT CHANGE UP (ref 1.6–2.6)
POTASSIUM SERPL-MCNC: 3.7 MMOL/L — SIGNIFICANT CHANGE UP (ref 3.5–5.3)
POTASSIUM SERPL-SCNC: 3.7 MMOL/L — SIGNIFICANT CHANGE UP (ref 3.5–5.3)
PROT SERPL-MCNC: 8 G/DL — SIGNIFICANT CHANGE UP (ref 6–8.3)
SODIUM SERPL-SCNC: 136 MMOL/L — SIGNIFICANT CHANGE UP (ref 135–145)

## 2022-01-03 RX ADMIN — HEPARIN SODIUM 5000 UNIT(S): 5000 INJECTION INTRAVENOUS; SUBCUTANEOUS at 17:22

## 2022-01-03 RX ADMIN — SODIUM CHLORIDE 1000 MILLILITER(S): 9 INJECTION INTRAMUSCULAR; INTRAVENOUS; SUBCUTANEOUS at 22:08

## 2022-01-03 RX ADMIN — BICTEGRAVIR SODIUM, EMTRICITABINE, AND TENOFOVIR ALAFENAMIDE FUMARATE 1 TABLET(S): 30; 120; 15 TABLET ORAL at 12:07

## 2022-01-03 RX ADMIN — Medication 2: at 17:22

## 2022-01-03 RX ADMIN — Medication 5 UNIT(S): at 17:22

## 2022-01-03 RX ADMIN — Medication 1: at 12:06

## 2022-01-03 RX ADMIN — SODIUM CHLORIDE 1000 MILLILITER(S): 9 INJECTION INTRAMUSCULAR; INTRAVENOUS; SUBCUTANEOUS at 09:40

## 2022-01-03 RX ADMIN — HEPARIN SODIUM 5000 UNIT(S): 5000 INJECTION INTRAVENOUS; SUBCUTANEOUS at 05:06

## 2022-01-03 RX ADMIN — Medication 5 UNIT(S): at 12:06

## 2022-01-03 RX ADMIN — FOSCARNET SODIUM 250 MILLIGRAM(S): 24 INJECTION, SOLUTION INTRAVENOUS at 22:09

## 2022-01-03 RX ADMIN — AMLODIPINE BESYLATE 5 MILLIGRAM(S): 2.5 TABLET ORAL at 05:06

## 2022-01-03 RX ADMIN — INSULIN GLARGINE 20 UNIT(S): 100 INJECTION, SOLUTION SUBCUTANEOUS at 01:11

## 2022-01-03 RX ADMIN — INSULIN GLARGINE 20 UNIT(S): 100 INJECTION, SOLUTION SUBCUTANEOUS at 22:09

## 2022-01-03 RX ADMIN — Medication 5 UNIT(S): at 08:18

## 2022-01-03 RX ADMIN — FOSCARNET SODIUM 250 MILLIGRAM(S): 24 INJECTION, SOLUTION INTRAVENOUS at 09:41

## 2022-01-04 LAB
ALBUMIN SERPL ELPH-MCNC: 3.6 G/DL — SIGNIFICANT CHANGE UP (ref 3.3–5)
ALP SERPL-CCNC: 295 U/L — HIGH (ref 40–120)
ALT FLD-CCNC: 23 U/L — SIGNIFICANT CHANGE UP (ref 10–45)
ANION GAP SERPL CALC-SCNC: 17 MMOL/L — SIGNIFICANT CHANGE UP (ref 5–17)
AST SERPL-CCNC: 20 U/L — SIGNIFICANT CHANGE UP (ref 10–40)
BILIRUB SERPL-MCNC: 0.6 MG/DL — SIGNIFICANT CHANGE UP (ref 0.2–1.2)
BUN SERPL-MCNC: 18 MG/DL — SIGNIFICANT CHANGE UP (ref 7–23)
CALCIUM SERPL-MCNC: 10.2 MG/DL — SIGNIFICANT CHANGE UP (ref 8.4–10.5)
CHLORIDE SERPL-SCNC: 100 MMOL/L — SIGNIFICANT CHANGE UP (ref 96–108)
CO2 SERPL-SCNC: 20 MMOL/L — LOW (ref 22–31)
CREAT SERPL-MCNC: 1.62 MG/DL — HIGH (ref 0.5–1.3)
GLUCOSE BLDC GLUCOMTR-MCNC: 116 MG/DL — HIGH (ref 70–99)
GLUCOSE BLDC GLUCOMTR-MCNC: 156 MG/DL — HIGH (ref 70–99)
GLUCOSE BLDC GLUCOMTR-MCNC: 186 MG/DL — HIGH (ref 70–99)
GLUCOSE BLDC GLUCOMTR-MCNC: 246 MG/DL — HIGH (ref 70–99)
GLUCOSE SERPL-MCNC: 124 MG/DL — HIGH (ref 70–99)
MAGNESIUM SERPL-MCNC: 1.6 MG/DL — SIGNIFICANT CHANGE UP (ref 1.6–2.6)
POTASSIUM SERPL-MCNC: 3.7 MMOL/L — SIGNIFICANT CHANGE UP (ref 3.5–5.3)
POTASSIUM SERPL-SCNC: 3.7 MMOL/L — SIGNIFICANT CHANGE UP (ref 3.5–5.3)
PROT SERPL-MCNC: 8 G/DL — SIGNIFICANT CHANGE UP (ref 6–8.3)
SODIUM SERPL-SCNC: 137 MMOL/L — SIGNIFICANT CHANGE UP (ref 135–145)

## 2022-01-04 PROCEDURE — 99232 SBSQ HOSP IP/OBS MODERATE 35: CPT

## 2022-01-04 RX ORDER — FOSCARNET SODIUM 24 MG/ML
2160 INJECTION, SOLUTION INTRAVENOUS ONCE
Refills: 0 | Status: COMPLETED | OUTPATIENT
Start: 2022-01-04 | End: 2022-01-04

## 2022-01-04 RX ORDER — FOSCARNET SODIUM 24 MG/ML
2160 INJECTION, SOLUTION INTRAVENOUS EVERY 12 HOURS
Refills: 0 | Status: DISCONTINUED | OUTPATIENT
Start: 2022-01-04 | End: 2022-01-19

## 2022-01-04 RX ADMIN — INSULIN GLARGINE 20 UNIT(S): 100 INJECTION, SOLUTION SUBCUTANEOUS at 22:19

## 2022-01-04 RX ADMIN — FOSCARNET SODIUM 180 MILLIGRAM(S): 24 INJECTION, SOLUTION INTRAVENOUS at 22:29

## 2022-01-04 RX ADMIN — SODIUM CHLORIDE 1000 MILLILITER(S): 9 INJECTION INTRAMUSCULAR; INTRAVENOUS; SUBCUTANEOUS at 10:32

## 2022-01-04 RX ADMIN — HEPARIN SODIUM 5000 UNIT(S): 5000 INJECTION INTRAVENOUS; SUBCUTANEOUS at 05:58

## 2022-01-04 RX ADMIN — BICTEGRAVIR SODIUM, EMTRICITABINE, AND TENOFOVIR ALAFENAMIDE FUMARATE 1 TABLET(S): 30; 120; 15 TABLET ORAL at 12:29

## 2022-01-04 RX ADMIN — AMLODIPINE BESYLATE 5 MILLIGRAM(S): 2.5 TABLET ORAL at 05:58

## 2022-01-04 RX ADMIN — HEPARIN SODIUM 5000 UNIT(S): 5000 INJECTION INTRAVENOUS; SUBCUTANEOUS at 17:30

## 2022-01-04 RX ADMIN — Medication 1: at 17:31

## 2022-01-04 RX ADMIN — FOSCARNET SODIUM 180 MILLIGRAM(S): 24 INJECTION, SOLUTION INTRAVENOUS at 13:08

## 2022-01-04 RX ADMIN — Medication 1: at 12:30

## 2022-01-04 RX ADMIN — SODIUM CHLORIDE 1000 MILLILITER(S): 9 INJECTION INTRAMUSCULAR; INTRAVENOUS; SUBCUTANEOUS at 22:29

## 2022-01-04 RX ADMIN — Medication 5 UNIT(S): at 17:31

## 2022-01-04 RX ADMIN — Medication 5 UNIT(S): at 08:26

## 2022-01-04 RX ADMIN — Medication 5 UNIT(S): at 12:30

## 2022-01-05 LAB
ALBUMIN SERPL ELPH-MCNC: 3.8 G/DL — SIGNIFICANT CHANGE UP (ref 3.3–5)
ALP SERPL-CCNC: 310 U/L — HIGH (ref 40–120)
ALT FLD-CCNC: 28 U/L — SIGNIFICANT CHANGE UP (ref 10–45)
ANION GAP SERPL CALC-SCNC: 13 MMOL/L — SIGNIFICANT CHANGE UP (ref 5–17)
AST SERPL-CCNC: 22 U/L — SIGNIFICANT CHANGE UP (ref 10–40)
BILIRUB SERPL-MCNC: 0.5 MG/DL — SIGNIFICANT CHANGE UP (ref 0.2–1.2)
BUN SERPL-MCNC: 22 MG/DL — SIGNIFICANT CHANGE UP (ref 7–23)
CALCIUM SERPL-MCNC: 10.3 MG/DL — SIGNIFICANT CHANGE UP (ref 8.4–10.5)
CHLORIDE SERPL-SCNC: 101 MMOL/L — SIGNIFICANT CHANGE UP (ref 96–108)
CO2 SERPL-SCNC: 23 MMOL/L — SIGNIFICANT CHANGE UP (ref 22–31)
CREAT SERPL-MCNC: 1.75 MG/DL — HIGH (ref 0.5–1.3)
GLUCOSE BLDC GLUCOMTR-MCNC: 145 MG/DL — HIGH (ref 70–99)
GLUCOSE BLDC GLUCOMTR-MCNC: 161 MG/DL — HIGH (ref 70–99)
GLUCOSE BLDC GLUCOMTR-MCNC: 180 MG/DL — HIGH (ref 70–99)
GLUCOSE BLDC GLUCOMTR-MCNC: 260 MG/DL — HIGH (ref 70–99)
GLUCOSE SERPL-MCNC: 176 MG/DL — HIGH (ref 70–99)
HCT VFR BLD CALC: 40.1 % — SIGNIFICANT CHANGE UP (ref 34.5–45)
HGB BLD-MCNC: 13.4 G/DL — SIGNIFICANT CHANGE UP (ref 11.5–15.5)
MAGNESIUM SERPL-MCNC: 1.7 MG/DL — SIGNIFICANT CHANGE UP (ref 1.6–2.6)
MCHC RBC-ENTMCNC: 31.8 PG — SIGNIFICANT CHANGE UP (ref 27–34)
MCHC RBC-ENTMCNC: 33.4 GM/DL — SIGNIFICANT CHANGE UP (ref 32–36)
MCV RBC AUTO: 95 FL — SIGNIFICANT CHANGE UP (ref 80–100)
NRBC # BLD: 0 /100 WBCS — SIGNIFICANT CHANGE UP (ref 0–0)
PHOSPHATE SERPL-MCNC: 5.8 MG/DL — HIGH (ref 2.5–4.5)
PLATELET # BLD AUTO: 233 K/UL — SIGNIFICANT CHANGE UP (ref 150–400)
POTASSIUM SERPL-MCNC: 4 MMOL/L — SIGNIFICANT CHANGE UP (ref 3.5–5.3)
POTASSIUM SERPL-SCNC: 4 MMOL/L — SIGNIFICANT CHANGE UP (ref 3.5–5.3)
PROT SERPL-MCNC: 8.4 G/DL — HIGH (ref 6–8.3)
RBC # BLD: 4.22 M/UL — SIGNIFICANT CHANGE UP (ref 3.8–5.2)
RBC # FLD: 14.6 % — HIGH (ref 10.3–14.5)
SARS-COV-2 RNA SPEC QL NAA+PROBE: SIGNIFICANT CHANGE UP
SODIUM SERPL-SCNC: 137 MMOL/L — SIGNIFICANT CHANGE UP (ref 135–145)
WBC # BLD: 3.94 K/UL — SIGNIFICANT CHANGE UP (ref 3.8–10.5)
WBC # FLD AUTO: 3.94 K/UL — SIGNIFICANT CHANGE UP (ref 3.8–10.5)

## 2022-01-05 PROCEDURE — 99232 SBSQ HOSP IP/OBS MODERATE 35: CPT

## 2022-01-05 RX ORDER — SODIUM CHLORIDE 9 MG/ML
1000 INJECTION INTRAMUSCULAR; INTRAVENOUS; SUBCUTANEOUS
Refills: 0 | Status: DISCONTINUED | OUTPATIENT
Start: 2022-01-05 | End: 2022-01-06

## 2022-01-05 RX ADMIN — AMLODIPINE BESYLATE 5 MILLIGRAM(S): 2.5 TABLET ORAL at 06:00

## 2022-01-05 RX ADMIN — INSULIN GLARGINE 20 UNIT(S): 100 INJECTION, SOLUTION SUBCUTANEOUS at 22:15

## 2022-01-05 RX ADMIN — Medication 1: at 16:58

## 2022-01-05 RX ADMIN — SODIUM CHLORIDE 50 MILLILITER(S): 9 INJECTION INTRAMUSCULAR; INTRAVENOUS; SUBCUTANEOUS at 17:26

## 2022-01-05 RX ADMIN — FOSCARNET SODIUM 180 MILLIGRAM(S): 24 INJECTION, SOLUTION INTRAVENOUS at 11:16

## 2022-01-05 RX ADMIN — Medication 5 UNIT(S): at 11:47

## 2022-01-05 RX ADMIN — Medication 1: at 11:46

## 2022-01-05 RX ADMIN — FOSCARNET SODIUM 180 MILLIGRAM(S): 24 INJECTION, SOLUTION INTRAVENOUS at 23:02

## 2022-01-05 RX ADMIN — HEPARIN SODIUM 5000 UNIT(S): 5000 INJECTION INTRAVENOUS; SUBCUTANEOUS at 06:01

## 2022-01-05 RX ADMIN — BICTEGRAVIR SODIUM, EMTRICITABINE, AND TENOFOVIR ALAFENAMIDE FUMARATE 1 TABLET(S): 30; 120; 15 TABLET ORAL at 11:16

## 2022-01-05 RX ADMIN — Medication 5 UNIT(S): at 08:04

## 2022-01-05 RX ADMIN — HEPARIN SODIUM 5000 UNIT(S): 5000 INJECTION INTRAVENOUS; SUBCUTANEOUS at 17:00

## 2022-01-05 RX ADMIN — Medication 5 UNIT(S): at 16:59

## 2022-01-05 RX ADMIN — SODIUM CHLORIDE 1000 MILLILITER(S): 9 INJECTION INTRAMUSCULAR; INTRAVENOUS; SUBCUTANEOUS at 23:01

## 2022-01-05 RX ADMIN — Medication 1: at 22:16

## 2022-01-05 RX ADMIN — SODIUM CHLORIDE 1000 MILLILITER(S): 9 INJECTION INTRAMUSCULAR; INTRAVENOUS; SUBCUTANEOUS at 10:18

## 2022-01-06 LAB
ALBUMIN SERPL ELPH-MCNC: 3.5 G/DL — SIGNIFICANT CHANGE UP (ref 3.3–5)
ALP SERPL-CCNC: 304 U/L — HIGH (ref 40–120)
ALT FLD-CCNC: 29 U/L — SIGNIFICANT CHANGE UP (ref 10–45)
ANION GAP SERPL CALC-SCNC: 13 MMOL/L — SIGNIFICANT CHANGE UP (ref 5–17)
AST SERPL-CCNC: 22 U/L — SIGNIFICANT CHANGE UP (ref 10–40)
BILIRUB SERPL-MCNC: 0.5 MG/DL — SIGNIFICANT CHANGE UP (ref 0.2–1.2)
BUN SERPL-MCNC: 22 MG/DL — SIGNIFICANT CHANGE UP (ref 7–23)
CALCIUM SERPL-MCNC: 9.6 MG/DL — SIGNIFICANT CHANGE UP (ref 8.4–10.5)
CHLORIDE SERPL-SCNC: 102 MMOL/L — SIGNIFICANT CHANGE UP (ref 96–108)
CO2 SERPL-SCNC: 21 MMOL/L — LOW (ref 22–31)
CREAT SERPL-MCNC: 1.74 MG/DL — HIGH (ref 0.5–1.3)
GLUCOSE BLDC GLUCOMTR-MCNC: 132 MG/DL — HIGH (ref 70–99)
GLUCOSE BLDC GLUCOMTR-MCNC: 146 MG/DL — HIGH (ref 70–99)
GLUCOSE BLDC GLUCOMTR-MCNC: 193 MG/DL — HIGH (ref 70–99)
GLUCOSE BLDC GLUCOMTR-MCNC: 204 MG/DL — HIGH (ref 70–99)
GLUCOSE BLDC GLUCOMTR-MCNC: 94 MG/DL — SIGNIFICANT CHANGE UP (ref 70–99)
GLUCOSE SERPL-MCNC: 164 MG/DL — HIGH (ref 70–99)
HCT VFR BLD CALC: 38.2 % — SIGNIFICANT CHANGE UP (ref 34.5–45)
HGB BLD-MCNC: 12.6 G/DL — SIGNIFICANT CHANGE UP (ref 11.5–15.5)
MAGNESIUM SERPL-MCNC: 1.6 MG/DL — SIGNIFICANT CHANGE UP (ref 1.6–2.6)
MCHC RBC-ENTMCNC: 31.8 PG — SIGNIFICANT CHANGE UP (ref 27–34)
MCHC RBC-ENTMCNC: 33 GM/DL — SIGNIFICANT CHANGE UP (ref 32–36)
MCV RBC AUTO: 96.5 FL — SIGNIFICANT CHANGE UP (ref 80–100)
NRBC # BLD: 0 /100 WBCS — SIGNIFICANT CHANGE UP (ref 0–0)
PLATELET # BLD AUTO: 199 K/UL — SIGNIFICANT CHANGE UP (ref 150–400)
POTASSIUM SERPL-MCNC: 4 MMOL/L — SIGNIFICANT CHANGE UP (ref 3.5–5.3)
POTASSIUM SERPL-SCNC: 4 MMOL/L — SIGNIFICANT CHANGE UP (ref 3.5–5.3)
PROT SERPL-MCNC: 7.8 G/DL — SIGNIFICANT CHANGE UP (ref 6–8.3)
RBC # BLD: 3.96 M/UL — SIGNIFICANT CHANGE UP (ref 3.8–5.2)
RBC # FLD: 14.6 % — HIGH (ref 10.3–14.5)
SODIUM SERPL-SCNC: 136 MMOL/L — SIGNIFICANT CHANGE UP (ref 135–145)
WBC # BLD: 3.29 K/UL — LOW (ref 3.8–10.5)
WBC # FLD AUTO: 3.29 K/UL — LOW (ref 3.8–10.5)

## 2022-01-06 PROCEDURE — 99232 SBSQ HOSP IP/OBS MODERATE 35: CPT

## 2022-01-06 RX ORDER — SODIUM CHLORIDE 9 MG/ML
1000 INJECTION INTRAMUSCULAR; INTRAVENOUS; SUBCUTANEOUS
Refills: 0 | Status: DISCONTINUED | OUTPATIENT
Start: 2022-01-06 | End: 2022-01-19

## 2022-01-06 RX ADMIN — Medication 5 UNIT(S): at 16:58

## 2022-01-06 RX ADMIN — Medication 5 UNIT(S): at 12:19

## 2022-01-06 RX ADMIN — SODIUM CHLORIDE 1000 MILLILITER(S): 9 INJECTION INTRAMUSCULAR; INTRAVENOUS; SUBCUTANEOUS at 09:30

## 2022-01-06 RX ADMIN — HEPARIN SODIUM 5000 UNIT(S): 5000 INJECTION INTRAVENOUS; SUBCUTANEOUS at 06:05

## 2022-01-06 RX ADMIN — Medication 2: at 16:57

## 2022-01-06 RX ADMIN — Medication 5 UNIT(S): at 08:25

## 2022-01-06 RX ADMIN — FOSCARNET SODIUM 180 MILLIGRAM(S): 24 INJECTION, SOLUTION INTRAVENOUS at 09:55

## 2022-01-06 RX ADMIN — INSULIN GLARGINE 20 UNIT(S): 100 INJECTION, SOLUTION SUBCUTANEOUS at 23:12

## 2022-01-06 RX ADMIN — AMLODIPINE BESYLATE 5 MILLIGRAM(S): 2.5 TABLET ORAL at 06:05

## 2022-01-06 RX ADMIN — SODIUM CHLORIDE 1000 MILLILITER(S): 9 INJECTION INTRAMUSCULAR; INTRAVENOUS; SUBCUTANEOUS at 23:01

## 2022-01-06 RX ADMIN — HEPARIN SODIUM 5000 UNIT(S): 5000 INJECTION INTRAVENOUS; SUBCUTANEOUS at 18:36

## 2022-01-06 RX ADMIN — FOSCARNET SODIUM 180 MILLIGRAM(S): 24 INJECTION, SOLUTION INTRAVENOUS at 23:06

## 2022-01-06 RX ADMIN — BICTEGRAVIR SODIUM, EMTRICITABINE, AND TENOFOVIR ALAFENAMIDE FUMARATE 1 TABLET(S): 30; 120; 15 TABLET ORAL at 12:38

## 2022-01-07 LAB
ALBUMIN SERPL ELPH-MCNC: 3.6 G/DL — SIGNIFICANT CHANGE UP (ref 3.3–5)
ALP SERPL-CCNC: 310 U/L — HIGH (ref 40–120)
ALT FLD-CCNC: 29 U/L — SIGNIFICANT CHANGE UP (ref 10–45)
ANION GAP SERPL CALC-SCNC: 13 MMOL/L — SIGNIFICANT CHANGE UP (ref 5–17)
AST SERPL-CCNC: 21 U/L — SIGNIFICANT CHANGE UP (ref 10–40)
BILIRUB SERPL-MCNC: 0.5 MG/DL — SIGNIFICANT CHANGE UP (ref 0.2–1.2)
BUN SERPL-MCNC: 18 MG/DL — SIGNIFICANT CHANGE UP (ref 7–23)
CALCIUM SERPL-MCNC: 9.9 MG/DL — SIGNIFICANT CHANGE UP (ref 8.4–10.5)
CHLORIDE SERPL-SCNC: 105 MMOL/L — SIGNIFICANT CHANGE UP (ref 96–108)
CO2 SERPL-SCNC: 21 MMOL/L — LOW (ref 22–31)
CREAT SERPL-MCNC: 1.64 MG/DL — HIGH (ref 0.5–1.3)
GLUCOSE BLDC GLUCOMTR-MCNC: 146 MG/DL — HIGH (ref 70–99)
GLUCOSE BLDC GLUCOMTR-MCNC: 184 MG/DL — HIGH (ref 70–99)
GLUCOSE BLDC GLUCOMTR-MCNC: 194 MG/DL — HIGH (ref 70–99)
GLUCOSE BLDC GLUCOMTR-MCNC: 216 MG/DL — HIGH (ref 70–99)
GLUCOSE SERPL-MCNC: 166 MG/DL — HIGH (ref 70–99)
HCT VFR BLD CALC: 38.7 % — SIGNIFICANT CHANGE UP (ref 34.5–45)
HGB BLD-MCNC: 12.7 G/DL — SIGNIFICANT CHANGE UP (ref 11.5–15.5)
MAGNESIUM SERPL-MCNC: 1.7 MG/DL — SIGNIFICANT CHANGE UP (ref 1.6–2.6)
MCHC RBC-ENTMCNC: 32.1 PG — SIGNIFICANT CHANGE UP (ref 27–34)
MCHC RBC-ENTMCNC: 32.8 GM/DL — SIGNIFICANT CHANGE UP (ref 32–36)
MCV RBC AUTO: 97.7 FL — SIGNIFICANT CHANGE UP (ref 80–100)
NRBC # BLD: 0 /100 WBCS — SIGNIFICANT CHANGE UP (ref 0–0)
PLATELET # BLD AUTO: 215 K/UL — SIGNIFICANT CHANGE UP (ref 150–400)
POTASSIUM SERPL-MCNC: 3.9 MMOL/L — SIGNIFICANT CHANGE UP (ref 3.5–5.3)
POTASSIUM SERPL-SCNC: 3.9 MMOL/L — SIGNIFICANT CHANGE UP (ref 3.5–5.3)
PROT SERPL-MCNC: 8.2 G/DL — SIGNIFICANT CHANGE UP (ref 6–8.3)
RBC # BLD: 3.96 M/UL — SIGNIFICANT CHANGE UP (ref 3.8–5.2)
RBC # FLD: 14.7 % — HIGH (ref 10.3–14.5)
SODIUM SERPL-SCNC: 139 MMOL/L — SIGNIFICANT CHANGE UP (ref 135–145)
WBC # BLD: 3.39 K/UL — LOW (ref 3.8–10.5)
WBC # FLD AUTO: 3.39 K/UL — LOW (ref 3.8–10.5)

## 2022-01-07 PROCEDURE — 99232 SBSQ HOSP IP/OBS MODERATE 35: CPT

## 2022-01-07 RX ADMIN — AMLODIPINE BESYLATE 5 MILLIGRAM(S): 2.5 TABLET ORAL at 05:55

## 2022-01-07 RX ADMIN — Medication 5 UNIT(S): at 16:40

## 2022-01-07 RX ADMIN — BICTEGRAVIR SODIUM, EMTRICITABINE, AND TENOFOVIR ALAFENAMIDE FUMARATE 1 TABLET(S): 30; 120; 15 TABLET ORAL at 12:33

## 2022-01-07 RX ADMIN — Medication 5 UNIT(S): at 09:31

## 2022-01-07 RX ADMIN — Medication 1: at 16:40

## 2022-01-07 RX ADMIN — HEPARIN SODIUM 5000 UNIT(S): 5000 INJECTION INTRAVENOUS; SUBCUTANEOUS at 05:56

## 2022-01-07 RX ADMIN — SODIUM CHLORIDE 1000 MILLILITER(S): 9 INJECTION INTRAMUSCULAR; INTRAVENOUS; SUBCUTANEOUS at 10:17

## 2022-01-07 RX ADMIN — FOSCARNET SODIUM 180 MILLIGRAM(S): 24 INJECTION, SOLUTION INTRAVENOUS at 10:16

## 2022-01-07 RX ADMIN — SODIUM CHLORIDE 1000 MILLILITER(S): 9 INJECTION INTRAMUSCULAR; INTRAVENOUS; SUBCUTANEOUS at 21:58

## 2022-01-07 RX ADMIN — INSULIN GLARGINE 20 UNIT(S): 100 INJECTION, SOLUTION SUBCUTANEOUS at 21:57

## 2022-01-07 RX ADMIN — FOSCARNET SODIUM 180 MILLIGRAM(S): 24 INJECTION, SOLUTION INTRAVENOUS at 21:58

## 2022-01-07 RX ADMIN — Medication 5 UNIT(S): at 12:33

## 2022-01-07 RX ADMIN — Medication 1: at 12:34

## 2022-01-07 RX ADMIN — SODIUM CHLORIDE 50 MILLILITER(S): 9 INJECTION INTRAMUSCULAR; INTRAVENOUS; SUBCUTANEOUS at 00:18

## 2022-01-08 LAB
ALBUMIN SERPL ELPH-MCNC: 3.6 G/DL — SIGNIFICANT CHANGE UP (ref 3.3–5)
ALP SERPL-CCNC: 308 U/L — HIGH (ref 40–120)
ALT FLD-CCNC: 22 U/L — SIGNIFICANT CHANGE UP (ref 10–45)
ANION GAP SERPL CALC-SCNC: 12 MMOL/L — SIGNIFICANT CHANGE UP (ref 5–17)
AST SERPL-CCNC: 14 U/L — SIGNIFICANT CHANGE UP (ref 10–40)
BILIRUB SERPL-MCNC: 0.5 MG/DL — SIGNIFICANT CHANGE UP (ref 0.2–1.2)
BUN SERPL-MCNC: 19 MG/DL — SIGNIFICANT CHANGE UP (ref 7–23)
CALCIUM SERPL-MCNC: 9.9 MG/DL — SIGNIFICANT CHANGE UP (ref 8.4–10.5)
CHLORIDE SERPL-SCNC: 104 MMOL/L — SIGNIFICANT CHANGE UP (ref 96–108)
CO2 SERPL-SCNC: 21 MMOL/L — LOW (ref 22–31)
CREAT SERPL-MCNC: 1.58 MG/DL — HIGH (ref 0.5–1.3)
GLUCOSE BLDC GLUCOMTR-MCNC: 136 MG/DL — HIGH (ref 70–99)
GLUCOSE BLDC GLUCOMTR-MCNC: 203 MG/DL — HIGH (ref 70–99)
GLUCOSE BLDC GLUCOMTR-MCNC: 209 MG/DL — HIGH (ref 70–99)
GLUCOSE BLDC GLUCOMTR-MCNC: 232 MG/DL — HIGH (ref 70–99)
GLUCOSE SERPL-MCNC: 166 MG/DL — HIGH (ref 70–99)
MAGNESIUM SERPL-MCNC: 1.7 MG/DL — SIGNIFICANT CHANGE UP (ref 1.6–2.6)
POTASSIUM SERPL-MCNC: 3.8 MMOL/L — SIGNIFICANT CHANGE UP (ref 3.5–5.3)
POTASSIUM SERPL-SCNC: 3.8 MMOL/L — SIGNIFICANT CHANGE UP (ref 3.5–5.3)
PROT SERPL-MCNC: 7.9 G/DL — SIGNIFICANT CHANGE UP (ref 6–8.3)
SODIUM SERPL-SCNC: 137 MMOL/L — SIGNIFICANT CHANGE UP (ref 135–145)

## 2022-01-08 RX ADMIN — INSULIN GLARGINE 20 UNIT(S): 100 INJECTION, SOLUTION SUBCUTANEOUS at 21:48

## 2022-01-08 RX ADMIN — Medication 5 UNIT(S): at 17:41

## 2022-01-08 RX ADMIN — SODIUM CHLORIDE 1000 MILLILITER(S): 9 INJECTION INTRAMUSCULAR; INTRAVENOUS; SUBCUTANEOUS at 09:41

## 2022-01-08 RX ADMIN — BICTEGRAVIR SODIUM, EMTRICITABINE, AND TENOFOVIR ALAFENAMIDE FUMARATE 1 TABLET(S): 30; 120; 15 TABLET ORAL at 11:59

## 2022-01-08 RX ADMIN — Medication 2: at 17:30

## 2022-01-08 RX ADMIN — HEPARIN SODIUM 5000 UNIT(S): 5000 INJECTION INTRAVENOUS; SUBCUTANEOUS at 06:30

## 2022-01-08 RX ADMIN — SODIUM CHLORIDE 50 MILLILITER(S): 9 INJECTION INTRAMUSCULAR; INTRAVENOUS; SUBCUTANEOUS at 03:15

## 2022-01-08 RX ADMIN — FOSCARNET SODIUM 180 MILLIGRAM(S): 24 INJECTION, SOLUTION INTRAVENOUS at 22:22

## 2022-01-08 RX ADMIN — SODIUM CHLORIDE 1000 MILLILITER(S): 9 INJECTION INTRAMUSCULAR; INTRAVENOUS; SUBCUTANEOUS at 22:26

## 2022-01-08 RX ADMIN — HEPARIN SODIUM 5000 UNIT(S): 5000 INJECTION INTRAVENOUS; SUBCUTANEOUS at 17:43

## 2022-01-08 RX ADMIN — AMLODIPINE BESYLATE 5 MILLIGRAM(S): 2.5 TABLET ORAL at 06:30

## 2022-01-08 RX ADMIN — FOSCARNET SODIUM 180 MILLIGRAM(S): 24 INJECTION, SOLUTION INTRAVENOUS at 09:40

## 2022-01-08 RX ADMIN — Medication 2: at 12:29

## 2022-01-08 RX ADMIN — Medication 5 UNIT(S): at 09:38

## 2022-01-08 RX ADMIN — Medication 5 UNIT(S): at 13:12

## 2022-01-09 LAB
ALBUMIN SERPL ELPH-MCNC: 3.4 G/DL — SIGNIFICANT CHANGE UP (ref 3.3–5)
ALP SERPL-CCNC: 299 U/L — HIGH (ref 40–120)
ALT FLD-CCNC: 19 U/L — SIGNIFICANT CHANGE UP (ref 10–45)
ANION GAP SERPL CALC-SCNC: 14 MMOL/L — SIGNIFICANT CHANGE UP (ref 5–17)
AST SERPL-CCNC: 13 U/L — SIGNIFICANT CHANGE UP (ref 10–40)
BILIRUB SERPL-MCNC: 0.4 MG/DL — SIGNIFICANT CHANGE UP (ref 0.2–1.2)
BUN SERPL-MCNC: 19 MG/DL — SIGNIFICANT CHANGE UP (ref 7–23)
CALCIUM SERPL-MCNC: 9.5 MG/DL — SIGNIFICANT CHANGE UP (ref 8.4–10.5)
CHLORIDE SERPL-SCNC: 101 MMOL/L — SIGNIFICANT CHANGE UP (ref 96–108)
CO2 SERPL-SCNC: 20 MMOL/L — LOW (ref 22–31)
CREAT SERPL-MCNC: 1.54 MG/DL — HIGH (ref 0.5–1.3)
GLUCOSE BLDC GLUCOMTR-MCNC: 174 MG/DL — HIGH (ref 70–99)
GLUCOSE BLDC GLUCOMTR-MCNC: 194 MG/DL — HIGH (ref 70–99)
GLUCOSE BLDC GLUCOMTR-MCNC: 213 MG/DL — HIGH (ref 70–99)
GLUCOSE BLDC GLUCOMTR-MCNC: 234 MG/DL — HIGH (ref 70–99)
GLUCOSE SERPL-MCNC: 280 MG/DL — HIGH (ref 70–99)
HCT VFR BLD CALC: 36.3 % — SIGNIFICANT CHANGE UP (ref 34.5–45)
HGB BLD-MCNC: 12.1 G/DL — SIGNIFICANT CHANGE UP (ref 11.5–15.5)
MAGNESIUM SERPL-MCNC: 1.6 MG/DL — SIGNIFICANT CHANGE UP (ref 1.6–2.6)
MCHC RBC-ENTMCNC: 32 PG — SIGNIFICANT CHANGE UP (ref 27–34)
MCHC RBC-ENTMCNC: 33.3 GM/DL — SIGNIFICANT CHANGE UP (ref 32–36)
MCV RBC AUTO: 96 FL — SIGNIFICANT CHANGE UP (ref 80–100)
NRBC # BLD: 0 /100 WBCS — SIGNIFICANT CHANGE UP (ref 0–0)
PLATELET # BLD AUTO: 193 K/UL — SIGNIFICANT CHANGE UP (ref 150–400)
POTASSIUM SERPL-MCNC: 3.5 MMOL/L — SIGNIFICANT CHANGE UP (ref 3.5–5.3)
POTASSIUM SERPL-SCNC: 3.5 MMOL/L — SIGNIFICANT CHANGE UP (ref 3.5–5.3)
PROT SERPL-MCNC: 7.6 G/DL — SIGNIFICANT CHANGE UP (ref 6–8.3)
RBC # BLD: 3.78 M/UL — LOW (ref 3.8–5.2)
RBC # FLD: 14.5 % — SIGNIFICANT CHANGE UP (ref 10.3–14.5)
SODIUM SERPL-SCNC: 135 MMOL/L — SIGNIFICANT CHANGE UP (ref 135–145)
WBC # BLD: 3.28 K/UL — LOW (ref 3.8–10.5)
WBC # FLD AUTO: 3.28 K/UL — LOW (ref 3.8–10.5)

## 2022-01-09 RX ADMIN — AMLODIPINE BESYLATE 5 MILLIGRAM(S): 2.5 TABLET ORAL at 05:37

## 2022-01-09 RX ADMIN — HEPARIN SODIUM 5000 UNIT(S): 5000 INJECTION INTRAVENOUS; SUBCUTANEOUS at 05:37

## 2022-01-09 RX ADMIN — SODIUM CHLORIDE 1000 MILLILITER(S): 9 INJECTION INTRAMUSCULAR; INTRAVENOUS; SUBCUTANEOUS at 09:48

## 2022-01-09 RX ADMIN — INSULIN GLARGINE 20 UNIT(S): 100 INJECTION, SOLUTION SUBCUTANEOUS at 22:26

## 2022-01-09 RX ADMIN — SODIUM CHLORIDE 1000 MILLILITER(S): 9 INJECTION INTRAMUSCULAR; INTRAVENOUS; SUBCUTANEOUS at 20:00

## 2022-01-09 RX ADMIN — SODIUM CHLORIDE 50 MILLILITER(S): 9 INJECTION INTRAMUSCULAR; INTRAVENOUS; SUBCUTANEOUS at 09:55

## 2022-01-09 RX ADMIN — FOSCARNET SODIUM 180 MILLIGRAM(S): 24 INJECTION, SOLUTION INTRAVENOUS at 09:48

## 2022-01-09 RX ADMIN — Medication 1: at 17:13

## 2022-01-09 RX ADMIN — Medication 1: at 12:03

## 2022-01-09 RX ADMIN — Medication 5 UNIT(S): at 12:03

## 2022-01-09 RX ADMIN — Medication 5 UNIT(S): at 06:39

## 2022-01-09 RX ADMIN — BICTEGRAVIR SODIUM, EMTRICITABINE, AND TENOFOVIR ALAFENAMIDE FUMARATE 1 TABLET(S): 30; 120; 15 TABLET ORAL at 12:04

## 2022-01-09 RX ADMIN — Medication 5 UNIT(S): at 17:16

## 2022-01-09 RX ADMIN — HEPARIN SODIUM 5000 UNIT(S): 5000 INJECTION INTRAVENOUS; SUBCUTANEOUS at 17:15

## 2022-01-09 RX ADMIN — FOSCARNET SODIUM 180 MILLIGRAM(S): 24 INJECTION, SOLUTION INTRAVENOUS at 20:00

## 2022-01-10 LAB
ALBUMIN SERPL ELPH-MCNC: 3.7 G/DL — SIGNIFICANT CHANGE UP (ref 3.3–5)
ALP SERPL-CCNC: 320 U/L — HIGH (ref 40–120)
ALT FLD-CCNC: 18 U/L — SIGNIFICANT CHANGE UP (ref 10–45)
ANION GAP SERPL CALC-SCNC: 13 MMOL/L — SIGNIFICANT CHANGE UP (ref 5–17)
AST SERPL-CCNC: 13 U/L — SIGNIFICANT CHANGE UP (ref 10–40)
BILIRUB SERPL-MCNC: 0.5 MG/DL — SIGNIFICANT CHANGE UP (ref 0.2–1.2)
BUN SERPL-MCNC: 18 MG/DL — SIGNIFICANT CHANGE UP (ref 7–23)
CALCIUM SERPL-MCNC: 10.2 MG/DL — SIGNIFICANT CHANGE UP (ref 8.4–10.5)
CHLORIDE SERPL-SCNC: 103 MMOL/L — SIGNIFICANT CHANGE UP (ref 96–108)
CO2 SERPL-SCNC: 22 MMOL/L — SIGNIFICANT CHANGE UP (ref 22–31)
CREAT SERPL-MCNC: 1.57 MG/DL — HIGH (ref 0.5–1.3)
GLUCOSE BLDC GLUCOMTR-MCNC: 137 MG/DL — HIGH (ref 70–99)
GLUCOSE BLDC GLUCOMTR-MCNC: 164 MG/DL — HIGH (ref 70–99)
GLUCOSE BLDC GLUCOMTR-MCNC: 188 MG/DL — HIGH (ref 70–99)
GLUCOSE BLDC GLUCOMTR-MCNC: 222 MG/DL — HIGH (ref 70–99)
GLUCOSE SERPL-MCNC: 161 MG/DL — HIGH (ref 70–99)
HCT VFR BLD CALC: 38.9 % — SIGNIFICANT CHANGE UP (ref 34.5–45)
HGB BLD-MCNC: 12.8 G/DL — SIGNIFICANT CHANGE UP (ref 11.5–15.5)
MCHC RBC-ENTMCNC: 31.6 PG — SIGNIFICANT CHANGE UP (ref 27–34)
MCHC RBC-ENTMCNC: 32.9 GM/DL — SIGNIFICANT CHANGE UP (ref 32–36)
MCV RBC AUTO: 96 FL — SIGNIFICANT CHANGE UP (ref 80–100)
NRBC # BLD: 0 /100 WBCS — SIGNIFICANT CHANGE UP (ref 0–0)
PLATELET # BLD AUTO: 208 K/UL — SIGNIFICANT CHANGE UP (ref 150–400)
POTASSIUM SERPL-MCNC: 4.1 MMOL/L — SIGNIFICANT CHANGE UP (ref 3.5–5.3)
POTASSIUM SERPL-SCNC: 4.1 MMOL/L — SIGNIFICANT CHANGE UP (ref 3.5–5.3)
PROT SERPL-MCNC: 8.1 G/DL — SIGNIFICANT CHANGE UP (ref 6–8.3)
RBC # BLD: 4.05 M/UL — SIGNIFICANT CHANGE UP (ref 3.8–5.2)
RBC # FLD: 14.5 % — SIGNIFICANT CHANGE UP (ref 10.3–14.5)
SODIUM SERPL-SCNC: 138 MMOL/L — SIGNIFICANT CHANGE UP (ref 135–145)
WBC # BLD: 3.72 K/UL — LOW (ref 3.8–10.5)
WBC # FLD AUTO: 3.72 K/UL — LOW (ref 3.8–10.5)

## 2022-01-10 PROCEDURE — 99232 SBSQ HOSP IP/OBS MODERATE 35: CPT

## 2022-01-10 RX ADMIN — FOSCARNET SODIUM 180 MILLIGRAM(S): 24 INJECTION, SOLUTION INTRAVENOUS at 12:17

## 2022-01-10 RX ADMIN — BICTEGRAVIR SODIUM, EMTRICITABINE, AND TENOFOVIR ALAFENAMIDE FUMARATE 1 TABLET(S): 30; 120; 15 TABLET ORAL at 12:18

## 2022-01-10 RX ADMIN — Medication 5 UNIT(S): at 08:28

## 2022-01-10 RX ADMIN — Medication 5 UNIT(S): at 16:57

## 2022-01-10 RX ADMIN — Medication 1: at 16:57

## 2022-01-10 RX ADMIN — FOSCARNET SODIUM 180 MILLIGRAM(S): 24 INJECTION, SOLUTION INTRAVENOUS at 21:37

## 2022-01-10 RX ADMIN — AMLODIPINE BESYLATE 5 MILLIGRAM(S): 2.5 TABLET ORAL at 05:35

## 2022-01-10 RX ADMIN — SODIUM CHLORIDE 50 MILLILITER(S): 9 INJECTION INTRAMUSCULAR; INTRAVENOUS; SUBCUTANEOUS at 23:15

## 2022-01-10 RX ADMIN — SODIUM CHLORIDE 50 MILLILITER(S): 9 INJECTION INTRAMUSCULAR; INTRAVENOUS; SUBCUTANEOUS at 05:35

## 2022-01-10 RX ADMIN — HEPARIN SODIUM 5000 UNIT(S): 5000 INJECTION INTRAVENOUS; SUBCUTANEOUS at 05:35

## 2022-01-10 RX ADMIN — Medication 1: at 12:19

## 2022-01-10 RX ADMIN — Medication 5 UNIT(S): at 12:19

## 2022-01-10 RX ADMIN — INSULIN GLARGINE 20 UNIT(S): 100 INJECTION, SOLUTION SUBCUTANEOUS at 21:37

## 2022-01-10 RX ADMIN — HEPARIN SODIUM 5000 UNIT(S): 5000 INJECTION INTRAVENOUS; SUBCUTANEOUS at 17:37

## 2022-01-10 RX ADMIN — SODIUM CHLORIDE 1000 MILLILITER(S): 9 INJECTION INTRAMUSCULAR; INTRAVENOUS; SUBCUTANEOUS at 22:20

## 2022-01-10 RX ADMIN — SODIUM CHLORIDE 1000 MILLILITER(S): 9 INJECTION INTRAMUSCULAR; INTRAVENOUS; SUBCUTANEOUS at 12:18

## 2022-01-11 LAB
ALBUMIN SERPL ELPH-MCNC: 3.6 G/DL — SIGNIFICANT CHANGE UP (ref 3.3–5)
ALP SERPL-CCNC: 320 U/L — HIGH (ref 40–120)
ALT FLD-CCNC: 18 U/L — SIGNIFICANT CHANGE UP (ref 10–45)
ANION GAP SERPL CALC-SCNC: 12 MMOL/L — SIGNIFICANT CHANGE UP (ref 5–17)
AST SERPL-CCNC: 16 U/L — SIGNIFICANT CHANGE UP (ref 10–40)
BILIRUB SERPL-MCNC: 0.5 MG/DL — SIGNIFICANT CHANGE UP (ref 0.2–1.2)
BUN SERPL-MCNC: 18 MG/DL — SIGNIFICANT CHANGE UP (ref 7–23)
CALCIUM SERPL-MCNC: 9.8 MG/DL — SIGNIFICANT CHANGE UP (ref 8.4–10.5)
CHLORIDE SERPL-SCNC: 103 MMOL/L — SIGNIFICANT CHANGE UP (ref 96–108)
CO2 SERPL-SCNC: 22 MMOL/L — SIGNIFICANT CHANGE UP (ref 22–31)
CREAT SERPL-MCNC: 1.51 MG/DL — HIGH (ref 0.5–1.3)
GLUCOSE BLDC GLUCOMTR-MCNC: 154 MG/DL — HIGH (ref 70–99)
GLUCOSE BLDC GLUCOMTR-MCNC: 189 MG/DL — HIGH (ref 70–99)
GLUCOSE BLDC GLUCOMTR-MCNC: 245 MG/DL — HIGH (ref 70–99)
GLUCOSE BLDC GLUCOMTR-MCNC: 264 MG/DL — HIGH (ref 70–99)
GLUCOSE SERPL-MCNC: 235 MG/DL — HIGH (ref 70–99)
MAGNESIUM SERPL-MCNC: 1.7 MG/DL — SIGNIFICANT CHANGE UP (ref 1.6–2.6)
PHOSPHATE SERPL-MCNC: 5 MG/DL — HIGH (ref 2.5–4.5)
POTASSIUM SERPL-MCNC: 3.8 MMOL/L — SIGNIFICANT CHANGE UP (ref 3.5–5.3)
POTASSIUM SERPL-SCNC: 3.8 MMOL/L — SIGNIFICANT CHANGE UP (ref 3.5–5.3)
PROT SERPL-MCNC: 8.1 G/DL — SIGNIFICANT CHANGE UP (ref 6–8.3)
SARS-COV-2 RNA SPEC QL NAA+PROBE: SIGNIFICANT CHANGE UP
SODIUM SERPL-SCNC: 137 MMOL/L — SIGNIFICANT CHANGE UP (ref 135–145)

## 2022-01-11 PROCEDURE — 99232 SBSQ HOSP IP/OBS MODERATE 35: CPT

## 2022-01-11 RX ORDER — POTASSIUM CHLORIDE 20 MEQ
40 PACKET (EA) ORAL ONCE
Refills: 0 | Status: COMPLETED | OUTPATIENT
Start: 2022-01-11 | End: 2022-01-11

## 2022-01-11 RX ADMIN — Medication 1: at 12:03

## 2022-01-11 RX ADMIN — Medication 2: at 17:11

## 2022-01-11 RX ADMIN — HEPARIN SODIUM 5000 UNIT(S): 5000 INJECTION INTRAVENOUS; SUBCUTANEOUS at 17:11

## 2022-01-11 RX ADMIN — SODIUM CHLORIDE 50 MILLILITER(S): 9 INJECTION INTRAMUSCULAR; INTRAVENOUS; SUBCUTANEOUS at 15:16

## 2022-01-11 RX ADMIN — FOSCARNET SODIUM 180 MILLIGRAM(S): 24 INJECTION, SOLUTION INTRAVENOUS at 13:15

## 2022-01-11 RX ADMIN — Medication 5 UNIT(S): at 17:12

## 2022-01-11 RX ADMIN — Medication 1: at 08:26

## 2022-01-11 RX ADMIN — INSULIN GLARGINE 20 UNIT(S): 100 INJECTION, SOLUTION SUBCUTANEOUS at 21:56

## 2022-01-11 RX ADMIN — FOSCARNET SODIUM 180 MILLIGRAM(S): 24 INJECTION, SOLUTION INTRAVENOUS at 21:57

## 2022-01-11 RX ADMIN — Medication 5 UNIT(S): at 12:04

## 2022-01-11 RX ADMIN — SODIUM CHLORIDE 1000 MILLILITER(S): 9 INJECTION INTRAMUSCULAR; INTRAVENOUS; SUBCUTANEOUS at 22:43

## 2022-01-11 RX ADMIN — HEPARIN SODIUM 5000 UNIT(S): 5000 INJECTION INTRAVENOUS; SUBCUTANEOUS at 06:20

## 2022-01-11 RX ADMIN — SODIUM CHLORIDE 1000 MILLILITER(S): 9 INJECTION INTRAMUSCULAR; INTRAVENOUS; SUBCUTANEOUS at 13:15

## 2022-01-11 RX ADMIN — Medication 40 MILLIEQUIVALENT(S): at 15:16

## 2022-01-11 RX ADMIN — BICTEGRAVIR SODIUM, EMTRICITABINE, AND TENOFOVIR ALAFENAMIDE FUMARATE 1 TABLET(S): 30; 120; 15 TABLET ORAL at 13:24

## 2022-01-11 RX ADMIN — AMLODIPINE BESYLATE 5 MILLIGRAM(S): 2.5 TABLET ORAL at 06:21

## 2022-01-11 RX ADMIN — Medication 1: at 21:57

## 2022-01-11 RX ADMIN — Medication 5 UNIT(S): at 08:26

## 2022-01-12 LAB
ALBUMIN SERPL ELPH-MCNC: 3.8 G/DL — SIGNIFICANT CHANGE UP (ref 3.3–5)
ALP SERPL-CCNC: 336 U/L — HIGH (ref 40–120)
ALT FLD-CCNC: 20 U/L — SIGNIFICANT CHANGE UP (ref 10–45)
ANION GAP SERPL CALC-SCNC: 12 MMOL/L — SIGNIFICANT CHANGE UP (ref 5–17)
AST SERPL-CCNC: 18 U/L — SIGNIFICANT CHANGE UP (ref 10–40)
BILIRUB SERPL-MCNC: 0.4 MG/DL — SIGNIFICANT CHANGE UP (ref 0.2–1.2)
BUN SERPL-MCNC: 18 MG/DL — SIGNIFICANT CHANGE UP (ref 7–23)
CALCIUM SERPL-MCNC: 10.1 MG/DL — SIGNIFICANT CHANGE UP (ref 8.4–10.5)
CHLORIDE SERPL-SCNC: 103 MMOL/L — SIGNIFICANT CHANGE UP (ref 96–108)
CO2 SERPL-SCNC: 22 MMOL/L — SIGNIFICANT CHANGE UP (ref 22–31)
CREAT SERPL-MCNC: 1.59 MG/DL — HIGH (ref 0.5–1.3)
GLUCOSE BLDC GLUCOMTR-MCNC: 142 MG/DL — HIGH (ref 70–99)
GLUCOSE BLDC GLUCOMTR-MCNC: 157 MG/DL — HIGH (ref 70–99)
GLUCOSE BLDC GLUCOMTR-MCNC: 179 MG/DL — HIGH (ref 70–99)
GLUCOSE BLDC GLUCOMTR-MCNC: 189 MG/DL — HIGH (ref 70–99)
GLUCOSE SERPL-MCNC: 174 MG/DL — HIGH (ref 70–99)
MAGNESIUM SERPL-MCNC: 1.7 MG/DL — SIGNIFICANT CHANGE UP (ref 1.6–2.6)
PHOSPHATE SERPL-MCNC: 4.8 MG/DL — HIGH (ref 2.5–4.5)
POTASSIUM SERPL-MCNC: 4.1 MMOL/L — SIGNIFICANT CHANGE UP (ref 3.5–5.3)
POTASSIUM SERPL-SCNC: 4.1 MMOL/L — SIGNIFICANT CHANGE UP (ref 3.5–5.3)
PROT SERPL-MCNC: 8.2 G/DL — SIGNIFICANT CHANGE UP (ref 6–8.3)
SODIUM SERPL-SCNC: 137 MMOL/L — SIGNIFICANT CHANGE UP (ref 135–145)

## 2022-01-12 PROCEDURE — 99231 SBSQ HOSP IP/OBS SF/LOW 25: CPT

## 2022-01-12 RX ADMIN — FOSCARNET SODIUM 180 MILLIGRAM(S): 24 INJECTION, SOLUTION INTRAVENOUS at 21:41

## 2022-01-12 RX ADMIN — SODIUM CHLORIDE 1000 MILLILITER(S): 9 INJECTION INTRAMUSCULAR; INTRAVENOUS; SUBCUTANEOUS at 22:00

## 2022-01-12 RX ADMIN — Medication 1: at 07:54

## 2022-01-12 RX ADMIN — HEPARIN SODIUM 5000 UNIT(S): 5000 INJECTION INTRAVENOUS; SUBCUTANEOUS at 16:58

## 2022-01-12 RX ADMIN — Medication 1: at 16:57

## 2022-01-12 RX ADMIN — SODIUM CHLORIDE 1000 MILLILITER(S): 9 INJECTION INTRAMUSCULAR; INTRAVENOUS; SUBCUTANEOUS at 10:17

## 2022-01-12 RX ADMIN — BICTEGRAVIR SODIUM, EMTRICITABINE, AND TENOFOVIR ALAFENAMIDE FUMARATE 1 TABLET(S): 30; 120; 15 TABLET ORAL at 11:48

## 2022-01-12 RX ADMIN — Medication 5 UNIT(S): at 07:54

## 2022-01-12 RX ADMIN — Medication 5 UNIT(S): at 16:57

## 2022-01-12 RX ADMIN — AMLODIPINE BESYLATE 5 MILLIGRAM(S): 2.5 TABLET ORAL at 06:30

## 2022-01-12 RX ADMIN — INSULIN GLARGINE 20 UNIT(S): 100 INJECTION, SOLUTION SUBCUTANEOUS at 21:40

## 2022-01-12 RX ADMIN — SODIUM CHLORIDE 50 MILLILITER(S): 9 INJECTION INTRAMUSCULAR; INTRAVENOUS; SUBCUTANEOUS at 11:48

## 2022-01-12 RX ADMIN — Medication 5 UNIT(S): at 11:49

## 2022-01-12 RX ADMIN — FOSCARNET SODIUM 180 MILLIGRAM(S): 24 INJECTION, SOLUTION INTRAVENOUS at 10:17

## 2022-01-12 RX ADMIN — HEPARIN SODIUM 5000 UNIT(S): 5000 INJECTION INTRAVENOUS; SUBCUTANEOUS at 06:30

## 2022-01-13 LAB
GLUCOSE BLDC GLUCOMTR-MCNC: 157 MG/DL — HIGH (ref 70–99)
GLUCOSE BLDC GLUCOMTR-MCNC: 157 MG/DL — HIGH (ref 70–99)
GLUCOSE BLDC GLUCOMTR-MCNC: 189 MG/DL — HIGH (ref 70–99)
GLUCOSE BLDC GLUCOMTR-MCNC: 256 MG/DL — HIGH (ref 70–99)

## 2022-01-13 PROCEDURE — 99231 SBSQ HOSP IP/OBS SF/LOW 25: CPT

## 2022-01-13 RX ADMIN — HEPARIN SODIUM 5000 UNIT(S): 5000 INJECTION INTRAVENOUS; SUBCUTANEOUS at 05:25

## 2022-01-13 RX ADMIN — FOSCARNET SODIUM 180 MILLIGRAM(S): 24 INJECTION, SOLUTION INTRAVENOUS at 21:28

## 2022-01-13 RX ADMIN — BICTEGRAVIR SODIUM, EMTRICITABINE, AND TENOFOVIR ALAFENAMIDE FUMARATE 1 TABLET(S): 30; 120; 15 TABLET ORAL at 11:43

## 2022-01-13 RX ADMIN — Medication 1: at 11:44

## 2022-01-13 RX ADMIN — Medication 1: at 16:42

## 2022-01-13 RX ADMIN — Medication 1: at 21:27

## 2022-01-13 RX ADMIN — SODIUM CHLORIDE 50 MILLILITER(S): 9 INJECTION INTRAMUSCULAR; INTRAVENOUS; SUBCUTANEOUS at 19:41

## 2022-01-13 RX ADMIN — INSULIN GLARGINE 20 UNIT(S): 100 INJECTION, SOLUTION SUBCUTANEOUS at 21:27

## 2022-01-13 RX ADMIN — Medication 5 UNIT(S): at 11:44

## 2022-01-13 RX ADMIN — Medication 5 UNIT(S): at 07:47

## 2022-01-13 RX ADMIN — SODIUM CHLORIDE 1000 MILLILITER(S): 9 INJECTION INTRAMUSCULAR; INTRAVENOUS; SUBCUTANEOUS at 10:31

## 2022-01-13 RX ADMIN — Medication 5 UNIT(S): at 16:42

## 2022-01-13 RX ADMIN — AMLODIPINE BESYLATE 5 MILLIGRAM(S): 2.5 TABLET ORAL at 05:25

## 2022-01-13 RX ADMIN — HEPARIN SODIUM 5000 UNIT(S): 5000 INJECTION INTRAVENOUS; SUBCUTANEOUS at 16:43

## 2022-01-13 RX ADMIN — FOSCARNET SODIUM 180 MILLIGRAM(S): 24 INJECTION, SOLUTION INTRAVENOUS at 10:31

## 2022-01-13 RX ADMIN — Medication 1: at 07:47

## 2022-01-13 RX ADMIN — SODIUM CHLORIDE 1000 MILLILITER(S): 9 INJECTION INTRAMUSCULAR; INTRAVENOUS; SUBCUTANEOUS at 21:28

## 2022-01-14 LAB
ALBUMIN SERPL ELPH-MCNC: 3.8 G/DL — SIGNIFICANT CHANGE UP (ref 3.3–5)
ALP SERPL-CCNC: 335 U/L — HIGH (ref 40–120)
ALT FLD-CCNC: 16 U/L — SIGNIFICANT CHANGE UP (ref 10–45)
ANION GAP SERPL CALC-SCNC: 10 MMOL/L — SIGNIFICANT CHANGE UP (ref 5–17)
AST SERPL-CCNC: 15 U/L — SIGNIFICANT CHANGE UP (ref 10–40)
BILIRUB SERPL-MCNC: 0.5 MG/DL — SIGNIFICANT CHANGE UP (ref 0.2–1.2)
BUN SERPL-MCNC: 18 MG/DL — SIGNIFICANT CHANGE UP (ref 7–23)
CALCIUM SERPL-MCNC: 10.1 MG/DL — SIGNIFICANT CHANGE UP (ref 8.4–10.5)
CHLORIDE SERPL-SCNC: 101 MMOL/L — SIGNIFICANT CHANGE UP (ref 96–108)
CO2 SERPL-SCNC: 25 MMOL/L — SIGNIFICANT CHANGE UP (ref 22–31)
CREAT SERPL-MCNC: 1.59 MG/DL — HIGH (ref 0.5–1.3)
GLUCOSE BLDC GLUCOMTR-MCNC: 126 MG/DL — HIGH (ref 70–99)
GLUCOSE BLDC GLUCOMTR-MCNC: 162 MG/DL — HIGH (ref 70–99)
GLUCOSE BLDC GLUCOMTR-MCNC: 182 MG/DL — HIGH (ref 70–99)
GLUCOSE BLDC GLUCOMTR-MCNC: 293 MG/DL — HIGH (ref 70–99)
GLUCOSE SERPL-MCNC: 191 MG/DL — HIGH (ref 70–99)
MAGNESIUM SERPL-MCNC: 1.7 MG/DL — SIGNIFICANT CHANGE UP (ref 1.6–2.6)
POTASSIUM SERPL-MCNC: 3.8 MMOL/L — SIGNIFICANT CHANGE UP (ref 3.5–5.3)
POTASSIUM SERPL-SCNC: 3.8 MMOL/L — SIGNIFICANT CHANGE UP (ref 3.5–5.3)
PROT SERPL-MCNC: 8.4 G/DL — HIGH (ref 6–8.3)
SODIUM SERPL-SCNC: 136 MMOL/L — SIGNIFICANT CHANGE UP (ref 135–145)

## 2022-01-14 PROCEDURE — 99232 SBSQ HOSP IP/OBS MODERATE 35: CPT

## 2022-01-14 RX ADMIN — INSULIN GLARGINE 20 UNIT(S): 100 INJECTION, SOLUTION SUBCUTANEOUS at 21:39

## 2022-01-14 RX ADMIN — SODIUM CHLORIDE 1000 MILLILITER(S): 9 INJECTION INTRAMUSCULAR; INTRAVENOUS; SUBCUTANEOUS at 15:36

## 2022-01-14 RX ADMIN — HEPARIN SODIUM 5000 UNIT(S): 5000 INJECTION INTRAVENOUS; SUBCUTANEOUS at 05:39

## 2022-01-14 RX ADMIN — Medication 1: at 17:01

## 2022-01-14 RX ADMIN — Medication 5 UNIT(S): at 08:24

## 2022-01-14 RX ADMIN — BICTEGRAVIR SODIUM, EMTRICITABINE, AND TENOFOVIR ALAFENAMIDE FUMARATE 1 TABLET(S): 30; 120; 15 TABLET ORAL at 12:12

## 2022-01-14 RX ADMIN — Medication 5 UNIT(S): at 17:01

## 2022-01-14 RX ADMIN — Medication 5 UNIT(S): at 12:13

## 2022-01-14 RX ADMIN — SODIUM CHLORIDE 50 MILLILITER(S): 9 INJECTION INTRAMUSCULAR; INTRAVENOUS; SUBCUTANEOUS at 05:40

## 2022-01-14 RX ADMIN — Medication 1: at 21:28

## 2022-01-14 RX ADMIN — HEPARIN SODIUM 5000 UNIT(S): 5000 INJECTION INTRAVENOUS; SUBCUTANEOUS at 17:03

## 2022-01-14 RX ADMIN — FOSCARNET SODIUM 180 MILLIGRAM(S): 24 INJECTION, SOLUTION INTRAVENOUS at 15:37

## 2022-01-14 RX ADMIN — AMLODIPINE BESYLATE 5 MILLIGRAM(S): 2.5 TABLET ORAL at 05:39

## 2022-01-14 RX ADMIN — Medication 1: at 08:24

## 2022-01-14 NOTE — CONSULT NOTE ADULT - ATTENDING COMMENTS
51F with controlled HIV on biktarvy (12/28/21: Cd4= 378-29%; 5/27/21 HIV viral Load UNDETECTABLE <12 copies/mL), HSV2 genital lesions, CKD, obesity (BMI= 32.8), DM2  on insulin (5/27/21 HgbA1C = 9.0),  admitted today 12/29/21 for worsening genital lesions. Patient was admitted in May with HSV2 despited valacylclovir and repsonded to Foscaret,     Acyclovir resistant HSV-2  dysuria -  appears related to genital HSV  DM  - repeat A1C, glycemic control  HIV  controlled - continue Biktarvy    Suggest  Foscarnet ordered  12/29-->  Preinfusion hydration with NS  monitor CMP, Mg, Ca daily  Send Viral culture - will request HSV2 antiviral susceptibility
OB/GYN Attending Note:  Agree with above, patient seen by me with the resident Stefan Beth. Patient 53 y/o PMH: +HSV (undetectable viral load), DM type 2, CKD admitted for genital herpetic lesions. resistant HSV. Pt experiences burning when urinates. No urinary retention as per patient.  Hx of vulvar biopsy. PE: Pt in NAD , + coalescing herpetic lesion on labia majora + left labia majora lesion extends downward, + superficial, no discharge, no bleeding, not seeping, clean and dry, no erythema, + mild tenderness, no swelling. + tissue noted on vulva. Speculum exam not performed at this time (pt vulvar discomfort)  A/P   Patient 53 y/o PMH: +HSV (undetectable viral load), DM type 2, CKD admitted for genital herpetic lesions. resistant HSV  F/u as per ID recommendation regarding medication for resitant HSV.  Patient states used lidocaine cream in the past, not very helpful, ad the pads, gauze ar irritating. perineal care consider abdominal pad.   Consider some lidocaine cream, some barrier cream vaseline, A/D ointment  Consider vulvar biopsy if no improvement.  Maggie Kelly MD

## 2022-01-14 NOTE — CONSULT NOTE ADULT - ASSESSMENT
51 F pmhx HIV on biktarvy, HSV2 genital lesions, CKD, DM2 on insulin presenting for worsening genital lesions 2/2 resistant HSV2 genital infection.    #HSV2  - Will start Foscarnet IV; ordered by ID team.   - Patient will need to be on IV fluid prior to each dose, as ordered by ID team. Prior to first dose patient will receive 1L of Normal Saline, and subsequently 500cc prior to each dose.   - Monitor electrolytes at least daily, including SCr, Calcium, Magnesium, Phos and replete prn. Monitor neuro exam. Trend CBC daily  - Please send culture and genotype of HSV lesions (already ordered)  - Pending genotyping of resistant HSV strain, will have further recommendations on outpatient management     #UTI  - Currently on treatment with Rocephin  - Follow up urine cultures     #HIV  - Continue with Biktarvy  - Follow up outpatient labs including CD4 count, viral load, Hep B     Recommendations not final until noted signed by Attending. 
 52 F pmhx HIV on biktarvy (12/28/21: Cd4= 378-29%; 5/27/21 HIV viral Load UNDETECTABLE <12 copies/mL), HSV2 genital lesions, CKD, obesity (BMI= 32.8), DM2  on insulin (5/27/21 HgbA1C = 9.0),  admitted for worsening genital lesions.   - F/u HSV sensitivities from right emerging lesion  - appreciate ID recs as resistance requiring compounding for outpatient treatment.  - If lesions do not improve, may consider re-biopsy of vulvar lesion.    cathleen Beth PGY3

## 2022-01-14 NOTE — CONSULT NOTE ADULT - SUBJECTIVE AND OBJECTIVE BOX
TIEN SHARPE  52y  Female [id number]    HPI: 52 F pmhx HIV on biktarvy (12/28/21: Cd4= 378-29%; 5/27/21 HIV viral Load UNDETECTABLE <12 copies/mL), HSV2 genital lesions, CKD, obesity (BMI= 32.8), DM2  on insulin (5/27/21 HgbA1C = 9.0),  admitted for worsening genital lesions. Previously admitted in May with HSV2 despite valacylclovir and responded to Foscaret. She was feeling well until October when lesions started to pop again. Started taking valtrex in early november. Lesions worsened and did not respond to outpatient treatment. Inpatient she has received Foscarnet with residual improvement of left sided lesions but states right sided lesions have worsened and increased discharge with thickening.      Name of GYN Physician:     POB:    NSVDx2   SABx3  eTOPx1      [facility] Meds:   MEDICATIONS  (STANDING):  amLODIPine   Tablet 5 milliGRAM(s) Oral daily  bictegravir 50 mG/emtricitabine 200 mG/tenofovir alafenamide 25 mG (BIKTARVY) 1 Tablet(s) Oral daily  dextrose 40% Gel 15 Gram(s) Oral once  dextrose 5%. 1000 milliLiter(s) (50 mL/Hr) IV Continuous <Continuous>  dextrose 5%. 1000 milliLiter(s) (100 mL/Hr) IV Continuous <Continuous>  dextrose 50% Injectable 25 Gram(s) IV Push once  dextrose 50% Injectable 12.5 Gram(s) IV Push once  dextrose 50% Injectable 25 Gram(s) IV Push once  foscarnet (Peripheral) IVPB 2160 milliGRAM(s) IV Intermittent every 12 hours  glucagon  Injectable 1 milliGRAM(s) IntraMuscular once  heparin   Injectable 5000 Unit(s) SubCutaneous every 12 hours  influenza   Vaccine 0.5 milliLiter(s) IntraMuscular once  insulin glargine Injectable (LANTUS) 20 Unit(s) SubCutaneous at bedtime  insulin lispro (ADMELOG) corrective regimen sliding scale   SubCutaneous three times a day before meals  insulin lispro (ADMELOG) corrective regimen sliding scale   SubCutaneous at bedtime  insulin lispro Injectable (ADMELOG) 5 Unit(s) SubCutaneous three times a day before meals  sodium chloride 0.9% Bolus 500 milliLiter(s) IV Bolus <User Schedule>  sodium chloride 0.9%. 1000 milliLiter(s) (50 mL/Hr) IV Continuous <Continuous>    MEDICATIONS  (PRN):  acetaminophen     Tablet .. 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     Vital Signs Last 24 Hrs  T(C): 36.7 (14 Jan 2022 12:14), Max: 36.9 (13 Jan 2022 21:20)  T(F): 98 (14 Jan 2022 12:14), Max: 98.5 (13 Jan 2022 21:20)  HR: 88 (14 Jan 2022 12:14) (74 - 88)  BP: 122/76 (14 Jan 2022 12:14) (122/76 - 146/82)  BP(mean): --  RR: 18 (14 Jan 2022 12:14) (18 - 18)  SpO2: 96% (14 Jan 2022 12:14) (96% - 98%)    Physical Exam:   General: sitting comfortably in bed, NAD   HEENT: neck supple, full ROM  CV: RR S1S2 no m/r/g  Lungs: CTA b/l, good air flow b/l   Back: No CVA tenderness  Abd: Soft, non-tender, non-distended.  Bowel sounds present.    : diffuse vesicular lesions right and left to clitoris with extension down of left labia. Left vulvar lesion shows signs of healing with re-coloration.    LABS:    01-14    136  |  101  |  18  ----------------------------<  191<H>  3.8   |  25  |  1.59<H>    Ca    10.1      14 Jan 2022 07:16  Mg     1.7     01-14    TPro  8.4<H>  /  Alb  3.8  /  TBili  0.5  /  DBili  x   /  AST  15  /  ALT  16  /  AlkPhos  335<H>  01-14    I&O's Detail    13 Jan 2022 07:01  -  14 Jan 2022 07:00  --------------------------------------------------------  IN:    Oral Fluid: 600 mL    sodium chloride 0.9%: 550 mL  Total IN: 1150 mL    OUT:  Total OUT: 0 mL    Total NET: 1150 mL

## 2022-01-15 LAB
ALBUMIN SERPL ELPH-MCNC: 3.8 G/DL — SIGNIFICANT CHANGE UP (ref 3.3–5)
ALP SERPL-CCNC: 361 U/L — HIGH (ref 40–120)
ALT FLD-CCNC: 18 U/L — SIGNIFICANT CHANGE UP (ref 10–45)
ANION GAP SERPL CALC-SCNC: 13 MMOL/L — SIGNIFICANT CHANGE UP (ref 5–17)
AST SERPL-CCNC: 16 U/L — SIGNIFICANT CHANGE UP (ref 10–40)
BILIRUB SERPL-MCNC: 0.5 MG/DL — SIGNIFICANT CHANGE UP (ref 0.2–1.2)
BUN SERPL-MCNC: 20 MG/DL — SIGNIFICANT CHANGE UP (ref 7–23)
CALCIUM SERPL-MCNC: 9.9 MG/DL — SIGNIFICANT CHANGE UP (ref 8.4–10.5)
CHLORIDE SERPL-SCNC: 103 MMOL/L — SIGNIFICANT CHANGE UP (ref 96–108)
CO2 SERPL-SCNC: 22 MMOL/L — SIGNIFICANT CHANGE UP (ref 22–31)
CREAT SERPL-MCNC: 1.55 MG/DL — HIGH (ref 0.5–1.3)
GLUCOSE BLDC GLUCOMTR-MCNC: 162 MG/DL — HIGH (ref 70–99)
GLUCOSE BLDC GLUCOMTR-MCNC: 206 MG/DL — HIGH (ref 70–99)
GLUCOSE BLDC GLUCOMTR-MCNC: 278 MG/DL — HIGH (ref 70–99)
GLUCOSE BLDC GLUCOMTR-MCNC: 305 MG/DL — HIGH (ref 70–99)
GLUCOSE SERPL-MCNC: 233 MG/DL — HIGH (ref 70–99)
HSV+VZV DNA SPEC QL NAA+PROBE: ABNORMAL
MAGNESIUM SERPL-MCNC: 1.7 MG/DL — SIGNIFICANT CHANGE UP (ref 1.6–2.6)
MISCELLANEOUS TEST NAME: SIGNIFICANT CHANGE UP
POTASSIUM SERPL-MCNC: 4 MMOL/L — SIGNIFICANT CHANGE UP (ref 3.5–5.3)
POTASSIUM SERPL-SCNC: 4 MMOL/L — SIGNIFICANT CHANGE UP (ref 3.5–5.3)
PROT SERPL-MCNC: 8.7 G/DL — HIGH (ref 6–8.3)
SODIUM SERPL-SCNC: 138 MMOL/L — SIGNIFICANT CHANGE UP (ref 135–145)
SPECIMEN SOURCE: SIGNIFICANT CHANGE UP

## 2022-01-15 RX ADMIN — SODIUM CHLORIDE 1000 MILLILITER(S): 9 INJECTION INTRAMUSCULAR; INTRAVENOUS; SUBCUTANEOUS at 17:04

## 2022-01-15 RX ADMIN — FOSCARNET SODIUM 180 MILLIGRAM(S): 24 INJECTION, SOLUTION INTRAVENOUS at 04:41

## 2022-01-15 RX ADMIN — BICTEGRAVIR SODIUM, EMTRICITABINE, AND TENOFOVIR ALAFENAMIDE FUMARATE 1 TABLET(S): 30; 120; 15 TABLET ORAL at 12:22

## 2022-01-15 RX ADMIN — Medication 5 UNIT(S): at 16:49

## 2022-01-15 RX ADMIN — Medication 5 UNIT(S): at 08:19

## 2022-01-15 RX ADMIN — SODIUM CHLORIDE 1000 MILLILITER(S): 9 INJECTION INTRAMUSCULAR; INTRAVENOUS; SUBCUTANEOUS at 04:41

## 2022-01-15 RX ADMIN — HEPARIN SODIUM 5000 UNIT(S): 5000 INJECTION INTRAVENOUS; SUBCUTANEOUS at 17:16

## 2022-01-15 RX ADMIN — INSULIN GLARGINE 20 UNIT(S): 100 INJECTION, SOLUTION SUBCUTANEOUS at 21:44

## 2022-01-15 RX ADMIN — FOSCARNET SODIUM 180 MILLIGRAM(S): 24 INJECTION, SOLUTION INTRAVENOUS at 16:59

## 2022-01-15 RX ADMIN — Medication 5 UNIT(S): at 12:22

## 2022-01-15 RX ADMIN — Medication 2: at 08:19

## 2022-01-15 RX ADMIN — Medication 1: at 12:22

## 2022-01-15 RX ADMIN — Medication 3: at 16:49

## 2022-01-15 RX ADMIN — HEPARIN SODIUM 5000 UNIT(S): 5000 INJECTION INTRAVENOUS; SUBCUTANEOUS at 05:00

## 2022-01-15 RX ADMIN — Medication 2: at 21:43

## 2022-01-15 RX ADMIN — AMLODIPINE BESYLATE 5 MILLIGRAM(S): 2.5 TABLET ORAL at 05:00

## 2022-01-16 ENCOUNTER — TRANSCRIPTION ENCOUNTER (OUTPATIENT)
Age: 52
End: 2022-01-16

## 2022-01-16 LAB
ALBUMIN SERPL ELPH-MCNC: 3.5 G/DL — SIGNIFICANT CHANGE UP (ref 3.3–5)
ALP SERPL-CCNC: 318 U/L — HIGH (ref 40–120)
ALT FLD-CCNC: 15 U/L — SIGNIFICANT CHANGE UP (ref 10–45)
ANION GAP SERPL CALC-SCNC: 14 MMOL/L — SIGNIFICANT CHANGE UP (ref 5–17)
AST SERPL-CCNC: 10 U/L — SIGNIFICANT CHANGE UP (ref 10–40)
BILIRUB SERPL-MCNC: 0.4 MG/DL — SIGNIFICANT CHANGE UP (ref 0.2–1.2)
BUN SERPL-MCNC: 18 MG/DL — SIGNIFICANT CHANGE UP (ref 7–23)
CALCIUM SERPL-MCNC: 9.5 MG/DL — SIGNIFICANT CHANGE UP (ref 8.4–10.5)
CHLORIDE SERPL-SCNC: 103 MMOL/L — SIGNIFICANT CHANGE UP (ref 96–108)
CO2 SERPL-SCNC: 21 MMOL/L — LOW (ref 22–31)
CREAT SERPL-MCNC: 1.57 MG/DL — HIGH (ref 0.5–1.3)
CULTURE RESULTS: SIGNIFICANT CHANGE UP
GLUCOSE BLDC GLUCOMTR-MCNC: 149 MG/DL — HIGH (ref 70–99)
GLUCOSE BLDC GLUCOMTR-MCNC: 149 MG/DL — HIGH (ref 70–99)
GLUCOSE BLDC GLUCOMTR-MCNC: 155 MG/DL — HIGH (ref 70–99)
GLUCOSE BLDC GLUCOMTR-MCNC: 195 MG/DL — HIGH (ref 70–99)
GLUCOSE SERPL-MCNC: 190 MG/DL — HIGH (ref 70–99)
MAGNESIUM SERPL-MCNC: 1.6 MG/DL — SIGNIFICANT CHANGE UP (ref 1.6–2.6)
POTASSIUM SERPL-MCNC: 3.8 MMOL/L — SIGNIFICANT CHANGE UP (ref 3.5–5.3)
POTASSIUM SERPL-SCNC: 3.8 MMOL/L — SIGNIFICANT CHANGE UP (ref 3.5–5.3)
PROT SERPL-MCNC: 7.8 G/DL — SIGNIFICANT CHANGE UP (ref 6–8.3)
SODIUM SERPL-SCNC: 138 MMOL/L — SIGNIFICANT CHANGE UP (ref 135–145)
SPECIMEN SOURCE: SIGNIFICANT CHANGE UP

## 2022-01-16 RX ADMIN — INSULIN GLARGINE 20 UNIT(S): 100 INJECTION, SOLUTION SUBCUTANEOUS at 21:57

## 2022-01-16 RX ADMIN — Medication 5 UNIT(S): at 16:28

## 2022-01-16 RX ADMIN — Medication 0: at 12:47

## 2022-01-16 RX ADMIN — FOSCARNET SODIUM 180 MILLIGRAM(S): 24 INJECTION, SOLUTION INTRAVENOUS at 16:21

## 2022-01-16 RX ADMIN — Medication 5 UNIT(S): at 12:47

## 2022-01-16 RX ADMIN — HEPARIN SODIUM 5000 UNIT(S): 5000 INJECTION INTRAVENOUS; SUBCUTANEOUS at 05:04

## 2022-01-16 RX ADMIN — AMLODIPINE BESYLATE 5 MILLIGRAM(S): 2.5 TABLET ORAL at 05:04

## 2022-01-16 RX ADMIN — Medication 1: at 08:01

## 2022-01-16 RX ADMIN — HEPARIN SODIUM 5000 UNIT(S): 5000 INJECTION INTRAVENOUS; SUBCUTANEOUS at 18:35

## 2022-01-16 RX ADMIN — SODIUM CHLORIDE 1000 MILLILITER(S): 9 INJECTION INTRAMUSCULAR; INTRAVENOUS; SUBCUTANEOUS at 16:21

## 2022-01-16 RX ADMIN — Medication 5 UNIT(S): at 08:00

## 2022-01-16 RX ADMIN — Medication 1: at 16:28

## 2022-01-16 RX ADMIN — BICTEGRAVIR SODIUM, EMTRICITABINE, AND TENOFOVIR ALAFENAMIDE FUMARATE 1 TABLET(S): 30; 120; 15 TABLET ORAL at 12:48

## 2022-01-16 RX ADMIN — FOSCARNET SODIUM 180 MILLIGRAM(S): 24 INJECTION, SOLUTION INTRAVENOUS at 05:07

## 2022-01-16 RX ADMIN — SODIUM CHLORIDE 1000 MILLILITER(S): 9 INJECTION INTRAMUSCULAR; INTRAVENOUS; SUBCUTANEOUS at 05:05

## 2022-01-16 NOTE — DISCHARGE NOTE PROVIDER - NSDCCPCAREPLAN_GEN_ALL_CORE_FT
PRINCIPAL DISCHARGE DIAGNOSIS  Diagnosis: Herpes infection  Assessment and Plan of Treatment: Foscarnet IV completed.   Follow up with ID as scheduled.      SECONDARY DISCHARGE DIAGNOSES  Diagnosis: HIV disease  Assessment and Plan of Treatment: Continue current medications, follow up with ID as scheduled    Diagnosis: TASHA (acute kidney injury)  Assessment and Plan of Treatment: Avoid taking (NSAIDs) - (ex: Ibuprofen, Advil, Celebrex, Naprosyn)  Avoid taking any nephrotoxic agents (can harm kidneys) - Intravenous contrast for diagnostic testing, combination cold medications.  Have all medications adjusted for your renal function by your Health Care Provider.  Blood pressure control is important.  Take all medication as prescribed.      Diagnosis: DMII (diabetes mellitus, type 2)  Assessment and Plan of Treatment: HgA1C this admission 9.0  Make sure you get your HgA1c checked every three months.  If you take oral diabetes medications, check your blood glucose two times a day.  If you take insulin, check your blood glucose before meals and at bedtime.  It's important not to skip any meals.  Keep a log of your blood glucose results and always take it with you to your doctor appointments.  Keep a list of your current medications including injectables and over the counter medications and bring this medication list with you to all your doctor appointments.  If you have not seen your ophthalmologist this year call for appointment.  Check your feet daily for redness, sores, or openings. Do not self treat. If no improvement in two days call your primary care physician for an appointment.  Low blood sugar (hypoglycemia) is a blood sugar below 70mg/dl. Check your blood sugar if you feel signs/symptoms of hypoglycemia. If your blood sugar is below 70 take 15 grams of carbohydrates (ex 4 oz of apple juice, 3-4 glucose tablets, or 4-6 oz of regular soda) wait 15 minutes and repeat blood sugar to make sure it comes up above 70.  If your blood sugar is above 70 and you are due for a meal, have a meal.  If you are not due for a meal have a snack.  This snack helps keeps your blood sugar at a safe range.       PRINCIPAL DISCHARGE DIAGNOSIS  Diagnosis: Herpes infection  Assessment and Plan of Treatment: Foscarnet IV completed.   Follow up with ID Dr Rosales after hospital discharge.      SECONDARY DISCHARGE DIAGNOSES  Diagnosis: HIV disease  Assessment and Plan of Treatment: Continue current medications, follow up with ID as scheduled.    Diagnosis: TASHA (acute kidney injury)  Assessment and Plan of Treatment: Avoid taking (NSAIDs) - (ex: Ibuprofen, Advil, Celebrex, Naprosyn)  Avoid taking any nephrotoxic agents (can harm kidneys) - Intravenous contrast for diagnostic testing, combination cold medications.  Have all medications adjusted for your renal function by your Health Care Provider.  Blood pressure control is important.  Take all medication as prescribed.      Diagnosis: DMII (diabetes mellitus, type 2)  Assessment and Plan of Treatment: HgA1C this admission 9.0  Make sure you get your HgA1c checked every three months.  If you take oral diabetes medications, check your blood glucose two times a day.  If you take insulin, check your blood glucose before meals and at bedtime.  It's important not to skip any meals.  Keep a log of your blood glucose results and always take it with you to your doctor appointments.  Keep a list of your current medications including injectables and over the counter medications and bring this medication list with you to all your doctor appointments.  If you have not seen your ophthalmologist this year call for appointment.  Check your feet daily for redness, sores, or openings. Do not self treat. If no improvement in two days call your primary care physician for an appointment.  Low blood sugar (hypoglycemia) is a blood sugar below 70mg/dl. Check your blood sugar if you feel signs/symptoms of hypoglycemia. If your blood sugar is below 70 take 15 grams of carbohydrates (ex 4 oz of apple juice, 3-4 glucose tablets, or 4-6 oz of regular soda) wait 15 minutes and repeat blood sugar to make sure it comes up above 70.  If your blood sugar is above 70 and you are due for a meal, have a meal.  If you are not due for a meal have a snack.  This snack helps keeps your blood sugar at a safe range.      Diagnosis: Hypertension  Assessment and Plan of Treatment: You were started on Norvasc 5mg daily. Please follow up with your PCP after hospital discharge for continued management of your blood pressure.

## 2022-01-16 NOTE — DISCHARGE NOTE PROVIDER - NSDCMRMEDTOKEN_GEN_ALL_CORE_FT
bictegravir/emtricitabine/tenofovir 50 mg-200 mg-25 mg oral tablet: 1 tab(s) orally once a day  HumaLOG KwikPen 100 units/mL injectable solution: 10  injectable 3 times a day, As Needed  Lantus 100 units/mL subcutaneous solution: 20 unit(s) subcutaneous once a day (at bedtime)  polyethylene glycol 3350 oral powder for reconstitution: 17 gram(s) orally once a day, As needed, Constipation   acetaminophen 325 mg oral tablet: 2 tab(s) orally every 6 hours, As needed, Temp greater or equal to 38C (100.4F), Mild Pain (1 - 3)  amLODIPine 5 mg oral tablet: 1 tab(s) orally once a day  bictegravir/emtricitabine/tenofovir 50 mg-200 mg-25 mg oral tablet: 1 tab(s) orally once a day  HumaLOG KwikPen 100 units/mL injectable solution: 10  injectable 3 times a day, As Needed  Lantus 100 units/mL subcutaneous solution: 20 unit(s) subcutaneous once a day (at bedtime)  polyethylene glycol 3350 oral powder for reconstitution: 17 gram(s) orally once a day, As needed, Constipation

## 2022-01-16 NOTE — DISCHARGE NOTE PROVIDER - CARE PROVIDER_API CALL
Bandar Rosales)  Internal Medicine  [address] [place] [address]  Phone: [phone]  Fax: [phone]  Follow Up Time:

## 2022-01-16 NOTE — DISCHARGE NOTE PROVIDER - HOSPITAL COURSE
52F with controlled HIV on biktarvy (12/28/21: Cd4= 378-29%; 12/31/21 HIV viral Load UNDETECTABLE <12 copies/mL), with acyclovir resistant HSV2 genital lesions, CKD, obesity (BMI= 32.8), DM2  on insulin (5/27/21 HgbA1C = 9.0),  admitted today 12/29/21 for worsening genital lesions. Patient was admitted in May with HSV2 despite valacylclovir and responded to Foscarnet,     Assessment and Plan:    Problem/Plan - 1:  ·  Problem: Herpes genitalis.   ·  Plan: On IV Foscarnet till 1/19/2022.  DC planned for today.     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   ·  Plan: Cont home meds, ID Fu.     Problem/Plan - 3:  ·  Problem: Diabetes mellitus.   ·  Plan: RISS,      Cleared for discharge by ID after last dose of Foscarnet today, to follow up with Dr Bandar Rosales as outpatient, may start Pritelivir as outpatient.

## 2022-01-17 LAB
ANION GAP SERPL CALC-SCNC: 14 MMOL/L — SIGNIFICANT CHANGE UP (ref 5–17)
BUN SERPL-MCNC: 16 MG/DL — SIGNIFICANT CHANGE UP (ref 7–23)
CALCIUM SERPL-MCNC: 9.5 MG/DL — SIGNIFICANT CHANGE UP (ref 8.4–10.5)
CHLORIDE SERPL-SCNC: 103 MMOL/L — SIGNIFICANT CHANGE UP (ref 96–108)
CO2 SERPL-SCNC: 20 MMOL/L — LOW (ref 22–31)
CREAT SERPL-MCNC: 1.51 MG/DL — HIGH (ref 0.5–1.3)
GLUCOSE BLDC GLUCOMTR-MCNC: 146 MG/DL — HIGH (ref 70–99)
GLUCOSE BLDC GLUCOMTR-MCNC: 173 MG/DL — HIGH (ref 70–99)
GLUCOSE BLDC GLUCOMTR-MCNC: 192 MG/DL — HIGH (ref 70–99)
GLUCOSE BLDC GLUCOMTR-MCNC: 230 MG/DL — HIGH (ref 70–99)
GLUCOSE SERPL-MCNC: 192 MG/DL — HIGH (ref 70–99)
MAGNESIUM SERPL-MCNC: 1.7 MG/DL — SIGNIFICANT CHANGE UP (ref 1.6–2.6)
POTASSIUM SERPL-MCNC: 3.6 MMOL/L — SIGNIFICANT CHANGE UP (ref 3.5–5.3)
POTASSIUM SERPL-SCNC: 3.6 MMOL/L — SIGNIFICANT CHANGE UP (ref 3.5–5.3)
SARS-COV-2 RNA SPEC QL NAA+PROBE: SIGNIFICANT CHANGE UP
SODIUM SERPL-SCNC: 137 MMOL/L — SIGNIFICANT CHANGE UP (ref 135–145)

## 2022-01-17 RX ADMIN — BICTEGRAVIR SODIUM, EMTRICITABINE, AND TENOFOVIR ALAFENAMIDE FUMARATE 1 TABLET(S): 30; 120; 15 TABLET ORAL at 11:57

## 2022-01-17 RX ADMIN — Medication 2: at 16:41

## 2022-01-17 RX ADMIN — SODIUM CHLORIDE 1000 MILLILITER(S): 9 INJECTION INTRAMUSCULAR; INTRAVENOUS; SUBCUTANEOUS at 04:52

## 2022-01-17 RX ADMIN — SODIUM CHLORIDE 50 MILLILITER(S): 9 INJECTION INTRAMUSCULAR; INTRAVENOUS; SUBCUTANEOUS at 11:57

## 2022-01-17 RX ADMIN — SODIUM CHLORIDE 50 MILLILITER(S): 9 INJECTION INTRAMUSCULAR; INTRAVENOUS; SUBCUTANEOUS at 21:45

## 2022-01-17 RX ADMIN — FOSCARNET SODIUM 180 MILLIGRAM(S): 24 INJECTION, SOLUTION INTRAVENOUS at 16:42

## 2022-01-17 RX ADMIN — INSULIN GLARGINE 20 UNIT(S): 100 INJECTION, SOLUTION SUBCUTANEOUS at 21:40

## 2022-01-17 RX ADMIN — AMLODIPINE BESYLATE 5 MILLIGRAM(S): 2.5 TABLET ORAL at 05:04

## 2022-01-17 RX ADMIN — Medication 5 UNIT(S): at 16:41

## 2022-01-17 RX ADMIN — FOSCARNET SODIUM 180 MILLIGRAM(S): 24 INJECTION, SOLUTION INTRAVENOUS at 04:52

## 2022-01-17 RX ADMIN — HEPARIN SODIUM 5000 UNIT(S): 5000 INJECTION INTRAVENOUS; SUBCUTANEOUS at 05:04

## 2022-01-17 RX ADMIN — Medication 1: at 08:06

## 2022-01-17 RX ADMIN — SODIUM CHLORIDE 1000 MILLILITER(S): 9 INJECTION INTRAMUSCULAR; INTRAVENOUS; SUBCUTANEOUS at 16:42

## 2022-01-17 RX ADMIN — Medication 5 UNIT(S): at 08:05

## 2022-01-17 RX ADMIN — HEPARIN SODIUM 5000 UNIT(S): 5000 INJECTION INTRAVENOUS; SUBCUTANEOUS at 17:41

## 2022-01-17 RX ADMIN — Medication 5 UNIT(S): at 11:58

## 2022-01-17 NOTE — PROVIDER CONTACT NOTE (CRITICAL VALUE NOTIFICATION) - PERSON GIVING RESULT:
Conjuntivae and eyelids appear normal,  Sclerae : White without injection Lucille PÉREZ ([facility])

## 2022-01-17 NOTE — PROVIDER CONTACT NOTE (CRITICAL VALUE NOTIFICATION) - BACKGROUND
Pt admitted for herpesvirus infection. 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 Pt currently foscarvir IVPB & biktarvy.

## 2022-01-18 LAB
ANION GAP SERPL CALC-SCNC: 12 MMOL/L — SIGNIFICANT CHANGE UP (ref 5–17)
BUN SERPL-MCNC: 15 MG/DL — SIGNIFICANT CHANGE UP (ref 7–23)
CALCIUM SERPL-MCNC: 9.8 MG/DL — SIGNIFICANT CHANGE UP (ref 8.4–10.5)
CHLORIDE SERPL-SCNC: 100 MMOL/L — SIGNIFICANT CHANGE UP (ref 96–108)
CO2 SERPL-SCNC: 23 MMOL/L — SIGNIFICANT CHANGE UP (ref 22–31)
CREAT SERPL-MCNC: 1.45 MG/DL — HIGH (ref 0.5–1.3)
GLUCOSE BLDC GLUCOMTR-MCNC: 112 MG/DL — HIGH (ref 70–99)
GLUCOSE BLDC GLUCOMTR-MCNC: 168 MG/DL — HIGH (ref 70–99)
GLUCOSE BLDC GLUCOMTR-MCNC: 183 MG/DL — HIGH (ref 70–99)
GLUCOSE BLDC GLUCOMTR-MCNC: 186 MG/DL — HIGH (ref 70–99)
GLUCOSE SERPL-MCNC: 149 MG/DL — HIGH (ref 70–99)
MAGNESIUM SERPL-MCNC: 1.7 MG/DL — SIGNIFICANT CHANGE UP (ref 1.6–2.6)
PHOSPHATE SERPL-MCNC: 4.3 MG/DL — SIGNIFICANT CHANGE UP (ref 2.5–4.5)
POTASSIUM SERPL-MCNC: 3.5 MMOL/L — SIGNIFICANT CHANGE UP (ref 3.5–5.3)
POTASSIUM SERPL-SCNC: 3.5 MMOL/L — SIGNIFICANT CHANGE UP (ref 3.5–5.3)
SODIUM SERPL-SCNC: 135 MMOL/L — SIGNIFICANT CHANGE UP (ref 135–145)

## 2022-01-18 PROCEDURE — 99232 SBSQ HOSP IP/OBS MODERATE 35: CPT

## 2022-01-18 RX ADMIN — SODIUM CHLORIDE 50 MILLILITER(S): 9 INJECTION INTRAMUSCULAR; INTRAVENOUS; SUBCUTANEOUS at 08:43

## 2022-01-18 RX ADMIN — HEPARIN SODIUM 5000 UNIT(S): 5000 INJECTION INTRAVENOUS; SUBCUTANEOUS at 05:39

## 2022-01-18 RX ADMIN — SODIUM CHLORIDE 1000 MILLILITER(S): 9 INJECTION INTRAMUSCULAR; INTRAVENOUS; SUBCUTANEOUS at 04:56

## 2022-01-18 RX ADMIN — FOSCARNET SODIUM 180 MILLIGRAM(S): 24 INJECTION, SOLUTION INTRAVENOUS at 04:40

## 2022-01-18 RX ADMIN — Medication 1: at 07:54

## 2022-01-18 RX ADMIN — HEPARIN SODIUM 5000 UNIT(S): 5000 INJECTION INTRAVENOUS; SUBCUTANEOUS at 17:44

## 2022-01-18 RX ADMIN — BICTEGRAVIR SODIUM, EMTRICITABINE, AND TENOFOVIR ALAFENAMIDE FUMARATE 1 TABLET(S): 30; 120; 15 TABLET ORAL at 12:05

## 2022-01-18 RX ADMIN — INSULIN GLARGINE 20 UNIT(S): 100 INJECTION, SOLUTION SUBCUTANEOUS at 21:43

## 2022-01-18 RX ADMIN — FOSCARNET SODIUM 180 MILLIGRAM(S): 24 INJECTION, SOLUTION INTRAVENOUS at 16:40

## 2022-01-18 RX ADMIN — AMLODIPINE BESYLATE 5 MILLIGRAM(S): 2.5 TABLET ORAL at 05:39

## 2022-01-18 RX ADMIN — Medication 1: at 16:40

## 2022-01-18 RX ADMIN — Medication 5 UNIT(S): at 07:54

## 2022-01-18 RX ADMIN — Medication 5 UNIT(S): at 16:40

## 2022-01-18 RX ADMIN — Medication 5 UNIT(S): at 12:05

## 2022-01-18 RX ADMIN — SODIUM CHLORIDE 1000 MILLILITER(S): 9 INJECTION INTRAMUSCULAR; INTRAVENOUS; SUBCUTANEOUS at 16:40

## 2022-01-19 ENCOUNTER — NON-APPOINTMENT (OUTPATIENT)
Age: 52
End: 2022-01-19

## 2022-01-19 ENCOUNTER — TRANSCRIPTION ENCOUNTER (OUTPATIENT)
Age: 52
End: 2022-01-19

## 2022-01-19 VITALS
DIASTOLIC BLOOD PRESSURE: 90 MMHG | HEART RATE: 88 BPM | RESPIRATION RATE: 19 BRPM | TEMPERATURE: 98 F | SYSTOLIC BLOOD PRESSURE: 153 MMHG | OXYGEN SATURATION: 100 %

## 2022-01-19 LAB
ALBUMIN SERPL ELPH-MCNC: 3.5 G/DL — SIGNIFICANT CHANGE UP (ref 3.3–5)
ALP SERPL-CCNC: 323 U/L — HIGH (ref 40–120)
ALT FLD-CCNC: 18 U/L — SIGNIFICANT CHANGE UP (ref 10–45)
ANION GAP SERPL CALC-SCNC: 14 MMOL/L — SIGNIFICANT CHANGE UP (ref 5–17)
AST SERPL-CCNC: 18 U/L — SIGNIFICANT CHANGE UP (ref 10–40)
BILIRUB SERPL-MCNC: 0.4 MG/DL — SIGNIFICANT CHANGE UP (ref 0.2–1.2)
BUN SERPL-MCNC: 17 MG/DL — SIGNIFICANT CHANGE UP (ref 7–23)
CALCIUM SERPL-MCNC: 9.8 MG/DL — SIGNIFICANT CHANGE UP (ref 8.4–10.5)
CHLORIDE SERPL-SCNC: 102 MMOL/L — SIGNIFICANT CHANGE UP (ref 96–108)
CO2 SERPL-SCNC: 21 MMOL/L — LOW (ref 22–31)
CREAT SERPL-MCNC: 1.56 MG/DL — HIGH (ref 0.5–1.3)
GLUCOSE BLDC GLUCOMTR-MCNC: 105 MG/DL — HIGH (ref 70–99)
GLUCOSE BLDC GLUCOMTR-MCNC: 148 MG/DL — HIGH (ref 70–99)
GLUCOSE SERPL-MCNC: 106 MG/DL — HIGH (ref 70–99)
MAGNESIUM SERPL-MCNC: 1.7 MG/DL — SIGNIFICANT CHANGE UP (ref 1.6–2.6)
POTASSIUM SERPL-MCNC: 3.4 MMOL/L — LOW (ref 3.5–5.3)
POTASSIUM SERPL-SCNC: 3.4 MMOL/L — LOW (ref 3.5–5.3)
PROT SERPL-MCNC: 8 G/DL — SIGNIFICANT CHANGE UP (ref 6–8.3)
SODIUM SERPL-SCNC: 137 MMOL/L — SIGNIFICANT CHANGE UP (ref 135–145)

## 2022-01-19 PROCEDURE — 86359 T CELLS TOTAL COUNT: CPT

## 2022-01-19 PROCEDURE — 87529 HSV DNA AMP PROBE: CPT

## 2022-01-19 PROCEDURE — 83036 HEMOGLOBIN GLYCOSYLATED A1C: CPT

## 2022-01-19 PROCEDURE — 99285 EMERGENCY DEPT VISIT HI MDM: CPT | Mod: 25

## 2022-01-19 PROCEDURE — 80048 BASIC METABOLIC PNL TOTAL CA: CPT

## 2022-01-19 PROCEDURE — 96374 THER/PROPH/DIAG INJ IV PUSH: CPT

## 2022-01-19 PROCEDURE — 36415 COLL VENOUS BLD VENIPUNCTURE: CPT

## 2022-01-19 PROCEDURE — 87798 DETECT AGENT NOS DNA AMP: CPT

## 2022-01-19 PROCEDURE — 86360 T CELL ABSOLUTE COUNT/RATIO: CPT

## 2022-01-19 PROCEDURE — U0005: CPT

## 2022-01-19 PROCEDURE — 84100 ASSAY OF PHOSPHORUS: CPT

## 2022-01-19 PROCEDURE — 85025 COMPLETE CBC W/AUTO DIFF WBC: CPT

## 2022-01-19 PROCEDURE — 99232 SBSQ HOSP IP/OBS MODERATE 35: CPT

## 2022-01-19 PROCEDURE — U0003: CPT

## 2022-01-19 PROCEDURE — 87070 CULTURE OTHR SPECIMN AEROBIC: CPT

## 2022-01-19 PROCEDURE — 83735 ASSAY OF MAGNESIUM: CPT

## 2022-01-19 PROCEDURE — 87086 URINE CULTURE/COLONY COUNT: CPT

## 2022-01-19 PROCEDURE — 81001 URINALYSIS AUTO W/SCOPE: CPT

## 2022-01-19 PROCEDURE — 81025 URINE PREGNANCY TEST: CPT

## 2022-01-19 PROCEDURE — 87536 HIV-1 QUANT&REVRSE TRNSCRPJ: CPT

## 2022-01-19 PROCEDURE — 85027 COMPLETE CBC AUTOMATED: CPT

## 2022-01-19 PROCEDURE — 80053 COMPREHEN METABOLIC PANEL: CPT

## 2022-01-19 PROCEDURE — 82962 GLUCOSE BLOOD TEST: CPT

## 2022-01-19 RX ORDER — POTASSIUM CHLORIDE 20 MEQ
40 PACKET (EA) ORAL EVERY 4 HOURS
Refills: 0 | Status: COMPLETED | OUTPATIENT
Start: 2022-01-19 | End: 2022-01-19

## 2022-01-19 RX ORDER — AMLODIPINE BESYLATE 2.5 MG/1
1 TABLET ORAL
Qty: 30 | Refills: 0
Start: 2022-01-19 | End: 2022-02-17

## 2022-01-19 RX ORDER — ACETAMINOPHEN 500 MG
2 TABLET ORAL
Qty: 0 | Refills: 0 | DISCHARGE
Start: 2022-01-19

## 2022-01-19 RX ADMIN — Medication 40 MILLIEQUIVALENT(S): at 09:35

## 2022-01-19 RX ADMIN — Medication 5 UNIT(S): at 07:48

## 2022-01-19 RX ADMIN — BICTEGRAVIR SODIUM, EMTRICITABINE, AND TENOFOVIR ALAFENAMIDE FUMARATE 1 TABLET(S): 30; 120; 15 TABLET ORAL at 12:02

## 2022-01-19 RX ADMIN — SODIUM CHLORIDE 1000 MILLILITER(S): 9 INJECTION INTRAMUSCULAR; INTRAVENOUS; SUBCUTANEOUS at 03:46

## 2022-01-19 RX ADMIN — AMLODIPINE BESYLATE 5 MILLIGRAM(S): 2.5 TABLET ORAL at 07:49

## 2022-01-19 RX ADMIN — Medication 40 MILLIEQUIVALENT(S): at 13:40

## 2022-01-19 RX ADMIN — HEPARIN SODIUM 5000 UNIT(S): 5000 INJECTION INTRAVENOUS; SUBCUTANEOUS at 07:48

## 2022-01-19 RX ADMIN — FOSCARNET SODIUM 180 MILLIGRAM(S): 24 INJECTION, SOLUTION INTRAVENOUS at 03:48

## 2022-01-19 RX ADMIN — FOSCARNET SODIUM 180 MILLIGRAM(S): 24 INJECTION, SOLUTION INTRAVENOUS at 14:13

## 2022-01-19 RX ADMIN — Medication 5 UNIT(S): at 12:02

## 2022-01-19 RX ADMIN — SODIUM CHLORIDE 50 MILLILITER(S): 9 INJECTION INTRAMUSCULAR; INTRAVENOUS; SUBCUTANEOUS at 07:50

## 2022-01-19 RX ADMIN — SODIUM CHLORIDE 1000 MILLILITER(S): 9 INJECTION INTRAMUSCULAR; INTRAVENOUS; SUBCUTANEOUS at 13:41

## 2022-01-19 NOTE — PROGRESS NOTE ADULT - PROVIDER SPECIALTY LIST ADULT
Infectious Disease
Internal Medicine
Infectious Disease
Infectious Disease
Internal Medicine
Internal Medicine
Infectious Disease
Internal Medicine

## 2022-01-19 NOTE — PROGRESS NOTE ADULT - REASON FOR ADMISSION
Genitl herpes
Genital herpes
Genital herpes
Genitl herpes
resistant herpes
Genitl herpes
Genital herpes
Genitl herpes

## 2022-01-19 NOTE — PROGRESS NOTE ADULT - SUBJECTIVE AND OBJECTIVE BOX
Follow Up:  valacyclovir resistant HSV2    Interval History/ROS:  notes continued discharge from remaining labial lesion upper left    Allergies  No Known Allergies    ANTIMICROBIALS:  bictegravir 50 mG/emtricitabine 200 mG/tenofovir alafenamide 25 mG (BIKTARVY) 1 daily  foscarnet (Peripheral) IVPB 2160 every 12 hours      OTHER MEDS:  MEDICATIONS  (STANDING):  acetaminophen     Tablet .. 650 every 6 hours PRN  amLODIPine   Tablet 5 daily  dextrose 40% Gel 15 once  dextrose 50% Injectable 25 once  dextrose 50% Injectable 12.5 once  dextrose 50% Injectable 25 once  glucagon  Injectable 1 once  heparin   Injectable 5000 every 12 hours  influenza   Vaccine 0.5 once  insulin glargine Injectable (LANTUS) 20 at bedtime  insulin lispro (ADMELOG) corrective regimen sliding scale  three times a day before meals  insulin lispro (ADMELOG) corrective regimen sliding scale  at bedtime  insulin lispro Injectable (ADMELOG) 5 three times a day before meals      Vital Signs Last 24 Hrs  T(C): 36.6 (19 Jan 2022 16:25), Max: 36.9 (19 Jan 2022 05:40)  T(F): 97.9 (19 Jan 2022 16:25), Max: 98.4 (19 Jan 2022 05:40)  HR: 88 (19 Jan 2022 16:25) (76 - 88)  BP: 153/90 (19 Jan 2022 16:25) (133/88 - 153/90)  BP(mean): --  RR: 19 (19 Jan 2022 16:25) (18 - 19)  SpO2: 100% (19 Jan 2022 16:25) (97% - 100%)    PHYSICAL EXAM:  General: WN/WD NAD, Non-toxic  Neurology: A&Ox3, nonfocal  Respiratory: Clear to auscultation bilaterally  CV: RRR, S1S2, no murmurs, rubs or gallops  Abdominal: Soft, Non-tender, non-distended, normal bowel sounds  Extremities: No edema,  Line Sites: Clear  Skin: No rash        01-19    137  |  102  |  17  ----------------------------<  106<H>  3.4<L>   |  21<L>  |  1.56<H>    Ca    9.8      19 Jan 2022 07:10  Phos  4.3     01-18  Mg     1.7     01-19    TPro  8.0  /  Alb  3.5  /  TBili  0.4  /  DBili  x   /  AST  18  /  ALT  18  /  AlkPhos  323<H>  01-19      MICROBIOLOGY:  .Genital Vulva  01-15-22   Rare Streptococcus agalactiae (Group B) isolated  Group B streptococci are susceptible to ampicillin,  penicillin and cefazolin, but may be resistant to  erythromycin and clindamycin.  Recommendations for intrapartum prophylaxis for Group B  streptococci are penicillin or ampicillin.  Routine vaginal jasbir  --  --      Clean Catch Clean Catch (Midstream)  12-30-21   No growth  --  --      Clean Catch Clean Catch (Midstream)  12-29-21   <10,000 CFU/mL Normal Urogenital Jasbir  --  --          v  HIV-1 RNA Quantitative, Viral Load Log: NOT DET. lg /mL (12-31-21 @ 09:28)  HIV-1 RNA Quantitative, Viral Load Log: NOT DET. lg /mL (05-27-21 @ 17:01)          RADIOLOGY:    Bandar Rosales MD; Division of Infectious Disease; Pager: [phone]; nights and weekends: [phone]
  Follow Up:  valacylovir resistant HSV2    Interval History/ROS:  persistence of right upper labial lesion - no change in size - remains tender and with local pruritus,  - local drainage sl decreased    Allergies  No Known Allergies    ANTIMICROBIALS:  bictegravir 50 mG/emtricitabine 200 mG/tenofovir alafenamide 25 mG (BIKTARVY) 1 daily  foscarnet (Peripheral) IVPB 2160 every 12 hours      OTHER MEDS:  MEDICATIONS  (STANDING):  acetaminophen     Tablet .. 650 every 6 hours PRN  amLODIPine   Tablet 5 daily  dextrose 40% Gel 15 once  dextrose 50% Injectable 25 once  dextrose 50% Injectable 12.5 once  dextrose 50% Injectable 25 once  glucagon  Injectable 1 once  heparin   Injectable 5000 every 12 hours  influenza   Vaccine 0.5 once  insulin glargine Injectable (LANTUS) 20 at bedtime  insulin lispro (ADMELOG) corrective regimen sliding scale  three times a day before meals  insulin lispro (ADMELOG) corrective regimen sliding scale  at bedtime  insulin lispro Injectable (ADMELOG) 5 three times a day before meals      Vital Signs Last 24 Hrs  T(C): 36.7 (18 Jan 2022 12:03), Max: 37.1 (17 Jan 2022 21:19)  T(F): 98 (18 Jan 2022 12:03), Max: 98.8 (17 Jan 2022 21:19)  HR: 64 (18 Jan 2022 12:03) (64 - 78)  BP: 143/82 (18 Jan 2022 12:03) (125/64 - 143/82)  BP(mean): --  RR: 19 (18 Jan 2022 12:03) (18 - 19)  SpO2: 98% (18 Jan 2022 12:03) (98% - 100%)    PHYSICAL EXAM:  General: WN/WD NAD, Non-toxic  Neurology: A&Ox3, nonfocal  Respiratory: no resp distress  Extremities: No edema,   Line Sites: Clear  Skin: No rash        01-18    135  |  100  |  15  ----------------------------<  149<H>  3.5   |  23  |  1.45<H>    Ca    9.8      18 Jan 2022 11:00  Phos  4.3     01-18  Mg     1.7     01-18        MICROBIOLOGY:  .Genital Vulva  01-15-22   Rare Streptococcus agalactiae (Group B) isolated  Group B streptococci are susceptible to ampicillin,  penicillin and cefazolin, but may be resistant to  erythromycin and clindamycin.  Recommendations for intrapartum prophylaxis for Group B  streptococci are penicillin or ampicillin.  Routine vaginal jasbir  --  --      Clean Catch Clean Catch (Midstream)  12-30-21   No growth  --  --      Clean Catch Clean Catch (Midstream)  12-29-21   <10,000 CFU/mL Normal Urogenital Jasbir  --  --    (01.15.22 @ 01:20)  Herpes Simplex Virus 1/2  VZV PCR Source: lesion   Herpes Simplex Virus 1/2  VZV PCR Result: HSV2 Detected  HIV-1 RNA Quantitative, Viral Load Log: NOT DET. lg /mL (12-31-21 @ 09:28)  HIV-1 RNA Quantitative, Viral Load Log: NOT DET. lg /mL (05-27-21 @ 17:01)      RADIOLOGY:    Bandar Rosales MD; Division of Infectious Disease; Pager: [phone]; nights and weekends: [phone]
  Follow Up: resistant HSV-2    Interval History/ROS:  better, walking in room    Allergies  No Known Allergies    ANTIMICROBIALS:  bictegravir 50 mG/emtricitabine 200 mG/tenofovir alafenamide 25 mG (BIKTARVY) 1 daily  foscarnet (Peripheral) IVPB 2160 every 12 hours      OTHER MEDS:  MEDICATIONS  (STANDING):  acetaminophen     Tablet .. 650 every 6 hours PRN  amLODIPine   Tablet 5 daily  dextrose 40% Gel 15 once  dextrose 50% Injectable 25 once  dextrose 50% Injectable 12.5 once  dextrose 50% Injectable 25 once  glucagon  Injectable 1 once  heparin   Injectable 5000 every 12 hours  influenza   Vaccine 0.5 once  insulin glargine Injectable (LANTUS) 20 at bedtime  insulin lispro (ADMELOG) corrective regimen sliding scale  three times a day before meals  insulin lispro (ADMELOG) corrective regimen sliding scale  at bedtime  insulin lispro Injectable (ADMELOG) 5 three times a day before meals      Vital Signs Last 24 Hrs  T(C): 36.8 (11 Jan 2022 11:50), Max: 36.8 (11 Jan 2022 11:50)  T(F): 98.3 (11 Jan 2022 11:50), Max: 98.3 (11 Jan 2022 11:50)  HR: 77 (11 Jan 2022 11:50) (77 - 79)  BP: 137/84 (11 Jan 2022 11:50) (132/82 - 137/84)  BP(mean): --  RR: 18 (11 Jan 2022 11:50) (18 - 18)  SpO2: 100% (11 Jan 2022 11:50) (97% - 100%)    PHYSICAL EXAM:  General: WN/WD NAD, Non-toxic  Neurology: A&Ox3, nonfocal  Respiratory: Clear to auscultation bilaterally  CV: RRR, S1S2, no murmurs, rubs or gallops  Abdominal: Soft, Non-tender, non-distended, normal bowel sounds  Extremities: No edema,  Line Sites: Clear  Skin: No rash                          12.8   3.72  )-----------( 208      ( 10 Octavio 2022 07:27 )             38.9       01-11    137  |  103  |  18  ----------------------------<  235<H>  3.8   |  22  |  1.51<H>    Ca    9.8      11 Jan 2022 06:23  Phos  5.0     01-11  Mg     1.7     01-11    TPro  8.1  /  Alb  3.6  /  TBili  0.5  /  DBili  x   /  AST  16  /  ALT  18  /  AlkPhos  320<H>  01-11      MICROBIOLOGY:  Clean Catch Clean Catch (Midstream)  12-30-21   No growth  --  --      Clean Catch Clean Catch (Midstream)  12-29-21   <10,000 CFU/mL Normal Urogenital Eufemia  --  --          v  HIV-1 RNA Quantitative, Viral Load Log: NOT DET. lg /mL (12-31-21 @ 09:28)  HIV-1 RNA Quantitative, Viral Load Log: NOT DET. lg /mL (05-27-21 @ 17:01)          RADIOLOGY:    Bandar Rosales MD; Division of Infectious Disease; Pager: [phone]; nights and weekends: [phone]
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 No nausea, vomiting, or hematemesis; No diarrhea or constipation. No melena or hematochezia.  GENITOURINARY: No dysuria, frequency, hematuria, or incontinence  NEUROLOGICAL: No headaches, memory loss, loss of strength, numbness, or tremors  SKIN: No itching, burning, rashes, or lesions   LYMPH NODES: No enlarged glands  ENDOCRINE: No heat or cold intolerance; No hair loss  MUSCULOSKELETAL: No joint pain or swelling; No muscle, back, or extremity pain  PSYCHIATRIC: No depression, anxiety, mood swings, or difficulty sleeping  HEME/LYMPH: No easy bruising, or bleeding gums  ALLERY AND IMMUNOLOGIC: No hives or eczema    Allergies    No Known Allergies    Intolerances        Social History:     FAMILY HISTORY:      MEDICATIONS  (STANDING):  bictegravir 50 mG/emtricitabine 200 mG/tenofovir alafenamide 25 mG (BIKTARVY) 1 Tablet(s) Oral daily  dextrose 40% Gel 15 Gram(s) Oral once  dextrose 5%. 1000 milliLiter(s) (50 mL/Hr) IV Continuous <Continuous>  dextrose 5%. 1000 milliLiter(s) (100 mL/Hr) IV Continuous <Continuous>  dextrose 50% Injectable 25 Gram(s) IV Push once  dextrose 50% Injectable 12.5 Gram(s) IV Push once  dextrose 50% Injectable 25 Gram(s) IV Push once  foscarnet (Peripheral) IVPB 3000 milliGRAM(s) IV Intermittent every 12 hours  glucagon  Injectable 1 milliGRAM(s) IntraMuscular once  heparin   Injectable 5000 Unit(s) SubCutaneous every 12 hours  influenza   Vaccine 0.5 milliLiter(s) IntraMuscular once  insulin glargine Injectable (LANTUS) 20 Unit(s) SubCutaneous at bedtime  insulin lispro (ADMELOG) corrective regimen sliding scale   SubCutaneous three times a day before meals  insulin lispro (ADMELOG) corrective regimen sliding scale   SubCutaneous at bedtime  insulin lispro Injectable (ADMELOG) 10 Unit(s) SubCutaneous three times a day before meals  sodium chloride 0.9% Bolus 500 milliLiter(s) IV Bolus <User Schedule>    MEDICATIONS  (PRN):  ibuprofen  Tablet. 400 milliGRAM(s) Oral every 8 hours PRN Mild Pain (1 - 3), Moderate Pain (4 - 6)        CAPILLARY BLOOD GLUCOSE      POCT Blood Glucose.: 100 mg/dL (30 Dec 2021 17:16)  POCT Blood Glucose.: 146 mg/dL (30 Dec 2021 12:03)  POCT Blood Glucose.: 145 mg/dL (30 Dec 2021 08:32)  POCT Blood Glucose.: 211 mg/dL (29 Dec 2021 22:25)    I&O's Summary    29 Dec 2021 07:01  -  30 Dec 2021 07:00  --------------------------------------------------------  IN: 630 mL / OUT: 0 mL / NET: 630 mL    30 Dec 2021 07:01  -  30 Dec 2021 18:23  --------------------------------------------------------  IN: 480 mL / OUT: 0 mL / NET: 480 mL        PHYSICAL EXAM:  Vital Signs Last 24 Hrs  T(C): 36.5 (30 Dec 2021 16:37), Max: 36.9 (30 Dec 2021 13:30)  T(F): 97.7 (30 Dec 2021 16:37), Max: 98.5 (30 Dec 2021 13:30)  HR: 78 (30 Dec 2021 16:37) (76 - 93)  BP: 156/90 (30 Dec 2021 16:37) (132/80 - 162/90)  BP(mean): --  RR: 18 (30 Dec 2021 16:37) (18 - 19)  SpO2: 98% (30 Dec 2021 16:37) (95% - 98%)    GENERAL: NAD, well-developed  HEAD:  Atraumatic, Normocephalic  EYES: EOMI, PERRLA, conjunctiva and sclera clear  NECK: Supple, No JVD  CHEST/LUNG: Clear to auscultation bilaterally; No wheeze  HEART: Regular rate and rhythm; No murmurs, rubs, or gallops  ABDOMEN: Soft, Nontender, Nondistended; Bowel sounds present  EXTREMITIES:  2+ Peripheral Pulses, No clubbing, cyanosis, or edema  PSYCH: AAOx3  NEUROLOGY: non-focal  SKIN: No rashes or lesions    LABS:                        12.0   3.54  )-----------( 210      ( 30 Dec 2021 07:15 )             36.3     12-    138  |  107  |  15  ----------------------------<  158<H>  3.9   |  22  |  1.40<H>    Ca    8.9      30 Dec 2021 07:10  Phos  3.2     12-30  Mg     1.8     12    TPro  8.6<H>  /  Alb  3.8  /  TBili  0.6  /  DBili  x   /  AST  16  /  ALT  15  /  AlkPhos  360<H>  12-29          Urinalysis Basic - ( 30 Dec 2021 15:17 )    Color: Light Yellow / Appearance: Clear / S.012 / pH: x  Gluc: x / Ketone: Negative  / Bili: Negative / Urobili: Negative   Blood: x / Protein: 30 mg/dL / Nitrite: Negative   Leuk Esterase: Moderate / RBC: 1 /hpf / WBC 7 /HPF   Sq Epi: x / Non Sq Epi: 0 /hpf / Bacteria: Negative        RADIOLOGY & ADDITIONAL TESTS:    Imaging Personally Reviewed:    Consultant(s) Notes Reviewed:      Care Discussed with Consultants/Other Providers:  
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 No nausea, vomiting, or hematemesis; No diarrhea or constipation. No melena or hematochezia.  GENITOURINARY: No dysuria, frequency, hematuria, or incontinence  NEUROLOGICAL: No headaches, memory loss, loss of strength, numbness, or tremors  SKIN: No itching, burning, rashes, or lesions   LYMPH NODES: No enlarged glands  ENDOCRINE: No heat or cold intolerance; No hair loss  MUSCULOSKELETAL: No joint pain or swelling; No muscle, back, or extremity pain  PSYCHIATRIC: No depression, anxiety, mood swings, or difficulty sleeping  HEME/LYMPH: No easy bruising, or bleeding gums  ALLERY AND IMMUNOLOGIC: No hives or eczema    Allergies    No Known Allergies    Intolerances        Social History:     FAMILY HISTORY:      MEDICATIONS  (STANDING):  bictegravir 50 mG/emtricitabine 200 mG/tenofovir alafenamide 25 mG (BIKTARVY) 1 Tablet(s) Oral daily  dextrose 40% Gel 15 Gram(s) Oral once  dextrose 5%. 1000 milliLiter(s) (50 mL/Hr) IV Continuous <Continuous>  dextrose 5%. 1000 milliLiter(s) (100 mL/Hr) IV Continuous <Continuous>  dextrose 50% Injectable 25 Gram(s) IV Push once  dextrose 50% Injectable 12.5 Gram(s) IV Push once  dextrose 50% Injectable 25 Gram(s) IV Push once  foscarnet (Peripheral) IVPB 3000 milliGRAM(s) IV Intermittent every 12 hours  glucagon  Injectable 1 milliGRAM(s) IntraMuscular once  heparin   Injectable 5000 Unit(s) SubCutaneous every 12 hours  influenza   Vaccine 0.5 milliLiter(s) IntraMuscular once  insulin glargine Injectable (LANTUS) 20 Unit(s) SubCutaneous at bedtime  insulin lispro (ADMELOG) corrective regimen sliding scale   SubCutaneous three times a day before meals  insulin lispro (ADMELOG) corrective regimen sliding scale   SubCutaneous at bedtime  insulin lispro Injectable (ADMELOG) 10 Unit(s) SubCutaneous three times a day before meals  sodium chloride 0.9% Bolus 500 milliLiter(s) IV Bolus <User Schedule>    MEDICATIONS  (PRN):  ibuprofen  Tablet. 400 milliGRAM(s) Oral every 8 hours PRN Mild Pain (1 - 3), Moderate Pain (4 - 6)        CAPILLARY BLOOD GLUCOSE      POCT Blood Glucose.: 100 mg/dL (30 Dec 2021 17:16)  POCT Blood Glucose.: 146 mg/dL (30 Dec 2021 12:03)  POCT Blood Glucose.: 145 mg/dL (30 Dec 2021 08:32)  POCT Blood Glucose.: 211 mg/dL (29 Dec 2021 22:25)    I&O's Summary    29 Dec 2021 07:01  -  30 Dec 2021 07:00  --------------------------------------------------------  IN: 630 mL / OUT: 0 mL / NET: 630 mL    30 Dec 2021 07:01  -  30 Dec 2021 18:23  --------------------------------------------------------  IN: 480 mL / OUT: 0 mL / NET: 480 mL        PHYSICAL EXAM:  Vital Signs Last 24 Hrs  T(C): 36.5 (30 Dec 2021 16:37), Max: 36.9 (30 Dec 2021 13:30)  T(F): 97.7 (30 Dec 2021 16:37), Max: 98.5 (30 Dec 2021 13:30)  HR: 78 (30 Dec 2021 16:37) (76 - 93)  BP: 156/90 (30 Dec 2021 16:37) (132/80 - 162/90)  BP(mean): --  RR: 18 (30 Dec 2021 16:37) (18 - 19)  SpO2: 98% (30 Dec 2021 16:37) (95% - 98%)    GENERAL: NAD, well-developed  HEAD:  Atraumatic, Normocephalic  EYES: EOMI, PERRLA, conjunctiva and sclera clear  NECK: Supple, No JVD  CHEST/LUNG: Clear to auscultation bilaterally; No wheeze  HEART: Regular rate and rhythm; No murmurs, rubs, or gallops  ABDOMEN: Soft, Nontender, Nondistended; Bowel sounds present  EXTREMITIES:  2+ Peripheral Pulses, No clubbing, cyanosis, or edema  PSYCH: AAOx3  NEUROLOGY: non-focal  SKIN: No rashes or lesions    LABS:                        12.0   3.54  )-----------( 210      ( 30 Dec 2021 07:15 )             36.3     12-    138  |  107  |  15  ----------------------------<  158<H>  3.9   |  22  |  1.40<H>    Ca    8.9      30 Dec 2021 07:10  Phos  3.2     12-30  Mg     1.8     12    TPro  8.6<H>  /  Alb  3.8  /  TBili  0.6  /  DBili  x   /  AST  16  /  ALT  15  /  AlkPhos  360<H>  12-29          Urinalysis Basic - ( 30 Dec 2021 15:17 )    Color: Light Yellow / Appearance: Clear / S.012 / pH: x  Gluc: x / Ketone: Negative  / Bili: Negative / Urobili: Negative   Blood: x / Protein: 30 mg/dL / Nitrite: Negative   Leuk Esterase: Moderate / RBC: 1 /hpf / WBC 7 /HPF   Sq Epi: x / Non Sq Epi: 0 /hpf / Bacteria: Negative        RADIOLOGY & ADDITIONAL TESTS:    Imaging Personally Reviewed:    Consultant(s) Notes Reviewed:      Care Discussed with Consultants/Other Providers:  
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 No nausea, vomiting, or hematemesis; No diarrhea or constipation. No melena or hematochezia.  GENITOURINARY: No dysuria, frequency, hematuria, or incontinence  NEUROLOGICAL: No headaches, memory loss, loss of strength, numbness, or tremors  SKIN: No itching, burning, rashes, or lesions   LYMPH NODES: No enlarged glands  ENDOCRINE: No heat or cold intolerance; No hair loss  MUSCULOSKELETAL: No joint pain or swelling; No muscle, back, or extremity pain  PSYCHIATRIC: No depression, anxiety, mood swings, or difficulty sleeping  HEME/LYMPH: No easy bruising, or bleeding gums  ALLERY AND IMMUNOLOGIC: No hives or eczema    Allergies    No Known Allergies    Intolerances        Social History:     FAMILY HISTORY:      MEDICATIONS  (STANDING):  bictegravir 50 mG/emtricitabine 200 mG/tenofovir alafenamide 25 mG (BIKTARVY) 1 Tablet(s) Oral daily  dextrose 40% Gel 15 Gram(s) Oral once  dextrose 5%. 1000 milliLiter(s) (50 mL/Hr) IV Continuous <Continuous>  dextrose 5%. 1000 milliLiter(s) (100 mL/Hr) IV Continuous <Continuous>  dextrose 50% Injectable 25 Gram(s) IV Push once  dextrose 50% Injectable 12.5 Gram(s) IV Push once  dextrose 50% Injectable 25 Gram(s) IV Push once  foscarnet (Peripheral) IVPB 3000 milliGRAM(s) IV Intermittent every 12 hours  glucagon  Injectable 1 milliGRAM(s) IntraMuscular once  heparin   Injectable 5000 Unit(s) SubCutaneous every 12 hours  influenza   Vaccine 0.5 milliLiter(s) IntraMuscular once  insulin glargine Injectable (LANTUS) 20 Unit(s) SubCutaneous at bedtime  insulin lispro (ADMELOG) corrective regimen sliding scale   SubCutaneous three times a day before meals  insulin lispro (ADMELOG) corrective regimen sliding scale   SubCutaneous at bedtime  insulin lispro Injectable (ADMELOG) 10 Unit(s) SubCutaneous three times a day before meals  sodium chloride 0.9% Bolus 500 milliLiter(s) IV Bolus <User Schedule>    MEDICATIONS  (PRN):  ibuprofen  Tablet. 400 milliGRAM(s) Oral every 8 hours PRN Mild Pain (1 - 3), Moderate Pain (4 - 6)        CAPILLARY BLOOD GLUCOSE      POCT Blood Glucose.: 100 mg/dL (30 Dec 2021 17:16)  POCT Blood Glucose.: 146 mg/dL (30 Dec 2021 12:03)  POCT Blood Glucose.: 145 mg/dL (30 Dec 2021 08:32)  POCT Blood Glucose.: 211 mg/dL (29 Dec 2021 22:25)    I&O's Summary    29 Dec 2021 07:01  -  30 Dec 2021 07:00  --------------------------------------------------------  IN: 630 mL / OUT: 0 mL / NET: 630 mL    30 Dec 2021 07:01  -  30 Dec 2021 18:23  --------------------------------------------------------  IN: 480 mL / OUT: 0 mL / NET: 480 mL        PHYSICAL EXAM:  Vital Signs Last 24 Hrs  T(C): 36.5 (30 Dec 2021 16:37), Max: 36.9 (30 Dec 2021 13:30)  T(F): 97.7 (30 Dec 2021 16:37), Max: 98.5 (30 Dec 2021 13:30)  HR: 78 (30 Dec 2021 16:37) (76 - 93)  BP: 156/90 (30 Dec 2021 16:37) (132/80 - 162/90)  BP(mean): --  RR: 18 (30 Dec 2021 16:37) (18 - 19)  SpO2: 98% (30 Dec 2021 16:37) (95% - 98%)    GENERAL: NAD, well-developed  HEAD:  Atraumatic, Normocephalic  EYES: EOMI, PERRLA, conjunctiva and sclera clear  NECK: Supple, No JVD  CHEST/LUNG: Clear to auscultation bilaterally; No wheeze  HEART: Regular rate and rhythm; No murmurs, rubs, or gallops  ABDOMEN: Soft, Nontender, Nondistended; Bowel sounds present  EXTREMITIES:  2+ Peripheral Pulses, No clubbing, cyanosis, or edema  PSYCH: AAOx3  NEUROLOGY: non-focal  SKIN: No rashes or lesions    LABS:                        12.0   3.54  )-----------( 210      ( 30 Dec 2021 07:15 )             36.3     12-    138  |  107  |  15  ----------------------------<  158<H>  3.9   |  22  |  1.40<H>    Ca    8.9      30 Dec 2021 07:10  Phos  3.2     12-30  Mg     1.8     12    TPro  8.6<H>  /  Alb  3.8  /  TBili  0.6  /  DBili  x   /  AST  16  /  ALT  15  /  AlkPhos  360<H>  12-29          Urinalysis Basic - ( 30 Dec 2021 15:17 )    Color: Light Yellow / Appearance: Clear / S.012 / pH: x  Gluc: x / Ketone: Negative  / Bili: Negative / Urobili: Negative   Blood: x / Protein: 30 mg/dL / Nitrite: Negative   Leuk Esterase: Moderate / RBC: 1 /hpf / WBC 7 /HPF   Sq Epi: x / Non Sq Epi: 0 /hpf / Bacteria: Negative        RADIOLOGY & ADDITIONAL TESTS:    Imaging Personally Reviewed:    Consultant(s) Notes Reviewed:      Care Discussed with Consultants/Other Providers:  
Patient is a 51y old  Female who presents with a chief complaint of resistant herpes (30 Dec 2021 18:12)    2022    HPI:  Afebrile, feels better. Pain resolved.  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 No nausea, vomiting, or hematemesis; No diarrhea or constipation. No melena or hematochezia.  GENITOURINARY: No dysuria, frequency, hematuria, or incontinence  NEUROLOGICAL: No headaches, memory loss, loss of strength, numbness, or tremors  SKIN: No itching, burning, rashes, or lesions   LYMPH NODES: No enlarged glands  ENDOCRINE: No heat or cold intolerance; No hair loss  MUSCULOSKELETAL: No joint pain or swelling; No muscle, back, or extremity pain  PSYCHIATRIC: No depression, anxiety, mood swings, or difficulty sleeping  HEME/LYMPH: No easy bruising, or bleeding gums  ALLERY AND IMMUNOLOGIC: No hives or eczema    Allergies    No Known Allergies    Intolerances        Social History:     FAMILY HISTORY:      MEDICATIONS  (STANDING):  bictegravir 50 mG/emtricitabine 200 mG/tenofovir alafenamide 25 mG (BIKTARVY) 1 Tablet(s) Oral daily  dextrose 40% Gel 15 Gram(s) Oral once  dextrose 5%. 1000 milliLiter(s) (50 mL/Hr) IV Continuous <Continuous>  dextrose 5%. 1000 milliLiter(s) (100 mL/Hr) IV Continuous <Continuous>  dextrose 50% Injectable 25 Gram(s) IV Push once  dextrose 50% Injectable 12.5 Gram(s) IV Push once  dextrose 50% Injectable 25 Gram(s) IV Push once  foscarnet (Peripheral) IVPB 3000 milliGRAM(s) IV Intermittent every 12 hours  glucagon  Injectable 1 milliGRAM(s) IntraMuscular once  heparin   Injectable 5000 Unit(s) SubCutaneous every 12 hours  influenza   Vaccine 0.5 milliLiter(s) IntraMuscular once  insulin glargine Injectable (LANTUS) 20 Unit(s) SubCutaneous at bedtime  insulin lispro (ADMELOG) corrective regimen sliding scale   SubCutaneous three times a day before meals  insulin lispro (ADMELOG) corrective regimen sliding scale   SubCutaneous at bedtime  insulin lispro Injectable (ADMELOG) 10 Unit(s) SubCutaneous three times a day before meals  sodium chloride 0.9% Bolus 500 milliLiter(s) IV Bolus <User Schedule>    MEDICATIONS  (PRN):  ibuprofen  Tablet. 400 milliGRAM(s) Oral every 8 hours PRN Mild Pain (1 - 3), Moderate Pain (4 - 6)        CAPILLARY BLOOD GLUCOSE      POCT Blood Glucose.: 100 mg/dL (30 Dec 2021 17:16)  POCT Blood Glucose.: 146 mg/dL (30 Dec 2021 12:03)  POCT Blood Glucose.: 145 mg/dL (30 Dec 2021 08:32)  POCT Blood Glucose.: 211 mg/dL (29 Dec 2021 22:25)    I&O's Summary    29 Dec 2021 07:01  -  30 Dec 2021 07:00  --------------------------------------------------------  IN: 630 mL / OUT: 0 mL / NET: 630 mL    30 Dec 2021 07:01  -  30 Dec 2021 18:23  --------------------------------------------------------  IN: 480 mL / OUT: 0 mL / NET: 480 mL        PHYSICAL EXAM:  Vital Signs Last 24 Hrs  T(C): 36.5 (30 Dec 2021 16:37), Max: 36.9 (30 Dec 2021 13:30)  T(F): 97.7 (30 Dec 2021 16:37), Max: 98.5 (30 Dec 2021 13:30)  HR: 78 (30 Dec 2021 16:37) (76 - 93)  BP: 156/90 (30 Dec 2021 16:37) (132/80 - 162/90)  BP(mean): --  RR: 18 (30 Dec 2021 16:37) (18 - 19)  SpO2: 98% (30 Dec 2021 16:37) (95% - 98%)    GENERAL: NAD, well-developed  HEAD:  Atraumatic, Normocephalic  EYES: EOMI, PERRLA, conjunctiva and sclera clear  NECK: Supple, No JVD  CHEST/LUNG: Clear to auscultation bilaterally; No wheeze  HEART: Regular rate and rhythm; No murmurs, rubs, or gallops  ABDOMEN: Soft, Nontender, Nondistended; Bowel sounds present  EXTREMITIES:  2+ Peripheral Pulses, No clubbing, cyanosis, or edema  PSYCH: AAOx3  NEUROLOGY: non-focal  SKIN: No rashes or lesions    LABS:                        12.0   3.54  )-----------( 210      ( 30 Dec 2021 07:15 )             36.3     12-    138  |  107  |  15  ----------------------------<  158<H>  3.9   |  22  |  1.40<H>    Ca    8.9      30 Dec 2021 07:10  Phos  3.2     12-30  Mg     1.8         TPro  8.6<H>  /  Alb  3.8  /  TBili  0.6  /  DBili  x   /  AST  16  /  ALT  15  /  AlkPhos  360<H>  12-29          Urinalysis Basic - ( 30 Dec 2021 15:17 )    Color: Light Yellow / Appearance: Clear / S.012 / pH: x  Gluc: x / Ketone: Negative  / Bili: Negative / Urobili: Negative   Blood: x / Protein: 30 mg/dL / Nitrite: Negative   Leuk Esterase: Moderate / RBC: 1 /hpf / WBC 7 /HPF   Sq Epi: x / Non Sq Epi: 0 /hpf / Bacteria: Negative        RADIOLOGY & ADDITIONAL TESTS:    Imaging Personally Reviewed:    Consultant(s) Notes Reviewed:      Care Discussed with Consultants/Other Providers:  
51y old  Female who presents with a chief complaint of Genitl herpes (06 Jan 2022 13:38)      Interval history:  Afebrile, feeling well, ulcers seems to be improving each day.       Allergies:   No Known Allergies      Antimicrobials:  bictegravir 50 mG/emtricitabine 200 mG/tenofovir alafenamide 25 mG (BIKTARVY) 1 Tablet(s) Oral daily  foscarnet (Peripheral) IVPB 2160 milliGRAM(s) IV Intermittent every 12 hours      REVIEW OF SYSTEMS:  No chest pain   No SOB  No abdominal pain  No dysuria      Vital Signs Last 24 Hrs  T(C): 36.7 (01-06-22 @ 12:06), Max: 36.7 (01-06-22 @ 05:00)  T(F): 98 (01-06-22 @ 12:06), Max: 98 (01-06-22 @ 05:00)  HR: 79 (01-06-22 @ 12:06) (79 - 82)  BP: 133/89 (01-06-22 @ 12:06) (123/82 - 133/89)  BP(mean): --  RR: 18 (01-06-22 @ 12:06) (18 - 18)  SpO2: 100% (01-06-22 @ 12:06) (96% - 100%)      PHYSICAL EXAM:  Patient in no acute distress. Alert, awake.   Cardiovascular: S1S2 normal.  Lungs: + air entry B/L lung fields.  Gastrointestinal: soft, nontender, nondistended.  Extremities: no edema.  genital ulcerated mass like swelling, tenderness.   IV sites not inflamed.                             12.6   3.29  )-----------( 199      ( 06 Jan 2022 06:26 )             38.2   01-06    136  |  102  |  22  ----------------------------<  164<H>  4.0   |  21<L>  |  1.74<H>    Ca    9.6      06 Jan 2022 06:26  Phos  5.8     01-05  Mg     1.6     01-06    TPro  7.8  /  Alb  3.5  /  TBili  0.5  /  DBili  x   /  AST  22  /  ALT  29  /  AlkPhos  304<H>  01-06      LIVER FUNCTIONS - ( 06 Jan 2022 06:26 )  Alb: 3.5 g/dL / Pro: 7.8 g/dL / ALK PHOS: 304 U/L / ALT: 29 U/L / AST: 22 U/L / GGT: x                 
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 No nausea, vomiting, or hematemesis; No diarrhea or constipation. No melena or hematochezia.  GENITOURINARY: No dysuria, frequency, hematuria, or incontinence  NEUROLOGICAL: No headaches, memory loss, loss of strength, numbness, or tremors  SKIN: No itching, burning, rashes, or lesions   LYMPH NODES: No enlarged glands  ENDOCRINE: No heat or cold intolerance; No hair loss  MUSCULOSKELETAL: No joint pain or swelling; No muscle, back, or extremity pain  PSYCHIATRIC: No depression, anxiety, mood swings, or difficulty sleeping  HEME/LYMPH: No easy bruising, or bleeding gums  ALLERY AND IMMUNOLOGIC: No hives or eczema    Allergies    No Known Allergies    Intolerances        Social History:     FAMILY HISTORY:      MEDICATIONS  (STANDING):  bictegravir 50 mG/emtricitabine 200 mG/tenofovir alafenamide 25 mG (BIKTARVY) 1 Tablet(s) Oral daily  dextrose 40% Gel 15 Gram(s) Oral once  dextrose 5%. 1000 milliLiter(s) (50 mL/Hr) IV Continuous <Continuous>  dextrose 5%. 1000 milliLiter(s) (100 mL/Hr) IV Continuous <Continuous>  dextrose 50% Injectable 25 Gram(s) IV Push once  dextrose 50% Injectable 12.5 Gram(s) IV Push once  dextrose 50% Injectable 25 Gram(s) IV Push once  foscarnet (Peripheral) IVPB 3000 milliGRAM(s) IV Intermittent every 12 hours  glucagon  Injectable 1 milliGRAM(s) IntraMuscular once  heparin   Injectable 5000 Unit(s) SubCutaneous every 12 hours  influenza   Vaccine 0.5 milliLiter(s) IntraMuscular once  insulin glargine Injectable (LANTUS) 20 Unit(s) SubCutaneous at bedtime  insulin lispro (ADMELOG) corrective regimen sliding scale   SubCutaneous three times a day before meals  insulin lispro (ADMELOG) corrective regimen sliding scale   SubCutaneous at bedtime  insulin lispro Injectable (ADMELOG) 10 Unit(s) SubCutaneous three times a day before meals  sodium chloride 0.9% Bolus 500 milliLiter(s) IV Bolus <User Schedule>    MEDICATIONS  (PRN):  ibuprofen  Tablet. 400 milliGRAM(s) Oral every 8 hours PRN Mild Pain (1 - 3), Moderate Pain (4 - 6)        CAPILLARY BLOOD GLUCOSE      POCT Blood Glucose.: 100 mg/dL (30 Dec 2021 17:16)  POCT Blood Glucose.: 146 mg/dL (30 Dec 2021 12:03)  POCT Blood Glucose.: 145 mg/dL (30 Dec 2021 08:32)  POCT Blood Glucose.: 211 mg/dL (29 Dec 2021 22:25)    I&O's Summary    29 Dec 2021 07:01  -  30 Dec 2021 07:00  --------------------------------------------------------  IN: 630 mL / OUT: 0 mL / NET: 630 mL    30 Dec 2021 07:01  -  30 Dec 2021 18:23  --------------------------------------------------------  IN: 480 mL / OUT: 0 mL / NET: 480 mL        PHYSICAL EXAM:  Vital Signs Last 24 Hrs  T(C): 36.5 (30 Dec 2021 16:37), Max: 36.9 (30 Dec 2021 13:30)  T(F): 97.7 (30 Dec 2021 16:37), Max: 98.5 (30 Dec 2021 13:30)  HR: 78 (30 Dec 2021 16:37) (76 - 93)  BP: 156/90 (30 Dec 2021 16:37) (132/80 - 162/90)  BP(mean): --  RR: 18 (30 Dec 2021 16:37) (18 - 19)  SpO2: 98% (30 Dec 2021 16:37) (95% - 98%)    GENERAL: NAD, well-developed  HEAD:  Atraumatic, Normocephalic  EYES: EOMI, PERRLA, conjunctiva and sclera clear  NECK: Supple, No JVD  CHEST/LUNG: Clear to auscultation bilaterally; No wheeze  HEART: Regular rate and rhythm; No murmurs, rubs, or gallops  ABDOMEN: Soft, Nontender, Nondistended; Bowel sounds present  EXTREMITIES:  2+ Peripheral Pulses, No clubbing, cyanosis, or edema  PSYCH: AAOx3  NEUROLOGY: non-focal  SKIN: No rashes or lesions    LABS:                        12.0   3.54  )-----------( 210      ( 30 Dec 2021 07:15 )             36.3     12-    138  |  107  |  15  ----------------------------<  158<H>  3.9   |  22  |  1.40<H>    Ca    8.9      30 Dec 2021 07:10  Phos  3.2     12-30  Mg     1.8     12    TPro  8.6<H>  /  Alb  3.8  /  TBili  0.6  /  DBili  x   /  AST  16  /  ALT  15  /  AlkPhos  360<H>  12-29          Urinalysis Basic - ( 30 Dec 2021 15:17 )    Color: Light Yellow / Appearance: Clear / S.012 / pH: x  Gluc: x / Ketone: Negative  / Bili: Negative / Urobili: Negative   Blood: x / Protein: 30 mg/dL / Nitrite: Negative   Leuk Esterase: Moderate / RBC: 1 /hpf / WBC 7 /HPF   Sq Epi: x / Non Sq Epi: 0 /hpf / Bacteria: Negative        RADIOLOGY & ADDITIONAL TESTS:    Imaging Personally Reviewed:    Consultant(s) Notes Reviewed:      Care Discussed with Consultants/Other Providers:  
  Follow Up:  acyclovir resistant HSV-2    Interval History/ROS:  some intermittent drainage from superior right vulvar lesion,  pain resolved, dysuria resolved    Allergies  No Known Allergies    ANTIMICROBIALS:  bictegravir 50 mG/emtricitabine 200 mG/tenofovir alafenamide 25 mG (BIKTARVY) 1 daily  foscarnet (Peripheral) IVPB 2160 every 12 hours      OTHER MEDS:  MEDICATIONS  (STANDING):  acetaminophen     Tablet .. 650 every 6 hours PRN  amLODIPine   Tablet 5 daily  dextrose 40% Gel 15 once  dextrose 50% Injectable 25 once  dextrose 50% Injectable 12.5 once  dextrose 50% Injectable 25 once  glucagon  Injectable 1 once  heparin   Injectable 5000 every 12 hours  influenza   Vaccine 0.5 once  insulin glargine Injectable (LANTUS) 20 at bedtime  insulin lispro (ADMELOG) corrective regimen sliding scale  three times a day before meals  insulin lispro (ADMELOG) corrective regimen sliding scale  at bedtime  insulin lispro Injectable (ADMELOG) 5 three times a day before meals      Vital Signs Last 24 Hrs  T(C): 36.6 (10 Octavio 2022 11:55), Max: 36.8 (09 Jan 2022 21:25)  T(F): 97.9 (10 Octavio 2022 11:55), Max: 98.2 (09 Jan 2022 21:25)  HR: 73 (10 Octavio 2022 11:55) (73 - 81)  BP: 137/82 (10 Octavio 2022 11:55) (135/92 - 142/91)  BP(mean): --  RR: 18 (10 Octavio 2022 11:55) (18 - 18)  SpO2: 98% (10 Octavio 2022 11:55) (95% - 98%)    PHYSICAL EXAM:  General: WN/WD NAD, Non-toxic  Neurology: A&Ox3, nonfocal  Respiratory: no resp distress  Abdominal: Soft, Non-tender, non-distended, normal bowel sounds  Extremities: No edema,   Line Sites: Clear  Skin: No rash                        12.8   3.72  )-----------( 208      ( 10 Octavio 2022 07:27 )             38.9       01-10    138  |  103  |  18  ----------------------------<  161<H>  4.1   |  22  |  1.57<H>  Creatinine: 1.57 (01-10 @ 07:24)    Creatinine: 1.54 (01-09 @ 06:35)    Creatinine: 1.58 (01-08 @ 07:26)    Creatinine: 1.64 (01-07 @ 07:53)    Creatinine: 1.74 (01-06 @ 06:26)        Ca    10.2      10 Octavio 2022 07:24  Mg     1.6     01-09    TPro  8.1  /  Alb  3.7  /  TBili  0.5  /  DBili  x   /  AST  13  /  ALT  18  /  AlkPhos  320<H>  01-10    (01.09.22 @ 06:35) Magnesium, Serum: 1.6 mg/dL   Calcium, Total Serum: 10.2 mg/dL (01.10.22 @ 07:24)   (01.05.22 @ 06:05) Phosphorus Level, Serum: 5.8 mg/dL       MICROBIOLOGY:  Clean Catch Clean Catch (Midstream)  12-30-21   No growth  --  --      Clean Catch Clean Catch (Midstream)  12-29-21   <10,000 CFU/mL Normal Urogenital Eufemia  --  --    HIV-1 RNA Quantitative, Viral Load Log: NOT DET. lg /mL (12-31-21 @ 09:28)  HIV-1 RNA Quantitative, Viral Load Log: NOT DET. lg /mL (05-27-21 @ 17:01)          RADIOLOGY:    Bandar Rosales MD; Division of Infectious Disease; Pager: [phone]; nights and weekends: [phone]
  Follow Up:  acyclovir resistant genital HSV2    Interval History/ROS:  "what a difference a day makes:  decreased but presistent pain, external dysuria    Allergies  No Known Allergies    ANTIMICROBIALS:  bictegravir 50 mG/emtricitabine 200 mG/tenofovir alafenamide 25 mG (BIKTARVY) 1 daily  foscarnet (Peripheral) IVPB 3000 every 12 hours      OTHER MEDS:  MEDICATIONS  (STANDING):  dextrose 40% Gel 15 once  dextrose 50% Injectable 25 once  dextrose 50% Injectable 12.5 once  dextrose 50% Injectable 25 once  glucagon  Injectable 1 once  heparin   Injectable 5000 every 12 hours  ibuprofen  Tablet. 400 every 8 hours PRN  influenza   Vaccine 0.5 once  insulin glargine Injectable (LANTUS) 20 at bedtime  insulin lispro (ADMELOG) corrective regimen sliding scale  three times a day before meals  insulin lispro (ADMELOG) corrective regimen sliding scale  at bedtime  insulin lispro Injectable (ADMELOG) 10 three times a day before meals      Vital Signs Last 24 Hrs  T(C): 36.5 (30 Dec 2021 16:37), Max: 36.9 (30 Dec 2021 13:30)  T(F): 97.7 (30 Dec 2021 16:37), Max: 98.5 (30 Dec 2021 13:30)  HR: 78 (30 Dec 2021 16:37) (76 - 93)  BP: 156/90 (30 Dec 2021 16:37) (132/80 - 162/90)  BP(mean): --  RR: 18 (30 Dec 2021 16:37) (18 - 19)  SpO2: 98% (30 Dec 2021 16:37) (95% - 98%)    PHYSICAL EXAM:  General: WN/WD NAD, Non-toxic  Neurology: A&Ox3, nonfocal  Respiratory: Clear to auscultation bilaterally  CV: RRR, S1S2, no murmurs, rubs or gallops  Abdominal: Soft, Non-tender, non-distended, normal bowel sounds  Extremities: No edema,   Line Sites: Clear  Skin: No rash                        12.0   3.54  )-----------( 210      ( 30 Dec 2021 07:15 )             36.3       12-30    138  |  107  |  15  ----------------------------<  158<H>  3.9   |  22  |  1.40<H>    Ca    8.9      30 Dec 2021 07:10  Phos  3.2       Mg     1.8         TPro  8.6<H>  /  Alb  3.8  /  TBili  0.6  /  DBili  x   /  AST  16  /  ALT  15  /  AlkPhos  360<H>        Urinalysis Basic - ( 30 Dec 2021 15:17 )    Color: Light Yellow / Appearance: Clear / S.012 / pH: x  Gluc: x / Ketone: Negative  / Bili: Negative / Urobili: Negative   Blood: x / Protein: 30 mg/dL / Nitrite: Negative   Leuk Esterase: Moderate / RBC: 1 /hpf / WBC 7 /HPF   Sq Epi: x / Non Sq Epi: 0 /hpf / Bacteria: Negative    (21 @ 10:28) A1C with Estimated Average Glucose Result: 9.0:   MICROBIOLOGY:  Clean Catch Clean Catch (Midstream)  21   <10,000 CFU/mL Normal Urogenital Eufemia  --  --      HIV-1 RNA Quantitative, Viral Load Log: NOT DET. lg /mL (21 @ 17:01)          RADIOLOGY:    Bandar Rosales MD; Division of Infectious Disease; Pager: [phone]; nights and weekends: [phone]
  Follow Up:  resistant HSV-2 vulvovaginitis    Interval History/ROS:  feels ok, pain improved    Allergies  No Known Allergies      ANTIMICROBIALS:  bictegravir 50 mG/emtricitabine 200 mG/tenofovir alafenamide 25 mG (BIKTARVY) 1 daily  foscarnet (Peripheral) IVPB 2160 every 12 hours      OTHER MEDS:  MEDICATIONS  (STANDING):  acetaminophen     Tablet .. 650 every 6 hours PRN  amLODIPine   Tablet 5 daily  dextrose 40% Gel 15 once  dextrose 50% Injectable 25 once  dextrose 50% Injectable 12.5 once  dextrose 50% Injectable 25 once  glucagon  Injectable 1 once  heparin   Injectable 5000 every 12 hours  influenza   Vaccine 0.5 once  insulin glargine Injectable (LANTUS) 20 at bedtime  insulin lispro (ADMELOG) corrective regimen sliding scale  three times a day before meals  insulin lispro (ADMELOG) corrective regimen sliding scale  at bedtime  insulin lispro Injectable (ADMELOG) 5 three times a day before meals      Vital Signs Last 24 Hrs  T(C): 36.7 (12 Jan 2022 11:55), Max: 36.9 (11 Jan 2022 20:18)  T(F): 98 (12 Jan 2022 11:55), Max: 98.5 (11 Jan 2022 20:18)  HR: 82 (12 Jan 2022 11:55) (74 - 82)  BP: 152/83 (12 Jan 2022 11:55) (125/80 - 152/83)  BP(mean): --  RR: 18 (12 Jan 2022 11:55) (18 - 18)  SpO2: 100% (12 Jan 2022 11:55) (100% - 100%)    PHYSICAL EXAM:  General: WN/WD NAD, Non-toxic  Neurology: A&Ox3, nonfocal  Respiratory: no resp distress  Extremities: No edema  Line Sites: Clear  Skin: No rash        01-12    137  |  103  |  18  ----------------------------<  174<H>  4.1   |  22  |  1.59<H>  Creatinine: 1.59 (01-12 @ 06:55)    Creatinine: 1.51 (01-11 @ 06:23)    Creatinine: 1.57 (01-10 @ 07:24)    Creatinine: 1.54 (01-09 @ 06:35)    Creatinine: 1.58 (01-08 @ 07:26)        Ca    10.1      12 Jan 2022 06:55  Phos  4.8     01-12  Mg     1.7     01-12    TPro  8.2  /  Alb  3.8  /  TBili  0.4  /  DBili  x   /  AST  18  /  ALT  20  /  AlkPhos  336<H>  01-12      MICROBIOLOGY:  Clean Catch Clean Catch (Midstream)  12-30-21   No growth  --  --      Clean Catch Clean Catch (Midstream)  12-29-21   <10,000 CFU/mL Normal Urogenital Eufemia  --  --    HIV-1 RNA Quantitative, Viral Load Log: NOT DET. lg /mL (12-31-21 @ 09:28)  HIV-1 RNA Quantitative, Viral Load Log: NOT DET. lg /mL (05-27-21 @ 17:01)          RADIOLOGY:    Bandar Rosales MD; Division of Infectious Disease; Pager: [phone]; nights and weekends: [phone]
51y old  Female who presents with a chief complaint of Genital herpes (07 Jan 2022 21:04)      Interval history:  Afebrile, feeling well, improving lesion each day.       Allergies:   No Known Allergies    Antimicrobials:  bictegravir 50 mG/emtricitabine 200 mG/tenofovir alafenamide 25 mG (BIKTARVY) 1 Tablet(s) Oral daily  foscarnet (Peripheral) IVPB 2160 milliGRAM(s) IV Intermittent every 12 hours      REVIEW OF SYSTEMS:  No chest pain  No SOB  No N/V  No rash.       Vital Signs Last 24 Hrs  T(C): 36.6 (01-07-22 @ 11:49), Max: 36.8 (01-07-22 @ 05:04)  T(F): 97.8 (01-07-22 @ 11:49), Max: 98.3 (01-07-22 @ 05:04)  HR: 78 (01-07-22 @ 11:49) (78 - 83)  BP: 129/76 (01-07-22 @ 11:49) (129/76 - 141/75)  BP(mean): --  RR: 18 (01-07-22 @ 11:49) (18 - 18)  SpO2: 100% (01-07-22 @ 11:49) (99% - 100%)      PHYSICAL EXAM:  Patient in no acute distress. Alert, awake.   Cardiovascular: S1S2 normal.  Lungs: + air entry B/L lung fields.  Gastrointestinal: soft, nontender, nondistended.  Extremities: no edema.  genital ulcerated mass like swelling, tenderness.   IV sites not inflamed.                             12.7   3.39  )-----------( 215      ( 07 Jan 2022 07:55 )             38.7   01-07    139  |  105  |  18  ----------------------------<  166<H>  3.9   |  21<L>  |  1.64<H>    Ca    9.9      07 Jan 2022 07:53  Mg     1.7     01-07    TPro  8.2  /  Alb  3.6  /  TBili  0.5  /  DBili  x   /  AST  21  /  ALT  29  /  AlkPhos  310<H>  01-07      LIVER FUNCTIONS - ( 07 Jan 2022 07:53 )  Alb: 3.6 g/dL / Pro: 8.2 g/dL / ALK PHOS: 310 U/L / ALT: 29 U/L / AST: 21 U/L / GGT: x                   
51y old  Female who presents with a chief complaint of Genitl herpes (04 Jan 2022 14:19)      Interval history:  Afebrile, improved swelling lower part and discomfort on urination but still with discomfort in proximal ulcer.       Allergies:   No Known Allergies    Antimicrobials:  bictegravir 50 mG/emtricitabine 200 mG/tenofovir alafenamide 25 mG (BIKTARVY) 1 Tablet(s) Oral daily  foscarnet (Peripheral) IVPB 2160 milliGRAM(s) IV Intermittent every 12 hours    REVIEW OF SYSTEMS:  No chest pain   No SOB  No abdominal pain      Vital Signs Last 24 Hrs  T(C): 36.5 (01-04-22 @ 12:35), Max: 36.9 (01-04-22 @ 04:43)  T(F): 97.7 (01-04-22 @ 12:35), Max: 98.4 (01-04-22 @ 04:43)  HR: 91 (01-04-22 @ 12:35) (82 - 91)  BP: 137/89 (01-04-22 @ 12:35) (134/92 - 144/93)  BP(mean): --  RR: 18 (01-04-22 @ 12:35) (18 - 18)  SpO2: 95% (01-04-22 @ 12:35) (95% - 96%)      PHYSICAL EXAM:  Patient in no acute distress. AAOX3.  No icterus, no oral ulcers.  Cardiovascular: S1S2 normal.  Lungs: Good air entry B/L lung fields.  Gastrointestinal: soft, nontender, nondistended.  Extremities: no edema.  genital ulcer with swelling, tenderness.   IV sites not inflamed.         01-04    137  |  100  |  18  ----------------------------<  124<H>  3.7   |  20<L>  |  1.62<H>    Ca    10.2      04 Jan 2022 07:24  Mg     1.6     01-04    TPro  8.0  /  Alb  3.6  /  TBili  0.6  /  DBili  x   /  AST  20  /  ALT  23  /  AlkPhos  295<H>  01-04      LIVER FUNCTIONS - ( 04 Jan 2022 07:24 )  Alb: 3.6 g/dL / Pro: 8.0 g/dL / ALK PHOS: 295 U/L / ALT: 23 U/L / AST: 20 U/L / GGT: x             Culture - Urine (12.30.21 @ 18:25)   Specimen Source: Clean Catch Clean Catch (Midstream)   Culture Results:   No growth         
  Follow Up:  acyclovir resistant HSV2    Interval History/ROS:  pretty good, ambulating,  pain resolved    Allergies  No Known Allergies    ANTIMICROBIALS:  bictegravir 50 mG/emtricitabine 200 mG/tenofovir alafenamide 25 mG (BIKTARVY) 1 daily  foscarnet (Peripheral) IVPB 2160 every 12 hours      OTHER MEDS:  MEDICATIONS  (STANDING):  acetaminophen     Tablet .. 650 every 6 hours PRN  amLODIPine   Tablet 5 daily  dextrose 40% Gel 15 once  dextrose 50% Injectable 25 once  dextrose 50% Injectable 12.5 once  dextrose 50% Injectable 25 once  glucagon  Injectable 1 once  heparin   Injectable 5000 every 12 hours  influenza   Vaccine 0.5 once  insulin glargine Injectable (LANTUS) 20 at bedtime  insulin lispro (ADMELOG) corrective regimen sliding scale  three times a day before meals  insulin lispro (ADMELOG) corrective regimen sliding scale  at bedtime  insulin lispro Injectable (ADMELOG) 5 three times a day before meals      Vital Signs Last 24 Hrs  T(C): 36.6 (13 Jan 2022 11:52), Max: 36.7 (12 Jan 2022 21:36)  T(F): 97.9 (13 Jan 2022 11:52), Max: 98.1 (12 Jan 2022 21:36)  HR: 86 (13 Jan 2022 11:52) (76 - 86)  BP: 142/88 (13 Jan 2022 11:52) (131/79 - 142/88)  BP(mean): --  RR: 18 (13 Jan 2022 11:52) (18 - 18)  SpO2: 96% (13 Jan 2022 11:52) (96% - 98%)    PHYSICAL EXAM:  General: WN/WD NAD, Non-toxic  Neurology: A&Ox3, nonfocal  Respiratory: no resp distress  Extremities: No edema,  Line Sites: Clear  Skin: No rash    01-12    137  |  103  |  18  ----------------------------<  174<H>  4.1   |  22  |  1.59<H>    Ca    10.1      12 Jan 2022 06:55  Phos  4.8     01-12  Mg     1.7     01-12    TPro  8.2  /  Alb  3.8  /  TBili  0.4  /  DBili  x   /  AST  18  /  ALT  20  /  AlkPhos  336<H>  01-12      MICROBIOLOGY:  Clean Catch Clean Catch (Midstream)  12-30-21   No growth  --  --      Clean Catch Clean Catch (Midstream)  12-29-21   <10,000 CFU/mL Normal Urogenital Eufemia  --  --    HIV-1 RNA Quantitative, Viral Load Log: NOT DET. lg /mL (12-31-21 @ 09:28)  HIV-1 RNA Quantitative, Viral Load Log: NOT DET. lg /mL (05-27-21 @ 17:01)    RADIOLOGY:    Bandar Rosales MD; Division of Infectious Disease; Pager: [phone]; nights and weekends: [phone]
  Follow Up:  valacylcovir resistant genital HSV2    Interval History/ROS:  feels generally well - but notes persistent drainage, itching irritation from upper right vulvar lesion  -- left lesion healed    Allergies  No Known Allergies    ANTIMICROBIALS:  bictegravir 50 mG/emtricitabine 200 mG/tenofovir alafenamide 25 mG (BIKTARVY) 1 daily  foscarnet (Peripheral) IVPB 2160 every 12 hours      OTHER MEDS:  MEDICATIONS  (STANDING):  acetaminophen     Tablet .. 650 every 6 hours PRN  amLODIPine   Tablet 5 daily  dextrose 40% Gel 15 once  dextrose 50% Injectable 25 once  dextrose 50% Injectable 12.5 once  dextrose 50% Injectable 25 once  glucagon  Injectable 1 once  heparin   Injectable 5000 every 12 hours  influenza   Vaccine 0.5 once  insulin glargine Injectable (LANTUS) 20 at bedtime  insulin lispro (ADMELOG) corrective regimen sliding scale  three times a day before meals  insulin lispro (ADMELOG) corrective regimen sliding scale  at bedtime  insulin lispro Injectable (ADMELOG) 5 three times a day before meals      Vital Signs Last 24 Hrs  T(C): 36.7 (14 Jan 2022 12:14), Max: 36.9 (13 Jan 2022 21:20)  T(F): 98 (14 Jan 2022 12:14), Max: 98.5 (13 Jan 2022 21:20)  HR: 88 (14 Jan 2022 12:14) (74 - 88)  BP: 122/76 (14 Jan 2022 12:14) (122/76 - 146/82)  BP(mean): --  RR: 18 (14 Jan 2022 12:14) (18 - 18)  SpO2: 96% (14 Jan 2022 12:14) (96% - 98%)    PHYSICAL EXAM:  General: WN/WD NAD, Non-toxic  Neurology: A&Ox3, nonfocal  Respiratory: no resp distress  Abdominal: Soft, Non-tender, non-distended, normal bowel sounds   - superficial ulceration with drainage at upper right vulvar area   Extremities: No edema,   Line Sites: Clear  Skin: No rash    01-14    136  |  101  |  18  ----------------------------<  191<H>  3.8   |  25  |  1.59<H>    Ca    10.1      14 Jan 2022 07:16  Mg     1.7     01-14    TPro  8.4<H>  /  Alb  3.8  /  TBili  0.5  /  DBili  x   /  AST  15  /  ALT  16  /  AlkPhos  335<H>  01-14      MICROBIOLOGY:  Clean Catch Clean Catch (Midstream)  12-30-21   No growth  --  --      Clean Catch Clean Catch (Midstream)  12-29-21   <10,000 CFU/mL Normal Urogenital Eufemia  --  --    HIV-1 RNA Quantitative, Viral Load Log: NOT DET. lg /mL (12-31-21 @ 09:28)  HIV-1 RNA Quantitative, Viral Load Log: NOT DET. lg /mL (05-27-21 @ 17:01)    RADIOLOGY:    Bandar Rosales MD; Division of Infectious Disease; Pager: [phone]; nights and weekends: [phone]
51y old  Female who presents with a chief complaint of Genitl herpes (05 Jan 2022 18:47)      Interval history:  Afebrile, feeling better, oozing from the proximal ulcer decreased.       Allergies:   No Known Allergies      Antimicrobials:  bictegravir 50 mG/emtricitabine 200 mG/tenofovir alafenamide 25 mG (BIKTARVY) 1 Tablet(s) Oral daily  foscarnet (Peripheral) IVPB 2160 milliGRAM(s) IV Intermittent every 12 hours      REVIEW OF SYSTEMS:  No chest pain   No cough,   No N/V  No rash.       Vital Signs Last 24 Hrs  T(C): 37 (01-05-22 @ 11:35), Max: 37 (01-05-22 @ 11:35)  T(F): 98.6 (01-05-22 @ 11:35), Max: 98.6 (01-05-22 @ 11:35)  HR: 95 (01-05-22 @ 11:35) (90 - 95)  BP: 149/94 (01-05-22 @ 11:35) (145/90 - 151/81)  BP(mean): --  RR: 18 (01-05-22 @ 11:35) (18 - 18)  SpO2: 96% (01-05-22 @ 11:35) (95% - 96%)      PHYSICAL EXAM:  Patient in no acute distress. Alert, awake.   Cardiovascular: S1S2 normal.  Lungs: + air entry B/L lung fields.  Gastrointestinal: soft, nontender, nondistended.  Extremities: no edema.  genital ulcer with swelling, tenderness.   IV sites not inflamed.                             13.4   3.94  )-----------( 233      ( 05 Jan 2022 06:06 )             40.1   01-05    137  |  101  |  22  ----------------------------<  176<H>  4.0   |  23  |  1.75<H>    Ca    10.3      05 Jan 2022 06:05  Phos  5.8     01-05  Mg     1.7     01-05    TPro  8.4<H>  /  Alb  3.8  /  TBili  0.5  /  DBili  x   /  AST  22  /  ALT  28  /  AlkPhos  310<H>  01-05      LIVER FUNCTIONS - ( 05 Jan 2022 06:05 )  Alb: 3.8 g/dL / Pro: 8.4 g/dL / ALK PHOS: 310 U/L / ALT: 28 U/L / AST: 22 U/L / GGT: x                   
Patient is a 51y old  Female who presents with a chief complaint of resistant herpes (30 Dec 2021 18:12)    2022    HPI:  Afebrile, No new symptoms.    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 No nausea, vomiting, or hematemesis; No diarrhea or constipation. No melena or hematochezia.  GENITOURINARY: No dysuria, frequency, hematuria, or incontinence  NEUROLOGICAL: No headaches, memory loss, loss of strength, numbness, or tremors  SKIN: No itching, burning, rashes, or lesions   LYMPH NODES: No enlarged glands  ENDOCRINE: No heat or cold intolerance; No hair loss  MUSCULOSKELETAL: No joint pain or swelling; No muscle, back, or extremity pain  PSYCHIATRIC: No depression, anxiety, mood swings, or difficulty sleeping  HEME/LYMPH: No easy bruising, or bleeding gums  ALLERY AND IMMUNOLOGIC: No hives or eczema    Allergies    No Known Allergies    Intolerances        Social History:     FAMILY HISTORY:      MEDICATIONS  (STANDING):  bictegravir 50 mG/emtricitabine 200 mG/tenofovir alafenamide 25 mG (BIKTARVY) 1 Tablet(s) Oral daily  dextrose 40% Gel 15 Gram(s) Oral once  dextrose 5%. 1000 milliLiter(s) (50 mL/Hr) IV Continuous <Continuous>  dextrose 5%. 1000 milliLiter(s) (100 mL/Hr) IV Continuous <Continuous>  dextrose 50% Injectable 25 Gram(s) IV Push once  dextrose 50% Injectable 12.5 Gram(s) IV Push once  dextrose 50% Injectable 25 Gram(s) IV Push once  foscarnet (Peripheral) IVPB 3000 milliGRAM(s) IV Intermittent every 12 hours  glucagon  Injectable 1 milliGRAM(s) IntraMuscular once  heparin   Injectable 5000 Unit(s) SubCutaneous every 12 hours  influenza   Vaccine 0.5 milliLiter(s) IntraMuscular once  insulin glargine Injectable (LANTUS) 20 Unit(s) SubCutaneous at bedtime  insulin lispro (ADMELOG) corrective regimen sliding scale   SubCutaneous three times a day before meals  insulin lispro (ADMELOG) corrective regimen sliding scale   SubCutaneous at bedtime  insulin lispro Injectable (ADMELOG) 10 Unit(s) SubCutaneous three times a day before meals  sodium chloride 0.9% Bolus 500 milliLiter(s) IV Bolus <User Schedule>    MEDICATIONS  (PRN):  ibuprofen  Tablet. 400 milliGRAM(s) Oral every 8 hours PRN Mild Pain (1 - 3), Moderate Pain (4 - 6)        CAPILLARY BLOOD GLUCOSE      POCT Blood Glucose.: 100 mg/dL (30 Dec 2021 17:16)  POCT Blood Glucose.: 146 mg/dL (30 Dec 2021 12:03)  POCT Blood Glucose.: 145 mg/dL (30 Dec 2021 08:32)  POCT Blood Glucose.: 211 mg/dL (29 Dec 2021 22:25)    I&O's Summary    29 Dec 2021 07:01  -  30 Dec 2021 07:00  --------------------------------------------------------  IN: 630 mL / OUT: 0 mL / NET: 630 mL    30 Dec 2021 07:01  -  30 Dec 2021 18:23  --------------------------------------------------------  IN: 480 mL / OUT: 0 mL / NET: 480 mL        PHYSICAL EXAM:  Vital Signs Last 24 Hrs  T(C): 36.5 (30 Dec 2021 16:37), Max: 36.9 (30 Dec 2021 13:30)  T(F): 97.7 (30 Dec 2021 16:37), Max: 98.5 (30 Dec 2021 13:30)  HR: 78 (30 Dec 2021 16:37) (76 - 93)  BP: 156/90 (30 Dec 2021 16:37) (132/80 - 162/90)  BP(mean): --  RR: 18 (30 Dec 2021 16:37) (18 - 19)  SpO2: 98% (30 Dec 2021 16:37) (95% - 98%)    GENERAL: NAD, well-developed  HEAD:  Atraumatic, Normocephalic  EYES: EOMI, PERRLA, conjunctiva and sclera clear  NECK: Supple, No JVD  CHEST/LUNG: Clear to auscultation bilaterally; No wheeze  HEART: Regular rate and rhythm; No murmurs, rubs, or gallops  ABDOMEN: Soft, Nontender, Nondistended; Bowel sounds present  EXTREMITIES:  2+ Peripheral Pulses, No clubbing, cyanosis, or edema  PSYCH: AAOx3  NEUROLOGY: non-focal  SKIN: No rashes or lesions    LABS:                        12.0   3.54  )-----------( 210      ( 30 Dec 2021 07:15 )             36.3     12-30    138  |  107  |  15  ----------------------------<  158<H>  3.9   |  22  |  1.40<H>    Ca    8.9      30 Dec 2021 07:10  Phos  3.2     12-30  Mg     1.8     12-    TPro  8.6<H>  /  Alb  3.8  /  TBili  0.6  /  DBili  x   /  AST  16  /  ALT  15  /  AlkPhos  360<H>  12-29          Urinalysis Basic - ( 30 Dec 2021 15:17 )    Color: Light Yellow / Appearance: Clear / S.012 / pH: x  Gluc: x / Ketone: Negative  / Bili: Negative / Urobili: Negative   Blood: x / Protein: 30 mg/dL / Nitrite: Negative   Leuk Esterase: Moderate / RBC: 1 /hpf / WBC 7 /HPF   Sq Epi: x / Non Sq Epi: 0 /hpf / Bacteria: Negative        RADIOLOGY & ADDITIONAL TESTS:    Imaging Personally Reviewed:    Consultant(s) Notes Reviewed:      Care Discussed with Consultants/Other Providers:  
Patient is a 51y old  Female who presents with a chief complaint of resistant herpes (30 Dec 2021 18:12)    1/3/2022    HPI:  Afebrile, feels better. Pain resolved.  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 No nausea, vomiting, or hematemesis; No diarrhea or constipation. No melena or hematochezia.  GENITOURINARY: No dysuria, frequency, hematuria, or incontinence  NEUROLOGICAL: No headaches, memory loss, loss of strength, numbness, or tremors  SKIN: No itching, burning, rashes, or lesions   LYMPH NODES: No enlarged glands  ENDOCRINE: No heat or cold intolerance; No hair loss  MUSCULOSKELETAL: No joint pain or swelling; No muscle, back, or extremity pain  PSYCHIATRIC: No depression, anxiety, mood swings, or difficulty sleeping  HEME/LYMPH: No easy bruising, or bleeding gums  ALLERY AND IMMUNOLOGIC: No hives or eczema    Allergies    No Known Allergies    Intolerances        Social History:     FAMILY HISTORY:      MEDICATIONS  (STANDING):  bictegravir 50 mG/emtricitabine 200 mG/tenofovir alafenamide 25 mG (BIKTARVY) 1 Tablet(s) Oral daily  dextrose 40% Gel 15 Gram(s) Oral once  dextrose 5%. 1000 milliLiter(s) (50 mL/Hr) IV Continuous <Continuous>  dextrose 5%. 1000 milliLiter(s) (100 mL/Hr) IV Continuous <Continuous>  dextrose 50% Injectable 25 Gram(s) IV Push once  dextrose 50% Injectable 12.5 Gram(s) IV Push once  dextrose 50% Injectable 25 Gram(s) IV Push once  foscarnet (Peripheral) IVPB 3000 milliGRAM(s) IV Intermittent every 12 hours  glucagon  Injectable 1 milliGRAM(s) IntraMuscular once  heparin   Injectable 5000 Unit(s) SubCutaneous every 12 hours  influenza   Vaccine 0.5 milliLiter(s) IntraMuscular once  insulin glargine Injectable (LANTUS) 20 Unit(s) SubCutaneous at bedtime  insulin lispro (ADMELOG) corrective regimen sliding scale   SubCutaneous three times a day before meals  insulin lispro (ADMELOG) corrective regimen sliding scale   SubCutaneous at bedtime  insulin lispro Injectable (ADMELOG) 10 Unit(s) SubCutaneous three times a day before meals  sodium chloride 0.9% Bolus 500 milliLiter(s) IV Bolus <User Schedule>    MEDICATIONS  (PRN):  ibuprofen  Tablet. 400 milliGRAM(s) Oral every 8 hours PRN Mild Pain (1 - 3), Moderate Pain (4 - 6)        CAPILLARY BLOOD GLUCOSE      POCT Blood Glucose.: 100 mg/dL (30 Dec 2021 17:16)  POCT Blood Glucose.: 146 mg/dL (30 Dec 2021 12:03)  POCT Blood Glucose.: 145 mg/dL (30 Dec 2021 08:32)  POCT Blood Glucose.: 211 mg/dL (29 Dec 2021 22:25)    I&O's Summary    29 Dec 2021 07:01  -  30 Dec 2021 07:00  --------------------------------------------------------  IN: 630 mL / OUT: 0 mL / NET: 630 mL    30 Dec 2021 07:01  -  30 Dec 2021 18:23  --------------------------------------------------------  IN: 480 mL / OUT: 0 mL / NET: 480 mL        PHYSICAL EXAM:  Vital Signs Last 24 Hrs  T(C): 36.5 (30 Dec 2021 16:37), Max: 36.9 (30 Dec 2021 13:30)  T(F): 97.7 (30 Dec 2021 16:37), Max: 98.5 (30 Dec 2021 13:30)  HR: 78 (30 Dec 2021 16:37) (76 - 93)  BP: 156/90 (30 Dec 2021 16:37) (132/80 - 162/90)  BP(mean): --  RR: 18 (30 Dec 2021 16:37) (18 - 19)  SpO2: 98% (30 Dec 2021 16:37) (95% - 98%)    GENERAL: NAD, well-developed  HEAD:  Atraumatic, Normocephalic  EYES: EOMI, PERRLA, conjunctiva and sclera clear  NECK: Supple, No JVD  CHEST/LUNG: Clear to auscultation bilaterally; No wheeze  HEART: Regular rate and rhythm; No murmurs, rubs, or gallops  ABDOMEN: Soft, Nontender, Nondistended; Bowel sounds present  EXTREMITIES:  2+ Peripheral Pulses, No clubbing, cyanosis, or edema  PSYCH: AAOx3  NEUROLOGY: non-focal  SKIN: No rashes or lesions    LABS:                        12.0   3.54  )-----------( 210      ( 30 Dec 2021 07:15 )             36.3     12    138  |  107  |  15  ----------------------------<  158<H>  3.9   |  22  |  1.40<H>    Ca    8.9      30 Dec 2021 07:10  Phos  3.2     12-  Mg     1.8         TPro  8.6<H>  /  Alb  3.8  /  TBili  0.6  /  DBili  x   /  AST  16  /  ALT  15  /  AlkPhos  360<H>  12-          Urinalysis Basic - ( 30 Dec 2021 15:17 )    Color: Light Yellow / Appearance: Clear / S.012 / pH: x  Gluc: x / Ketone: Negative  / Bili: Negative / Urobili: Negative   Blood: x / Protein: 30 mg/dL / Nitrite: Negative   Leuk Esterase: Moderate / RBC: 1 /hpf / WBC 7 /HPF   Sq Epi: x / Non Sq Epi: 0 /hpf / Bacteria: Negative        RADIOLOGY & ADDITIONAL TESTS:    Imaging Personally Reviewed:    Consultant(s) Notes Reviewed:      Care Discussed with Consultants/Other Providers:  
Patient is a 51y old  Female who presents with a chief complaint of resistant herpes (30 Dec 2021 18:12)    1/10/2022    HPI:  Afebrile, No new symptoms.    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 No nausea, vomiting, or hematemesis; No diarrhea or constipation. No melena or hematochezia.  GENITOURINARY: No dysuria, frequency, hematuria, or incontinence  NEUROLOGICAL: No headaches, memory loss, loss of strength, numbness, or tremors  SKIN: No itching, burning, rashes, or lesions   LYMPH NODES: No enlarged glands  ENDOCRINE: No heat or cold intolerance; No hair loss  MUSCULOSKELETAL: No joint pain or swelling; No muscle, back, or extremity pain  PSYCHIATRIC: No depression, anxiety, mood swings, or difficulty sleeping  HEME/LYMPH: No easy bruising, or bleeding gums  ALLERY AND IMMUNOLOGIC: No hives or eczema    Allergies    No Known Allergies    Intolerances        Social History:     FAMILY HISTORY:      MEDICATIONS  (STANDING):  bictegravir 50 mG/emtricitabine 200 mG/tenofovir alafenamide 25 mG (BIKTARVY) 1 Tablet(s) Oral daily  dextrose 40% Gel 15 Gram(s) Oral once  dextrose 5%. 1000 milliLiter(s) (50 mL/Hr) IV Continuous <Continuous>  dextrose 5%. 1000 milliLiter(s) (100 mL/Hr) IV Continuous <Continuous>  dextrose 50% Injectable 25 Gram(s) IV Push once  dextrose 50% Injectable 12.5 Gram(s) IV Push once  dextrose 50% Injectable 25 Gram(s) IV Push once  foscarnet (Peripheral) IVPB 3000 milliGRAM(s) IV Intermittent every 12 hours  glucagon  Injectable 1 milliGRAM(s) IntraMuscular once  heparin   Injectable 5000 Unit(s) SubCutaneous every 12 hours  influenza   Vaccine 0.5 milliLiter(s) IntraMuscular once  insulin glargine Injectable (LANTUS) 20 Unit(s) SubCutaneous at bedtime  insulin lispro (ADMELOG) corrective regimen sliding scale   SubCutaneous three times a day before meals  insulin lispro (ADMELOG) corrective regimen sliding scale   SubCutaneous at bedtime  insulin lispro Injectable (ADMELOG) 10 Unit(s) SubCutaneous three times a day before meals  sodium chloride 0.9% Bolus 500 milliLiter(s) IV Bolus <User Schedule>    MEDICATIONS  (PRN):  ibuprofen  Tablet. 400 milliGRAM(s) Oral every 8 hours PRN Mild Pain (1 - 3), Moderate Pain (4 - 6)        CAPILLARY BLOOD GLUCOSE      POCT Blood Glucose.: 100 mg/dL (30 Dec 2021 17:16)  POCT Blood Glucose.: 146 mg/dL (30 Dec 2021 12:03)  POCT Blood Glucose.: 145 mg/dL (30 Dec 2021 08:32)  POCT Blood Glucose.: 211 mg/dL (29 Dec 2021 22:25)    I&O's Summary    29 Dec 2021 07:01  -  30 Dec 2021 07:00  --------------------------------------------------------  IN: 630 mL / OUT: 0 mL / NET: 630 mL    30 Dec 2021 07:01  -  30 Dec 2021 18:23  --------------------------------------------------------  IN: 480 mL / OUT: 0 mL / NET: 480 mL        PHYSICAL EXAM:  Vital Signs Last 24 Hrs  T(C): 36.5 (30 Dec 2021 16:37), Max: 36.9 (30 Dec 2021 13:30)  T(F): 97.7 (30 Dec 2021 16:37), Max: 98.5 (30 Dec 2021 13:30)  HR: 78 (30 Dec 2021 16:37) (76 - 93)  BP: 156/90 (30 Dec 2021 16:37) (132/80 - 162/90)  BP(mean): --  RR: 18 (30 Dec 2021 16:37) (18 - 19)  SpO2: 98% (30 Dec 2021 16:37) (95% - 98%)    GENERAL: NAD, well-developed  HEAD:  Atraumatic, Normocephalic  EYES: EOMI, PERRLA, conjunctiva and sclera clear  NECK: Supple, No JVD  CHEST/LUNG: Clear to auscultation bilaterally; No wheeze  HEART: Regular rate and rhythm; No murmurs, rubs, or gallops  ABDOMEN: Soft, Nontender, Nondistended; Bowel sounds present  EXTREMITIES:  2+ Peripheral Pulses, No clubbing, cyanosis, or edema  PSYCH: AAOx3  NEUROLOGY: non-focal  SKIN: No rashes or lesions    LABS:                        12.0   3.54  )-----------( 210      ( 30 Dec 2021 07:15 )             36.3     12-30    138  |  107  |  15  ----------------------------<  158<H>  3.9   |  22  |  1.40<H>    Ca    8.9      30 Dec 2021 07:10  Phos  3.2     12-30  Mg     1.8     12-    TPro  8.6<H>  /  Alb  3.8  /  TBili  0.6  /  DBili  x   /  AST  16  /  ALT  15  /  AlkPhos  360<H>  12-29          Urinalysis Basic - ( 30 Dec 2021 15:17 )    Color: Light Yellow / Appearance: Clear / S.012 / pH: x  Gluc: x / Ketone: Negative  / Bili: Negative / Urobili: Negative   Blood: x / Protein: 30 mg/dL / Nitrite: Negative   Leuk Esterase: Moderate / RBC: 1 /hpf / WBC 7 /HPF   Sq Epi: x / Non Sq Epi: 0 /hpf / Bacteria: Negative        RADIOLOGY & ADDITIONAL TESTS:    Imaging Personally Reviewed:    Consultant(s) Notes Reviewed:      Care Discussed with Consultants/Other Providers:  
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 No nausea, vomiting, or hematemesis; No diarrhea or constipation. No melena or hematochezia.  GENITOURINARY: No dysuria, frequency, hematuria, or incontinence  NEUROLOGICAL: No headaches, memory loss, loss of strength, numbness, or tremors  SKIN: No itching, burning, rashes, or lesions   LYMPH NODES: No enlarged glands  ENDOCRINE: No heat or cold intolerance; No hair loss  MUSCULOSKELETAL: No joint pain or swelling; No muscle, back, or extremity pain  PSYCHIATRIC: No depression, anxiety, mood swings, or difficulty sleeping  HEME/LYMPH: No easy bruising, or bleeding gums  ALLERY AND IMMUNOLOGIC: No hives or eczema    Allergies    No Known Allergies    Intolerances        Social History:     FAMILY HISTORY:      MEDICATIONS  (STANDING):  bictegravir 50 mG/emtricitabine 200 mG/tenofovir alafenamide 25 mG (BIKTARVY) 1 Tablet(s) Oral daily  dextrose 40% Gel 15 Gram(s) Oral once  dextrose 5%. 1000 milliLiter(s) (50 mL/Hr) IV Continuous <Continuous>  dextrose 5%. 1000 milliLiter(s) (100 mL/Hr) IV Continuous <Continuous>  dextrose 50% Injectable 25 Gram(s) IV Push once  dextrose 50% Injectable 12.5 Gram(s) IV Push once  dextrose 50% Injectable 25 Gram(s) IV Push once  foscarnet (Peripheral) IVPB 3000 milliGRAM(s) IV Intermittent every 12 hours  glucagon  Injectable 1 milliGRAM(s) IntraMuscular once  heparin   Injectable 5000 Unit(s) SubCutaneous every 12 hours  influenza   Vaccine 0.5 milliLiter(s) IntraMuscular once  insulin glargine Injectable (LANTUS) 20 Unit(s) SubCutaneous at bedtime  insulin lispro (ADMELOG) corrective regimen sliding scale   SubCutaneous three times a day before meals  insulin lispro (ADMELOG) corrective regimen sliding scale   SubCutaneous at bedtime  insulin lispro Injectable (ADMELOG) 10 Unit(s) SubCutaneous three times a day before meals  sodium chloride 0.9% Bolus 500 milliLiter(s) IV Bolus <User Schedule>    MEDICATIONS  (PRN):  ibuprofen  Tablet. 400 milliGRAM(s) Oral every 8 hours PRN Mild Pain (1 - 3), Moderate Pain (4 - 6)        CAPILLARY BLOOD GLUCOSE      POCT Blood Glucose.: 100 mg/dL (30 Dec 2021 17:16)  POCT Blood Glucose.: 146 mg/dL (30 Dec 2021 12:03)  POCT Blood Glucose.: 145 mg/dL (30 Dec 2021 08:32)  POCT Blood Glucose.: 211 mg/dL (29 Dec 2021 22:25)    I&O's Summary    29 Dec 2021 07:01  -  30 Dec 2021 07:00  --------------------------------------------------------  IN: 630 mL / OUT: 0 mL / NET: 630 mL    30 Dec 2021 07:01  -  30 Dec 2021 18:23  --------------------------------------------------------  IN: 480 mL / OUT: 0 mL / NET: 480 mL        PHYSICAL EXAM:  Vital Signs Last 24 Hrs  T(C): 36.5 (30 Dec 2021 16:37), Max: 36.9 (30 Dec 2021 13:30)  T(F): 97.7 (30 Dec 2021 16:37), Max: 98.5 (30 Dec 2021 13:30)  HR: 78 (30 Dec 2021 16:37) (76 - 93)  BP: 156/90 (30 Dec 2021 16:37) (132/80 - 162/90)  BP(mean): --  RR: 18 (30 Dec 2021 16:37) (18 - 19)  SpO2: 98% (30 Dec 2021 16:37) (95% - 98%)    GENERAL: NAD, well-developed  HEAD:  Atraumatic, Normocephalic  EYES: EOMI, PERRLA, conjunctiva and sclera clear  NECK: Supple, No JVD  CHEST/LUNG: Clear to auscultation bilaterally; No wheeze  HEART: Regular rate and rhythm; No murmurs, rubs, or gallops  ABDOMEN: Soft, Nontender, Nondistended; Bowel sounds present  EXTREMITIES:  2+ Peripheral Pulses, No clubbing, cyanosis, or edema  PSYCH: AAOx3  NEUROLOGY: non-focal  SKIN: No rashes or lesions    LABS:                        12.0   3.54  )-----------( 210      ( 30 Dec 2021 07:15 )             36.3     12-    138  |  107  |  15  ----------------------------<  158<H>  3.9   |  22  |  1.40<H>    Ca    8.9      30 Dec 2021 07:10  Phos  3.2     12-30  Mg     1.8     12    TPro  8.6<H>  /  Alb  3.8  /  TBili  0.6  /  DBili  x   /  AST  16  /  ALT  15  /  AlkPhos  360<H>  12-29          Urinalysis Basic - ( 30 Dec 2021 15:17 )    Color: Light Yellow / Appearance: Clear / S.012 / pH: x  Gluc: x / Ketone: Negative  / Bili: Negative / Urobili: Negative   Blood: x / Protein: 30 mg/dL / Nitrite: Negative   Leuk Esterase: Moderate / RBC: 1 /hpf / WBC 7 /HPF   Sq Epi: x / Non Sq Epi: 0 /hpf / Bacteria: Negative        RADIOLOGY & ADDITIONAL TESTS:    Imaging Personally Reviewed:    Consultant(s) Notes Reviewed:      Care Discussed with Consultants/Other Providers:  
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 No nausea, vomiting, or hematemesis; No diarrhea or constipation. No melena or hematochezia.  GENITOURINARY: No dysuria, frequency, hematuria, or incontinence  NEUROLOGICAL: No headaches, memory loss, loss of strength, numbness, or tremors  SKIN: No itching, burning, rashes, or lesions   LYMPH NODES: No enlarged glands  ENDOCRINE: No heat or cold intolerance; No hair loss  MUSCULOSKELETAL: No joint pain or swelling; No muscle, back, or extremity pain  PSYCHIATRIC: No depression, anxiety, mood swings, or difficulty sleeping  HEME/LYMPH: No easy bruising, or bleeding gums  ALLERY AND IMMUNOLOGIC: No hives or eczema    Allergies    No Known Allergies    Intolerances        Social History:     FAMILY HISTORY:      MEDICATIONS  (STANDING):  bictegravir 50 mG/emtricitabine 200 mG/tenofovir alafenamide 25 mG (BIKTARVY) 1 Tablet(s) Oral daily  dextrose 40% Gel 15 Gram(s) Oral once  dextrose 5%. 1000 milliLiter(s) (50 mL/Hr) IV Continuous <Continuous>  dextrose 5%. 1000 milliLiter(s) (100 mL/Hr) IV Continuous <Continuous>  dextrose 50% Injectable 25 Gram(s) IV Push once  dextrose 50% Injectable 12.5 Gram(s) IV Push once  dextrose 50% Injectable 25 Gram(s) IV Push once  foscarnet (Peripheral) IVPB 3000 milliGRAM(s) IV Intermittent every 12 hours  glucagon  Injectable 1 milliGRAM(s) IntraMuscular once  heparin   Injectable 5000 Unit(s) SubCutaneous every 12 hours  influenza   Vaccine 0.5 milliLiter(s) IntraMuscular once  insulin glargine Injectable (LANTUS) 20 Unit(s) SubCutaneous at bedtime  insulin lispro (ADMELOG) corrective regimen sliding scale   SubCutaneous three times a day before meals  insulin lispro (ADMELOG) corrective regimen sliding scale   SubCutaneous at bedtime  insulin lispro Injectable (ADMELOG) 10 Unit(s) SubCutaneous three times a day before meals  sodium chloride 0.9% Bolus 500 milliLiter(s) IV Bolus <User Schedule>    MEDICATIONS  (PRN):  ibuprofen  Tablet. 400 milliGRAM(s) Oral every 8 hours PRN Mild Pain (1 - 3), Moderate Pain (4 - 6)        CAPILLARY BLOOD GLUCOSE      POCT Blood Glucose.: 100 mg/dL (30 Dec 2021 17:16)  POCT Blood Glucose.: 146 mg/dL (30 Dec 2021 12:03)  POCT Blood Glucose.: 145 mg/dL (30 Dec 2021 08:32)  POCT Blood Glucose.: 211 mg/dL (29 Dec 2021 22:25)    I&O's Summary    29 Dec 2021 07:01  -  30 Dec 2021 07:00  --------------------------------------------------------  IN: 630 mL / OUT: 0 mL / NET: 630 mL    30 Dec 2021 07:01  -  30 Dec 2021 18:23  --------------------------------------------------------  IN: 480 mL / OUT: 0 mL / NET: 480 mL        PHYSICAL EXAM:  Vital Signs Last 24 Hrs  T(C): 36.5 (30 Dec 2021 16:37), Max: 36.9 (30 Dec 2021 13:30)  T(F): 97.7 (30 Dec 2021 16:37), Max: 98.5 (30 Dec 2021 13:30)  HR: 78 (30 Dec 2021 16:37) (76 - 93)  BP: 156/90 (30 Dec 2021 16:37) (132/80 - 162/90)  BP(mean): --  RR: 18 (30 Dec 2021 16:37) (18 - 19)  SpO2: 98% (30 Dec 2021 16:37) (95% - 98%)    GENERAL: NAD, well-developed  HEAD:  Atraumatic, Normocephalic  EYES: EOMI, PERRLA, conjunctiva and sclera clear  NECK: Supple, No JVD  CHEST/LUNG: Clear to auscultation bilaterally; No wheeze  HEART: Regular rate and rhythm; No murmurs, rubs, or gallops  ABDOMEN: Soft, Nontender, Nondistended; Bowel sounds present  EXTREMITIES:  2+ Peripheral Pulses, No clubbing, cyanosis, or edema  PSYCH: AAOx3  NEUROLOGY: non-focal  SKIN: No rashes or lesions    LABS:                        12.0   3.54  )-----------( 210      ( 30 Dec 2021 07:15 )             36.3     12-    138  |  107  |  15  ----------------------------<  158<H>  3.9   |  22  |  1.40<H>    Ca    8.9      30 Dec 2021 07:10  Phos  3.2     12-30  Mg     1.8     12    TPro  8.6<H>  /  Alb  3.8  /  TBili  0.6  /  DBili  x   /  AST  16  /  ALT  15  /  AlkPhos  360<H>  12-29          Urinalysis Basic - ( 30 Dec 2021 15:17 )    Color: Light Yellow / Appearance: Clear / S.012 / pH: x  Gluc: x / Ketone: Negative  / Bili: Negative / Urobili: Negative   Blood: x / Protein: 30 mg/dL / Nitrite: Negative   Leuk Esterase: Moderate / RBC: 1 /hpf / WBC 7 /HPF   Sq Epi: x / Non Sq Epi: 0 /hpf / Bacteria: Negative        RADIOLOGY & ADDITIONAL TESTS:    Imaging Personally Reviewed:    Consultant(s) Notes Reviewed:      Care Discussed with Consultants/Other Providers:  
Patient is a 51y old  Female who presents with a chief complaint of resistant herpes (30 Dec 2021 18:12)    2022    HPI:  Afebrile, No new symptoms.    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 No nausea, vomiting, or hematemesis; No diarrhea or constipation. No melena or hematochezia.  GENITOURINARY: No dysuria, frequency, hematuria, or incontinence  NEUROLOGICAL: No headaches, memory loss, loss of strength, numbness, or tremors  SKIN: No itching, burning, rashes, or lesions   LYMPH NODES: No enlarged glands  ENDOCRINE: No heat or cold intolerance; No hair loss  MUSCULOSKELETAL: No joint pain or swelling; No muscle, back, or extremity pain  PSYCHIATRIC: No depression, anxiety, mood swings, or difficulty sleeping  HEME/LYMPH: No easy bruising, or bleeding gums  ALLERY AND IMMUNOLOGIC: No hives or eczema    Allergies    No Known Allergies    Intolerances        Social History:     FAMILY HISTORY:      MEDICATIONS  (STANDING):  bictegravir 50 mG/emtricitabine 200 mG/tenofovir alafenamide 25 mG (BIKTARVY) 1 Tablet(s) Oral daily  dextrose 40% Gel 15 Gram(s) Oral once  dextrose 5%. 1000 milliLiter(s) (50 mL/Hr) IV Continuous <Continuous>  dextrose 5%. 1000 milliLiter(s) (100 mL/Hr) IV Continuous <Continuous>  dextrose 50% Injectable 25 Gram(s) IV Push once  dextrose 50% Injectable 12.5 Gram(s) IV Push once  dextrose 50% Injectable 25 Gram(s) IV Push once  foscarnet (Peripheral) IVPB 3000 milliGRAM(s) IV Intermittent every 12 hours  glucagon  Injectable 1 milliGRAM(s) IntraMuscular once  heparin   Injectable 5000 Unit(s) SubCutaneous every 12 hours  influenza   Vaccine 0.5 milliLiter(s) IntraMuscular once  insulin glargine Injectable (LANTUS) 20 Unit(s) SubCutaneous at bedtime  insulin lispro (ADMELOG) corrective regimen sliding scale   SubCutaneous three times a day before meals  insulin lispro (ADMELOG) corrective regimen sliding scale   SubCutaneous at bedtime  insulin lispro Injectable (ADMELOG) 10 Unit(s) SubCutaneous three times a day before meals  sodium chloride 0.9% Bolus 500 milliLiter(s) IV Bolus <User Schedule>    MEDICATIONS  (PRN):  ibuprofen  Tablet. 400 milliGRAM(s) Oral every 8 hours PRN Mild Pain (1 - 3), Moderate Pain (4 - 6)        CAPILLARY BLOOD GLUCOSE      POCT Blood Glucose.: 100 mg/dL (30 Dec 2021 17:16)  POCT Blood Glucose.: 146 mg/dL (30 Dec 2021 12:03)  POCT Blood Glucose.: 145 mg/dL (30 Dec 2021 08:32)  POCT Blood Glucose.: 211 mg/dL (29 Dec 2021 22:25)    I&O's Summary    29 Dec 2021 07:01  -  30 Dec 2021 07:00  --------------------------------------------------------  IN: 630 mL / OUT: 0 mL / NET: 630 mL    30 Dec 2021 07:01  -  30 Dec 2021 18:23  --------------------------------------------------------  IN: 480 mL / OUT: 0 mL / NET: 480 mL        PHYSICAL EXAM:  Vital Signs Last 24 Hrs  T(C): 36.5 (30 Dec 2021 16:37), Max: 36.9 (30 Dec 2021 13:30)  T(F): 97.7 (30 Dec 2021 16:37), Max: 98.5 (30 Dec 2021 13:30)  HR: 78 (30 Dec 2021 16:37) (76 - 93)  BP: 156/90 (30 Dec 2021 16:37) (132/80 - 162/90)  BP(mean): --  RR: 18 (30 Dec 2021 16:37) (18 - 19)  SpO2: 98% (30 Dec 2021 16:37) (95% - 98%)    GENERAL: NAD, well-developed  HEAD:  Atraumatic, Normocephalic  EYES: EOMI, PERRLA, conjunctiva and sclera clear  NECK: Supple, No JVD  CHEST/LUNG: Clear to auscultation bilaterally; No wheeze  HEART: Regular rate and rhythm; No murmurs, rubs, or gallops  ABDOMEN: Soft, Nontender, Nondistended; Bowel sounds present  EXTREMITIES:  2+ Peripheral Pulses, No clubbing, cyanosis, or edema  PSYCH: AAOx3  NEUROLOGY: non-focal  SKIN: No rashes or lesions    LABS:                        12.0   3.54  )-----------( 210      ( 30 Dec 2021 07:15 )             36.3     12-30    138  |  107  |  15  ----------------------------<  158<H>  3.9   |  22  |  1.40<H>    Ca    8.9      30 Dec 2021 07:10  Phos  3.2     12-30  Mg     1.8     12-    TPro  8.6<H>  /  Alb  3.8  /  TBili  0.6  /  DBili  x   /  AST  16  /  ALT  15  /  AlkPhos  360<H>  12-29          Urinalysis Basic - ( 30 Dec 2021 15:17 )    Color: Light Yellow / Appearance: Clear / S.012 / pH: x  Gluc: x / Ketone: Negative  / Bili: Negative / Urobili: Negative   Blood: x / Protein: 30 mg/dL / Nitrite: Negative   Leuk Esterase: Moderate / RBC: 1 /hpf / WBC 7 /HPF   Sq Epi: x / Non Sq Epi: 0 /hpf / Bacteria: Negative        RADIOLOGY & ADDITIONAL TESTS:    Imaging Personally Reviewed:    Consultant(s) Notes Reviewed:      Care Discussed with Consultants/Other Providers:  
Patient is a 51y old  Female who presents with a chief complaint of resistant herpes (30 Dec 2021 18:12)    2022    HPI:  Afebrile, feels better. Pain resolved.  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 No nausea, vomiting, or hematemesis; No diarrhea or constipation. No melena or hematochezia.  GENITOURINARY: No dysuria, frequency, hematuria, or incontinence  NEUROLOGICAL: No headaches, memory loss, loss of strength, numbness, or tremors  SKIN: No itching, burning, rashes, or lesions   LYMPH NODES: No enlarged glands  ENDOCRINE: No heat or cold intolerance; No hair loss  MUSCULOSKELETAL: No joint pain or swelling; No muscle, back, or extremity pain  PSYCHIATRIC: No depression, anxiety, mood swings, or difficulty sleeping  HEME/LYMPH: No easy bruising, or bleeding gums  ALLERY AND IMMUNOLOGIC: No hives or eczema    Allergies    No Known Allergies    Intolerances        Social History:     FAMILY HISTORY:      MEDICATIONS  (STANDING):  bictegravir 50 mG/emtricitabine 200 mG/tenofovir alafenamide 25 mG (BIKTARVY) 1 Tablet(s) Oral daily  dextrose 40% Gel 15 Gram(s) Oral once  dextrose 5%. 1000 milliLiter(s) (50 mL/Hr) IV Continuous <Continuous>  dextrose 5%. 1000 milliLiter(s) (100 mL/Hr) IV Continuous <Continuous>  dextrose 50% Injectable 25 Gram(s) IV Push once  dextrose 50% Injectable 12.5 Gram(s) IV Push once  dextrose 50% Injectable 25 Gram(s) IV Push once  foscarnet (Peripheral) IVPB 3000 milliGRAM(s) IV Intermittent every 12 hours  glucagon  Injectable 1 milliGRAM(s) IntraMuscular once  heparin   Injectable 5000 Unit(s) SubCutaneous every 12 hours  influenza   Vaccine 0.5 milliLiter(s) IntraMuscular once  insulin glargine Injectable (LANTUS) 20 Unit(s) SubCutaneous at bedtime  insulin lispro (ADMELOG) corrective regimen sliding scale   SubCutaneous three times a day before meals  insulin lispro (ADMELOG) corrective regimen sliding scale   SubCutaneous at bedtime  insulin lispro Injectable (ADMELOG) 10 Unit(s) SubCutaneous three times a day before meals  sodium chloride 0.9% Bolus 500 milliLiter(s) IV Bolus <User Schedule>    MEDICATIONS  (PRN):  ibuprofen  Tablet. 400 milliGRAM(s) Oral every 8 hours PRN Mild Pain (1 - 3), Moderate Pain (4 - 6)        CAPILLARY BLOOD GLUCOSE      POCT Blood Glucose.: 100 mg/dL (30 Dec 2021 17:16)  POCT Blood Glucose.: 146 mg/dL (30 Dec 2021 12:03)  POCT Blood Glucose.: 145 mg/dL (30 Dec 2021 08:32)  POCT Blood Glucose.: 211 mg/dL (29 Dec 2021 22:25)    I&O's Summary    29 Dec 2021 07:01  -  30 Dec 2021 07:00  --------------------------------------------------------  IN: 630 mL / OUT: 0 mL / NET: 630 mL    30 Dec 2021 07:01  -  30 Dec 2021 18:23  --------------------------------------------------------  IN: 480 mL / OUT: 0 mL / NET: 480 mL        PHYSICAL EXAM:  Vital Signs Last 24 Hrs  T(C): 36.5 (30 Dec 2021 16:37), Max: 36.9 (30 Dec 2021 13:30)  T(F): 97.7 (30 Dec 2021 16:37), Max: 98.5 (30 Dec 2021 13:30)  HR: 78 (30 Dec 2021 16:37) (76 - 93)  BP: 156/90 (30 Dec 2021 16:37) (132/80 - 162/90)  BP(mean): --  RR: 18 (30 Dec 2021 16:37) (18 - 19)  SpO2: 98% (30 Dec 2021 16:37) (95% - 98%)    GENERAL: NAD, well-developed  HEAD:  Atraumatic, Normocephalic  EYES: EOMI, PERRLA, conjunctiva and sclera clear  NECK: Supple, No JVD  CHEST/LUNG: Clear to auscultation bilaterally; No wheeze  HEART: Regular rate and rhythm; No murmurs, rubs, or gallops  ABDOMEN: Soft, Nontender, Nondistended; Bowel sounds present  EXTREMITIES:  2+ Peripheral Pulses, No clubbing, cyanosis, or edema  PSYCH: AAOx3  NEUROLOGY: non-focal  SKIN: No rashes or lesions    LABS:                        12.0   3.54  )-----------( 210      ( 30 Dec 2021 07:15 )             36.3     12-    138  |  107  |  15  ----------------------------<  158<H>  3.9   |  22  |  1.40<H>    Ca    8.9      30 Dec 2021 07:10  Phos  3.2     12-30  Mg     1.8         TPro  8.6<H>  /  Alb  3.8  /  TBili  0.6  /  DBili  x   /  AST  16  /  ALT  15  /  AlkPhos  360<H>  12-29          Urinalysis Basic - ( 30 Dec 2021 15:17 )    Color: Light Yellow / Appearance: Clear / S.012 / pH: x  Gluc: x / Ketone: Negative  / Bili: Negative / Urobili: Negative   Blood: x / Protein: 30 mg/dL / Nitrite: Negative   Leuk Esterase: Moderate / RBC: 1 /hpf / WBC 7 /HPF   Sq Epi: x / Non Sq Epi: 0 /hpf / Bacteria: Negative        RADIOLOGY & ADDITIONAL TESTS:    Imaging Personally Reviewed:    Consultant(s) Notes Reviewed:      Care Discussed with Consultants/Other Providers:  
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 No nausea, vomiting, or hematemesis; No diarrhea or constipation. No melena or hematochezia.  GENITOURINARY: No dysuria, frequency, hematuria, or incontinence  NEUROLOGICAL: No headaches, memory loss, loss of strength, numbness, or tremors  SKIN: No itching, burning, rashes, or lesions   LYMPH NODES: No enlarged glands  ENDOCRINE: No heat or cold intolerance; No hair loss  MUSCULOSKELETAL: No joint pain or swelling; No muscle, back, or extremity pain  PSYCHIATRIC: No depression, anxiety, mood swings, or difficulty sleeping  HEME/LYMPH: No easy bruising, or bleeding gums  ALLERY AND IMMUNOLOGIC: No hives or eczema    Allergies    No Known Allergies    Intolerances        Social History:     FAMILY HISTORY:      MEDICATIONS  (STANDING):  bictegravir 50 mG/emtricitabine 200 mG/tenofovir alafenamide 25 mG (BIKTARVY) 1 Tablet(s) Oral daily  dextrose 40% Gel 15 Gram(s) Oral once  dextrose 5%. 1000 milliLiter(s) (50 mL/Hr) IV Continuous <Continuous>  dextrose 5%. 1000 milliLiter(s) (100 mL/Hr) IV Continuous <Continuous>  dextrose 50% Injectable 25 Gram(s) IV Push once  dextrose 50% Injectable 12.5 Gram(s) IV Push once  dextrose 50% Injectable 25 Gram(s) IV Push once  foscarnet (Peripheral) IVPB 3000 milliGRAM(s) IV Intermittent every 12 hours  glucagon  Injectable 1 milliGRAM(s) IntraMuscular once  heparin   Injectable 5000 Unit(s) SubCutaneous every 12 hours  influenza   Vaccine 0.5 milliLiter(s) IntraMuscular once  insulin glargine Injectable (LANTUS) 20 Unit(s) SubCutaneous at bedtime  insulin lispro (ADMELOG) corrective regimen sliding scale   SubCutaneous three times a day before meals  insulin lispro (ADMELOG) corrective regimen sliding scale   SubCutaneous at bedtime  insulin lispro Injectable (ADMELOG) 10 Unit(s) SubCutaneous three times a day before meals  sodium chloride 0.9% Bolus 500 milliLiter(s) IV Bolus <User Schedule>    MEDICATIONS  (PRN):  ibuprofen  Tablet. 400 milliGRAM(s) Oral every 8 hours PRN Mild Pain (1 - 3), Moderate Pain (4 - 6)        CAPILLARY BLOOD GLUCOSE      POCT Blood Glucose.: 100 mg/dL (30 Dec 2021 17:16)  POCT Blood Glucose.: 146 mg/dL (30 Dec 2021 12:03)  POCT Blood Glucose.: 145 mg/dL (30 Dec 2021 08:32)  POCT Blood Glucose.: 211 mg/dL (29 Dec 2021 22:25)    I&O's Summary    29 Dec 2021 07:01  -  30 Dec 2021 07:00  --------------------------------------------------------  IN: 630 mL / OUT: 0 mL / NET: 630 mL    30 Dec 2021 07:01  -  30 Dec 2021 18:23  --------------------------------------------------------  IN: 480 mL / OUT: 0 mL / NET: 480 mL        PHYSICAL EXAM:  Vital Signs Last 24 Hrs  T(C): 36.5 (30 Dec 2021 16:37), Max: 36.9 (30 Dec 2021 13:30)  T(F): 97.7 (30 Dec 2021 16:37), Max: 98.5 (30 Dec 2021 13:30)  HR: 78 (30 Dec 2021 16:37) (76 - 93)  BP: 156/90 (30 Dec 2021 16:37) (132/80 - 162/90)  BP(mean): --  RR: 18 (30 Dec 2021 16:37) (18 - 19)  SpO2: 98% (30 Dec 2021 16:37) (95% - 98%)    GENERAL: NAD, well-developed  HEAD:  Atraumatic, Normocephalic  EYES: EOMI, PERRLA, conjunctiva and sclera clear  NECK: Supple, No JVD  CHEST/LUNG: Clear to auscultation bilaterally; No wheeze  HEART: Regular rate and rhythm; No murmurs, rubs, or gallops  ABDOMEN: Soft, Nontender, Nondistended; Bowel sounds present  EXTREMITIES:  2+ Peripheral Pulses, No clubbing, cyanosis, or edema  PSYCH: AAOx3  NEUROLOGY: non-focal  SKIN: No rashes or lesions    LABS:                        12.0   3.54  )-----------( 210      ( 30 Dec 2021 07:15 )             36.3     12-    138  |  107  |  15  ----------------------------<  158<H>  3.9   |  22  |  1.40<H>    Ca    8.9      30 Dec 2021 07:10  Phos  3.2     12-30  Mg     1.8     12    TPro  8.6<H>  /  Alb  3.8  /  TBili  0.6  /  DBili  x   /  AST  16  /  ALT  15  /  AlkPhos  360<H>  12-29          Urinalysis Basic - ( 30 Dec 2021 15:17 )    Color: Light Yellow / Appearance: Clear / S.012 / pH: x  Gluc: x / Ketone: Negative  / Bili: Negative / Urobili: Negative   Blood: x / Protein: 30 mg/dL / Nitrite: Negative   Leuk Esterase: Moderate / RBC: 1 /hpf / WBC 7 /HPF   Sq Epi: x / Non Sq Epi: 0 /hpf / Bacteria: Negative        RADIOLOGY & ADDITIONAL TESTS:    Imaging Personally Reviewed:    Consultant(s) Notes Reviewed:      Care Discussed with Consultants/Other Providers:  
Patient is a 51y old  Female who presents with a chief complaint of resistant herpes (30 Dec 2021 18:12)    2022    HPI:  Afebrile, feels better. Pain resolved.  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 No nausea, vomiting, or hematemesis; No diarrhea or constipation. No melena or hematochezia.  GENITOURINARY: No dysuria, frequency, hematuria, or incontinence  NEUROLOGICAL: No headaches, memory loss, loss of strength, numbness, or tremors  SKIN: No itching, burning, rashes, or lesions   LYMPH NODES: No enlarged glands  ENDOCRINE: No heat or cold intolerance; No hair loss  MUSCULOSKELETAL: No joint pain or swelling; No muscle, back, or extremity pain  PSYCHIATRIC: No depression, anxiety, mood swings, or difficulty sleeping  HEME/LYMPH: No easy bruising, or bleeding gums  ALLERY AND IMMUNOLOGIC: No hives or eczema    Allergies    No Known Allergies    Intolerances        Social History:     FAMILY HISTORY:      MEDICATIONS  (STANDING):  bictegravir 50 mG/emtricitabine 200 mG/tenofovir alafenamide 25 mG (BIKTARVY) 1 Tablet(s) Oral daily  dextrose 40% Gel 15 Gram(s) Oral once  dextrose 5%. 1000 milliLiter(s) (50 mL/Hr) IV Continuous <Continuous>  dextrose 5%. 1000 milliLiter(s) (100 mL/Hr) IV Continuous <Continuous>  dextrose 50% Injectable 25 Gram(s) IV Push once  dextrose 50% Injectable 12.5 Gram(s) IV Push once  dextrose 50% Injectable 25 Gram(s) IV Push once  foscarnet (Peripheral) IVPB 3000 milliGRAM(s) IV Intermittent every 12 hours  glucagon  Injectable 1 milliGRAM(s) IntraMuscular once  heparin   Injectable 5000 Unit(s) SubCutaneous every 12 hours  influenza   Vaccine 0.5 milliLiter(s) IntraMuscular once  insulin glargine Injectable (LANTUS) 20 Unit(s) SubCutaneous at bedtime  insulin lispro (ADMELOG) corrective regimen sliding scale   SubCutaneous three times a day before meals  insulin lispro (ADMELOG) corrective regimen sliding scale   SubCutaneous at bedtime  insulin lispro Injectable (ADMELOG) 10 Unit(s) SubCutaneous three times a day before meals  sodium chloride 0.9% Bolus 500 milliLiter(s) IV Bolus <User Schedule>    MEDICATIONS  (PRN):  ibuprofen  Tablet. 400 milliGRAM(s) Oral every 8 hours PRN Mild Pain (1 - 3), Moderate Pain (4 - 6)        CAPILLARY BLOOD GLUCOSE      POCT Blood Glucose.: 100 mg/dL (30 Dec 2021 17:16)  POCT Blood Glucose.: 146 mg/dL (30 Dec 2021 12:03)  POCT Blood Glucose.: 145 mg/dL (30 Dec 2021 08:32)  POCT Blood Glucose.: 211 mg/dL (29 Dec 2021 22:25)    I&O's Summary    29 Dec 2021 07:01  -  30 Dec 2021 07:00  --------------------------------------------------------  IN: 630 mL / OUT: 0 mL / NET: 630 mL    30 Dec 2021 07:01  -  30 Dec 2021 18:23  --------------------------------------------------------  IN: 480 mL / OUT: 0 mL / NET: 480 mL        PHYSICAL EXAM:  Vital Signs Last 24 Hrs  T(C): 36.5 (30 Dec 2021 16:37), Max: 36.9 (30 Dec 2021 13:30)  T(F): 97.7 (30 Dec 2021 16:37), Max: 98.5 (30 Dec 2021 13:30)  HR: 78 (30 Dec 2021 16:37) (76 - 93)  BP: 156/90 (30 Dec 2021 16:37) (132/80 - 162/90)  BP(mean): --  RR: 18 (30 Dec 2021 16:37) (18 - 19)  SpO2: 98% (30 Dec 2021 16:37) (95% - 98%)    GENERAL: NAD, well-developed  HEAD:  Atraumatic, Normocephalic  EYES: EOMI, PERRLA, conjunctiva and sclera clear  NECK: Supple, No JVD  CHEST/LUNG: Clear to auscultation bilaterally; No wheeze  HEART: Regular rate and rhythm; No murmurs, rubs, or gallops  ABDOMEN: Soft, Nontender, Nondistended; Bowel sounds present  EXTREMITIES:  2+ Peripheral Pulses, No clubbing, cyanosis, or edema  PSYCH: AAOx3  NEUROLOGY: non-focal  SKIN: No rashes or lesions    LABS:                        12.0   3.54  )-----------( 210      ( 30 Dec 2021 07:15 )             36.3     12    138  |  107  |  15  ----------------------------<  158<H>  3.9   |  22  |  1.40<H>    Ca    8.9      30 Dec 2021 07:10  Phos  3.2     12-  Mg     1.8         TPro  8.6<H>  /  Alb  3.8  /  TBili  0.6  /  DBili  x   /  AST  16  /  ALT  15  /  AlkPhos  360<H>  12-          Urinalysis Basic - ( 30 Dec 2021 15:17 )    Color: Light Yellow / Appearance: Clear / S.012 / pH: x  Gluc: x / Ketone: Negative  / Bili: Negative / Urobili: Negative   Blood: x / Protein: 30 mg/dL / Nitrite: Negative   Leuk Esterase: Moderate / RBC: 1 /hpf / WBC 7 /HPF   Sq Epi: x / Non Sq Epi: 0 /hpf / Bacteria: Negative        RADIOLOGY & ADDITIONAL TESTS:    Imaging Personally Reviewed:    Consultant(s) Notes Reviewed:      Care Discussed with Consultants/Other Providers:  
Patient is a 51y old  Female who presents with a chief complaint of resistant herpes (30 Dec 2021 18:12)    2022    HPI:  Afebrile, feels better. Pain resolved.  ID needs to FU about further management.    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 No nausea, vomiting, or hematemesis; No diarrhea or constipation. No melena or hematochezia.  GENITOURINARY: No dysuria, frequency, hematuria, or incontinence  NEUROLOGICAL: No headaches, memory loss, loss of strength, numbness, or tremors  SKIN: No itching, burning, rashes, or lesions   LYMPH NODES: No enlarged glands  ENDOCRINE: No heat or cold intolerance; No hair loss  MUSCULOSKELETAL: No joint pain or swelling; No muscle, back, or extremity pain  PSYCHIATRIC: No depression, anxiety, mood swings, or difficulty sleeping  HEME/LYMPH: No easy bruising, or bleeding gums  ALLERY AND IMMUNOLOGIC: No hives or eczema    Allergies    No Known Allergies    Intolerances        Social History:     FAMILY HISTORY:      MEDICATIONS  (STANDING):  bictegravir 50 mG/emtricitabine 200 mG/tenofovir alafenamide 25 mG (BIKTARVY) 1 Tablet(s) Oral daily  dextrose 40% Gel 15 Gram(s) Oral once  dextrose 5%. 1000 milliLiter(s) (50 mL/Hr) IV Continuous <Continuous>  dextrose 5%. 1000 milliLiter(s) (100 mL/Hr) IV Continuous <Continuous>  dextrose 50% Injectable 25 Gram(s) IV Push once  dextrose 50% Injectable 12.5 Gram(s) IV Push once  dextrose 50% Injectable 25 Gram(s) IV Push once  foscarnet (Peripheral) IVPB 3000 milliGRAM(s) IV Intermittent every 12 hours  glucagon  Injectable 1 milliGRAM(s) IntraMuscular once  heparin   Injectable 5000 Unit(s) SubCutaneous every 12 hours  influenza   Vaccine 0.5 milliLiter(s) IntraMuscular once  insulin glargine Injectable (LANTUS) 20 Unit(s) SubCutaneous at bedtime  insulin lispro (ADMELOG) corrective regimen sliding scale   SubCutaneous three times a day before meals  insulin lispro (ADMELOG) corrective regimen sliding scale   SubCutaneous at bedtime  insulin lispro Injectable (ADMELOG) 10 Unit(s) SubCutaneous three times a day before meals  sodium chloride 0.9% Bolus 500 milliLiter(s) IV Bolus <User Schedule>    MEDICATIONS  (PRN):  ibuprofen  Tablet. 400 milliGRAM(s) Oral every 8 hours PRN Mild Pain (1 - 3), Moderate Pain (4 - 6)        CAPILLARY BLOOD GLUCOSE      POCT Blood Glucose.: 100 mg/dL (30 Dec 2021 17:16)  POCT Blood Glucose.: 146 mg/dL (30 Dec 2021 12:03)  POCT Blood Glucose.: 145 mg/dL (30 Dec 2021 08:32)  POCT Blood Glucose.: 211 mg/dL (29 Dec 2021 22:25)    I&O's Summary    29 Dec 2021 07:01  -  30 Dec 2021 07:00  --------------------------------------------------------  IN: 630 mL / OUT: 0 mL / NET: 630 mL    30 Dec 2021 07:01  -  30 Dec 2021 18:23  --------------------------------------------------------  IN: 480 mL / OUT: 0 mL / NET: 480 mL        PHYSICAL EXAM:  Vital Signs Last 24 Hrs  T(C): 36.5 (30 Dec 2021 16:37), Max: 36.9 (30 Dec 2021 13:30)  T(F): 97.7 (30 Dec 2021 16:37), Max: 98.5 (30 Dec 2021 13:30)  HR: 78 (30 Dec 2021 16:37) (76 - 93)  BP: 156/90 (30 Dec 2021 16:37) (132/80 - 162/90)  BP(mean): --  RR: 18 (30 Dec 2021 16:37) (18 - 19)  SpO2: 98% (30 Dec 2021 16:37) (95% - 98%)    GENERAL: NAD, well-developed  HEAD:  Atraumatic, Normocephalic  EYES: EOMI, PERRLA, conjunctiva and sclera clear  NECK: Supple, No JVD  CHEST/LUNG: Clear to auscultation bilaterally; No wheeze  HEART: Regular rate and rhythm; No murmurs, rubs, or gallops  ABDOMEN: Soft, Nontender, Nondistended; Bowel sounds present  EXTREMITIES:  2+ Peripheral Pulses, No clubbing, cyanosis, or edema  PSYCH: AAOx3  NEUROLOGY: non-focal  SKIN: No rashes or lesions    LABS:                        12.0   3.54  )-----------( 210      ( 30 Dec 2021 07:15 )             36.3     12-    138  |  107  |  15  ----------------------------<  158<H>  3.9   |  22  |  1.40<H>    Ca    8.9      30 Dec 2021 07:10  Phos  3.2     12-30  Mg     1.8         TPro  8.6<H>  /  Alb  3.8  /  TBili  0.6  /  DBili  x   /  AST  16  /  ALT  15  /  AlkPhos  360<H>  12-29          Urinalysis Basic - ( 30 Dec 2021 15:17 )    Color: Light Yellow / Appearance: Clear / S.012 / pH: x  Gluc: x / Ketone: Negative  / Bili: Negative / Urobili: Negative   Blood: x / Protein: 30 mg/dL / Nitrite: Negative   Leuk Esterase: Moderate / RBC: 1 /hpf / WBC 7 /HPF   Sq Epi: x / Non Sq Epi: 0 /hpf / Bacteria: Negative        RADIOLOGY & ADDITIONAL TESTS:    Imaging Personally Reviewed:    Consultant(s) Notes Reviewed:      Care Discussed with Consultants/Other Providers:

## 2022-01-19 NOTE — PROGRESS NOTE ADULT - ASSESSMENT
51F with controlled HIV on biktarvy (12/28/21: Cd4= 378-29%; 12/31/21 HIV viral Load UNDETECTABLE <12 copies/mL), with acyclovir resistant HSV2 genital lesions, CKD, obesity (BMI= 32.8), DM2  on insulin (5/27/21 HgbA1C = 9.0),  admitted today 12/29/21 for worsening genital lesions. Patient was admitted in May with HSV2 despited valacylclovir and responded to Foscaret,     Acyclovir resistant HSV-2  dysuria -  appears related to genital HSV - now resolved  DM  - 12/30:  A1C = 9.0, glycemic control  HIV  controlled - continue Biktarvy -  clinically improving.   tolerating Foscarnet  -unable to obtain Valganciclovir susceptibility      Suggest  Continue Foscarnet  12/29--> 21 day course through 1/19/22 -  will consider topical cidofovir or topical foscarnet at completion of IV foscarnet (will require compounding)  rise in Cr, dose adjusted accordingly.   Preinfusion hydration with NS  c/w continues infusion saline besides preinfusion hydration.   close and daily monitoring of CMP, Mg, Ca daily and replete as needed.   HIV VL, undetectable, CD4 >300   c/w biktarvy.   
51F with controlled HIV on biktarvy (12/28/21: Cd4= 378-29%; 12/31/21 HIV viral Load UNDETECTABLE <12 copies/mL), with acyclovir resistant HSV2 genital lesions, CKD, obesity (BMI= 32.8), DM2  on insulin (5/27/21 HgbA1C = 9.0),  admitted today 12/29/21 for worsening genital lesions. Patient was admitted in May with HSV2 despited valacylclovir and responded to Foscaret,     Acyclovir resistant HSV-2  dysuria -  appears related to genital HSV - now resolved  DM  - 12/30:  A1C = 9.0, glycemic control  HIV  controlled - continue Biktarvy -  clinically improving.   tolerating Foscarnet  susceptibility testing has been sent out  - anticipate results 1/30  - I will follow up  [patient lives in [facility]]      Suggest  Continue Foscarnet  12/29--> 21 day course through 1/19/22 -  will consider topical cidofovir  at completion of IV foscarnet (will require compounding- seeking pharmacies - appears to be very expensive and not covered)  rise in Cr, dose adjusted accordingly.   Preinfusion hydration with NS  c/w continues infusion saline besides preinfusion hydration.   close and regular monitoring of CMP, Mg, Ca daily and replete as needed.   HIV VL, undetectable, CD4 >300   c/w biktarvy.   
51F with controlled HIV on biktarvy (12/28/21: Cd4= 378-29%; 12/31/21 HIV viral Load UNDETECTABLE <12 copies/mL), with acyclovir resistant HSV2 genital lesions, CKD, obesity (BMI= 32.8), DM2  on insulin (5/27/21 HgbA1C = 9.0),  admitted today 12/29/21 for worsening genital lesions. Patient was admitted in May with HSV2 despited valacylclovir and responded to Foscaret,     Acyclovir resistant HSV-2  dysuria -  appears related to genital HSV - now resolved  DM  - 12/30:  A1C = 9.0, glycemic control  HIV  controlled - continue Biktarvy -  clinically improving.   tolerating Foscarnet  susceptibility testing has been sent out  - anticipate results 1/30  - I will follow up  [patient lives in [facility]]      Suggest  Continue Foscarnet  12/29--> 21 day course through 1/19/22 -  will consider topical cidofovir  at completion of IV foscarnet (will require compounding- seeking pharmacies - appears to be very expensive and not covered)  rise in Cr, dose adjusted accordingly.   Preinfusion hydration with NS  c/w continues infusion saline besides preinfusion hydration.   close and daily monitoring of CMP, Mg, Ca daily and replete as needed.   HIV VL, undetectable, CD4 >300   c/w biktarvy.   
51F with controlled HIV on biktarvy (12/28/21: Cd4= 378-29%; 12/31/21 HIV viral Load UNDETECTABLE <12 copies/mL), with acyclovir resistant HSV2 genital lesions, CKD, obesity (BMI= 32.8), DM2  on insulin (5/27/21 HgbA1C = 9.0),  admitted today 12/29/21 for worsening genital lesions. Patient was admitted in May with HSV2 despited valacylclovir and responded to Foscaret,     Acyclovir resistant HSV-2  dysuria -  appears related to genital HSV - now resolved  DM  - 12/30:  A1C = 9.0, glycemic control  HIV  controlled - continue Biktarvy -  clinically improving.   tolerating Foscarnet  susceptibility testing has been sent out  - anticipate results 1/30  - I will follow up  [patient lives in [facility]]    significance of upper right vulvar lesion unclear: different pathology seems more likely than foscarnet resistant lesion.    Options after discharge reviewed by PharmD - assistance greatly appreciated  topical cidofovir would require compounding. The out of pocked expense exceeds what Ms Wolf states she could afford  The investigational drug, PRITELIVIR, appears promising for resistant HSV  and could be made available through expanded access      Suggest  GYN evaluation  Perform repeat viral swab of persisting lesion  Continue Foscarnet  12/29--> 21 day course planned through 1/19/22 -    Preinfusion hydration with NS  c/w continues infusion saline besides preinfusion hydration.   close and regular monitoring of CMP, Mg, Ca daily and replete as needed.   HIV VL, undetectable, CD4 >300   c/w biktarvy.     discussed with NP  Please call ID if needed over weekend    
51F with controlled HIV on biktarvy (12/28/21: Cd4= 378-29%; 5/27/21 HIV viral Load UNDETECTABLE <12 copies/mL), HSV2 genital lesions, CKD, obesity (BMI= 32.8), DM2  on insulin (5/27/21 HgbA1C = 9.0),  admitted today 12/29/21 for worsening genital lesions. Patient was admitted in May with HSV2 despited valacylclovir and repsonded to Foscaret,     Acyclovir resistant HSV-2  dysuria -  appears related to genital HSV  DM  - 12/30:  A1C = 9.0, glycemic control  HIV  controlled - continue Biktarvy -    Suggest  Foscarnet  12/29-->  Preinfusion hydration with NS  monitor CMP, Mg, Ca daily  check HIV VL, CD4 ordered  prn percocet    I have emailed request about HSV2 antiviral susceptibility  but was unable to reach dept head due to holiday  trial of valganciclovir after foscarnet course can be considered - topical therapy with compounded foscarnet in ointment can be considered    discussed with primary MD  I will be out until 1/10 - ID service will follo
51F with controlled HIV on biktarvy (12/28/21: Cd4= 378-29%; 5/27/21 HIV viral Load UNDETECTABLE <12 copies/mL), HSV2 genital lesions, CKD, obesity (BMI= 32.8), DM2  on insulin (5/27/21 HgbA1C = 9.0),  admitted today 12/29/21 for worsening genital lesions. Patient was admitted in May with HSV2 despited valacylclovir and repsonded to Foscaret,     Acyclovir resistant HSV-2  dysuria -  appears related to genital HSV  DM  - 12/30:  A1C = 9.0, glycemic control  HIV  controlled - continue Biktarvy -  clinically improving.       Suggest  Foscarnet  12/29-->  rise in Cr, dose adjusted accordingly.   Preinfusion hydration with NS  consider adding continues infusion saline besides preinfusion hydration.   close and daily monitoring of CMP, Mg, Ca daily  HIV VL, undetectable, CD4 >300   c/w biktarvy.       Plan discussed with Medicine NP.       Nida Huitron  Pager: [phone]. If no response or past 5 pm call [phone].      
51F with controlled HIV on biktarvy (12/28/21: Cd4= 378-29%; 5/27/21 HIV viral Load UNDETECTABLE <12 copies/mL), HSV2 genital lesions, CKD, obesity (BMI= 32.8), DM2  on insulin (5/27/21 HgbA1C = 9.0),  admitted today 12/29/21 for worsening genital lesions. Patient was admitted in May with HSV2 despited valacylclovir and repsonded to Foscaret,     Acyclovir resistant HSV-2  dysuria -  appears related to genital HSV  DM  - 12/30:  A1C = 9.0, glycemic control  HIV  controlled - continue Biktarvy -  clinically improving.       Suggest  Foscarnet  12/29-->  rise in Cr, dose adjusted accordingly.   Preinfusion hydration with NS  monitor CMP, Mg, Ca daily  HIV VL, undetectable, CD4 >300   c/w biktarvy.     Plan discussed with Medicine Attending    Nida Huitorn  Pager: [phone]. If no response or past 5 pm call [phone].      
51F with controlled HIV on biktarvy (12/28/21: Cd4= 378-29%; 12/31/21 HIV viral Load UNDETECTABLE <12 copies/mL), with acyclovir resistant HSV2 genital lesions, CKD, obesity (BMI= 32.8), DM2  on insulin (5/27/21 HgbA1C = 9.0),  admitted today 12/29/21 for worsening genital lesions. Patient was admitted in May with HSV2 despited valacylclovir and responded to Foscaret,     Acyclovir resistant HSV-2  dysuria -  appears related to genital HSV - now resolved  DM  - 12/30:  A1C = 9.0, glycemic control  HIV  controlled - continue Biktarvy -  clinically improving.   tolerating Foscarnet  susceptibility testing has been sent out  - anticipate results 1/30  -   [patient lives in [facility]]    significance of upper right vulvar lesion of concern   repeat viral swab of persisting lesion demonstrated persistent HSV-2 raising possibility of  partial resistance, Culture for Grp B strep of unclear signficance, but would treat with Amox    Options after discharge reviewed by PharmD - assistance greatly appreciated  The investigational drug, PRITELIVIR, appears promising for resistant HSV  and could be made available through expanded access      Suggest  po Amox 500 mg every 8 hours  (I will order)   completed  Foscarnet  12/29--> 21 day course planned through 1/19/22 -    discussed with NP earlier today, ok for discharge    We are continuing to pursue obtaining Pritelivir for this patient  
51F with controlled HIV on biktarvy (12/28/21: Cd4= 378-29%; 12/31/21 HIV viral Load UNDETECTABLE <12 copies/mL), with acyclovir resistant HSV2 genital lesions, CKD, obesity (BMI= 32.8), DM2  on insulin (5/27/21 HgbA1C = 9.0),  admitted today 12/29/21 for worsening genital lesions. Patient was admitted in May with HSV2 despited valacylclovir and responded to Foscaret,     Acyclovir resistant HSV-2  dysuria -  appears related to genital HSV - now resolved  DM  - 12/30:  A1C = 9.0, glycemic control  HIV  controlled - continue Biktarvy -  clinically improving.   tolerating Foscarnet  susceptibility testing has been sent out  - anticipate results 1/30  - I will follow up  [patient lives in [facility]]    significance of upper right vulvar lesion of concern   repeat viral swab of persisting lesion demonstrated persistent HSV-2 raising possibility of  partial resistance, Culture for Grp B strep of unclear signficance, but would treat with Amox    Options after discharge reviewed by PharmD - assistance greatly appreciated  The investigational drug, PRITELIVIR, appears promising for resistant HSV  and could be made available through expanded access      Suggest  po Amox 500 mg every 8 hours  (I will order)   Continue Foscarnet  12/29--> 21 day course planned through 1/19/22 -    will pursue obtaining Pritelivir  
51F with controlled HIV on biktarvy (12/28/21: Cd4= 378-29%; 5/27/21 HIV viral Load UNDETECTABLE <12 copies/mL), HSV2 genital lesions, CKD, obesity (BMI= 32.8), DM2  on insulin (5/27/21 HgbA1C = 9.0),  admitted today 12/29/21 for worsening genital lesions. Patient was admitted in May with HSV2 despited valacylclovir and repsonded to Foscaret,     Acyclovir resistant HSV-2  dysuria -  appears related to genital HSV  DM  - 12/30:  A1C = 9.0, glycemic control  HIV  controlled - continue Biktarvy -  clinically improving.       Suggest  Foscarnet  12/29-->  rise in Cr, dose adjusted accordingly.   Preinfusion hydration with NS  c/w continues infusion saline besides preinfusion hydration.   close and daily monitoring of CMP, Mg, Ca daily and replete as needed.   mild leucopenia on CBC, c/w trending cbc  HIV VL, undetectable, CD4 >300   c/w biktarvy.       Please call ID service for questions over weekend at [phone].     Nida Huitron  Pager: [phone]. If no response or past 5 pm call [phone].      
51F with controlled HIV on biktarvy (12/28/21: Cd4= 378-29%; 12/31/21 HIV viral Load UNDETECTABLE <12 copies/mL), with acyclovir resistant HSV2 genital lesions, CKD, obesity (BMI= 32.8), DM2  on insulin (5/27/21 HgbA1C = 9.0),  admitted today 12/29/21 for worsening genital lesions. Patient was admitted in May with HSV2 despited valacylclovir and responded to Foscaret,     Acyclovir resistant HSV-2  dysuria -  appears related to genital HSV - now resolved  DM  - 12/30:  A1C = 9.0, glycemic control  HIV  controlled - continue Biktarvy -  clinically improving.   tolerating Foscarnet  susceptibility testing has been sent out  - anticipate results 1/30  - I will follow up      Suggest  Continue Foscarnet  12/29--> 21 day course through 1/19/22 -  will consider topical cidofovir  at completion of IV foscarnet (will require compounding- seeking pharmacies - appears to be very expensive and not covered)  rise in Cr, dose adjusted accordingly.   Preinfusion hydration with NS  c/w continues infusion saline besides preinfusion hydration.   close and daily monitoring of CMP, Mg, Ca daily and replete as needed.   HIV VL, undetectable, CD4 >300   c/w biktarvy.     discussed with NP today - asked to provide extra potassium
51F with controlled HIV on biktarvy (12/28/21: Cd4= 378-29%; 5/27/21 HIV viral Load UNDETECTABLE <12 copies/mL), HSV2 genital lesions, CKD, obesity (BMI= 32.8), DM2  on insulin (5/27/21 HgbA1C = 9.0),  admitted today 12/29/21 for worsening genital lesions. Patient was admitted in May with HSV2 despited valacylclovir and repsonded to Foscaret,     Acyclovir resistant HSV-2  dysuria -  appears related to genital HSV  DM  - 12/30:  A1C = 9.0, glycemic control  HIV  controlled - continue Biktarvy -  clinically improving.       Suggest  Foscarnet  12/29-->  rise in Cr, dose adjusted accordingly.   Preinfusion hydration with NS  c/w continues infusion saline besides preinfusion hydration.   close and daily monitoring of CMP, Mg, Ca daily  mild leucopenia on CBC today, c/w trending cbc  HIV VL, undetectable, CD4 >300   c/w biktarvy.         Nida Huitron  Pager: [phone]. If no response or past 5 pm call [phone].

## 2022-01-19 NOTE — PROGRESS NOTE ADULT - PROBLEM SELECTOR PLAN 3
RISS,
RISS,   Creatinine elevated, possibly dehydration. Start IV Fluids
RISS,
RISS,   Creatinine elevated, possibly dehydration. Start IV Fluids
RISS,
RISS,   Creatinine elevated, possibly dehydration. Start IV Fluids

## 2022-01-19 NOTE — PROGRESS NOTE ADULT - PROBLEM SELECTOR PLAN 2
Cont home meds
Cont home meds, ID Fu
Cont home meds, ID Fu
Cont home meds
Cont home meds, ID Fu
Cont home meds

## 2022-01-19 NOTE — PROGRESS NOTE ADULT - PROBLEM SELECTOR PROBLEM 3
Diabetes mellitus

## 2022-01-19 NOTE — PROGRESS NOTE ADULT - PROBLEM SELECTOR PLAN 1
FU ID recommendations. IV Foscarnet till 1/19/2022.
ROHIT ACEVEDO recommendations.     ROHIT montoya
FU ID recommendations. Cont Foscarnet.  Duration of foscarnet TBD  Monitor creat.
On IV Foscarnet till 1/19/2022.  DC planned for today.
FU ID recommendations. Cont Foscarnet.  DC planning once cleared
FU ID recommendations. Cont Foscarnet.  Duration of foscarnet as per ID recommendations  Monitor creat.  Will get Renal evaluation as well for CKD.
FU ID recommendations. IV Foscarnet till 1/19/2022.
On IV Foscarnet till 1/19/2022.
FU ID recommendations.     FU renal recommendations.
FU ID recommendations.   On IV fluids, No  edema
FU ID recommendations. Cont Foscarnet. Pain meds as needed.
FU ID recommendations. Cont Foscarnet. Pain meds as needed.  swab for cultures to evaluate sensitivities
FU ID recommendations. Cont Foscarnet.
FU ID recommendations. Cont Foscarnet.  Duration of foscarnet as per ID recommendations  Monitor creat.  FU renal recommendations.
FU ID recommendations. IV Foscarnet till 1/19/2022.    No  edema
FU ID recommendations. IV Foscarnet till 1/19/2022.
On IV Foscarnet till 1/19/2022.  DC tomorrow
FU ID recommendations. Cont Foscarnet. Pain meds as needed.
FU ID recommendations. IV Foscarnet till 1/19/2022.    On IV fluids, No  edema

## 2022-01-19 NOTE — DISCHARGE NOTE NURSING/CASE MANAGEMENT/SOCIAL WORK - NSDCPEFALRISK_GEN_ALL_CORE
For information on Fall & Injury Prevention, visit: [email or web address] OR  [email or web address] OR  https://www.cdc.gov/steadi/patient.html

## 2022-01-19 NOTE — DISCHARGE NOTE NURSING/CASE MANAGEMENT/SOCIAL WORK - PATIENT PORTAL LINK FT
You can access the FollowMyHealth Patient Portal offered by [facility] by registering at the following website: [email or web address]. By joining [employer]’s FollowMyHealth portal, you will also be able to view your health information using other applications (apps) compatible with our system.

## 2022-01-19 NOTE — PROGRESS NOTE ADULT - PROBLEM SELECTOR PROBLEM 2
AIDS due to HIV-I

## 2022-01-19 NOTE — PROGRESS NOTE ADULT - PROBLEM SELECTOR PROBLEM 1
Herpes genitalis

## 2022-01-20 NOTE — PHARMACOTHERAPY INTERVENTION NOTE - COMMENTS
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    Assisted Dr. Rosales in gathering information regarding possible therapeutic options including:  - topical cidofovir - must be compounded and found a pharmacy ([facility] in [place]) that could do it.  However, the cost would be $600 for 30g of 1%.  They do not take insurance.    - investigational antiviral, pritelivir - available either through ongoing trial but closest site is [place] or possibly available through expanded access which Dr. Rosales is trying to coordinate    Link to clinical trial: https://www.clinicaltrials.gov/ct2/show/DGN31881088?wogh=FYF81765672&draw=2&rank=1  Links to expanded access info: [email or web address]-[email or web address]-.htm;  [email or web address]      Thank you,  Tammy Lewis, PharmD, BCIDP  Clinical Pharmacist, Infectious Diseases  Cell: [phone]/Pager: [phone]
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    Recommendation(s):  1) Patient's weight is 106.6 kg, BMI >30, adjusted body weight is ~87 kg. Based on most recent serum creatinine, would dose adjust the foscarnet to 35 mg/kg IV Q12H (~3000 mg IV Q12H).  2) Would give 1L normal saline bolus prior to first dose, then 500 mL with each subsequent dose.  3) Monitor renal function and electrolytes daily while patient is on therapy with IV foscarnet.    With kind regards,  Abhinav Stephens, PharmD  Infectious Diseases Clinical Pharmacist  .

## 2022-01-24 ENCOUNTER — NON-APPOINTMENT (OUTPATIENT)
Age: 52
End: 2022-01-24

## 2022-02-22 NOTE — ED ADULT NURSE NOTE - NS ED NOTE  TALK SOMEONE YN
Patient called to follow up on oral therapy  Drug:   Gleevec    Date started  2/25/2022    Regimen:   400mg daily    Compliance:   With food?    Taking same time every day?    Keeping in original bottle?   Taking with full glass of water?  Compliant with allopurinol?    Toxicity assessment:   Palpitations, abnormal heart rhythm, chest pain, sudden weight gain, dizziness  Fatigue  Sensitivity to sun  Diarrhea  Nausea  Flu like symptoms  Rash?  Swelling?  Mood Change    Questions/Concerns:     ECHO order placed. Okay per Dr. Diehl to start therapy without completing ECHO prior.   no

## 2022-03-10 NOTE — ED ADULT TRIAGE NOTE - ESI TRIAGE ACUITY LEVEL, MLM
Patient has sent a request for the following Rx: Fluticasone Propionate (Xhance) 93 MCG/ACT Exhaler Suspension.  Last OV was on 01/26/2022  -Due to cost of Xhance, she could consider adding Similasan nasal spray or antihistamine nasal spray to daily regimen. She will contact our office if she wants to start antihistamine nasal spray.     Return in about 1 year (around 1/26/2023) for allergic rhinitis.     Rx was sent to the [facility] per protocol.   3

## 2022-03-16 ENCOUNTER — NON-APPOINTMENT (OUTPATIENT)
Age: 52
End: 2022-03-16

## 2022-03-17 ENCOUNTER — APPOINTMENT (OUTPATIENT)
Dept: INFECTIOUS DISEASE | Facility: CLINIC | Age: 52
End: 2022-03-17

## 2022-03-18 ENCOUNTER — NON-APPOINTMENT (OUTPATIENT)
Age: 52
End: 2022-03-18

## 2022-03-21 ENCOUNTER — NON-APPOINTMENT (OUTPATIENT)
Age: 52
End: 2022-03-21

## 2022-03-22 ENCOUNTER — NON-APPOINTMENT (OUTPATIENT)
Age: 52
End: 2022-03-22

## 2022-03-23 ENCOUNTER — NON-APPOINTMENT (OUTPATIENT)
Age: 52
End: 2022-03-23

## 2022-03-24 ENCOUNTER — NON-APPOINTMENT (OUTPATIENT)
Age: 52
End: 2022-03-24

## 2022-04-07 ENCOUNTER — RX RENEWAL (OUTPATIENT)
Age: 52
End: 2022-04-07

## 2022-04-13 ENCOUNTER — NON-APPOINTMENT (OUTPATIENT)
Age: 52
End: 2022-04-13

## 2022-04-18 NOTE — HISTORY OF PRESENT ILLNESS
Patient : Rickey Torres Age: 4 year old Sex: male   MRN: [id number] Encounter Date: 4/17/2022      History     Chief Complaint   Patient presents with   • Cough   • Vomiting     HPI     4-year-old male presents emergency department after having an episode of severe coughing.  The mother states that the child went to bed in his usual state of health and awoke this evening with difficulty breathing she describes course noisy breathing with associated barking cough.  The child then had a “coughing fit” followed by some dry heaves.  The mother immediately took him outside bring him to the emergency room and did note some improvement in his condition with exposure to cold air.  At arrival he is no longer having the respiratory distress previously described but continues to have occasional barking cough.  Child's immunizations are up-to-date.  No fevers chills or URI symptoms preceding this event.    No Known Allergies    There are no discharge medications for this patient.      No past medical history on file.    Past Surgical History:   Procedure Laterality Date   • CIRCUMCISION, PRIMARY         Family History   Problem Relation Age of Onset   • Hypertension Mother        Social History     Tobacco Use   • Smoking status: Never Smoker   • Smokeless tobacco: Never Used   Substance Use Topics   • Alcohol use: Not on file   • Drug use: Not on file       E-cigarette/Vaping     E-Cigarette/Vaping Substances & Devices       Review of Systems   Constitutional: Negative.    HENT: Negative.    Respiratory: Positive for cough.    Musculoskeletal: Negative.    Neurological: Negative.        Physical Exam     ED Triage Vitals [04/17/22 2221]   ED Triage Vitals Group      Temp 98.7 °F (37.1 °C)      Heart Rate (!) 153      Resp 32      BP (!) 137/72      SpO2 100 %      EtCO2 mmHg       Height       Weight 35 lb (15.9 kg)      Weight Scale Used Scale in bed      BMI (Calculated)       IBW/kg (Calculated)        Physical  Exam  Constitutional:       General: He is active. He is not in acute distress.     Appearance: He is well-developed.   HENT:      Head: Atraumatic.      Right Ear: Tympanic membrane normal.      Left Ear: Tympanic membrane normal.      Nose: Nose normal.      Mouth/Throat:      Mouth: Mucous membranes are moist.      Pharynx: Oropharynx is clear.   Eyes:      Conjunctiva/sclera: Conjunctivae normal.      Pupils: Pupils are equal, round, and reactive to light.   Cardiovascular:      Rate and Rhythm: Normal rate and regular rhythm.      Pulses: Pulses are strong.   Pulmonary:      Effort: Pulmonary effort is normal. No respiratory distress.      Breath sounds: Normal breath sounds. No stridor.      Comments: Barking cough  Abdominal:      General: There is no distension.      Palpations: Abdomen is soft.      Tenderness: There is no abdominal tenderness. There is no guarding or rebound.   Musculoskeletal:         General: No deformity. Normal range of motion.      Cervical back: Normal range of motion and neck supple.   Skin:     General: Skin is warm and dry.      Findings: No rash.   Neurological:      Mental Status: He is alert.         ED Course     Procedures    Lab Results     No results found for this visit on 04/17/22.        Radiology Results     Imaging Results    None         ED Medication Orders (From admission, onward)    Ordered Start     Status Ordering Provider    04/17/22 2344 04/17/22 2345  dexAMETHasone (DECADRON) injection 9.6 mg  ONCE         Last MAR action: Given REBECCA FRIAS     Presentation and exam suspicious for croup.  Child shows no signs of respiratory distress.  He was treated with 0.6 mg/kg of Decadron     Croup dx and treatment discussed with the mother     At the end of the emergency department stay the patient was reassessed and found to be in stable condition for outpatient management. The work up was discussed with the parent, reviewing all relative  department evaluations as well as discharge instructions and follow up information. They were advised to follow up with the child's pediatrician for re-evaluation, if symptoms should change or worsen they need to immediately return to the ED.    Clinical Impression     ED Diagnosis   1. Croup         Disposition        Discharge 4/17/2022 11:44 PM  Rickey Torres discharge to home/self care.                         Oliverio CORRAL DO  04/18/22 0021     [FreeTextEntry1] : chief complaint: DM2\par \par Patient is a 50 year old woman with HIV and uncontrolled DM2 complicated by CKD, HTN, HLD, vitamin D deficiency. A1c 9.8% in 11/2020.   Initially diagnosed in 2010, during a hospitalization. Started on insulin at that time. The patient was on basal/bolus insulin for a year and had made major modifications and self-titrated off. Now on Trulicity 0.75 mg/week (has missed 2 doses in the last 6 months). Also on Humalog 15 units before or after meals, not taking basal insulin. Sometimes takes the Humalog after she eats due to forgetting, especially at night, however she notices that by the morning, her BG rises. She uses the Freestyle Katy to check her BG and Katy data today reveals the following: BG in target range 16% of the time, BG high 36% of the time and very high 47% of the time. Hypoglycemia less than 1% of the time. Average  mg/dL.\par \par Last dilated eye exam was within the last year, no known retinopathy. No sx of neuropathy. She has CKD 3 and urine microalbumin/creatinine was elevated in 5/2020.   Exercise is limited 2/2 hip pain, had hip surgery in the summer of 2020.   \par \par Works as a . \par \[employer] For HTN, she takes HCTZ 25 mg daily. For HLD, she takes Lipitor 10 mg daily, LDL has been elevated -  in 11/2020. \par \[employer] Also with vitamin D insufficiency, takes multivitamin daily. \par \[employer] Also with thyroid nodules, thyroid ultrasound in 12/2019 revealed the following: right 2.3 cm nodule, unchanged from prior as well as left 1.1 cm cystic nodule. She was supposed to go for FNA last year but did not do so, agreeable to FNA now. No history of prior FNA and not on thyroidal medications. TSH was 0.76 in 11/2020.

## 2022-04-20 ENCOUNTER — NON-APPOINTMENT (OUTPATIENT)
Age: 52
End: 2022-04-20

## 2022-05-12 ENCOUNTER — NON-APPOINTMENT (OUTPATIENT)
Age: 52
End: 2022-05-12

## 2022-05-16 NOTE — CONSULT NOTE ADULT - SUBJECTIVE AND OBJECTIVE BOX
TIEN SHARPE  51y  Female [id number]    HPI:  50yo  perimenopausal female with PMHx HIV, DM2, presenting with an acutely worsening infection for HSV. Pt reports first outbreak two years ago, with no resolution of symptoms since that time. Pt has been treated with Valtrex TID for almost two years with no resolution of symptoms. Pt reports this particular outbreak on her right labia has gotten progressively worse over past 6 months. Pt also with Imiquid cream for 6 months with no improvement. Pt reports accompanying dysuria and vaginal discharge. Pt has been intermittently treated with abx for UTI and discharge, and pt reports symptoms return within days of completing course. Pt admitted on  to GYN service for management and biopsy. Biopsy performed on  and patient transferred to Medicine service for ID consultation and management of IV antivirals. Pt remains admitted inpatient for continued course of IV foscarnet.     Patient denies CP, SOB, Cough, abdominal pain, N/V/D, fever, rash, dizziness.   No fever or chills, dysuria due to the ulceration.     PAST MEDICAL & SURGICAL HISTORY:  Diabetes mellitus -- -200, wearing CGM  HIV  Herpes genitalis  S/P Tubal Ligation  S/P bilateral breast implants    ObHx: NSVDx2      Vital Signs Last 24 Hrs  T(C): 36.7 (2021 09:17), Max: 36.9 (2021 06:09)  T(F): 98 (2021 09:17), Max: 98.4 (2021 06:09)  HR: 75 (2021 09:17) (66 - 75)  BP: 123/80 (2021 09:17) (122/78 - 144/76)  BP(mean): --  RR: 18 (2021 09:17) (18 - 18)  SpO2: 98% (2021 09:17) (96% - 99%)    Gen NAD AOx3  CV RRR S1 S2  Pulm CTA b/l   Abd soft, nontender  : 2x4cm exophytic, granulated lesion on right labia, exquisitely tender to touch - non communication with left labia. Normal uretheral meatus. Normal left labia. No palpable lymphadenopathy on exam. No bleeding or discharge on pad  *Examined with Dr. Nina*  Ext: nontender b/l      LABS:                              11.0   3.44  )-----------( 259      ( 2021 06:35 )             34.4         136  |  105  |  18  ----------------------------<  142<H>  4.0   |  20<L>  |  1.38<H>    Ca    9.4      2021 06:35  Mg     1.8         TPro  8.1  /  Alb  3.6  /  TBili  0.4  /  DBili  x   /  AST  15  /  ALT  22  /  AlkPhos  258<H>      I&O's Detail    2021 07:01  -  2021 07:00  --------------------------------------------------------  IN:    IV PiggyBack: 300 mL    Oral Fluid: 960 mL    sodium chloride 0.9%: 1650 mL  Total IN: 2910 mL    OUT:    Voided (mL): 300 mL  Total OUT: 300 mL    Total NET: 2610 mL          Urinalysis Basic - ( 2021 10:42 )    Color: Colorless / Appearance: Clear / S.013 / pH: x  Gluc: x / Ketone: Negative  / Bili: Negative / Urobili: Negative   Blood: x / Protein: 30 mg/dL / Nitrite: Negative   Leuk Esterase: Large / RBC: 2 /hpf / WBC 24 /HPF   Sq Epi: x / Non Sq Epi: 0 /hpf / Bacteria: Few        RADIOLOGY & ADDITIONAL STUDIES:    Surgical Pathology Report (21 @ 15:25)    Surgical Pathology Report:   ACCESSION No:  [id number]    TIEN SHARPE Y                   2        Surgical Final Report          Final Diagnosis    VULVA, MASS, BIOPSY:  -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 [address]  Phone: [phone]   Fax: [phone]  _________________________________________________________________    Comment  This case was reviewed as an intradepartmental consultation with  Dr. ANTOINETTE Jhaveri who concurs with the diagnosis on 2021.    Clinical History  Vulvar mass, no ... , HIV +    Specimen(s) Submitted  1     Vulvar mass    Gross Description  Received in formalin labeled "vulvar mass" is a 0.6 x 0.5 x 0.2  cm in greatest dimension brown-white tissue fragment. Entirely in  one cassette: 1A.    DORI Arzola 2021 01:16 PM    Disclaimer  In addition to other data that may appear on the specimen  containers, all labels have been inspected to confirm the  presence of the patient's name and date of birth.    Histological Processing Performed at [facility], Department of Pathology,[address] [place] [address].                TIEN SHARPE Y                   2        Surgical Consult Report          Disclaimer  In addition to other data that may appear on the specimen  containers, all labels have been inspected to confirm the  presence of the patient's name and date of birth.    Histological Processing Performed at [facility], Department of Pathology, [address] [place] [address].     TIEN SHARPE  51y  Female [id number]    HPI:  50yo  perimenopausal female with PMHx HIV, DM2, presenting with an acutely worsening infection for HSV. Pt reports first outbreak two years ago, with no resolution of symptoms since that time. Pt has been treated with Valtrex TID for almost two years with no resolution of symptoms. Pt reports this particular outbreak on her right labia has gotten progressively worse over past 6 months. Pt also with Imiquid cream for 6 months with no improvement. Pt reports accompanying dysuria and vaginal discharge. Pt has been intermittently treated with abx for UTI and discharge, and pt reports symptoms return within days of completing course. Pt admitted on  to GYN service for management and biopsy. Biopsy performed on  and patient transferred to Medicine service for ID consultation and management of IV antivirals. Pt remains admitted inpatient for continued course of IV foscarnet.     Patient denies CP, SOB, Cough, abdominal pain, N/V/D, fever, rash, dizziness.   No fever or chills, dysuria due to the ulceration.     PAST MEDICAL & SURGICAL HISTORY:  Diabetes mellitus -- -200, wearing CGM  HIV  Herpes genitalis  S/P Tubal Ligation  S/P bilateral breast implants    ObHx: NSVDx2      Vital Signs Last 24 Hrs  T(C): 36.7 (2021 09:17), Max: 36.9 (2021 06:09)  T(F): 98 (2021 09:17), Max: 98.4 (2021 06:09)  HR: 75 (2021 09:17) (66 - 75)  BP: 123/80 (2021 09:17) (122/78 - 144/76)  BP(mean): --  RR: 18 (2021 09:17) (18 - 18)  SpO2: 98% (2021 09:17) (96% - 99%)    Gen NAD AOx3  CV RRR S1 S2  Pulm CTA b/l   Abd soft, nontender  : 2x4cm exophytic, granulated lesion on right labia, exquisitely tender to touch - no communication with left labia. Normal uretheral meatus. Normal left labia. No palpable lymphadenopathy on exam. No bleeding or discharge on pad  Ext: nontender b/l      LABS:                              11.0   3.44  )-----------( 259      ( 2021 06:35 )             34.4         136  |  105  |  18  ----------------------------<  142<H>  4.0   |  20<L>  |  1.38<H>    Ca    9.4      2021 06:35  Mg     1.8         TPro  8.1  /  Alb  3.6  /  TBili  0.4  /  DBili  x   /  AST  15  /  ALT  22  /  AlkPhos  258<H>      I&O's Detail    2021 07:01  -  2021 07:00  --------------------------------------------------------  IN:    IV PiggyBack: 300 mL    Oral Fluid: 960 mL    sodium chloride 0.9%: 1650 mL  Total IN: 2910 mL    OUT:    Voided (mL): 300 mL  Total OUT: 300 mL    Total NET: 2610 mL          Urinalysis Basic - ( 2021 10:42 )    Color: Colorless / Appearance: Clear / S.013 / pH: x  Gluc: x / Ketone: Negative  / Bili: Negative / Urobili: Negative   Blood: x / Protein: 30 mg/dL / Nitrite: Negative   Leuk Esterase: Large / RBC: 2 /hpf / WBC 24 /HPF   Sq Epi: x / Non Sq Epi: 0 /hpf / Bacteria: Few        RADIOLOGY & ADDITIONAL STUDIES:    Surgical Pathology Report (21 @ 15:25)    Surgical Pathology Report:   ACCESSION No:  [id number]    TIEN SHARPE Y                   2        Surgical Final Report          Final Diagnosis    VULVA, MASS, BIOPSY:  -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 [address]  Phone: [phone]   Fax: [phone]  _________________________________________________________________    Comment  This case was reviewed as an intradepartmental consultation with  Dr. ANTOINETTE Jhaveri who concurs with the diagnosis on 2021.    Clinical History  Vulvar mass, no ... , HIV +    Specimen(s) Submitted  1     Vulvar mass    Gross Description  Received in formalin labeled "vulvar mass" is a 0.6 x 0.5 x 0.2  cm in greatest dimension brown-white tissue fragment. Entirely in  one cassette: 1A.    DORI Arzola 2021 01:16 PM    Disclaimer  In addition to other data that may appear on the specimen  containers, all labels have been inspected to confirm the  presence of the patient's name and date of birth.    Histological Processing Performed at [facility], Department of Pathology,[address] [place] [address].                TIEN SHARPE Y                   2        Surgical Consult Report          Disclaimer  In addition to other data that may appear on the specimen  containers, all labels have been inspected to confirm the  presence of the patient's name and date of birth.    Histological Processing Performed at [facility], Department of Pathology, [address] [place] [address].     TIEN SHARPE  51y  Female [id number]    HPI:  52yo  perimenopausal female with PMHx HIV, DM2, presenting with an acutely worsening infection for HSV. Pt reports first outbreak two years ago, with no resolution of symptoms since that time. Pt has been treated with Valtrex TID for almost two years with no resolution of symptoms. Pt reports this particular outbreak on her right labia has gotten progressively worse over past 6 months. Pt also with Imiquid cream for 6 months with no improvement. Pt reports accompanying dysuria and vaginal discharge. Pt has been intermittently treated with abx for UTI and discharge, and pt reports symptoms return within days of completing course. Pt admitted on  to GYN service for management and biopsy. Biopsy performed on  and patient transferred to Medicine service for ID consultation and management of IV antivirals. Pt remains admitted inpatient for continued course of IV foscarnet.     Patient denies CP, SOB, Cough, abdominal pain, N/V/D, fever, rash, dizziness.   No fever or chills, dysuria due to the ulceration.     PAST MEDICAL & SURGICAL HISTORY:  Diabetes mellitus -- -200, wearing CGM  HIV  Herpes genitalis  S/P Tubal Ligation  S/P bilateral breast implants    ObHx: NSVDx2      Vital Signs Last 24 Hrs  T(C): 36.7 (2021 09:17), Max: 36.9 (2021 06:09)  T(F): 98 (2021 09:17), Max: 98.4 (2021 06:09)  HR: 75 (2021 09:17) (66 - 75)  BP: 123/80 (2021 09:17) (122/78 - 144/76)  BP(mean): --  RR: 18 (2021 09:17) (18 - 18)  SpO2: 98% (2021 09:17) (96% - 99%)    Gen NAD AOx3  CV RRR S1 S2  Pulm CTA b/l   Abd soft, nontender  : 2x4cm exophytic, granulated lesion on right labia, exquisitely tender to touch - no communication with left labia. Normal uretheral meatus. Normal left labia. No palpable lymphadenopathy on exam. No bleeding or discharge on pad  Ext: nontender b/l      LABS:                              11.0   3.44  )-----------( 259      ( 2021 06:35 )             34.4         136  |  105  |  18  ----------------------------<  142<H>  4.0   |  20<L>  |  1.38<H>    Ca    9.4      2021 06:35  Mg     1.8         TPro  8.1  /  Alb  3.6  /  TBili  0.4  /  DBili  x   /  AST  15  /  ALT  22  /  AlkPhos  258<H>      I&O's Detail    2021 07:01  -  2021 07:00  --------------------------------------------------------  IN:    IV PiggyBack: 300 mL    Oral Fluid: 960 mL    sodium chloride 0.9%: 1650 mL  Total IN: 2910 mL    OUT:    Voided (mL): 300 mL  Total OUT: 300 mL    Total NET: 2610 mL          Urinalysis Basic - ( 2021 10:42 )    Color: Colorless / Appearance: Clear / S.013 / pH: x  Gluc: x / Ketone: Negative  / Bili: Negative / Urobili: Negative   Blood: x / Protein: 30 mg/dL / Nitrite: Negative   Leuk Esterase: Large / RBC: 2 /hpf / WBC 24 /HPF   Sq Epi: x / Non Sq Epi: 0 /hpf / Bacteria: Few        RADIOLOGY & ADDITIONAL STUDIES:    Surgical Pathology Report (21 @ 15:25)    Surgical Pathology Report:   ACCESSION No:  [id number]    TIEN SHARPE Y                   2        Surgical Final Report          Final Diagnosis    VULVA, MASS, BIOPSY:  -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 [address]  Phone: [phone]   Fax: [phone]  _________________________________________________________________    Comment  This case was reviewed as an intradepartmental consultation with  Dr. ANTOINETTE Jhaveri who concurs with the diagnosis on 2021.    Clinical History  Vulvar mass, no ... , HIV +    Specimen(s) Submitted  1     Vulvar mass    Gross Description  Received in formalin labeled "vulvar mass" is a 0.6 x 0.5 x 0.2  cm in greatest dimension brown-white tissue fragment. Entirely in  one cassette: 1A.    DORI Arzola 2021 01:16 PM  Disclaimer  In addition to other data that may appear on the specimen  containers, all labels have been inspected to confirm the  presence of the patient's name and date of birth.    Histological Processing Performed at [facility], Department of Pathology,[address] [place] [address].      TIEN SHARPE Y                   2    Surgical Consult Report    Disclaimer  In addition to other data that may appear on the specimen  containers, all labels have been inspected to confirm the  presence of the patient's name and date of birth.    Histological Processing Performed at [facility], Department of Pathology, [address] [place] [address].     Name band;

## 2022-08-06 ENCOUNTER — NON-APPOINTMENT (OUTPATIENT)
Age: 52
End: 2022-08-06

## 2022-08-11 ENCOUNTER — NON-APPOINTMENT (OUTPATIENT)
Age: 52
End: 2022-08-11

## 2022-08-25 NOTE — ED PROVIDER NOTE - CONSTITUTIONAL, MLM
- - - Prednisone Counseling:  I discussed with the patient the risks of prolonged use of prednisone including but not limited to weight gain, insomnia, osteoporosis, mood changes, diabetes, susceptibility to infection, glaucoma and high blood pressure.  In cases where prednisone use is prolonged, patients should be monitored with blood pressure checks, serum glucose levels and an eye exam.  Additionally, the patient may need to be placed on GI prophylaxis, PCP prophylaxis, and calcium and vitamin D supplementation and/or a bisphosphonate.  The patient verbalized understanding of the proper use and the possible adverse effects of prednisone.  All of the patient's questions and concerns were addressed.

## 2022-09-28 ENCOUNTER — APPOINTMENT (OUTPATIENT)
Dept: ORTHOPEDIC SURGERY | Facility: CLINIC | Age: 52
End: 2022-09-28

## 2022-09-28 ENCOUNTER — TRANSCRIPTION ENCOUNTER (OUTPATIENT)
Age: 52
End: 2022-09-28

## 2022-09-28 DIAGNOSIS — M16.11 UNILATERAL PRIMARY OSTEOARTHRITIS, RIGHT HIP: ICD-10-CM

## 2022-09-28 PROCEDURE — 73502 X-RAY EXAM HIP UNI 2-3 VIEWS: CPT

## 2022-09-28 PROCEDURE — 99215 OFFICE O/P EST HI 40 MIN: CPT

## 2022-11-02 PROBLEM — Z00.00 ENCOUNTER FOR PREVENTIVE HEALTH EXAMINATION: Status: ACTIVE | Noted: 2022-11-02

## 2022-11-15 ENCOUNTER — OUTPATIENT (OUTPATIENT)
Dept: OUTPATIENT SERVICES | Facility: HOSPITAL | Age: 52
LOS: 1 days | End: 2022-11-15
Payer: COMMERCIAL

## 2022-11-15 VITALS
RESPIRATION RATE: 18 BRPM | HEIGHT: 71 IN | DIASTOLIC BLOOD PRESSURE: 88 MMHG | TEMPERATURE: 98 F | SYSTOLIC BLOOD PRESSURE: 143 MMHG | HEART RATE: 89 BPM | OXYGEN SATURATION: 98 % | WEIGHT: 220.02 LBS

## 2022-11-15 DIAGNOSIS — Z01.818 ENCOUNTER FOR OTHER PREPROCEDURAL EXAMINATION: ICD-10-CM

## 2022-11-15 DIAGNOSIS — Z29.9 ENCOUNTER FOR PROPHYLACTIC MEASURES, UNSPECIFIED: ICD-10-CM

## 2022-11-15 DIAGNOSIS — E11.9 TYPE 2 DIABETES MELLITUS WITHOUT COMPLICATIONS: ICD-10-CM

## 2022-11-15 DIAGNOSIS — Z98.82 BREAST IMPLANT STATUS: Chronic | ICD-10-CM

## 2022-11-15 DIAGNOSIS — M16.11 UNILATERAL PRIMARY OSTEOARTHRITIS, RIGHT HIP: ICD-10-CM

## 2022-11-15 DIAGNOSIS — Z96.642 PRESENCE OF LEFT ARTIFICIAL HIP JOINT: Chronic | ICD-10-CM

## 2022-11-15 LAB
A1C WITH ESTIMATED AVERAGE GLUCOSE RESULT: 7.8 % — HIGH (ref 4–5.6)
ALBUMIN SERPL ELPH-MCNC: 4.4 G/DL — SIGNIFICANT CHANGE UP (ref 3.3–5)
ALP SERPL-CCNC: 433 U/L — HIGH (ref 40–120)
ALT FLD-CCNC: 13 U/L — SIGNIFICANT CHANGE UP (ref 10–45)
ANION GAP SERPL CALC-SCNC: 14 MMOL/L — SIGNIFICANT CHANGE UP (ref 5–17)
AST SERPL-CCNC: 14 U/L — SIGNIFICANT CHANGE UP (ref 10–40)
BILIRUB SERPL-MCNC: 0.7 MG/DL — SIGNIFICANT CHANGE UP (ref 0.2–1.2)
BLD GP AB SCN SERPL QL: NEGATIVE — SIGNIFICANT CHANGE UP
BUN SERPL-MCNC: 20 MG/DL — SIGNIFICANT CHANGE UP (ref 7–23)
CALCIUM SERPL-MCNC: 10.2 MG/DL — SIGNIFICANT CHANGE UP (ref 8.4–10.5)
CHLORIDE SERPL-SCNC: 100 MMOL/L — SIGNIFICANT CHANGE UP (ref 96–108)
CO2 SERPL-SCNC: 23 MMOL/L — SIGNIFICANT CHANGE UP (ref 22–31)
CREAT SERPL-MCNC: 1.34 MG/DL — HIGH (ref 0.5–1.3)
EGFR: 48 ML/MIN/1.73M2 — LOW
ESTIMATED AVERAGE GLUCOSE: 177 MG/DL — HIGH (ref 68–114)
GLUCOSE SERPL-MCNC: 230 MG/DL — HIGH (ref 70–99)
HCT VFR BLD CALC: 41.9 % — SIGNIFICANT CHANGE UP (ref 34.5–45)
HGB BLD-MCNC: 13.6 G/DL — SIGNIFICANT CHANGE UP (ref 11.5–15.5)
MCHC RBC-ENTMCNC: 30.9 PG — SIGNIFICANT CHANGE UP (ref 27–34)
MCHC RBC-ENTMCNC: 32.5 GM/DL — SIGNIFICANT CHANGE UP (ref 32–36)
MCV RBC AUTO: 95.2 FL — SIGNIFICANT CHANGE UP (ref 80–100)
MRSA PCR RESULT.: SIGNIFICANT CHANGE UP
NRBC # BLD: 0 /100 WBCS — SIGNIFICANT CHANGE UP (ref 0–0)
PLATELET # BLD AUTO: 212 K/UL — SIGNIFICANT CHANGE UP (ref 150–400)
POTASSIUM SERPL-MCNC: 4 MMOL/L — SIGNIFICANT CHANGE UP (ref 3.5–5.3)
POTASSIUM SERPL-SCNC: 4 MMOL/L — SIGNIFICANT CHANGE UP (ref 3.5–5.3)
PROT SERPL-MCNC: 9.7 G/DL — HIGH (ref 6–8.3)
RBC # BLD: 4.4 M/UL — SIGNIFICANT CHANGE UP (ref 3.8–5.2)
RBC # FLD: 13.9 % — SIGNIFICANT CHANGE UP (ref 10.3–14.5)
RH IG SCN BLD-IMP: POSITIVE — SIGNIFICANT CHANGE UP
S AUREUS DNA NOSE QL NAA+PROBE: SIGNIFICANT CHANGE UP
SODIUM SERPL-SCNC: 137 MMOL/L — SIGNIFICANT CHANGE UP (ref 135–145)
WBC # BLD: 3.67 K/UL — LOW (ref 3.8–10.5)
WBC # FLD AUTO: 3.67 K/UL — LOW (ref 3.8–10.5)

## 2022-11-15 PROCEDURE — 86850 RBC ANTIBODY SCREEN: CPT

## 2022-11-15 PROCEDURE — G0463: CPT

## 2022-11-15 PROCEDURE — 87641 MR-STAPH DNA AMP PROBE: CPT

## 2022-11-15 PROCEDURE — 80053 COMPREHEN METABOLIC PANEL: CPT

## 2022-11-15 PROCEDURE — 83036 HEMOGLOBIN GLYCOSYLATED A1C: CPT

## 2022-11-15 PROCEDURE — 85027 COMPLETE CBC AUTOMATED: CPT

## 2022-11-15 PROCEDURE — 86901 BLOOD TYPING SEROLOGIC RH(D): CPT

## 2022-11-15 PROCEDURE — 86900 BLOOD TYPING SEROLOGIC ABO: CPT

## 2022-11-15 PROCEDURE — 87640 STAPH A DNA AMP PROBE: CPT

## 2022-11-15 RX ORDER — INSULIN LISPRO 100/ML
10 VIAL (ML) SUBCUTANEOUS
Qty: 0 | Refills: 0 | DISCHARGE

## 2022-11-15 RX ORDER — INSULIN GLARGINE 100 [IU]/ML
20 INJECTION, SOLUTION SUBCUTANEOUS
Qty: 0 | Refills: 0 | DISCHARGE

## 2022-11-15 NOTE — H&P PST ADULT - PROBLEM SELECTOR PLAN 1
Right Direct Anterior Approach Total Hip Replacement on 11/29/22  Pre-op Instructions discussed   labs sent  covid test 1/26/22

## 2022-11-15 NOTE — H&P PST ADULT - NSICDXPASTSURGICALHX_GEN_ALL_CORE_FT
PAST SURGICAL HISTORY:  History of left hip replacement 2020    S/P bilateral breast implants 2009    S/P Tubal Ligation 1995

## 2022-11-15 NOTE — H&P PST ADULT - HISTORY OF PRESENT ILLNESS
52F with controlled HIV on biktarvy , CKD, obesity (BMI= 30), DM2  on insulin , osteoarthritis s/p left hip replacement. Patient complains of pain ,radiating to right groin , decreased ROM of her right hip. Her pain has worsened in the last few years.  She states the symptoms are worsening and her pain  not relieved by exercise regimen and not relieved by nonsteroidal anti-inflammatory drugs. Presents to [facility] for scheduled  Right Direct Anterior  Approach Total Hip replacement on 11/29/22.    covid test 11/26/22

## 2022-11-15 NOTE — H&P PST ADULT - ABORTIONS, OB PROFILE
Telephone Encounter by Maria Del Rosario Hodges RN at 07/06/17 09:09 AM     Author:  Maria Del Rosario Hodges RN Service:  (none) Author Type:  Registered Nurse     Filed:  07/06/17 09:11 AM Encounter Date:  6/29/2017 Status:  Signed     :  Maria Del Rosario Hodges RN (Registered Nurse)            Noted. Thank you.[NH1.1M]       Revision History        User Key Date/Time User Provider Type Action    > NH1.1 07/06/17 09:11 AM Maria Del Rosario Hodges RN Registered Nurse Sign    M - Manual             4

## 2022-11-15 NOTE — H&P PST ADULT - ASSESSMENT
CAPRINI SCORE [CLOT updated 18]    AGE RELATED RISK FACTORS                                                       MOBILITY RELATED FACTORS  [x ] Age 41-60 years                                            (1 Point)                    [ ] Bed rest                                                        (1 Point)  [ ] Age: 61-74 years                                           (2 Points)                  [ ] Plaster cast                                                   (2 Points)  [ ] Age= 75 years                                              (3 Points)                    [ ] Bed bound for more than 72 hours                 (2 Points)    DISEASE RELATED RISK FACTORS                                               GENDER SPECIFIC FACTORS  [ ] Edema in the lower extremities                       (1 Point)              [ ] Pregnancy                                                     (1 Point)  [ ] Varicose veins                                               (1 Point)                     [ ] Post-partum < 6 weeks                                   (1 Point)             x[ x] BMI > 25 Kg/m2                                            (1 Point)                     [ ] Hormonal therapy  or oral contraception          (1 Point)                 [ ] Sepsis (in the previous month)                        (1 Point)               [ ] History of pregnancy complications                 (1 point)  [ ] Pneumonia or serious lung disease                                               [ ] Unexplained or recurrent                     (1 Point)           (in the previous month)                               (1 Point)  [ ] Abnormal pulmonary function test                     (1 Point)                 SURGERY RELATED RISK FACTORS  [ ] Acute myocardial infarction                              (1 Point)               [ ]  Section                                             (1 Point)  [ ] Congestive heart failure (in the previous month)  (1 Point)      [ ] Minor surgery                                                  (1 Point)   [ ] Inflammatory bowel disease                             (1 Point)               [ ] Arthroscopic surgery                                        (2 Points)  [ ] Central venous access                                      (2 Points)                [ ] General surgery lasting more than 45 minutes (2 points)  [ ] Present or previous malignancy                     (2 Points)                [ xlective arthroplasty                                         (5 points)    [ ] Stroke (in the previous month)                          (5 Points)                                                                                                                                                           HEMATOLOGY RELATED FACTORS                                                 TRAUMA RELATED RISK FACTORS  [ ] Prior episodes of VTE                                     (3 Points)                [ ] Fracture of the hip, pelvis, or leg                       (5 Points)  [ ] Positive family history for VTE                         (3 Points)             [ ] Acute spinal cord injury (in the previous month)  (5 Points)  [ ] Prothrombin 49602 A                                     (3 Points)               [ ] Paralysis  (less than 1 month)                             (5 Points)  [ ] Factor V Leiden                                             (3 Points)                  [ ] Multiple Trauma within 1 month                        (5 Points)  [ ] Lupus anticoagulants                                     (3 Points)                                                           [ ] Anticardiolipin antibodies                               (3 Points)                                                       [ ] High homocysteine in the blood                      (3 Points)                                             [ ] Other congenital or acquired thrombophilia      (3 Points)                                                [ ] Heparin induced thrombocytopenia                  (3 Points)                                     Total Score [       7   ]

## 2022-11-15 NOTE — H&P PST ADULT - NSICDXPASTMEDICALHX_GEN_ALL_CORE_FT
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

## 2022-11-26 ENCOUNTER — OUTPATIENT (OUTPATIENT)
Dept: OUTPATIENT SERVICES | Facility: HOSPITAL | Age: 52
LOS: 1 days | End: 2022-11-26
Payer: COMMERCIAL

## 2022-11-26 DIAGNOSIS — Z96.642 PRESENCE OF LEFT ARTIFICIAL HIP JOINT: Chronic | ICD-10-CM

## 2022-11-26 DIAGNOSIS — Z11.52 ENCOUNTER FOR SCREENING FOR COVID-19: ICD-10-CM

## 2022-11-26 DIAGNOSIS — Z98.82 BREAST IMPLANT STATUS: Chronic | ICD-10-CM

## 2022-11-26 LAB — SARS-COV-2 RNA SPEC QL NAA+PROBE: SIGNIFICANT CHANGE UP

## 2022-11-26 PROCEDURE — U0003: CPT

## 2022-11-26 PROCEDURE — C9803: CPT

## 2022-11-26 PROCEDURE — U0005: CPT

## 2022-11-28 ENCOUNTER — TRANSCRIPTION ENCOUNTER (OUTPATIENT)
Age: 52
End: 2022-11-28

## 2022-11-28 PROBLEM — M16.12 UNILATERAL PRIMARY OSTEOARTHRITIS, LEFT HIP: Chronic | Status: ACTIVE | Noted: 2022-11-15

## 2022-11-28 PROBLEM — E66.9 OBESITY, UNSPECIFIED: Chronic | Status: ACTIVE | Noted: 2022-11-15

## 2022-11-28 PROBLEM — A60.00 HERPESVIRAL INFECTION OF UROGENITAL SYSTEM, UNSPECIFIED: Chronic | Status: ACTIVE | Noted: 2020-06-16

## 2022-11-28 PROBLEM — B20 HUMAN IMMUNODEFICIENCY VIRUS [HIV] DISEASE: Chronic | Status: ACTIVE | Noted: 2020-06-16

## 2022-11-29 ENCOUNTER — NON-APPOINTMENT (OUTPATIENT)
Age: 52
End: 2022-11-29

## 2022-11-29 ENCOUNTER — APPOINTMENT (OUTPATIENT)
Dept: ORTHOPEDIC SURGERY | Facility: HOSPITAL | Age: 52
End: 2022-11-29

## 2022-11-29 ENCOUNTER — TRANSCRIPTION ENCOUNTER (OUTPATIENT)
Age: 52
End: 2022-11-29

## 2022-11-29 ENCOUNTER — INPATIENT (INPATIENT)
Facility: HOSPITAL | Age: 52
LOS: 0 days | Discharge: HOME HEALTH | DRG: 470 | End: 2022-11-30
Attending: ORTHOPAEDIC SURGERY | Admitting: ORTHOPAEDIC SURGERY
Payer: COMMERCIAL

## 2022-11-29 VITALS
RESPIRATION RATE: 18 BRPM | OXYGEN SATURATION: 100 % | HEART RATE: 87 BPM | WEIGHT: 220.02 LBS | SYSTOLIC BLOOD PRESSURE: 157 MMHG | TEMPERATURE: 98 F | DIASTOLIC BLOOD PRESSURE: 75 MMHG | HEIGHT: 71 IN

## 2022-11-29 DIAGNOSIS — M16.11 UNILATERAL PRIMARY OSTEOARTHRITIS, RIGHT HIP: ICD-10-CM

## 2022-11-29 DIAGNOSIS — Z98.82 BREAST IMPLANT STATUS: Chronic | ICD-10-CM

## 2022-11-29 DIAGNOSIS — Z96.642 PRESENCE OF LEFT ARTIFICIAL HIP JOINT: Chronic | ICD-10-CM

## 2022-11-29 LAB
GLUCOSE BLDC GLUCOMTR-MCNC: 155 MG/DL — HIGH (ref 70–99)
GLUCOSE BLDC GLUCOMTR-MCNC: 240 MG/DL — HIGH (ref 70–99)
GLUCOSE BLDC GLUCOMTR-MCNC: 267 MG/DL — HIGH (ref 70–99)
GLUCOSE BLDC GLUCOMTR-MCNC: 320 MG/DL — HIGH (ref 70–99)

## 2022-11-29 PROCEDURE — 72170 X-RAY EXAM OF PELVIS: CPT | Mod: 26

## 2022-11-29 PROCEDURE — 27130 TOTAL HIP ARTHROPLASTY: CPT | Mod: RT

## 2022-11-29 PROCEDURE — 27130 TOTAL HIP ARTHROPLASTY: CPT | Mod: 82,RT

## 2022-11-29 DEVICE — SHELL ACET TRIDENT II D 50MM: Type: IMPLANTABLE DEVICE | Site: RIGHT | Status: FUNCTIONAL

## 2022-11-29 DEVICE — HIP STEM MODULAR 21MM PLUS 0MM CONE: Type: IMPLANTABLE DEVICE | Site: RIGHT | Status: FUNCTIONAL

## 2022-11-29 DEVICE — IMPLANTABLE DEVICE: Type: IMPLANTABLE DEVICE | Site: RIGHT | Status: FUNCTIONAL

## 2022-11-29 DEVICE — LINER ACET TRIDENT X3 0 DEG 36MM D: Type: IMPLANTABLE DEVICE | Site: RIGHT | Status: FUNCTIONAL

## 2022-11-29 DEVICE — HEAD FEM CER V40 36MM  PLUS 0MM: Type: IMPLANTABLE DEVICE | Site: RIGHT | Status: FUNCTIONAL

## 2022-11-29 RX ORDER — SODIUM CHLORIDE 9 MG/ML
500 INJECTION, SOLUTION INTRAVENOUS ONCE
Refills: 0 | Status: DISCONTINUED | OUTPATIENT
Start: 2022-11-29 | End: 2022-11-29

## 2022-11-29 RX ORDER — KETOROLAC TROMETHAMINE 30 MG/ML
15 SYRINGE (ML) INJECTION EVERY 6 HOURS
Refills: 0 | Status: DISCONTINUED | OUTPATIENT
Start: 2022-11-29 | End: 2022-11-29

## 2022-11-29 RX ORDER — ACETAMINOPHEN 500 MG
975 TABLET ORAL EVERY 8 HOURS
Refills: 0 | Status: DISCONTINUED | OUTPATIENT
Start: 2022-11-30 | End: 2022-11-30

## 2022-11-29 RX ORDER — BICTEGRAVIR SODIUM, EMTRICITABINE, AND TENOFOVIR ALAFENAMIDE FUMARATE 30; 120; 15 MG/1; MG/1; MG/1
1 TABLET ORAL DAILY
Refills: 0 | Status: DISCONTINUED | OUTPATIENT
Start: 2022-11-30 | End: 2022-11-30

## 2022-11-29 RX ORDER — OXYCODONE HYDROCHLORIDE 5 MG/1
10 TABLET ORAL EVERY 4 HOURS
Refills: 0 | Status: DISCONTINUED | OUTPATIENT
Start: 2022-11-29 | End: 2022-11-30

## 2022-11-29 RX ORDER — SODIUM CHLORIDE 9 MG/ML
500 INJECTION INTRAMUSCULAR; INTRAVENOUS; SUBCUTANEOUS ONCE
Refills: 0 | Status: COMPLETED | OUTPATIENT
Start: 2022-11-30 | End: 2022-11-30

## 2022-11-29 RX ORDER — DEXTROSE 50 % IN WATER 50 %
25 SYRINGE (ML) INTRAVENOUS ONCE
Refills: 0 | Status: DISCONTINUED | OUTPATIENT
Start: 2022-11-29 | End: 2022-11-30

## 2022-11-29 RX ORDER — OXYCODONE HYDROCHLORIDE 5 MG/1
5 TABLET ORAL
Refills: 0 | Status: DISCONTINUED | OUTPATIENT
Start: 2022-11-29 | End: 2022-11-30

## 2022-11-29 RX ORDER — SODIUM CHLORIDE 9 MG/ML
3 INJECTION INTRAMUSCULAR; INTRAVENOUS; SUBCUTANEOUS EVERY 8 HOURS
Refills: 0 | Status: DISCONTINUED | OUTPATIENT
Start: 2022-11-29 | End: 2022-11-29

## 2022-11-29 RX ORDER — INSULIN LISPRO 100/ML
VIAL (ML) SUBCUTANEOUS
Refills: 0 | Status: DISCONTINUED | OUTPATIENT
Start: 2022-11-29 | End: 2022-11-30

## 2022-11-29 RX ORDER — ACETAMINOPHEN 500 MG
1000 TABLET ORAL ONCE
Refills: 0 | Status: COMPLETED | OUTPATIENT
Start: 2022-11-30 | End: 2022-11-30

## 2022-11-29 RX ORDER — INSULIN LISPRO 100/ML
3 VIAL (ML) SUBCUTANEOUS ONCE
Refills: 0 | Status: COMPLETED | OUTPATIENT
Start: 2022-11-29 | End: 2022-11-29

## 2022-11-29 RX ORDER — SODIUM CHLORIDE 9 MG/ML
500 INJECTION INTRAMUSCULAR; INTRAVENOUS; SUBCUTANEOUS ONCE
Refills: 0 | Status: COMPLETED | OUTPATIENT
Start: 2022-11-29 | End: 2022-11-29

## 2022-11-29 RX ORDER — POLYETHYLENE GLYCOL 3350 17 G/17G
17 POWDER, FOR SOLUTION ORAL AT BEDTIME
Refills: 0 | Status: DISCONTINUED | OUTPATIENT
Start: 2022-11-29 | End: 2022-11-30

## 2022-11-29 RX ORDER — ONDANSETRON 8 MG/1
4 TABLET, FILM COATED ORAL EVERY 6 HOURS
Refills: 0 | Status: DISCONTINUED | OUTPATIENT
Start: 2022-11-29 | End: 2022-11-30

## 2022-11-29 RX ORDER — ACETAMINOPHEN 500 MG
975 TABLET ORAL EVERY 8 HOURS
Refills: 0 | Status: DISCONTINUED | OUTPATIENT
Start: 2022-11-29 | End: 2022-11-29

## 2022-11-29 RX ORDER — INSULIN LISPRO 100/ML
VIAL (ML) SUBCUTANEOUS AT BEDTIME
Refills: 0 | Status: DISCONTINUED | OUTPATIENT
Start: 2022-11-29 | End: 2022-11-30

## 2022-11-29 RX ORDER — CELECOXIB 200 MG/1
200 CAPSULE ORAL EVERY 12 HOURS
Refills: 0 | Status: DISCONTINUED | OUTPATIENT
Start: 2022-11-30 | End: 2022-11-30

## 2022-11-29 RX ORDER — SODIUM CHLORIDE 9 MG/ML
500 INJECTION, SOLUTION INTRAVENOUS ONCE
Refills: 0 | Status: COMPLETED | OUTPATIENT
Start: 2022-11-29 | End: 2022-11-29

## 2022-11-29 RX ORDER — ASPIRIN/CALCIUM CARB/MAGNESIUM 324 MG
325 TABLET ORAL
Refills: 0 | Status: DISCONTINUED | OUTPATIENT
Start: 2022-11-29 | End: 2022-11-30

## 2022-11-29 RX ORDER — MAGNESIUM HYDROXIDE 400 MG/1
30 TABLET, CHEWABLE ORAL DAILY
Refills: 0 | Status: DISCONTINUED | OUTPATIENT
Start: 2022-11-29 | End: 2022-11-30

## 2022-11-29 RX ORDER — SODIUM CHLORIDE 9 MG/ML
1000 INJECTION, SOLUTION INTRAVENOUS
Refills: 0 | Status: DISCONTINUED | OUTPATIENT
Start: 2022-11-29 | End: 2022-11-30

## 2022-11-29 RX ORDER — DEXTROSE 50 % IN WATER 50 %
15 SYRINGE (ML) INTRAVENOUS ONCE
Refills: 0 | Status: DISCONTINUED | OUTPATIENT
Start: 2022-11-29 | End: 2022-11-30

## 2022-11-29 RX ORDER — TRAMADOL HYDROCHLORIDE 50 MG/1
50 TABLET ORAL ONCE
Refills: 0 | Status: DISCONTINUED | OUTPATIENT
Start: 2022-11-29 | End: 2022-11-29

## 2022-11-29 RX ORDER — GLUCAGON INJECTION, SOLUTION 0.5 MG/.1ML
1 INJECTION, SOLUTION SUBCUTANEOUS ONCE
Refills: 0 | Status: DISCONTINUED | OUTPATIENT
Start: 2022-11-29 | End: 2022-11-30

## 2022-11-29 RX ORDER — HYDROMORPHONE HYDROCHLORIDE 2 MG/ML
0.5 INJECTION INTRAMUSCULAR; INTRAVENOUS; SUBCUTANEOUS
Refills: 0 | Status: DISCONTINUED | OUTPATIENT
Start: 2022-11-29 | End: 2022-11-29

## 2022-11-29 RX ORDER — PANTOPRAZOLE SODIUM 20 MG/1
40 TABLET, DELAYED RELEASE ORAL ONCE
Refills: 0 | Status: COMPLETED | OUTPATIENT
Start: 2022-11-29 | End: 2022-11-29

## 2022-11-29 RX ORDER — TRAMADOL HYDROCHLORIDE 50 MG/1
50 TABLET ORAL EVERY 6 HOURS
Refills: 0 | Status: DISCONTINUED | OUTPATIENT
Start: 2022-11-29 | End: 2022-11-30

## 2022-11-29 RX ORDER — CHLORHEXIDINE GLUCONATE 213 G/1000ML
1 SOLUTION TOPICAL ONCE
Refills: 0 | Status: DISCONTINUED | OUTPATIENT
Start: 2022-11-29 | End: 2022-11-29

## 2022-11-29 RX ORDER — DEXTROSE 50 % IN WATER 50 %
12.5 SYRINGE (ML) INTRAVENOUS ONCE
Refills: 0 | Status: DISCONTINUED | OUTPATIENT
Start: 2022-11-29 | End: 2022-11-30

## 2022-11-29 RX ORDER — CEFAZOLIN SODIUM 1 G
2000 VIAL (EA) INJECTION EVERY 8 HOURS
Refills: 0 | Status: COMPLETED | OUTPATIENT
Start: 2022-11-29 | End: 2022-11-30

## 2022-11-29 RX ORDER — PANTOPRAZOLE SODIUM 20 MG/1
40 TABLET, DELAYED RELEASE ORAL
Refills: 0 | Status: DISCONTINUED | OUTPATIENT
Start: 2022-11-29 | End: 2022-11-30

## 2022-11-29 RX ORDER — INSULIN LISPRO 100/ML
11 VIAL (ML) SUBCUTANEOUS
Qty: 0 | Refills: 0 | DISCHARGE

## 2022-11-29 RX ORDER — ACETAMINOPHEN 500 MG
1000 TABLET ORAL ONCE
Refills: 0 | Status: COMPLETED | OUTPATIENT
Start: 2022-11-29 | End: 2022-11-29

## 2022-11-29 RX ORDER — CEFAZOLIN SODIUM 1 G
2000 VIAL (EA) INJECTION ONCE
Refills: 0 | Status: COMPLETED | OUTPATIENT
Start: 2022-11-29 | End: 2022-11-29

## 2022-11-29 RX ORDER — SENNA PLUS 8.6 MG/1
2 TABLET ORAL AT BEDTIME
Refills: 0 | Status: DISCONTINUED | OUTPATIENT
Start: 2022-11-29 | End: 2022-11-30

## 2022-11-29 RX ORDER — INSULIN LISPRO 100/ML
10 VIAL (ML) SUBCUTANEOUS
Refills: 0 | Status: DISCONTINUED | OUTPATIENT
Start: 2022-11-29 | End: 2022-11-30

## 2022-11-29 RX ORDER — LIDOCAINE HCL 20 MG/ML
0.2 VIAL (ML) INJECTION ONCE
Refills: 0 | Status: DISCONTINUED | OUTPATIENT
Start: 2022-11-29 | End: 2022-11-29

## 2022-11-29 RX ADMIN — SODIUM CHLORIDE 500 MILLILITER(S): 9 INJECTION, SOLUTION INTRAVENOUS at 13:17

## 2022-11-29 RX ADMIN — POLYETHYLENE GLYCOL 3350 17 GRAM(S): 17 POWDER, FOR SOLUTION ORAL at 21:21

## 2022-11-29 RX ADMIN — SENNA PLUS 2 TABLET(S): 8.6 TABLET ORAL at 21:21

## 2022-11-29 RX ADMIN — SODIUM CHLORIDE 1000 MILLILITER(S): 9 INJECTION INTRAMUSCULAR; INTRAVENOUS; SUBCUTANEOUS at 19:06

## 2022-11-29 RX ADMIN — Medication 1000 MILLIGRAM(S): at 19:30

## 2022-11-29 RX ADMIN — TRAMADOL HYDROCHLORIDE 50 MILLIGRAM(S): 50 TABLET ORAL at 08:19

## 2022-11-29 RX ADMIN — Medication 1: at 21:50

## 2022-11-29 RX ADMIN — PANTOPRAZOLE SODIUM 40 MILLIGRAM(S): 20 TABLET, DELAYED RELEASE ORAL at 08:19

## 2022-11-29 RX ADMIN — Medication 3 UNIT(S): at 12:54

## 2022-11-29 RX ADMIN — OXYCODONE HYDROCHLORIDE 5 MILLIGRAM(S): 5 TABLET ORAL at 13:55

## 2022-11-29 RX ADMIN — Medication 325 MILLIGRAM(S): at 17:51

## 2022-11-29 RX ADMIN — Medication 100 MILLIGRAM(S): at 17:51

## 2022-11-29 RX ADMIN — Medication 400 MILLIGRAM(S): at 19:00

## 2022-11-29 RX ADMIN — Medication 4: at 17:51

## 2022-11-29 RX ADMIN — OXYCODONE HYDROCHLORIDE 5 MILLIGRAM(S): 5 TABLET ORAL at 13:57

## 2022-11-29 RX ADMIN — TRAMADOL HYDROCHLORIDE 50 MILLIGRAM(S): 50 TABLET ORAL at 13:44

## 2022-11-29 RX ADMIN — HYDROMORPHONE HYDROCHLORIDE 0.5 MILLIGRAM(S): 2 INJECTION INTRAMUSCULAR; INTRAVENOUS; SUBCUTANEOUS at 12:57

## 2022-11-29 RX ADMIN — TRAMADOL HYDROCHLORIDE 50 MILLIGRAM(S): 50 TABLET ORAL at 13:57

## 2022-11-29 RX ADMIN — Medication 10 UNIT(S): at 17:52

## 2022-11-29 RX ADMIN — OXYCODONE HYDROCHLORIDE 10 MILLIGRAM(S): 5 TABLET ORAL at 17:51

## 2022-11-29 RX ADMIN — HYDROMORPHONE HYDROCHLORIDE 0.5 MILLIGRAM(S): 2 INJECTION INTRAMUSCULAR; INTRAVENOUS; SUBCUTANEOUS at 11:55

## 2022-11-29 NOTE — PHYSICAL THERAPY INITIAL EVALUATION ADULT - ADDITIONAL COMMENTS
Pt reports that she lives in a pvt home with home with her fiance +4 steps to enter with a handrail and +14 steps once inside the home. Pt reports that she ambulated with a straight cane prior to this admission.

## 2022-11-29 NOTE — BRIEF OPERATIVE NOTE - OPERATION/FINDINGS
R TOBIN with diaphyseal engaging stem  Fellow assisted throughout the case. Please see full operative report for complete details

## 2022-11-29 NOTE — PATIENT PROFILE ADULT - FALL HARM RISK - UNIVERSAL INTERVENTIONS
Bed in lowest position, wheels locked, appropriate side rails in place/Call bell, personal items and telephone in reach/Instruct patient to call for assistance before getting out of bed or chair/Non-slip footwear when patient is out of bed/[place] to call system/Physically safe environment - no spills, clutter or unnecessary equipment/Purposeful Proactive Rounding/Room/bathroom lighting operational, light cord in reach

## 2022-11-29 NOTE — PHYSICAL THERAPY INITIAL EVALUATION ADULT - PERTINENT HX OF CURRENT PROBLEM, REHAB EVAL
52F POD 0 s/p R total hip replacement anterior approach with PMHx including controlled HIV on biktarvy , CKD, obesity, DM2, osteoarthritis s/p left hip replacement. Patient complains of pain, radiating to right groin , decreased ROM of her right hip. Her pain has worsened in the last few years. She states the symptoms are worsening and her pain  not relieved by exercise regimen and not relieved by nonsteroidal anti-inflammatory drugs.

## 2022-11-29 NOTE — PATIENT PROFILE ADULT - FALL HARM RISK - RISK INTERVENTIONS
Assistance OOB with selected safe patient handling equipment/Assistance with ambulation/Communicate Fall Risk and Risk Factors to all staff, patient, and family/Discuss with provider need for PT consult/Monitor gait and stability/Provide patient with walking aids - walker, cane, crutches/Reinforce activity limits and safety measures with patient and family/Sit up slowly, dangle for a short time, stand at bedside before walking/Use of alarms - bed, chair and/or voice tab/Visual Cue: Yellow wristband/Bed in lowest position, wheels locked, appropriate side rails in place/Call bell, personal items and telephone in reach/Instruct patient to call for assistance before getting out of bed or chair/Non-slip footwear when patient is out of bed/[place] to call system/Physically safe environment - no spills, clutter or unnecessary equipment/Purposeful Proactive Rounding/Room/bathroom lighting operational, light cord in reach

## 2022-11-29 NOTE — DISCHARGE NOTE PROVIDER - CARE PROVIDER_API CALL
Baldev Diaz)  Orthopaedic Surgery  [address] [place] [address]  Phone: [phone]  Fax: [phone]  Established Patient  Follow Up Time:

## 2022-11-29 NOTE — DISCHARGE NOTE PROVIDER - NSDCMRMEDTOKEN_GEN_ALL_CORE_FT
bictegravir/emtricitabine/tenofovir 50 mg-200 mg-25 mg oral tablet: 1 tab(s) orally once a day  HumaLOG KwikPen 100 units/mL injectable solution: 11  injectable 3 times a day, As Needed   acetaminophen 325 mg oral tablet: Take 3 tabs oral every 8 hours x 5 days, then as needed for mild pain, temp &gt;100.4F   bictegravir/emtricitabine/tenofovir 50 mg-200 mg-25 mg oral tablet: 1 tab(s) orally once a day  Ecotrin 325 mg oral delayed release tablet: 1 tab(s) orally 2 times a day x 6 weeks total for dvt prevention MDD:2  HumaLOG KwikPen 100 units/mL injectable solution: 11  injectable 3 times a day, As Needed  Narcan 4 mg/0.1 mL nasal spray: 1 spray q2-3 minutes alternating between nostrils.   oxyCODONE 5 mg oral tablet: 1 tabs orally every 4-6 hours as needed for moderate pain, 2 tab(s) orally every 4-6 hours, As Needed for  severe pain MDD:8  Protonix 40 mg oral delayed release tablet: 1 tab(s) orally once a day   senna leaf extract oral tablet: 2 tab(s) orally once a day (at bedtime)  traMADol 50 mg oral tablet: 1 tab(s) orally every 6 hours, As needed, Mild Pain (1 - 3) MDD:4

## 2022-11-29 NOTE — PHYSICAL THERAPY INITIAL EVALUATION ADULT - PLANNED THERAPY INTERVENTIONS, PT EVAL
stair training: GOAL: Pt will negotiate up/down 14 steps with r/l handrail ascending using SC independently in 2 weeks./balance training/bed mobility training/gait training/strengthening/transfer training

## 2022-11-29 NOTE — DISCHARGE NOTE PROVIDER - NSDCACTIVITY_GEN_ALL_CORE
Do not drive or operate machinery/Showering allowed/Do not make important decisions/Walking - Indoors allowed/No heavy lifting/straining/Walking - Outdoors allowed Do not drive or operate machinery/Showering allowed/Do not make important decisions/Stairs allowed/Walking - Indoors allowed/No heavy lifting/straining/Walking - Outdoors allowed

## 2022-11-29 NOTE — PRE-ANESTHESIA EVALUATION ADULT - NSANTHPMHFT_GEN_ALL_CORE
chart and med note reviewed. hx hiv on  haart. ckd, at/near apparent baseline. dm a1c~ *, fs today < 200. no hx sig cv/pulm dz, et > 1 flight no cp/sob

## 2022-11-29 NOTE — CHART NOTE - NSCHARTNOTEFT_GEN_A_CORE
Post-Operative Check    Pt evaluated in RR resting without complaints. Pt states pain is well tolerated. No Chest Pain, SOB, N/V.  As per patient, takes 10-15 units of short acting insulin and does not take long lasting insulin.     T(C): 36.7 (11-29-22 @ 11:40), Max: 36.7 (11-29-22 @ 11:40)  HR: 68 (11-29-22 @ 13:00) (64 - 87)  BP: 124/74 (11-29-22 @ 13:00) (110/67 - 157/75)  RR: 14 (11-29-22 @ 13:00) (14 - 18)  SpO2: 97% (11-29-22 @ 13:00) (93% - 100%)  Wt(kg): --    Exam:  Alert and Oriented, No Acute Distress. VSS.   Laterality: L/R Hip/Knee     Calves soft, non-tender      Aquacel dressing x 2 clean, dry, and intact.      Motor function impaired 2/2 nerve block     Sensation intact to light touch      2+ DP/PT pulse    Imaging: Fluoroscopy s/p Right TOBIN  Impression: Prosthetics appear aligned and symmetrical.  No signs of periprosthetic changes/fractures.  Will review official read when available.       A/P: 52y Female s/p Right Total Hip Arthroplasty with posterior approach. VSS. NAD  -Reassess motor function once weened off nerve block  -Monitor BGL  -HOLD toradol due to Hx of CKD  -PT/OT- WBAT/OOB with Posterior Hip Precautions  -IS bedside   -DVT PPx: Aspirin 325 mg BID, SCD, Early OOB and Amb  -GI PPx: Protonix 40 mg   -Pain Control  -Continue Current Tx  -f/u AM labs.   -Dispo planning: PACU to Floor, pending PT/OT eval.     Tobias Najera PA-C  Orthopedic Surgery Team  Team Pager #[phone] Post-Operative Check    Pt evaluated in RR resting without complaints. Pt states pain is well tolerated. No Chest Pain, SOB, N/V.  As per patient, takes 10-15 units of short acting insulin and does not take long lasting insulin.     T(C): 36.7 (11-29-22 @ 11:40), Max: 36.7 (11-29-22 @ 11:40)  HR: 68 (11-29-22 @ 13:00) (64 - 87)  BP: 124/74 (11-29-22 @ 13:00) (110/67 - 157/75)  RR: 14 (11-29-22 @ 13:00) (14 - 18)  SpO2: 97% (11-29-22 @ 13:00) (93% - 100%)  Wt(kg): --    Exam:  Alert and Oriented, No Acute Distress. VSS.   Laterality: L/R Hip/Knee     Calves soft, non-tender      Aquacel dressing x 2 clean, dry, and intact.      Motor function impaired 2/2 nerve block     Sensation intact to light touch      2+ DP/PT pulse      Imaging: Xray pending      A/P: 52y Female s/p Right Total Hip Arthroplasty. VSS. NAD  -Reassess motor function once weened off nerve block  -Monitor BGL  -HOLD toradol due to Hx of CKD  -PT/OT- WBAT/OOB  -IS bedside   -DVT PPx: Aspirin 325 mg BID, SCD, Early OOB and Amb  -GI PPx: Protonix 40 mg   -Pain Control  -Continue Current Tx  -f/u AM labs.   -Dispo planning: PACU to Floor, pending PT/OT eval.     Tobias Najera PA-C  Orthopedic Surgery Team  Team Pager #[phone]

## 2022-11-29 NOTE — DISCHARGE NOTE PROVIDER - HOSPITAL COURSE
History of Present Illness    52F with controlled HIV on biktarvy , CKD, obesity (BMI= 30), DM2  on insulin , osteoarthritis s/p left hip replacement. Patient complains of pain ,radiating to right groin , decreased ROM of her right hip. Her pain has worsened in the last few years.  She states the symptoms are worsening and her pain  not relieved by exercise regimen and not relieved by nonsteroidal anti-inflammatory drugs. Presents to [facility] for scheduled Right Direct Anterior  Approach Total Hip replacement on 11/29/22.    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     Past Surgical History:  History of left hip replacement 2020  S/P bilateral breast implants 2009  S/P Tubal Ligation 1995.    Hospital Course:  After admission on 11/29/2022 and receiving pre-operative parenteral prophylactic antibiotics, the patient underwent an uncomplicated right total hip replacement with Dr. Diaz. Patient tolerated the procedure well and was transferred to the recovery room in stable condition, with a stable neuro / vascular exam of the operated extremity.    Patient was placed on  mg BID x 6 weeks for DVT ppx, and was placed on Protonix for GI protection.   Patient was made weight bearing as tolerated with the operative leg.     Typical Physical & occupational therapy modalities post surgery were performed by physical and occupational therapies, including ambulation training, range of motion, ADL's, abd transfers.  After progression of mobility guided by the PT/ OT staff,  the patient was felt to benefit from further rehabilitative care for restoration to level of function. This was felt to best be accomplished at ****  Discharge and Orthopedic Care instructions were delineated in the Discharge Plan and reviewed with the patient. All medications were delineated in the medication reconciliation tool and key points were reviewed with the patient. They were deemed stable from an Orthopedic & medical standpoint for discharge ***   History of Present Illness    52F with controlled HIV on biktarvy , CKD, obesity (BMI= 30), DM2  on insulin , osteoarthritis s/p left hip replacement. Patient complains of pain ,radiating to right groin , decreased ROM of her right hip. Her pain has worsened in the last few years.  She states the symptoms are worsening and her pain  not relieved by exercise regimen and not relieved by nonsteroidal anti-inflammatory drugs. Presents to [facility] for scheduled Right Direct Anterior  Approach Total Hip replacement on 11/29/22.    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     Past Surgical History:  History of left hip replacement 2020  S/P bilateral breast implants 2009  S/P Tubal Ligation 1995.    Hospital Course:  After admission on 11/29/2022 and receiving pre-operative parenteral prophylactic antibiotics, the patient underwent an uncomplicated right total hip replacement with Dr. Diaz. Patient tolerated the procedure well and was transferred to the recovery room in stable condition, with a stable neuro / vascular exam of the operated extremity.    Patient was placed on  mg BID x 6 weeks for DVT ppx, and was placed on Protonix for GI protection.   Patient was made weight bearing as tolerated with the operative leg.     Typical Physical & occupational therapy modalities post surgery were performed by physical and occupational therapies, including ambulation training, range of motion, ADL's, abd transfers.  After progression of mobility guided by the PT/ OT staff,  the patient was felt to benefit from further PT care for restoration to level of function. This was felt to best be accomplished at home.  Discharge and Orthopedic Care instructions were delineated in the Discharge Plan and reviewed with the patient. All medications were delineated in the medication reconciliation tool and key points were reviewed with the patient. They were deemed stable from an Orthopedic & medical standpoint for discharge.

## 2022-11-29 NOTE — PHYSICAL THERAPY INITIAL EVALUATION ADULT - STRENGTHENING, PT EVAL
GOAL: Patient will demonstrate a 1/3 increase in strength where deficient to assist with performing functional mobility and ADLs in 2 weeks.

## 2022-11-29 NOTE — OCCUPATIONAL THERAPY INITIAL EVALUATION ADULT - PERTINENT HX OF CURRENT PROBLEM, REHAB EVAL
52F with controlled HIV on biktarvy , CKD, obesity (BMI= 30), DM2  on insulin , osteoarthritis s/p left hip replacement. Patient complains of pain ,radiating to right groin , decreased ROM of her right hip. Her pain has worsened in the last few years.  She states the symptoms are worsening and her pain  not relieved by exercise regimen and not relieved by nonsteroidal anti-inflammatory drugs. Pt s/p scheduled  Right Direct Anterior Approach Total Hip replacement on 11/29/22.

## 2022-11-30 ENCOUNTER — TRANSCRIPTION ENCOUNTER (OUTPATIENT)
Age: 52
End: 2022-11-30

## 2022-11-30 VITALS
TEMPERATURE: 98 F | DIASTOLIC BLOOD PRESSURE: 73 MMHG | HEART RATE: 100 BPM | SYSTOLIC BLOOD PRESSURE: 114 MMHG | OXYGEN SATURATION: 98 % | RESPIRATION RATE: 17 BRPM

## 2022-11-30 LAB
ANION GAP SERPL CALC-SCNC: 13 MMOL/L — SIGNIFICANT CHANGE UP (ref 5–17)
BUN SERPL-MCNC: 22 MG/DL — SIGNIFICANT CHANGE UP (ref 7–23)
CALCIUM SERPL-MCNC: 9.2 MG/DL — SIGNIFICANT CHANGE UP (ref 8.4–10.5)
CHLORIDE SERPL-SCNC: 99 MMOL/L — SIGNIFICANT CHANGE UP (ref 96–108)
CO2 SERPL-SCNC: 24 MMOL/L — SIGNIFICANT CHANGE UP (ref 22–31)
CREAT SERPL-MCNC: 1.51 MG/DL — HIGH (ref 0.5–1.3)
EGFR: 41 ML/MIN/1.73M2 — LOW
GLUCOSE BLDC GLUCOMTR-MCNC: 240 MG/DL — HIGH (ref 70–99)
GLUCOSE SERPL-MCNC: 220 MG/DL — HIGH (ref 70–99)
HCT VFR BLD CALC: 34.4 % — LOW (ref 34.5–45)
HGB BLD-MCNC: 11 G/DL — LOW (ref 11.5–15.5)
MCHC RBC-ENTMCNC: 30.8 PG — SIGNIFICANT CHANGE UP (ref 27–34)
MCHC RBC-ENTMCNC: 32 GM/DL — SIGNIFICANT CHANGE UP (ref 32–36)
MCV RBC AUTO: 96.4 FL — SIGNIFICANT CHANGE UP (ref 80–100)
NRBC # BLD: 0 /100 WBCS — SIGNIFICANT CHANGE UP (ref 0–0)
PLATELET # BLD AUTO: 221 K/UL — SIGNIFICANT CHANGE UP (ref 150–400)
POTASSIUM SERPL-MCNC: 4.6 MMOL/L — SIGNIFICANT CHANGE UP (ref 3.5–5.3)
POTASSIUM SERPL-SCNC: 4.6 MMOL/L — SIGNIFICANT CHANGE UP (ref 3.5–5.3)
RBC # BLD: 3.57 M/UL — LOW (ref 3.8–5.2)
RBC # FLD: 13.9 % — SIGNIFICANT CHANGE UP (ref 10.3–14.5)
SODIUM SERPL-SCNC: 136 MMOL/L — SIGNIFICANT CHANGE UP (ref 135–145)
WBC # BLD: 6.02 K/UL — SIGNIFICANT CHANGE UP (ref 3.8–10.5)
WBC # FLD AUTO: 6.02 K/UL — SIGNIFICANT CHANGE UP (ref 3.8–10.5)

## 2022-11-30 PROCEDURE — 97165 OT EVAL LOW COMPLEX 30 MIN: CPT

## 2022-11-30 PROCEDURE — 76000 FLUOROSCOPY <1 HR PHYS/QHP: CPT

## 2022-11-30 PROCEDURE — 72170 X-RAY EXAM OF PELVIS: CPT

## 2022-11-30 PROCEDURE — 97535 SELF CARE MNGMENT TRAINING: CPT

## 2022-11-30 PROCEDURE — 97161 PT EVAL LOW COMPLEX 20 MIN: CPT

## 2022-11-30 PROCEDURE — C1776: CPT

## 2022-11-30 PROCEDURE — 97116 GAIT TRAINING THERAPY: CPT

## 2022-11-30 PROCEDURE — 97530 THERAPEUTIC ACTIVITIES: CPT

## 2022-11-30 PROCEDURE — 82962 GLUCOSE BLOOD TEST: CPT

## 2022-11-30 PROCEDURE — 85027 COMPLETE CBC AUTOMATED: CPT

## 2022-11-30 PROCEDURE — 80048 BASIC METABOLIC PNL TOTAL CA: CPT

## 2022-11-30 RX ORDER — SENNA PLUS 8.6 MG/1
2 TABLET ORAL
Qty: 0 | Refills: 0 | DISCHARGE
Start: 2022-11-30

## 2022-11-30 RX ORDER — OXYCODONE HYDROCHLORIDE 5 MG/1
1 TABLET ORAL
Qty: 30 | Refills: 0
Start: 2022-11-30

## 2022-11-30 RX ORDER — PANTOPRAZOLE SODIUM 20 MG/1
1 TABLET, DELAYED RELEASE ORAL
Qty: 14 | Refills: 0
Start: 2022-11-30 | End: 2022-12-13

## 2022-11-30 RX ORDER — TRAMADOL HYDROCHLORIDE 50 MG/1
1 TABLET ORAL
Qty: 28 | Refills: 0
Start: 2022-11-30

## 2022-11-30 RX ORDER — ASPIRIN/CALCIUM CARB/MAGNESIUM 324 MG
1 TABLET ORAL
Qty: 84 | Refills: 0
Start: 2022-11-30 | End: 2023-01-10

## 2022-11-30 RX ORDER — NALOXONE HYDROCHLORIDE 4 MG/.1ML
4 SPRAY NASAL
Qty: 1 | Refills: 0
Start: 2022-11-30 | End: 2022-11-30

## 2022-11-30 RX ORDER — NALOXONE HYDROCHLORIDE 4 MG/.1ML
4 SPRAY NASAL
Qty: 1 | Refills: 0
Start: 2022-11-30

## 2022-11-30 RX ORDER — ACETAMINOPHEN 500 MG
3 TABLET ORAL
Qty: 0 | Refills: 0 | DISCHARGE
Start: 2022-11-30

## 2022-11-30 RX ADMIN — Medication 10 UNIT(S): at 09:25

## 2022-11-30 RX ADMIN — Medication 1000 MILLIGRAM(S): at 04:01

## 2022-11-30 RX ADMIN — SODIUM CHLORIDE 1000 MILLILITER(S): 9 INJECTION INTRAMUSCULAR; INTRAVENOUS; SUBCUTANEOUS at 07:55

## 2022-11-30 RX ADMIN — Medication 2: at 09:24

## 2022-11-30 RX ADMIN — Medication 325 MILLIGRAM(S): at 05:20

## 2022-11-30 RX ADMIN — Medication 100 MILLIGRAM(S): at 00:34

## 2022-11-30 RX ADMIN — Medication 400 MILLIGRAM(S): at 02:47

## 2022-11-30 RX ADMIN — PANTOPRAZOLE SODIUM 40 MILLIGRAM(S): 20 TABLET, DELAYED RELEASE ORAL at 05:20

## 2022-11-30 RX ADMIN — TRAMADOL HYDROCHLORIDE 50 MILLIGRAM(S): 50 TABLET ORAL at 11:15

## 2022-11-30 RX ADMIN — TRAMADOL HYDROCHLORIDE 50 MILLIGRAM(S): 50 TABLET ORAL at 10:14

## 2022-11-30 RX ADMIN — CELECOXIB 200 MILLIGRAM(S): 200 CAPSULE ORAL at 05:20

## 2022-11-30 RX ADMIN — CELECOXIB 200 MILLIGRAM(S): 200 CAPSULE ORAL at 06:53

## 2022-11-30 NOTE — PROGRESS NOTE ADULT - SUBJECTIVE AND OBJECTIVE BOX
Post op Day [1 ]    Patient resting without complaints.  No chest pain, SOB, N/V.    T(C): 36.8 (11-30-22 @ 04:58), Max: 36.8 (11-30-22 @ 04:58)  HR: 67 (11-30-22 @ 04:58) (60 - 87)  BP: 120/72 (11-30-22 @ 04:58) (110/67 - 157/75)  RR: 17 (11-30-22 @ 04:58) (14 - 18)  SpO2: 94% (11-30-22 @ 04:58) (93% - 100%)  Wt(kg): --    Exam:  Alert and Oriented, No Acute Distress  R hip dsgs c/d/i, soft/compressible compartments  Calves Soft, Non-tender bilaterally  +PF/DF/EHL/FHL  SILT  +DP Pulse    Am labs

## 2022-11-30 NOTE — PROGRESS NOTE ADULT - PROBLEM SELECTOR PLAN 1
PT/OT-WBAT  IS  DVT PPx  Pain Control  Continue Current Tx.  AM labs    Israel Wilkins PA-C  Team Pager: #[phone]

## 2022-11-30 NOTE — DISCHARGE NOTE NURSING/CASE MANAGEMENT/SOCIAL WORK - PATIENT PORTAL LINK FT
You can access the FollowMyHealth Patient Portal offered by [facility] by registering at the following website: [email or web address]. By joining [facility]’s FollowMyHealth portal, you will also be able to view your health information using other applications (apps) compatible with our system.

## 2022-12-13 ENCOUNTER — APPOINTMENT (OUTPATIENT)
Dept: ORTHOPEDIC SURGERY | Facility: CLINIC | Age: 52
End: 2022-12-13

## 2022-12-13 VITALS — HEIGHT: 71 IN | BODY MASS INDEX: 31.5 KG/M2 | WEIGHT: 225 LBS

## 2022-12-13 PROCEDURE — 99024 POSTOP FOLLOW-UP VISIT: CPT

## 2022-12-13 PROCEDURE — 73502 X-RAY EXAM HIP UNI 2-3 VIEWS: CPT

## 2022-12-13 RX ORDER — TRAMADOL HYDROCHLORIDE 50 MG/1
50 TABLET, COATED ORAL
Qty: 30 | Refills: 0 | Status: ACTIVE | COMMUNITY
Start: 2022-12-13 | End: 1900-01-01

## 2022-12-22 ENCOUNTER — NON-APPOINTMENT (OUTPATIENT)
Age: 52
End: 2022-12-22

## 2022-12-22 ENCOUNTER — FORM ENCOUNTER (OUTPATIENT)
Age: 52
End: 2022-12-22

## 2022-12-23 ENCOUNTER — APPOINTMENT (OUTPATIENT)
Dept: INFECTIOUS DISEASE | Facility: CLINIC | Age: 52
End: 2022-12-23

## 2022-12-23 ENCOUNTER — LABORATORY RESULT (OUTPATIENT)
Age: 52
End: 2022-12-23

## 2022-12-23 VITALS
BODY MASS INDEX: 30.52 KG/M2 | SYSTOLIC BLOOD PRESSURE: 127 MMHG | DIASTOLIC BLOOD PRESSURE: 77 MMHG | TEMPERATURE: 97.6 F | OXYGEN SATURATION: 96 % | HEIGHT: 71 IN | WEIGHT: 218 LBS | HEART RATE: 119 BPM

## 2022-12-23 PROCEDURE — 99213 OFFICE O/P EST LOW 20 MIN: CPT

## 2022-12-23 NOTE — HISTORY OF PRESENT ILLNESS
[FreeTextEntry1] : 12/23/22----TIEN SHARPE is a 52 year old female being seen for a follow-up visit. \par Continue Biktarvy, May switch to Juluca due to renal function. \par Recent left hip replacement and doing well. No longer using cane, right hip Nov 29th at [facility]. \par She has a new PCP , Dr. Selvin Cho\par  Pt stopped tivicay and evotaz we then started on Biktarvy . \par Recent mammogram going back in October 2020 and biopsy, 2018. Seen by GYN in Jan 2021. \par I switch valtex to famcyclovir for breakout.\par Continued concern over diabetes and elevated LFT's\par Declines flu vaccine\par Up to date for 2022 just had tetnaus and pneumonia\par \par plan 12/23/22: \par 1) continue Biktarvy and famcyclovir and well as diabetic medications.\par 2) all labs and liver clinic ASAP for long term elevated ALK Phos\par 3) see in 6 months\par \par History of Present Illness\par Reason For Visit\par . [facility] on [address] for burning left thigh pain. . needs liver ultrasound andf all liver tests...see in 3 months. Pt was off insulin a year ago and was only diet controlled. Then seen in ER for elevated glucose...now once again under endo care and taking levemir 30 daily and amaryl 2mg daily. also taking levemir 30 units daily, and glimepiride 2 mg daily. and bactrim . and vit D3 2000units daily and atorvastatin and enalapril 5. \par \par \par  \par Active Problems\par Abnormal EKG (794.31) (R94.31)\par Abnormal mammogram (793.80) (R92.8)\par AIDS (042) (B20)\par DYLAN positive (795.79) (R76.8)\par Anemia (285.9) (D64.9)\par Ankle edema (719.07) (M25.473)\par Bilateral leg edema (782.3) (R60.0)\par Breast calcification, right (793.89) (R92.1)\par Cervical high risk HPV (human papillomavirus) test positive (795.05) (R87.810)\par Chronic kidney disease, stage 3 (585.3) (N18.30)\par Chronic left hip pain (719.45,338.29) (M25.552,G89.29)\par Dense breasts (793.82) (R92.2)\par Diabetes type 2, uncontrolled (250.02) (E11.65)\par Drug (multiple) resistant infection (V09.91)\par Elevated LFTs (790.6) (R79.89)\par Elevated liver enzymes (790.5) (R74.8)\par Elevated serum creatinine (790.99) (R79.89)\par Elevated serum immunoglobulin free light chains (795.79) (R76.8)\par Establishing care with new doctor, encounter for (V65.8) (Z76.89)\par Follow up (V67.9) (Z09)\par G6PD deficiency (282.2) (D75.A)\par Genital herpes (054.10) (A60.00)\par Herpes simplex (054.9) (B00.9)\par Herpes simplex type 2 infection (054.9) (B00.9)\par Herpes simplex vulvovaginitis (054.11) (A60.04)\par Hip osteoarthritis (715.95) (M16.9)\par HIV infection (V08) (B20)\par Hypercholesterolemia (272.0) (E78.00)\par Hypertension (401.9) (I10)\par Increased frequency of urination (788.41) (R35.0)\par Influenza vaccine refused (V64.06) (Z28.21)\par Left leg pain (729.5) (M79.605)\par Leg swelling (729.81) (M79.89)\par Lesion of vulva (624.8) (N90.89)\par Lichen sclerosus (701.0) (L90.0)\par Lumbar spondylosis (721.3) (M47.816)\par Lumbar stenosis (724.02) (M48.061)\par Medication monitoring encounter (V58.83) (Z51.81)\par Microscopic hematuria (599.72) (R31.29)\par Nephropathy (583.9) (N28.9)\par OAB (overactive bladder) (596.51) (N32.81)\par Obesity (278.00) (E66.9)\par Other elevated white blood cell (WBC) count (288.69) (D72.828)\par Pain of left thigh (729.5) (M79.652)\par Preop testing (V72.84) (Z01.818)\par Primary osteoarthritis of both hips (715.15) (M16.0)\par Primary osteoarthritis of left hip (715.15) (M16.12)\par Proteinuria (791.0) (R80.9)\par Recurrent UTI (599.0) (N39.0)\par Renal bone disease (588.0) (N25.0)\par Right hip pain (719.45) (M25.551)\par S/P hip replacement, left (V43.64) (Z96.642)\par Screening for cardiovascular condition (V81.2) (Z13.6)\par Skin tag (701.9) (L91.8)\par Thyroid nodule (241.0) (E04.1)\par Urge and stress incontinence (788.33) (N39.46)\par Urinary retention (788.20) (R33.9)\par Urine leukocytes (791.7) (R82.998)\par UTI symptoms (788.99) (R39.9)\par Valgus deformity of left hip joint (736.31) (M21.052)\par Visit for gynecologic examination (V72.31) (Z01.419)\par Vitamin D deficiency (268.9) (E55.9)\par Vitamin deficiency (269.2) (E56.9)\par 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)\par Vulvar itching (698.1) (L29.2)\par Vulvar mass (625.8) (N90.89)\par Vulvar ulceration (616.50) (N76.6)\par Well female exam with routine gynecological exam (V72.31) (Z01.419)\par \par Past Medical History\par History of Abnormal vaginal fluids (792.9) (R87.9)\par History of Bacterial vaginosis (616.10,041.9) (N76.0,B96.89)\par History of Continuous leakage of urine (788.37) (N39.45)\par History of Elevated liver function tests (790.6) (R79.89)\par History of Essential hypertriglyceridemia (272.1) (E78.1)\par History of Follow up (V67.9) (Z09)\par History of candidiasis of vagina (V12.09) (Z86.19)\par History of diabetes mellitus (V12.29) (Z86.39)\par History of diabetes with ketoacidosis (V12.29) (Z86.39)\par History of diarrhea (V12.79) (Z87.898)\par History of fracture of radius (V15.51) (Z87.81)\par History of upper respiratory infection (V12.09) (Z87.09)\par History of urinary incontinence (V13.09) (Z87.898)\par History of urinary tract infection (V13.02) (Z87.440)\par History of urinary urgency (V13.89) (Z87.898)\par History of vitamin D deficiency (V12.1) (Z86.39)\par History of HPV in female (079.4) (B97.7)\par History of Increased blood pressure (not hypertension) (796.2) (R03.0)\par History of Left leg swelling (729.81) (M79.89)\par Normal delivery (650) (O80)\par History of Skin infection (686.9) (L08.9)\par History of Unaware of passing urine (788.69) (R39.198)\par History of Vaginal odor (625.8) (N89.8)\par History of Visit for dental examination (V72.2) (Z01.20)\par History of Visit for eye and vision exam (V72.0) (Z01.00)\par Visit for gynecologic examination (V72.31) (Z01.419)\par History of Vulvar mass (625.8) (N90.89)\par \par Current Meds\par BD Pen Needle Mini U/F 31G X 5 MM; USE UP TO 4 TIMES DAILY\par BD Pen Needle Sandra U/F 32G X 4 MM; daily\par Biktarvy -25 MG Oral Tablet; TAKE ONE TABLET BY MOUTH DAILY\par Clobetasol Propionate 0.05 % External Cream; APPLY SPARINGLY TO AFFECTED\par AREA(S) TWICE DAILY\par Fluconazole 150 MG Oral Tablet; TAKE 1 TABLET. IF STILL SYMPTOMATIC TAKE 3 DAYS\par LATER\par FreeStyle Katy 14 Day [place] Device; USE AS DIRECTED\par FreeStyle Katy 14 Day Sensor; change every 14 days\par HumaLOG KwikPen 100 UNIT/ML Subcutaneous Solution Pen-injector; Inject up to 25\par units before meals\par Imiquimod 5 % External Cream; APPLY TO AFFECTED AREA THREE TIMES A WEEK\par UNTIL LESIONS RESOLVE\par Lantus SoloStar 100 UNIT/ML Subcutaneous Solution Pen-injector; INJECT 24 UNIT At\par Bedtime\par Lidocaine HCl - 3 % External Cream; USE TOPICALLY AS DIRECTED\par Lisinopril 5 MG Oral Tablet; TAKE 1 TABLET DAILY\par Trulicity 1.5 MG/0.5ML Subcutaneous Solution Pen-injector; Take 1.5mg SC once weekly\par valACYclovir HCl - 500 MG Oral Tablet; TAKE 2 TABLET Twice daily\par \par Allergies\par No Known Drug Allergies\par No Known Environmental Allergies\par No Known Food Allergies\par

## 2022-12-27 ENCOUNTER — NON-APPOINTMENT (OUTPATIENT)
Age: 52
End: 2022-12-27

## 2022-12-29 ENCOUNTER — NON-APPOINTMENT (OUTPATIENT)
Age: 52
End: 2022-12-29

## 2022-12-30 ENCOUNTER — NON-APPOINTMENT (OUTPATIENT)
Age: 52
End: 2022-12-30

## 2023-01-03 ENCOUNTER — APPOINTMENT (OUTPATIENT)
Dept: ENDOCRINOLOGY | Facility: CLINIC | Age: 53
End: 2023-01-03

## 2023-01-03 LAB
25(OH)D3 SERPL-MCNC: 13.5 NG/ML
ALBUMIN SERPL ELPH-MCNC: 4 G/DL
ALP BLD-CCNC: 459 U/L
ALT SERPL-CCNC: 11 U/L
ANION GAP SERPL CALC-SCNC: 13 MMOL/L
APPEARANCE: ABNORMAL
AST SERPL-CCNC: 11 U/L
BASOPHILS # BLD AUTO: 0.06 K/UL
BASOPHILS NFR BLD AUTO: 1.1 %
BILIRUB SERPL-MCNC: 0.3 MG/DL
BILIRUBIN URINE: NEGATIVE
BLOOD URINE: ABNORMAL
BUN SERPL-MCNC: 20 MG/DL
C TRACH RRNA SPEC QL NAA+PROBE: NOT DETECTED
CALCIUM SERPL-MCNC: 10.3 MG/DL
CD3 CELLS # BLD: 1132 CELLS/UL
CD3 CELLS NFR BLD: 85 %
CD3+CD4+ CELLS # BLD: 382 CELLS/UL
CD3+CD4+ CELLS NFR BLD: 29 %
CD3+CD4+ CELLS/CD3+CD8+ CLL SPEC: 0.6 RATIO
CD3+CD8+ CELLS # SPEC: 637 CELLS/UL
CD3+CD8+ CELLS NFR BLD: 48 %
CHLORIDE SERPL-SCNC: 100 MMOL/L
CHOLEST SERPL-MCNC: 274 MG/DL
CO2 SERPL-SCNC: 24 MMOL/L
COLOR: YELLOW
CREAT SERPL-MCNC: 1.49 MG/DL
CYSTATIN C SERPL-MCNC: 1.72 MG/L
EGFR: 42 ML/MIN/1.73M2
EOSINOPHIL # BLD AUTO: 0.48 K/UL
EOSINOPHIL NFR BLD AUTO: 9.1 %
ESTIMATED AVERAGE GLUCOSE: 160 MG/DL
GFR/BSA.PRED SERPLBLD CYS-BASED-ARV: 37 ML/MIN/1.73M2
GGT SERPL-CCNC: 17 U/L
GLUCOSE QUALITATIVE U: ABNORMAL
GLUCOSE SERPL-MCNC: 369 MG/DL
HBA1C MFR BLD HPLC: 7.2 %
HBV SURFACE AG SER QL: NONREACTIVE
HCT VFR BLD CALC: 38.4 %
HCV AB SER QL: NONREACTIVE
HCV S/CO RATIO: 0.28 S/CO
HDLC SERPL-MCNC: 47 MG/DL
HGB BLD-MCNC: 11.6 G/DL
HIV1 RNA # SERPL NAA+PROBE: 215
HIV1 RNA # SERPL NAA+PROBE: ABNORMAL
IMM GRANULOCYTES NFR BLD AUTO: 0 %
KETONES URINE: NEGATIVE
LDLC SERPL CALC-MCNC: 187 MG/DL
LEUKOCYTE ESTERASE URINE: ABNORMAL
LYMPHOCYTES # BLD AUTO: 1.36 K/UL
LYMPHOCYTES NFR BLD AUTO: 25.8 %
MAN DIFF?: NORMAL
MCHC RBC-ENTMCNC: 29.4 PG
MCHC RBC-ENTMCNC: 30.2 GM/DL
MCV RBC AUTO: 97.5 FL
MONOCYTES # BLD AUTO: 0.26 K/UL
MONOCYTES NFR BLD AUTO: 4.9 %
N GONORRHOEA RRNA SPEC QL NAA+PROBE: NOT DETECTED
NEUTROPHILS # BLD AUTO: 3.11 K/UL
NEUTROPHILS NFR BLD AUTO: 59.1 %
NITRITE URINE: POSITIVE
NONHDLC SERPL-MCNC: 227 MG/DL
PH URINE: 6
PLATELET # BLD AUTO: 440 K/UL
POTASSIUM SERPL-SCNC: 4.7 MMOL/L
PROT SERPL-MCNC: 9.1 G/DL
PROTEIN URINE: ABNORMAL
RBC # BLD: 3.94 M/UL
RBC # FLD: 14.2 %
SODIUM SERPL-SCNC: 137 MMOL/L
SOURCE AMPLIFICATION: NORMAL
SPECIFIC GRAVITY URINE: 1.02
T PALLIDUM AB SER QL IA: NEGATIVE
TRIGL SERPL-MCNC: 200 MG/DL
TSH SERPL-ACNC: 0.77 UIU/ML
UROBILINOGEN URINE: NORMAL
VIRAL LOAD INTERP: NORMAL
VIRAL LOAD LOG: 2.33
WBC # FLD AUTO: 5.27 K/UL

## 2023-01-05 ENCOUNTER — NON-APPOINTMENT (OUTPATIENT)
Age: 53
End: 2023-01-05

## 2023-01-09 ENCOUNTER — NON-APPOINTMENT (OUTPATIENT)
Age: 53
End: 2023-01-09

## 2023-01-10 ENCOUNTER — NON-APPOINTMENT (OUTPATIENT)
Age: 53
End: 2023-01-10

## 2023-02-23 ENCOUNTER — NON-APPOINTMENT (OUTPATIENT)
Age: 53
End: 2023-02-23

## 2023-04-04 ENCOUNTER — APPOINTMENT (OUTPATIENT)
Dept: ENDOCRINOLOGY | Facility: CLINIC | Age: 53
End: 2023-04-04

## 2023-05-09 ENCOUNTER — APPOINTMENT (OUTPATIENT)
Dept: ORTHOPEDIC SURGERY | Facility: CLINIC | Age: 53
End: 2023-05-09
Payer: COMMERCIAL

## 2023-05-09 VITALS — BODY MASS INDEX: 31.5 KG/M2 | HEIGHT: 71 IN | WEIGHT: 225 LBS

## 2023-05-09 DIAGNOSIS — Z96.641 PRESENCE OF RIGHT ARTIFICIAL HIP JOINT: ICD-10-CM

## 2023-05-09 DIAGNOSIS — Z96.642 PRESENCE OF LEFT ARTIFICIAL HIP JOINT: ICD-10-CM

## 2023-05-09 PROCEDURE — 99214 OFFICE O/P EST MOD 30 MIN: CPT

## 2023-05-09 PROCEDURE — 73521 X-RAY EXAM HIPS BI 2 VIEWS: CPT

## 2023-05-24 NOTE — DISCHARGE NOTE NURSING/CASE MANAGEMENT/SOCIAL WORK - DATE OF FIRST COVID-19 BOOSTER
Pre-Op call completed. Medications list reviewed and updated as needed.  Instructed patient to hold all medications am of surgery except labetalol, zyrtec, inhaler, pepcid, and levothyroxine (if normally taken in the a.m.).      Patient instructed to arrive for surgery at 0615 on 5/25 at [facility].    Pre-op instructions reviewed, verbalized understanding.  Questions answered.  Call back number of [phone] given in case other concerns or questions arise.    Patient instructed to bring in current list of medications (name of medication, dose and frequency of daily use) day of surgery.         Pre-op Teaching Completed:    OR - Procedure, OR - Time and time of arrival, Location of Registration, NPO, Medications to take the Day of Surgery, Skin Prep, Equipment to bring the Day of Surgery and Discharge Policy: Ride / Responsible Adult      Patient instructed to notify surgeon if patient has or develops any fever, cold or flu like symptoms, other signs of general illness, or any exposure to COVID19.  Patient also notified of current hospital visitor restrictions and hospital entrance screening.    PATIENT AWARE/NOTIFIED OF COVID TESTING TO BE COMPLETED PRIOR TO SURGERY.  PATIENT INSTRUCTED TO SELF QUARANTINE FROM TIME OF        TESTING UNTIL SURGERY.  Patient also notified if test not completed, surgery will need to be rescheduled.    Patient encouraged to self quarantine prior to surgery.     04-Dec-2021

## 2023-05-31 NOTE — DISCHARGE NOTE PROVIDER - EXTENDED VTE YES NO FOR MLM ENOXAPARIN
,
This was a shared visit with the MARGOTH. I reviewed and verified the documentation and independently performed the documented:

## 2023-06-23 ENCOUNTER — NON-APPOINTMENT (OUTPATIENT)
Age: 53
End: 2023-06-23

## 2023-08-10 ENCOUNTER — APPOINTMENT (OUTPATIENT)
Dept: ORTHOPEDIC SURGERY | Facility: CLINIC | Age: 53
End: 2023-08-10

## 2023-08-29 ENCOUNTER — NON-APPOINTMENT (OUTPATIENT)
Age: 53
End: 2023-08-29

## 2023-08-30 ENCOUNTER — APPOINTMENT (OUTPATIENT)
Dept: INFECTIOUS DISEASE | Facility: CLINIC | Age: 53
End: 2023-08-30
Payer: COMMERCIAL

## 2023-08-30 VITALS
RESPIRATION RATE: 16 BRPM | SYSTOLIC BLOOD PRESSURE: 149 MMHG | BODY MASS INDEX: 32.34 KG/M2 | DIASTOLIC BLOOD PRESSURE: 89 MMHG | WEIGHT: 231 LBS | HEIGHT: 71 IN | TEMPERATURE: 98.7 F | OXYGEN SATURATION: 100 % | HEART RATE: 73 BPM

## 2023-08-30 DIAGNOSIS — B20 HUMAN IMMUNODEFICIENCY VIRUS [HIV] DISEASE: ICD-10-CM

## 2023-08-30 PROCEDURE — 99214 OFFICE O/P EST MOD 30 MIN: CPT

## 2023-08-30 NOTE — ASSESSMENT
[FreeTextEntry1] : plan 8/30/23: 1) continue Biktarvy and Famcyclovir and well as diabetic medications. 2) all labs and liver clinic ASAP for long term elevated ALK Phos 3) see in 6 months

## 2023-08-30 NOTE — HISTORY OF PRESENT ILLNESS
[FreeTextEntry1] : 8/30/23----TIEN SHARPE is a 53 year old female being seen for a follow-up visit. Recent bladder surgery due to urine retention.  Continue Biktarvy, May switch to Juluca due to renal function. Recent left hip replacement and doing well. No longer using cane, right hip Nov 29th at [facility]. She has a new PCP , Dr. Selvin Cho Pt stopped tivicay and evotaz we then started on Biktarvy . Recent mammogram going back in October 2020 and biopsy, 2018. Seen by GYN in Jan 2021. I switched valtex to famcyclovir for breakout. Continued concern over diabetes and elevated LFT's Declines flu vaccine Up to date for 2022 just had tetnaus and pneumonia  plan 8/30/23: 1) continue Biktarvy and Famcyclovir and well as diabetic medications. 2) all labs and liver clinic ASAP for long term elevated ALK Phos 3) see in 6 months  History of Present Illness Pt recently seen by [facility] rheumatology on [address] for burning left thigh pain. . needs liver ultrasound and all liver tests...see in 3 months. Pt was off insulin a year ago and was only diet controlled. Then seen in ER for elevated glucose...now once again under endo care and taking levemir 30 daily and amaryl 2mg daily. also taking levemir 30 units daily, and glimepiride 2 mg daily. and bactrim . and vit D3 2000units daily and atorvastatin and enalapril 5.

## 2023-08-31 DIAGNOSIS — E78.5 HYPERLIPIDEMIA, UNSPECIFIED: ICD-10-CM

## 2023-08-31 LAB
25(OH)D3 SERPL-MCNC: 19.8 NG/ML
ALBUMIN SERPL ELPH-MCNC: 4 G/DL
ALP BLD-CCNC: 501 U/L
ALT SERPL-CCNC: 14 U/L
ANION GAP SERPL CALC-SCNC: 12 MMOL/L
APPEARANCE: ABNORMAL
AST SERPL-CCNC: 16 U/L
BACTERIA: NEGATIVE /HPF
BILIRUB SERPL-MCNC: 0.5 MG/DL
BILIRUBIN URINE: NEGATIVE
BLOOD URINE: ABNORMAL
BUN SERPL-MCNC: 17 MG/DL
C TRACH RRNA SPEC QL NAA+PROBE: NOT DETECTED
CALCIUM SERPL-MCNC: 9.9 MG/DL
CAST: 0 /LPF
CD3 CELLS # BLD: 1054 CELLS/UL
CD3 CELLS NFR BLD: 79 %
CD3+CD4+ CELLS # BLD: 385 CELLS/UL
CD3+CD4+ CELLS NFR BLD: 29 %
CD3+CD4+ CELLS/CD3+CD8+ CLL SPEC: 0.68 RATIO
CD3+CD8+ CELLS # SPEC: 569 CELLS/UL
CD3+CD8+ CELLS NFR BLD: 43 %
CHLORIDE SERPL-SCNC: 101 MMOL/L
CHOLEST SERPL-MCNC: 374 MG/DL
CO2 SERPL-SCNC: 24 MMOL/L
COLOR: YELLOW
CREAT SERPL-MCNC: 1.51 MG/DL
EGFR: 41 ML/MIN/1.73M2
EPITHELIAL CELLS: 4 /HPF
ESTIMATED AVERAGE GLUCOSE: 223 MG/DL
GLUCOSE QUALITATIVE U: 100 MG/DL
GLUCOSE SERPL-MCNC: 273 MG/DL
HBA1C MFR BLD HPLC: 9.4 %
HCT VFR BLD CALC: 42.7 %
HCV AB SER QL: NONREACTIVE
HCV S/CO RATIO: 0.21 S/CO
HDLC SERPL-MCNC: 58 MG/DL
HGB BLD-MCNC: 14 G/DL
HIV1 RNA # SERPL NAA+PROBE: ABNORMAL
HIV1 RNA # SERPL NAA+PROBE: ABNORMAL COPIES/ML
KETONES URINE: NEGATIVE MG/DL
LDLC SERPL CALC-MCNC: 273 MG/DL
LEUKOCYTE ESTERASE URINE: ABNORMAL
MCHC RBC-ENTMCNC: 31.1 PG
MCHC RBC-ENTMCNC: 32.8 GM/DL
MCV RBC AUTO: 94.9 FL
MICROSCOPIC-UA: NORMAL
N GONORRHOEA RRNA SPEC QL NAA+PROBE: NOT DETECTED
NITRITE URINE: NEGATIVE
NONHDLC SERPL-MCNC: 316 MG/DL
PH URINE: 6.5
PLATELET # BLD AUTO: 211 K/UL
POTASSIUM SERPL-SCNC: 5.4 MMOL/L
PROT SERPL-MCNC: 8.3 G/DL
PROTEIN URINE: 300 MG/DL
RBC # BLD: 4.5 M/UL
RBC # FLD: 13.3 %
RED BLOOD CELLS URINE: 13 /HPF
SODIUM SERPL-SCNC: 137 MMOL/L
SOURCE AMPLIFICATION: NORMAL
SOURCE AMPLIFICATION: NORMAL
SPECIFIC GRAVITY URINE: 1.02
T PALLIDUM AB SER QL IA: NEGATIVE
T VAGINALIS RRNA SPEC QL NAA+PROBE: NOT DETECTED
TRIGL SERPL-MCNC: 207 MG/DL
TSH SERPL-ACNC: 0.76 UIU/ML
UROBILINOGEN URINE: 0.2 MG/DL
VIRAL LOAD INTERP: NORMAL
VIRAL LOAD LOG: ABNORMAL LG COP/ML
WBC # FLD AUTO: 4.29 K/UL
WHITE BLOOD CELLS URINE: 208 /HPF

## 2023-09-01 ENCOUNTER — NON-APPOINTMENT (OUTPATIENT)
Age: 53
End: 2023-09-01

## 2023-10-19 NOTE — ED ADULT TRIAGE NOTE - HISTORY OF COVID-19 VACCINATION
Patient Surgery Information Sheet      Patient Name:  Ne Le  Surgery Date:  October 25, 2023    Type of Surgery:  RIGHT BREAST EXCISIONAL BIOPSY    Estimated arrival time 1015AM    Arrival time will be confirmed the afternoon before your surgery. Pre-procedure: Not Applicable    Pre-Operative Testing Department will call to schedule pre-op testing appointment if needed before surgery    Hospital:  [facility]  Address:  [address] [place] [address] [place] [address]  Check in location:  Through the Main Entrance of [address] [facility], Take the elevator on the left side to the second floor on your left as you step out of the elevator    Pre-Operative Instructions: Will be given at the pre-op appointment.     Special Instructions if needed:     NPO (nothing by mouth) or drinking after midnight the night before Surgery  Patient may shower the morning of, do not use an lotion, deodorant, powders, perfumes or makeup  Patient will need  the morning of surgery     Surgery Scheduler:  Kostas Escamilla Yes

## 2024-03-15 ENCOUNTER — APPOINTMENT (OUTPATIENT)
Dept: INFECTIOUS DISEASE | Facility: CLINIC | Age: 54
End: 2024-03-15
Payer: COMMERCIAL

## 2024-03-15 ENCOUNTER — NON-APPOINTMENT (OUTPATIENT)
Age: 54
End: 2024-03-15

## 2024-03-15 VITALS
DIASTOLIC BLOOD PRESSURE: 87 MMHG | BODY MASS INDEX: 33.6 KG/M2 | OXYGEN SATURATION: 98 % | WEIGHT: 240 LBS | HEIGHT: 71 IN | SYSTOLIC BLOOD PRESSURE: 143 MMHG | TEMPERATURE: 97.8 F | HEART RATE: 84 BPM

## 2024-03-15 DIAGNOSIS — R79.89 OTHER SPECIFIED ABNORMAL FINDINGS OF BLOOD CHEMISTRY: ICD-10-CM

## 2024-03-15 DIAGNOSIS — Z01.419 ENCOUNTER FOR GYNECOLOGICAL EXAMINATION (GENERAL) (ROUTINE) W/OUT ABNORMAL FINDINGS: ICD-10-CM

## 2024-03-15 DIAGNOSIS — A60.00 HERPESVIRAL INFECTION OF UROGENITAL SYSTEM, UNSPECIFIED: ICD-10-CM

## 2024-03-15 DIAGNOSIS — B20 HUMAN IMMUNODEFICIENCY VIRUS [HIV] DISEASE: ICD-10-CM

## 2024-03-15 DIAGNOSIS — Z01.00 ENCOUNTER FOR EXAMINATION OF EYES AND VISION W/OUT ABNORMAL FINDINGS: ICD-10-CM

## 2024-03-15 DIAGNOSIS — Z92.89 PERSONAL HISTORY OF OTHER MEDICAL TREATMENT: ICD-10-CM

## 2024-03-15 DIAGNOSIS — Z01.20 ENCOUNTER FOR DENTAL EXAMINATION AND CLEANING W/OUT ABNORMAL FINDINGS: ICD-10-CM

## 2024-03-15 PROCEDURE — 99214 OFFICE O/P EST MOD 30 MIN: CPT

## 2024-03-15 RX ORDER — BICTEGRAVIR SODIUM, EMTRICITABINE, AND TENOFOVIR ALAFENAMIDE FUMARATE 50; 200; 25 MG/1; MG/1; MG/1
50-200-25 TABLET ORAL
Qty: 30 | Refills: 5 | Status: ACTIVE | COMMUNITY
Start: 2018-09-19 | End: 1900-01-01

## 2024-03-15 NOTE — ASSESSMENT
[FreeTextEntry1] : plan 3/15/2024: 1) continue Biktarvy -25mg tablet oral daily for HIV and Valtrex 500mg BID for herpetic lesions, renewed  2) labs today , CBC, CBP, lipids, urine, CD4, viral load, tsh, A1c and referral liver clinic ASAP for long term elevated ALK Phos 3) see in 6 months

## 2024-03-15 NOTE — HISTORY OF PRESENT ILLNESS
[FreeTextEntry1] : 3/15/2024  Chief Complaint----TIEN SHARPE is a 54 year old female being seen for a HIV maintence visit. Recent bladder surgery due to urine retention. Continue Biktarvy for HIV management , May switch to Juluca due to renal function. Recent left hip replacement and doing well. No longer using cane, right hip Nov 29th at [facility]. She has a new PCP , Dr. Selvin Cho Pt stopped tivicay and evotaz we then started on Biktarvy . Recent mammogram going back in October 2020 and biopsy, 2018. Seen by GYN in Jan 2021. I switched valtex to famcyclovir for breakout. Continued concern over diabetes and elevated LFT's Declines flu vaccine Up to date for 2022 just had tetnaus and pneumonia  plan 3/15/2024: 1) continue Biktarvy -25mg tablet oral daily for HIV and Valtrex 500mg BID for herpetic lesions, renewed  2) labs today , CBC, CBP, lipids, urine, CD4, viral load, tsh, A1c and referral liver clinic ASAP for long term elevated ALK Phos 3) see in 6 months  History of Present Illness Pt recently seen by [facility] rheumatology on [address] for burning left thigh pain. . needs liver ultrasound and all liver tests...see in 3 months. Pt was off insulin a year ago and was only diet controlled. Then seen in ER for elevated glucose...now once again under endo care and taking levemir 30 daily and amaryl 2mg daily. also taking levemir 30 units daily, and glimepiride 2 mg daily. and bactrim . and vit D3 2000units daily and atorvastatin and enalapril 5.  Active Problems Abnormal EKG (794.31) (R94.31) Abnormal mammogram (793.80) (R92.8) AIDS (042) (B20) DYLAN positive (795.79) (R76.8) Anemia (285.9) (D64.9) Ankle edema (719.07) (M25.473) Bilateral leg edema (782.3) (R60.0) Breast calcification, right (793.89) (R92.1) Cervical high risk HPV (human papillomavirus) test positive (795.05) (R87.810) Chronic kidney disease, stage 3 (585.3) (N18.30) Chronic left hip pain (719.45,338.29) (M25.552,G89.29) Dense breasts (793.82) (R92.2) Diabetes type 2, uncontrolled (250.02) Drug (multiple) resistant infection (V09.91) Elevated LFTs (790.6) (R79.89) Elevated liver enzymes (790.5) (R74.8) Elevated serum creatinine (790.99) (R79.89) Elevated serum immunoglobulin free light chains (795.79) (R76.8) Establishing care with new doctor, encounter for (V65.8) (Z76.89) Follow up (V67.9) (Z09) G6PD deficiency (282.2) (D75.A) Genital herpes (054.10) (A60.00) Herpes simplex (054.9) (B00.9) Herpes simplex type 2 infection (054.9) (B00.9) Herpes simplex vulvovaginitis (054.11) (A60.04) Hip osteoarthritis (715.95) (M16.9) HIV infection (V08) (B20) Hypercholesterolemia (272.0) (E78.00) Hypertension (401.9) (I10) Increased frequency of urination (788.41) (R35.0) Influenza vaccine refused (V64.06) (Z28.21) Left leg pain (729.5) (M79.605) Leg swelling (729.81) (M79.89) Lesion of vulva (624.8) (N90.89) Lichen sclerosus (701.0) (L90.0) Lumbar spondylosis (721.3) (M47.816) Lumbar stenosis (724.02) (M48.061) Medication monitoring encounter (V58.83) (Z51.81) Microscopic hematuria (599.72) (R31.29) Nephropathy (583.9) (N28.9) OAB (overactive bladder) (596.51) (N32.81) Obesity (278.00) (E66.9) Other elevated white blood cell (WBC) count (288.69) (D72.828) Pain of left thigh (729.5) (M79.652) Preop testing (V72.84) (Z01.818) Primary osteoarthritis of both hips (715.15) (M16.0) Primary osteoarthritis of left hip (715.15) (M16.12) Primary osteoarthritis of right hip (715.15) (M16.11) Proteinuria (791.0) (R80.9) Recurrent UTI (599.0) (N39.0) Renal bone disease (588.0) (N25.0) Right hip pain (719.45) (M25.551) S/P hip replacement, left (V43.64) (Z96.642) S/P hip replacement, right (V43.64) (Z96.641) Screening for cardiovascular condition (V81.2) (Z13.6) Skin tag (701.9) (L91.8) Thyroid nodule (241.0) (E04.1) Urge and stress incontinence (788.33) (N39.46) Urinary retention (788.20) (R33.9) Urine leukocytes (791.7) (R82.998) UTI symptoms (788.99) (R39.9) Valgus deformity of left hip joint (736.31) (M21.052) Visit for gynecologic examination (V72.31) (Z01.419) Vitamin D deficiency (268.9) (E55.9) Vitamin deficiency (269.2) (E56.9) 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) Vulvar itching (698.1) (L29.2) Vulvar mass (625.8) (N90.89) Vulvar ulceration (616.50) (N76.6) Well female exam with routine gynecological exam (V72.31) (Z01.419)      Past Medical History History of Abnormal vaginal fluids (792.9) (R87.9) History of Bacterial vaginosis (616.10,041.9) (N76.0,B96.89) History of Continuous leakage of urine (788.37) (N39.45) History of Elevated liver function tests (790.6) (R79.89) History of Essential hypertriglyceridemia (272.1) (E78.1) History of Follow up (V67.9) (Z09) History of candidiasis of vagina (V12.09) (Z86.19) History of diabetes mellitus (V12.29) (Z86.39) History of diabetes with ketoacidosis (V12.29) (Z86.39) History of diarrhea (V12.79) (Z87.898) History of fracture of radius (V15.51) (Z87.81) History of upper respiratory infection (V12.09) (Z87.09) History of urinary incontinence (V13.09) (Z87.898) History of urinary tract infection (V13.02) (Z87.440) History of urinary urgency (V13.89) (Z87.898) History of vitamin D deficiency (V12.1) (Z86.39) History of HPV in female (079.4) (B97.7) History of Increased blood pressure (not hypertension) (796.2) (R03.0) History of Left leg swelling (729.81) (M79.89) Normal delivery (650) (O80) History of Skin infection (686.9) (L08.9) History of Unaware of passing urine (788.69) (R39.198) History of Vaginal odor (625.8) (N89.8) History of Visit for dental examination (V72.2) (Z01.20) History of Visit for eye and vision exam (V72.0) (Z01.00) Visit for gynecologic examination (V72.31) (Z01.419) History of Vulvar mass (625.8) (N90.89)      Current Meds BD Pen Needle Mini U/F 31G X 5 MM; USE UP TO 4 TIMES DAILY BD Pen Needle Sandra U/F 32G X 4 MM; daily Biktarvy -25 MG Oral Tablet; TAKE ONE TABLET BY MOUTH DAILY Clobetasol Propionate 0.05 % External Cream; APPLY SPARINGLY TO AFFECTED AREA(S) TWICE DAILY Fluconazole 150 MG Oral Tablet; TAKE 1 TABLET. IF STILL SYMPTOMATIC TAKE 3 DAYS LATER FreeStyle Katy 14 Day [place] Device; USE AS DIRECTED FreeStyle Katy 14 Day Sensor; change every 14 days HumaLOG KwikPen 100 UNIT/ML Subcutaneous Solution Pen-injector; Inject up to 25 units before meals Imiquimod 5 % External Cream; APPLY TO AFFECTED AREA THREE TIMES A WEEK UNTIL LESIONS RESOLVE Lantus SoloStar 100 UNIT/ML Subcutaneous Solution Pen-injector; INJECT 24 UNIT At Bedtime Lidocaine HCl - 3 % External Cream; USE TOPICALLY AS DIRECTED Lisinopril 5 MG Oral Tablet; TAKE 1 TABLET DAILY traMADol HCl - 50 MG Oral Tablet; TAKE 1 TABLET 3 TIMES DAILY AS NEEDED. MDD:3 tabs TDD:3 Trulicity 1.5 MG/0.5ML Subcutaneous Solution Pen-injector; Take 1.5mg SC once weekly valACYclovir HCl - 500 MG Oral Tablet; TAKE 2 TABLET Twice daily      Allergies No Known Drug Allergies No Known Environmental Allergies No Known Food Allergies

## 2024-03-17 LAB
25(OH)D3 SERPL-MCNC: 32.7 NG/ML
ALBUMIN SERPL ELPH-MCNC: 4.1 G/DL
ALP BLD-CCNC: 454 U/L
ALT SERPL-CCNC: 15 U/L
ANION GAP SERPL CALC-SCNC: 11 MMOL/L
APPEARANCE: CLEAR
AST SERPL-CCNC: 17 U/L
BACTERIA: NEGATIVE /HPF
BILIRUB SERPL-MCNC: 0.4 MG/DL
BILIRUBIN URINE: NEGATIVE
BLOOD URINE: NEGATIVE
BUN SERPL-MCNC: 22 MG/DL
C TRACH RRNA SPEC QL NAA+PROBE: NOT DETECTED
CALCIUM SERPL-MCNC: 9.9 MG/DL
CAST: 0 /LPF
CD3 CELLS # BLD: 1485 CELLS/UL
CD3 CELLS NFR BLD: 80 %
CD3+CD4+ CELLS # BLD: 549 CELLS/UL
CD3+CD4+ CELLS NFR BLD: 29 %
CD3+CD4+ CELLS/CD3+CD8+ CLL SPEC: 0.72 RATIO
CD3+CD8+ CELLS # SPEC: 762 CELLS/UL
CD3+CD8+ CELLS NFR BLD: 41 %
CHLORIDE SERPL-SCNC: 104 MMOL/L
CHOLEST SERPL-MCNC: 279 MG/DL
CO2 SERPL-SCNC: 24 MMOL/L
COLOR: YELLOW
CREAT SERPL-MCNC: 1.59 MG/DL
EGFR: 38 ML/MIN/1.73M2
EPITHELIAL CELLS: 5 /HPF
ESTIMATED AVERAGE GLUCOSE: 183 MG/DL
GLUCOSE QUALITATIVE U: NEGATIVE MG/DL
GLUCOSE SERPL-MCNC: 134 MG/DL
HBA1C MFR BLD HPLC: 8 %
HCT VFR BLD CALC: 41 %
HDLC SERPL-MCNC: 56 MG/DL
HGB BLD-MCNC: 13.3 G/DL
HIV1 RNA # SERPL NAA+PROBE: ABNORMAL
HIV1 RNA # SERPL NAA+PROBE: ABNORMAL COPIES/ML
KETONES URINE: NEGATIVE MG/DL
LDLC SERPL CALC-MCNC: 189 MG/DL
LEUKOCYTE ESTERASE URINE: ABNORMAL
MCHC RBC-ENTMCNC: 31 PG
MCHC RBC-ENTMCNC: 32.4 GM/DL
MCV RBC AUTO: 95.6 FL
MICROSCOPIC-UA: NORMAL
N GONORRHOEA RRNA SPEC QL NAA+PROBE: NOT DETECTED
NITRITE URINE: NEGATIVE
NONHDLC SERPL-MCNC: 223 MG/DL
PH URINE: 6
PLATELET # BLD AUTO: 200 K/UL
POTASSIUM SERPL-SCNC: 4.1 MMOL/L
PROT SERPL-MCNC: 8.6 G/DL
PROTEIN URINE: 100 MG/DL
RBC # BLD: 4.29 M/UL
RBC # FLD: 13.4 %
RED BLOOD CELLS URINE: 1 /HPF
SODIUM SERPL-SCNC: 138 MMOL/L
SOURCE AMPLIFICATION: NORMAL
SPECIFIC GRAVITY URINE: 1.01
TRIGL SERPL-MCNC: 178 MG/DL
TSH SERPL-ACNC: 1.56 UIU/ML
UROBILINOGEN URINE: 0.2 MG/DL
VIRAL LOAD INTERP: NORMAL
VIRAL LOAD LOG: ABNORMAL LG COP/ML
WBC # FLD AUTO: 3.89 K/UL
WHITE BLOOD CELLS URINE: 66 /HPF

## 2024-05-01 NOTE — PATIENT PROFILE ADULT - VISION (WITH CORRECTIVE LENSES IF THE PATIENT USUALLY WEARS THEM):
Normal vision: sees adequately in most situations; can see medication labels, newsprint 01-May-2024 22:00

## 2024-06-05 NOTE — CONSULT NOTE ADULT - REASON FOR ADMISSION
"Physical Therapy    Physical Therapy Evaluation    Patient Name: Aurora Burt  MRN: [id number]  Today's Date: 6/5/2024        Insurance:  Visit number #1  Insurance Type: Payor: [employer] DUAL COMPLETE / Plan: [employer] DUAL COMPLETE / Product Type: *No Product type* / VISITS ARE MED NEC NO AUTH NEEDED   Authorization or Plan of Care date Range: n/a    Problem List Items Addressed This Visit    None      General:  Reason for visit: low back pain and fibromyalgia    Referred by: Yaritza Castro MD  Next MD appt: nothing scheduled, consulting with spine specialists     Precautions:  none  Fall Risk: Moderate     Assessment:   ***    Impairments: {Impairments:92814}    Functional Limitations: {PT functional limitations:51251}    Clinical Presentation: {talClinicalPresentation:21450::\"Stable and/or uncomplicated characteristics\"}     Recommended Treatment:  {Treatment Modalities:63362}    Rehab Potential: {Prognosis:85314}      Plan:  Plan of care was developed with input and agreement by the patient.  1x per week for 4-6 weeks    Goals:  -Patient to be Indep with HEP for self management of symptoms    -Abolish LE radiating/radicular symptoms    -Modified Oswestry: 0-19% impairment to indicate a significant improvement in overall function.    -Patient will demonstrate correct posture with min to no cueing to allow for correct loading strategy.    -Patient will demonstrate ***/5 hip strength to allow for correct gait and stair mechanics.     -Patient will demo mild to no limitation AROM of the lumbar spine to allow for correct mechanics with functional mobility.     -Patient will report standing *** minutes  without pain to allow for return to work and ADLs without limitation.     -Patient will report sitting *** minutes without pain to allow for return to work and ADLs without limitation.    -Patient will demonstrate appropriate body mechanics for household and common activities to reduce incidence or " "future back pain exacerbations     Subjective:  - CC:  ***  - AMY:  ***  - DOI/onset: ***  - PAIN - Location: *** Worst(past 24 hours): ***   - PAIN - Alleviating: ***  Aggravating: ***  - CURRENT MEDICAL MANAGEMENT: ***  - PLOF: ***  - FUNCTIONAL LIMITATIONS: ***   - LIVING ENVIRONMENT: ***  - WORK: ***  - EXERCISE: ***  - Patient stated goal: ***    Medical History Form: Reviewed (scanned into chart)       Objective:   -GAIT: {tjplevelofassist:96548}. {tjpgaitassistive device:38939} {tjpgaitdescriptors:12849}  -STAIRS:  {Stair:42482}    -POSTURE:   Sitting: {GOOD/FAIR/POOR:63730}  Standing: {GOOD/FAIR/POOR:69814}  Lordosis: {DESC; NORMAL/REDUCED/ABSENT/INCREASED:34493}  Lateral shift: {Right Left Absent:47615}  Correction of posture: {DESC; BETTER/WORSE:43911}  {Reflex LE:67822::\".\"}    LE DERMATOMES:  {Dermatomes LE:10322::\"Lower extremity sensation is intact.\"}    -PALPATION: ***    LE MYOTOMES:  {Myotomes LE:16636::\"Lower extremity myotomes are WNL bilaterally.\"}      LUMBAR TEST MOVEMENTS IN STANDING: Movement Loss indicated by Major, Moderate, Minimal, or Nil  {ROM Lumbar in standin::\".\"}    LUMBAR TEST MOVEMENTS IN LYING: Movement Loss indicated by Major, Moderate, Minimal, or Nil   {ROM Lumbar in lyin::\".\"}    SPECIAL TESTS:  {Special Test Lumbar:59222::\".\"}      Outcome Measures:        Treatment Performed: (\"NP\" = Not Performed)     Therapeutic Exercise:    minutes      Manual Therapy:    minutes      Neuromuscular Re-education:  minutes      Gait Training:    minutes      Therapeutic Activity:  minutes      Modalities:  Vasopneumatic Device  minutes  Electrical Stimulation  minutes  Ultrasound  minutes  Iontophoresis  minutes  Cold Pack  minutes    Evaluation Complexity: Low:   minutes; Moderate   minutes; Complex   minutes    Re-Evaluation:   minutes    " Valacyclovir resistant HSV

## 2024-07-09 ENCOUNTER — NON-APPOINTMENT (OUTPATIENT)
Age: 54
End: 2024-07-09

## 2024-07-25 ENCOUNTER — APPOINTMENT (OUTPATIENT)
Dept: HEPATOLOGY | Facility: CLINIC | Age: 54
End: 2024-07-25
Payer: COMMERCIAL

## 2024-07-25 VITALS
HEART RATE: 97 BPM | OXYGEN SATURATION: 98 % | TEMPERATURE: 97.1 F | SYSTOLIC BLOOD PRESSURE: 146 MMHG | RESPIRATION RATE: 16 BRPM | DIASTOLIC BLOOD PRESSURE: 83 MMHG | BODY MASS INDEX: 33.6 KG/M2 | WEIGHT: 240 LBS | HEIGHT: 71 IN

## 2024-07-25 DIAGNOSIS — R74.8 ABNORMAL LEVELS OF OTHER SERUM ENZYMES: ICD-10-CM

## 2024-07-25 DIAGNOSIS — I10 ESSENTIAL (PRIMARY) HYPERTENSION: ICD-10-CM

## 2024-07-25 DIAGNOSIS — E66.9 OBESITY, UNSPECIFIED: ICD-10-CM

## 2024-07-25 DIAGNOSIS — E78.00 PURE HYPERCHOLESTEROLEMIA, UNSPECIFIED: ICD-10-CM

## 2024-07-25 PROCEDURE — 99204 OFFICE O/P NEW MOD 45 MIN: CPT

## 2024-07-25 NOTE — HISTORY OF PRESENT ILLNESS
[FreeTextEntry1] : 55yo female with pmhx of HIV (diagnosed in 2013), DM, and HLD presents to re-establish care with us for elevated alk phos. Seen by Dr. Choi in 2020, then by Dr. Lyons in 2021. Got lost to follow up but now back to re-establish care.  Per chart review, patient has chronic elevation of alk phos, documented back to 2014, though noted to be more elevated (400-500 from 200-300) since 2021. She initially saw Dr. Choi in 1/2020 for hepatology clearance prior to hip replacement surgery, underwent extensive liver work up which was largely unrevealing except for elevated DYLAN 1:320. She was initiated on ursodiol 2/2020, which she took temporarily then self-discontinued. Recommended MRI at the time, but patient was lost to follow up. She returned to see Dr. Lyons in 4/2021, work up with elevated bone specific alk phos, neg GGT, and elevated IgG. MRI was done with normal liver findings. Patient was once again lost to follow up after that visit.   Previous liver work ups (4/2021) - viral hepatitis serologies: HBsAg neg, HBcAb total positive, HCV Ab neg.  - CLD serologies: DYLAN 1:80, AMA neg, ASMA neg, IgG 2812; elevated bone specific alk phos 100 ug/L.  - MRI Abdomen (6/1/2021): liver and bile ducts wnl. Bulky labia minora without easily delineated mass. No definite disruption of tissue planes. Left inguinal lymphadenopathy.  During today's visit, patient reports feeling well and offers no complaints. Endorses uncontrolled DM with A1c ~13 at one point, now on insulin and trying diet changes with improvement of A1c to 8. Patient has dual residency and travels between [place] and [place], with her PCP in [place]. She denies family history of liver disease. Denies nausea, vomiting, diarrhea, abdominal pain, or jaundice.  Meds: Biktarvy, Humalog, Lantus, lisinopril, Atorvastatin Surgical hx: cystoplasty; appendectomy; bilateral hip replacement surgery Family hx: mother - CKD on HD and DM Social hx: no ETOH use, never smoker, no drug use  Retired   PCP in [place]: Marquis Loving

## 2024-07-25 NOTE — ASSESSMENT
[FreeTextEntry1] : 53yo female with pmhx of HIV (diagnosed in 2013), DM, and HLD presents to establish care with us for elevated alk phos. Seen by Dr. Choi in 2020, then by Dr. Lyons in 2021.  Per chart review, patient has chronic elevation of alk phos, documented back to 2014, though noted to be more elevated (400-500 from 200-300) since 2021. She initially saw Dr. Choi in 1/2020 for hepatology clearance prior to hip replacement surgery, underwent extensive liver work up which was largely unrevealing except for elevated DYLAN 1:320. She was initiated on ursodiol 2/2020, which she took temporarily then self-discontinued. Recommended MRI at the time, but patient was lost to follow up. She returns to see Dr. Lyons in 4/2021, work up with elevated bone specific alk phos, neg GGT, and elevated IgG. MRI was done with normal liver findings. Patient was once again lost to follow up after that visit.  Previous liver work ups (4/2021) - viral hepatitis serologies: HBsAg neg, HBcAb total positive, HCV Ab neg. - CLD serologies: DYLAN 1:80, AMA neg, ASMA neg, IgG 2812; elevated bone specific alk phos 100 ug/L. - MRI Abdomen (6/1/2021): liver and bile ducts wnl. Bulky labia minora without easily delineated mass. No definite disruption of tissue planes. Left inguinal lymphadenopathy.  #Elevated alk phos Chronic elevation of alk phos dating back to 2014 Liver work up with elevated IgG and bone specific alk phos, otherwise unremarkable.   Suspect her alkaline phos is predominantly bone origin given previous work up.  Probably multifactorial: 1. Elevated alk phos in the setting of MASLD given patient's multiple associated risk factors including DM, HLD, HTN, and obesity.  2. Underlying autoimmune disease given elevated IgG & IgG4 3. Underlying bone disease given elevated bone specific alk phos (more likely).  Advised patient to improve her diet, increase physical activity, and achieve weight loss to improve the underlying steatotic liver disease.   Plan:  Repeat labs today with isoenzymes & GGT Bone density scan ordered to evaluate for osteopenia/osteoporosis given elevated bone specific alk phos in the past. FibroScan ordered to assess for steatosis or fibrosis. Abd USG to evaluate for any structural liver disease.   #HCM Patient reports having a colonoscopy done ~4years ago and was told to repeat for screening in 10 years.   RTO in 3mo

## 2024-07-25 NOTE — PHYSICAL EXAM
[General Appearance - Alert] : alert [General Appearance - In No Acute Distress] : in no acute distress [Sclera] : the sclera and conjunctiva were normal [] : no respiratory distress [Auscultation Breath Sounds / Voice Sounds] : lungs were clear to auscultation bilaterally [Heart Rate And Rhythm] : heart rate was normal and rhythm regular [Heart Sounds] : normal S1 and S2 [Heart Sounds Gallop] : no gallops [Murmurs] : no murmurs [Edema] : there was no peripheral edema [Bowel Sounds] : normal bowel sounds [Abdomen Soft] : soft [Abdomen Tenderness] : non-tender [Abdomen Mass (___ Cm)] : no abdominal mass palpated [Abnormal Walk] : normal gait [Skin Color & Pigmentation] : normal skin color and pigmentation [Oriented To Time, Place, And Person] : oriented to person, place, and time [Impaired Insight] : insight and judgment were intact [Affect] : the affect was normal

## 2024-07-25 NOTE — END OF VISIT
[FreeTextEntry3] :  I, Dr. Enciso, personally performed the evaluation and management (E/M) services for this new patient. That E/M includes conducting the clinically appropriate initial history &/or exam, assessing all conditions, and establishing the plan of care. Today, my TRI, [employer]olla[employer], was here to observe my evaluation and management service for this patient & follow plan of care established by me going forward.

## 2024-07-30 LAB
ALBUMIN SERPL ELPH-MCNC: 4 G/DL
ALBUMIN SERPL ELPH-MCNC: 4 G/DL
ALP BLD-CCNC: 408 U/L
ALP BLD-CCNC: 410 IU/L
ALP BLD-CCNC: 417 U/L
ALP BONE CFR SERPL: 68 %
ALP INTEST CFR SERPL: 10 %
ALP LIVER CFR SERPL: 22 %
ALT SERPL-CCNC: 16 U/L
ALT SERPL-CCNC: 19 U/L
ANION GAP SERPL CALC-SCNC: 15 MMOL/L
AST SERPL-CCNC: 16 U/L
AST SERPL-CCNC: 21 U/L
BILIRUB DIRECT SERPL-MCNC: 0.1 MG/DL
BILIRUB INDIRECT SERPL-MCNC: 0.2 MG/DL
BILIRUB SERPL-MCNC: 0.2 MG/DL
BILIRUB SERPL-MCNC: 0.3 MG/DL
BUN SERPL-MCNC: 19 MG/DL
CALCIUM SERPL-MCNC: 9.6 MG/DL
CHLORIDE SERPL-SCNC: 105 MMOL/L
CO2 SERPL-SCNC: 21 MMOL/L
CREAT SERPL-MCNC: 1.76 MG/DL
EGFR: 34 ML/MIN/1.73M2
GGT SERPL-CCNC: 23 U/L
GLUCOSE SERPL-MCNC: 247 MG/DL
POTASSIUM SERPL-SCNC: 4.5 MMOL/L
PROT SERPL-MCNC: 8.6 G/DL
PROT SERPL-MCNC: 8.6 G/DL
SODIUM SERPL-SCNC: 141 MMOL/L

## 2024-08-06 ENCOUNTER — APPOINTMENT (OUTPATIENT)
Dept: HEPATOLOGY | Facility: CLINIC | Age: 54
End: 2024-08-06

## 2024-08-19 ENCOUNTER — APPOINTMENT (OUTPATIENT)
Dept: RADIOLOGY | Facility: CLINIC | Age: 54
End: 2024-08-19
Payer: COMMERCIAL

## 2024-08-19 ENCOUNTER — APPOINTMENT (OUTPATIENT)
Dept: ULTRASOUND IMAGING | Facility: CLINIC | Age: 54
End: 2024-08-19
Payer: COMMERCIAL

## 2024-08-19 PROCEDURE — 76700 US EXAM ABDOM COMPLETE: CPT

## 2024-08-19 PROCEDURE — 77080 DXA BONE DENSITY AXIAL: CPT

## 2024-09-04 ENCOUNTER — NON-APPOINTMENT (OUTPATIENT)
Age: 54
End: 2024-09-04

## 2024-09-04 ENCOUNTER — LABORATORY RESULT (OUTPATIENT)
Age: 54
End: 2024-09-04

## 2024-09-04 ENCOUNTER — APPOINTMENT (OUTPATIENT)
Dept: INFECTIOUS DISEASE | Facility: CLINIC | Age: 54
End: 2024-09-04
Payer: COMMERCIAL

## 2024-09-04 ENCOUNTER — APPOINTMENT (OUTPATIENT)
Dept: HEPATOLOGY | Facility: CLINIC | Age: 54
End: 2024-09-04
Payer: COMMERCIAL

## 2024-09-04 VITALS
SYSTOLIC BLOOD PRESSURE: 133 MMHG | HEART RATE: 98 BPM | TEMPERATURE: 97.7 F | BODY MASS INDEX: 33.04 KG/M2 | DIASTOLIC BLOOD PRESSURE: 81 MMHG | HEIGHT: 71 IN | WEIGHT: 236 LBS | OXYGEN SATURATION: 97 %

## 2024-09-04 DIAGNOSIS — R74.8 ABNORMAL LEVELS OF OTHER SERUM ENZYMES: ICD-10-CM

## 2024-09-04 DIAGNOSIS — Z21 ASYMPTOMATIC HUMAN IMMUNODEFICIENCY VIRUS [HIV] INFECTION STATUS: ICD-10-CM

## 2024-09-04 PROCEDURE — 90834 PSYTX W PT 45 MINUTES: CPT

## 2024-09-04 PROCEDURE — 99214 OFFICE O/P EST MOD 30 MIN: CPT | Mod: 25

## 2024-09-04 PROCEDURE — 76981 USE PARENCHYMA: CPT

## 2024-09-04 PROCEDURE — 90750 HZV VACC RECOMBINANT IM: CPT

## 2024-09-04 PROCEDURE — 90471 IMMUNIZATION ADMIN: CPT

## 2024-09-04 NOTE — HISTORY OF PRESENT ILLNESS
[FreeTextEntry1] : Reason For Visit---chronic stable HIV and chronic stable Herpes simplex type 2 infection  TIEN SHARPE is a pleasant 54 year old female being seen for a HIV Maintenace visit.  Recent bladder surgery due to urine retention. Continue Biktarvy for HIV management , May switch to Juluca due to renal function. Recent left hip replacement and doing well. No longer using cane, right hip Nov 29th at [facility]. She has a new PCP , Dr. Selvin Cho Pt stopped tivicay and evotaz we then started on Biktarvy . Recent mammogram in 2024 Seen by GYN in Jan 2024. I switched valtex to famcyclovir for breakout. Continued concern over diabetes and elevated LFT's Declines flu vaccine had tetnaus and pneumonia vaccine in 2022  plan --chronic stable HIV and chronic stable Herpes simplex type 2 infection  1) continue Biktarvy -25mg tablet oral daily for HIV and Valtrex 500mg BID for herpetic lesions, renewed sent to [facility]  2) labs today reviewed today and new labs ordered including  , CBC, CBP, lipids, urine, CD4, viral load, tsh, A1c and she is followed by liver clinic ASAP for long term elevated ALK Phos 3) see in 6 months 4) external provider notes reviewed   History of Present Illness Pt recently seen by [facility] rheumatology on [address] for burning left thigh pain. . needs liver ultrasound and all liver tests...see in 3 months. Pt was off insulin a year ago and was only diet controlled. Then seen in ER for elevated glucose...now once again under endo care and taking levemir 30 daily and amaryl 2mg daily. also taking levemir 30 units daily, and glimepiride 2 mg daily. and bactrim . and vit D3 2000units daily and atorvastatin and enalapril 5.  Active Problems Abnormal EKG (794.31) (R94.31) Abnormal mammogram (793.80) (R92.8) AIDS (042) (B20) DYLAN positive (795.79) (R76.8) Anemia (285.9) (D64.9) Ankle edema (719.07) (M25.473) Bilateral leg edema (782.3) (R60.0) Breast calcification, right (793.89) (R92.1) Cervical high risk HPV (human papillomavirus) test positive (795.05) (R87.810) Chronic kidney disease, stage 3 (585.3) (N18.30) Chronic left hip pain (719.45,338.29) (M25.552,G89.29) Dense breasts (793.82) (R92.2) Diabetes type 2, uncontrolled (250.02) Drug (multiple) resistant infection (V09.91) Elevated LFTs (790.6) (R79.89) Elevated liver enzymes (790.5) (R74.8) Elevated serum creatinine (790.99) (R79.89) Elevated serum immunoglobulin free light chains (795.79) (R76.8) Establishing care with new doctor, encounter for (V65.8) (Z76.89) Follow up (V67.9) (Z09) G6PD deficiency (282.2) (D75.A) Genital herpes (054.10) (A60.00) Herpes simplex (054.9) (B00.9) Herpes simplex type 2 infection (054.9) (B00.9) Herpes simplex vulvovaginitis (054.11) (A60.04) Hip osteoarthritis (715.95) (M16.9) HIV infection (V08) (B20) Hypercholesterolemia (272.0) (E78.00) Hypertension (401.9) (I10) Increased frequency of urination (788.41) (R35.0) Influenza vaccine refused (V64.06) (Z28.21) Left leg pain (729.5) (M79.605) Leg swelling (729.81) (M79.89) Lesion of vulva (624.8) (N90.89) Lichen sclerosus (701.0) (L90.0) Lumbar spondylosis (721.3) (M47.816) Lumbar stenosis (724.02) (M48.061) Medication monitoring encounter (V58.83) (Z51.81) Microscopic hematuria (599.72) (R31.29) Nephropathy (583.9) (N28.9) OAB (overactive bladder) (596.51) (N32.81) Obesity (278.00) (E66.9) Other elevated white blood cell (WBC) count (288.69) (D72.828) Pain of left thigh (729.5) (M79.652) Preop testing (V72.84) (Z01.818) Primary osteoarthritis of both hips (715.15) (M16.0) Primary osteoarthritis of left hip (715.15) (M16.12) Primary osteoarthritis of right hip (715.15) (M16.11) Proteinuria (791.0) (R80.9) Recurrent UTI (599.0) (N39.0) Renal bone disease (588.0) (N25.0) Right hip pain (719.45) (M25.551) S/P hip replacement, left (V43.64) (Z96.642) S/P hip replacement, right (V43.64) (Z96.641) Screening for cardiovascular condition (V81.2) (Z13.6) Skin tag (701.9) (L91.8) Thyroid nodule (241.0) (E04.1) Urge and stress incontinence (788.33) (N39.46) Urinary retention (788.20) (R33.9) Urine leukocytes (791.7) (R82.998) UTI symptoms (788.99) (R39.9) Valgus deformity of left hip joint (736.31) (M21.052) Visit for gynecologic examination (V72.31) (Z01.419) Vitamin D deficiency (268.9) (E55.9) Vitamin deficiency (269.2) (E56.9) 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) Vulvar itching (698.1) (L29.2) Vulvar mass (625.8) (N90.89) Vulvar ulceration (616.50) (N76.6) Well female exam with routine gynecological exam (V72.31) (Z01.419)   Past Medical History History of Abnormal vaginal fluids (792.9) (R87.9) History of Bacterial vaginosis (616.10,041.9) (N76.0,B96.89) History of Continuous leakage of urine (788.37) (N39.45) History of Elevated liver function tests (790.6) (R79.89) History of Essential hypertriglyceridemia (272.1) (E78.1) History of Follow up (V67.9) (Z09) History of candidiasis of vagina (V12.09) (Z86.19) History of diabetes mellitus (V12.29) (Z86.39) History of diabetes with ketoacidosis (V12.29) (Z86.39) History of diarrhea (V12.79) (Z87.898) History of fracture of radius (V15.51) (Z87.81) History of upper respiratory infection (V12.09) (Z87.09) History of urinary incontinence (V13.09) (Z87.898) History of urinary tract infection (V13.02) (Z87.440) History of urinary urgency (V13.89) (Z87.898) History of vitamin D deficiency (V12.1) (Z86.39) History of HPV in female (079.4) (B97.7) History of Increased blood pressure (not hypertension) (796.2) (R03.0) History of Left leg swelling (729.81) (M79.89) Normal delivery (650) (O80) History of Skin infection (686.9) (L08.9) History of Unaware of passing urine (788.69) (R39.198) History of Vaginal odor (625.8) (N89.8) History of Visit for dental examination (V72.2) (Z01.20) History of Visit for eye and vision exam (V72.0) (Z01.00) Visit for gynecologic examination (V72.31) (Z01.419) History of Vulvar mass (625.8) (N90.89)   Current Meds BD Pen Needle Mini U/F 31G X 5 MM; USE UP TO 4 TIMES DAILY BD Pen Needle Sandra U/F 32G X 4 MM; daily Biktarvy -25 MG Oral Tablet; TAKE ONE TABLET BY MOUTH DAILY Clobetasol Propionate 0.05 % External Cream; APPLY SPARINGLY TO AFFECTED AREA(S) TWICE DAILY Fluconazole 150 MG Oral Tablet; TAKE 1 TABLET. IF STILL SYMPTOMATIC TAKE 3 DAYS LATER FreeStWaste Remediese 14 Day [place] Device; USE AS DIRECTED FreeStyle Katy 14 Day Sensor; change every 14 days HumaLOG KwikPen 100 UNIT/ML Subcutaneous Solution Pen-injector; Inject up to 25 units before meals Imiquimod 5 % External Cream; APPLY TO AFFECTED AREA THREE TIMES A WEEK UNTIL LESIONS RESOLVE Lantus SoloStar 100 UNIT/ML Subcutaneous Solution Pen-injector; INJECT 24 UNIT At Bedtime Lidocaine HCl - 3 % External Cream; USE TOPICALLY AS DIRECTED Lisinopril 5 MG Oral Tablet; TAKE 1 TABLET DAILY traMADol HCl - 50 MG Oral Tablet; TAKE 1 TABLET 3 TIMES DAILY AS NEEDED. MDD:3 tabs TDD:3 Trulicity 1.5 MG/0.5ML Subcutaneous Solution Pen-injector; Take 1.5mg SC once weekly valACYclovir HCl - 500 MG Oral Tablet; TAKE 2 TABLET Twice daily   Allergies No Known Drug Allergies No Known Environmental Allergies No Known Food Allergies

## 2024-09-04 NOTE — ASSESSMENT
[FreeTextEntry1] : plan --chronic stable HIV and chronic stable Herpes simplex type 2 infection  1) continue Biktarvy -25mg tablet oral daily for HIV and Valtrex 500mg BID for herpetic lesions, renewed sent to [facility]  2) labs today reviewed today and new labs ordered including  , CBC, CBP, lipids, urine, CD4, viral load, tsh, A1c and she is followed by liver clinic ASAP for long term elevated ALK Phos 3) see in 6 months 4) external provider notes reviewed

## 2024-09-05 ENCOUNTER — NON-APPOINTMENT (OUTPATIENT)
Age: 54
End: 2024-09-05

## 2024-09-05 DIAGNOSIS — Z23 ENCOUNTER FOR IMMUNIZATION: ICD-10-CM

## 2024-09-05 DIAGNOSIS — Z01.00 ENCOUNTER FOR EXAMINATION OF EYES AND VISION W/OUT ABNORMAL FINDINGS: ICD-10-CM

## 2024-09-05 DIAGNOSIS — Z01.20 ENCOUNTER FOR DENTAL EXAMINATION AND CLEANING W/OUT ABNORMAL FINDINGS: ICD-10-CM

## 2024-09-05 DIAGNOSIS — Z01.419 ENCOUNTER FOR GYNECOLOGICAL EXAMINATION (GENERAL) (ROUTINE) W/OUT ABNORMAL FINDINGS: ICD-10-CM

## 2024-09-05 LAB
25(OH)D3 SERPL-MCNC: 27.8 NG/ML
ALBUMIN SERPL ELPH-MCNC: 4.1 G/DL
ALP BLD-CCNC: 438 U/L
ALT SERPL-CCNC: 14 U/L
ANION GAP SERPL CALC-SCNC: 12 MMOL/L
APPEARANCE: ABNORMAL
AST SERPL-CCNC: 16 U/L
BILIRUB SERPL-MCNC: 0.6 MG/DL
BILIRUBIN URINE: NEGATIVE
BLOOD URINE: NEGATIVE
BUN SERPL-MCNC: 20 MG/DL
CALCIUM SERPL-MCNC: 9.8 MG/DL
CD3 CELLS # BLD: 618 CELLS/UL
CD3 CELLS NFR BLD: 84 %
CD3+CD4+ CELLS # BLD: 287 CELLS/UL
CD3+CD4+ CELLS NFR BLD: 39 %
CD3+CD4+ CELLS/CD3+CD8+ CLL SPEC: 1.05 RATIO
CD3+CD8+ CELLS # SPEC: 273 CELLS/UL
CD3+CD8+ CELLS NFR BLD: 37 %
CHLORIDE SERPL-SCNC: 103 MMOL/L
CO2 SERPL-SCNC: 24 MMOL/L
COLOR: YELLOW
CREAT SERPL-MCNC: 1.66 MG/DL
EGFR: 36 ML/MIN/1.73M2
ESTIMATED AVERAGE GLUCOSE: 192 MG/DL
GLUCOSE QUALITATIVE U: NEGATIVE MG/DL
GLUCOSE SERPL-MCNC: 210 MG/DL
HBA1C MFR BLD HPLC: 8.3 %
HCT VFR BLD CALC: 41.9 %
HGB BLD-MCNC: 13.7 G/DL
HIV1 RNA # SERPL NAA+PROBE: ABNORMAL
HIV1 RNA # SERPL NAA+PROBE: NORMAL
KETONES URINE: NEGATIVE MG/DL
LEUKOCYTE ESTERASE URINE: ABNORMAL
MCHC RBC-ENTMCNC: 32.6 PG
MCHC RBC-ENTMCNC: 32.7 GM/DL
MCV RBC AUTO: 99.8 FL
NITRITE URINE: NEGATIVE
PH URINE: 6
PLATELET # BLD AUTO: 187 K/UL
POTASSIUM SERPL-SCNC: 4.8 MMOL/L
PROT SERPL-MCNC: 8.4 G/DL
PROTEIN URINE: 300 MG/DL
RBC # BLD: 4.2 M/UL
RBC # FLD: 13.4 %
SODIUM SERPL-SCNC: 139 MMOL/L
SPECIFIC GRAVITY URINE: 1.02
TSH SERPL-ACNC: 1.08 UIU/ML
UROBILINOGEN URINE: 0.2 MG/DL
VIRAL LOAD INTERP: NORMAL
VIRAL LOAD LOG: 3.6
WBC # FLD AUTO: 2.57 K/UL

## 2024-09-05 RX ORDER — ZOSTER VACCINE RECOMBINANT, ADJUVANTED 50 MCG/0.5
50 KIT INTRAMUSCULAR
Qty: 1 | Refills: 1 | Status: ACTIVE | COMMUNITY
Start: 2024-09-05 | End: 1900-01-01

## 2024-09-06 LAB
SOURCE AMPLIFICATION: NORMAL
T VAGINALIS RRNA SPEC QL NAA+PROBE: NOT DETECTED

## 2024-09-09 ENCOUNTER — APPOINTMENT (OUTPATIENT)
Dept: INTERNAL MEDICINE | Facility: CLINIC | Age: 54
End: 2024-09-09

## 2024-09-09 ENCOUNTER — NON-APPOINTMENT (OUTPATIENT)
Age: 54
End: 2024-09-09

## 2024-09-09 ENCOUNTER — OUTPATIENT (OUTPATIENT)
Dept: OUTPATIENT SERVICES | Facility: HOSPITAL | Age: 54
LOS: 1 days | End: 2024-09-09

## 2024-09-09 DIAGNOSIS — Z98.82 BREAST IMPLANT STATUS: Chronic | ICD-10-CM

## 2024-09-09 DIAGNOSIS — Z96.642 PRESENCE OF LEFT ARTIFICIAL HIP JOINT: Chronic | ICD-10-CM

## 2024-09-26 NOTE — ED ADULT NURSE NOTE - EXTENSIONS OF SELF_ADULT
COGNITION FLOW SHEET  LANGUAGE  [facility] [id number]   PN: 8/30/2024  POC: 10/30/2024 9/26/2024  Continue to visit and add to basic needs, orientation. consider expanding for groceries, laundry, shopping for clothes    SHORT TERM GOALS PROGRESS TOWARD GOALS CURRENT SESSION   POA will demonstrate Operational Competence- Supervision, pt with min A  Powering the device on/off  Adjusting the volume  Accessing the information on the screen; visual field scanning, direct selection, proprioceptive feedback and forced calibration  Developing proficiency in using the system efficiently Turned on - Mod A  Noted with minimally reduced direct selection and forced calibration Min cues.New device    Patient:  Pt demonstrated this date.Powering the device on/off S    Talk  screen. (I)    Pt demonstrating scanning of visual field, direct selection and proprioceptive feedback. (S)     At times pt notes with swiping attempt to move screens rather than selecting pages. Min A     New device: Mod A cues to push 'softer' button and hold 3-5 sec    Practiced   'back' button  Deleted  Video icons  Scrolling through screen options  Managing how many screens per icon/folder.              INITIATE COMMUNICATION:  Pt will use the AAC device to initiate a conversation 3 out of 5 opportunities across natural and simulated settings, min A   (friends, healthcare provider, stranger, neighbor, member of community)  Orientation:  Name: Verbal +             AAC : my info Ma A  Address: Verbal: Verbal: -            AAC: Min A My info  Birthday: Verbal: no            AAC:   Cats: people  Added brief narrative.   Can you please help me:      Vacuum, clean the litter    Added icon to describe pain often experienced on right side of body and how to share intensity of the pain using a pain scale.    Practice Fast Talk- to share needs more time to respond    Practiced 4 pages deep to share had a stroke and has aphasia and uses device to talk      Used 3 pages to select  "groceries- [employer]- [employer]2 foods will need.    Practice Fast Talk- to share needs more time to respond    Practiced 4 pages deep to share had a stroke and has aphasia and uses device to talk      Used 3 pages to select groceries- [employer]- [employer] foods will need.  Pt is not initiating communication with the device.  Added a folder for her friend César and started conversation starters.     Added a folder in places to eat- for wanting to stay home- eat BBQ    Initiated set up to initiate conversation with X2 friends.  Will add another friend for total of 3.  Encouraged to practice that selection to initiate over next few days.    Also will practice dining and eating folders to select what she would like to eat.      Pt added:  Help-\"help with my babies\"-clip loan's nails, get cat food ready, change out the litter   Added [employer] and common grocery lists from different stores.  Discussed ability to group ie. Shrimp and ham in meat Folder.     Explored card library  -actions  -calendar  -weather  Discussed hygiene items,clothing for shopping.      Discussed considering making categories:  Folder: Meat  Cards: shrimp, ham    Discussed using language starters on store pages  -I need  -I want  -Can we get  -   SYMBOL RECOGNITION  Pt will correctly identify and select symbols or icons on the AAC devcie for:  basic needs  Orientation  feelings/frustrations  novel responses  share information   with 80% accuracy, min A Explored additional medical descriptions     Explored orientation responses.      Added feelings-frustration this date.    Practiced 'other icons\" to explore and recognize vocabulary    Added feelings: mad, annoyed, nervous, happy, satisfied, sad, lonely, tired, frustrated- pt noted with difficulty selecting appropriate icon to de    Added folders for Cats, Marisa and Loan    Visited cards briefly and encouraged pt and friend to explore folders for:  Orientation  Feelings/frustrations    Added folder in interest- " "I like horror movies- Vincent Strickland movies    Added to pt's story that she went to [facility].     Brainstorm basic needs orientation and feelings. Noted with difficulty understanding why icons have 2-word phrases   LANGUAGE SYSTEMS   Pt will use single meaning folders and cards within TouchChat to communicate in single words, single meaning prepared scripts, and syntax phrases, min A.  Single words, prepared scripts.    Paired 2 icons for syntax    Single words, prepared scripts.      Not using syntax at this time, however, practiced   \"I don't like...\"  \"Tomatoes.     Demonstrated taking a photo to label a folder/card, discussed recorded narrative or script     Practicing learning single words and simple scripts with single icons.    Used single meaning words, prepared scripts and 2 icon communication.      IMPROVING ACCURACY  Pt will improve their accuracy in selecting the correct symbols on the SGD device by 20% over the next 3 months. F6- Mod I     Pt received own device this date with large font keyboard, bluetooth keyboard and mouse and stylus and weighted stylus    Pt exhibited interest in therapy ted.  Reduced response to cues from therapist to promote speech    Education/ Strategy Training:  Discussed some maladaptive behaviors and reduced self regulation resulting in maladaptive behaviors.     Thelma encouraged not to engage in identified behaviors and encourage strategies for self regulation in order to build adaptive skills.      Thelma verbalized understanding.     Encouraged POA to make a file for the  (name- pt's preferred ) and make an icon that says, \"I would like a chocolate martini.\"    Verbalized understanding    Education provided: discussing that device is to supplement language as an alternative means.  Discussed with POA that although device may stimulate language to continue to expand, it's purpose is to augment language skills.     will benefit from further " education/training         Other: Therapist to consult with [employer] consultant re:  Setting up calendar to discuss orientation questions.   Setting up digits for 10's, 100's value.   Inability to access internet  Option for print larger when making icons,  If pt can say words, (ie yes, no, hello, goodbye) should we replace or remove Pt is progressing with use and would benefit from device.       Long Term Goals   Time Frame    Pt will improve FCM in the following areas:  Augmentative/Alternative Communication  Current: 1  Goal: 3-4    12    EXPANDING VOCABULARY  The pt will use the AAC device to expand their functional vocabulary, adding and using 10 new words or phrases appropriately in daily communication  12    SOCIAL INTERRACTION  The pt will use the SGD device to participate in social interactions, such as greeting peers, asking questions, and making comments, in 80% of observed opportunities over a 3  month period  12    SIMPLE PHRASE CONSTRUCTION  The pt will use the SGD device to construct and communicate simple phrases in 70% of appropriate contexts  12    INDEPENDENCE  The pt will use the AAC device independently in a variety of settings (home and community)for different purposes  (social, personal) with 90% success over a 3 month period  12    DAILY ROUTINES  Pt will use the SGD device to communicate during daily routines with minimal prompting in 80% of opportunities  12    REPAIRING COMMUNICATION BREAKDOWNS  Pt esequiel use the SGD device to repair communication breakdowns in 70% of instnaces when misunderstandings occur 12   Pt supports will demonstrate appropriate stratiegies to support pts use of the SGD device, such as providing wait time and modeling use, in 90% of observed interactions.  12   PEER TRAINING  Peers will engage in supported interactions using the AAC device showing undersandings and patience in 3 out of 4 observed opportunities.  12       None

## 2024-10-11 ENCOUNTER — NON-APPOINTMENT (OUTPATIENT)
Age: 54
End: 2024-10-11

## 2024-10-24 ENCOUNTER — APPOINTMENT (OUTPATIENT)
Dept: HEPATOLOGY | Facility: CLINIC | Age: 54
End: 2024-10-24

## 2025-02-25 NOTE — ED ADULT TRIAGE NOTE - HEIGHT IN CM
Group Therapy Documentation    PATIENT'S NAME: Mainor Madsen  MRN:   [id number]  :   2009  ACCT. NUMBER: [id number]  DATE OF SERVICE: 25  START TIME:  8:30 AM  END TIME:  9:30 AM  FACILITATOR(S): Suad Saha LPCC; Nuno Barnes LADC  TOPIC: BEH Group Therapy  Number of patients attending the group:  11  Group Length:  1 Hours    Group Type: IOP    Dimensions addressed 3, 4, 5, and 6    Summary of Group / Topics Discussed:    Group Therapy/Process Group:  Community Group  Patient completed diary card ratings for the last 24 hours including emotions, safety concerns, substance use, treatment interfering behaviors, and use of DBT skills.  Patient checked in regarding the previous evening as well as progress on treatment goals.    Patient Session Goals / Objectives:  * Patient will increase awareness of emotions and ability to identify them  * Patient will report substance use and safety concerns   * Patient will increase use of DBT skills      Group Attendance:  Attended group session  Interactive Complexity: No    Patient's response to the group topic/interactions:  cooperative with task    Patient appeared to be Attentive and Engaged.       Client specific details: Client identified feeling frustrated, eager, and scared. Client shared that last night she took a nap and later ate dinner. DBT skills identified as used included: wise mind, focus on what works, and pros and cons. Diary Card Ratings: Urges for Use: 4  Action: No  Suicide ideation: 0  Action:  No.  Self-harm thoughts: 0  Action:  No.  Hours of sleep: 5.        2025    11:00 AM   Suicide Ideation Check In   Since last session, how often have you had suicidal thoughts? No thoughts of suicide                 180.34

## 2025-03-03 ENCOUNTER — APPOINTMENT (OUTPATIENT)
Dept: INTERNAL MEDICINE | Facility: CLINIC | Age: 55
End: 2025-03-03

## 2025-03-03 ENCOUNTER — OUTPATIENT (OUTPATIENT)
Dept: OUTPATIENT SERVICES | Facility: HOSPITAL | Age: 55
LOS: 1 days | End: 2025-03-03

## 2025-03-03 DIAGNOSIS — Z96.642 PRESENCE OF LEFT ARTIFICIAL HIP JOINT: Chronic | ICD-10-CM

## 2025-03-12 ENCOUNTER — RX RENEWAL (OUTPATIENT)
Age: 55
End: 2025-03-12

## 2025-05-05 ENCOUNTER — APPOINTMENT (OUTPATIENT)
Dept: INFECTIOUS DISEASE | Facility: CLINIC | Age: 55
End: 2025-05-05
Payer: COMMERCIAL

## 2025-05-05 VITALS
DIASTOLIC BLOOD PRESSURE: 85 MMHG | BODY MASS INDEX: 31.64 KG/M2 | WEIGHT: 226 LBS | OXYGEN SATURATION: 97 % | HEIGHT: 71 IN | TEMPERATURE: 98.3 F | SYSTOLIC BLOOD PRESSURE: 126 MMHG | HEART RATE: 83 BPM

## 2025-05-05 DIAGNOSIS — B20 HUMAN IMMUNODEFICIENCY VIRUS [HIV] DISEASE: ICD-10-CM

## 2025-05-05 PROCEDURE — 99214 OFFICE O/P EST MOD 30 MIN: CPT

## 2025-05-06 ENCOUNTER — NON-APPOINTMENT (OUTPATIENT)
Age: 55
End: 2025-05-06

## 2025-05-06 LAB
25(OH)D3 SERPL-MCNC: 33.3 NG/ML
ALBUMIN SERPL ELPH-MCNC: 4.3 G/DL
ALP BLD-CCNC: 396 U/L
ALT SERPL-CCNC: 14 U/L
ANION GAP SERPL CALC-SCNC: 14 MMOL/L
APPEARANCE: ABNORMAL
AST SERPL-CCNC: 17 U/L
BACTERIA: NEGATIVE /HPF
BILIRUB SERPL-MCNC: 0.5 MG/DL
BILIRUBIN URINE: NEGATIVE
BLOOD URINE: NEGATIVE
BUN SERPL-MCNC: 18 MG/DL
CALCIUM SERPL-MCNC: 10 MG/DL
CAST: 0 /LPF
CD3 CELLS # BLD: 1313 CELLS/UL
CD3 CELLS NFR BLD: 82 %
CD3+CD4+ CELLS # BLD: 550 CELLS/UL
CD3+CD4+ CELLS NFR BLD: 34 %
CD3+CD4+ CELLS/CD3+CD8+ CLL SPEC: 0.88 RATIO
CD3+CD8+ CELLS # SPEC: 626 CELLS/UL
CD3+CD8+ CELLS NFR BLD: 39 %
CHLORIDE SERPL-SCNC: 103 MMOL/L
CHOLEST SERPL-MCNC: 376 MG/DL
CO2 SERPL-SCNC: 22 MMOL/L
COLOR: YELLOW
CREAT SERPL-MCNC: 1.73 MG/DL
CYSTATIN C SERPL-MCNC: 1.82 MG/L
EGFRCR SERPLBLD CKD-EPI 2021: 34 ML/MIN/1.73M2
EPITHELIAL CELLS: 0 /HPF
ESTIMATED AVERAGE GLUCOSE: 186 MG/DL
GFR/BSA.PRED SERPLBLD CYS-BASED-ARV: 33 ML/MIN/1.73M2
GLUCOSE QUALITATIVE U: NEGATIVE MG/DL
GLUCOSE SERPL-MCNC: 201 MG/DL
HBA1C MFR BLD HPLC: 8.1 %
HCT VFR BLD CALC: 40.6 %
HDLC SERPL-MCNC: 51 MG/DL
HGB BLD-MCNC: 13.5 G/DL
HIV1 RNA # SERPL NAA+PROBE: NORMAL
HIV1 RNA # SERPL NAA+PROBE: NORMAL COPIES/ML
KETONES URINE: NEGATIVE MG/DL
LDLC SERPL-MCNC: 274 MG/DL
LEUKOCYTE ESTERASE URINE: ABNORMAL
MCHC RBC-ENTMCNC: 32.2 PG
MCHC RBC-ENTMCNC: 33.3 G/DL
MCV RBC AUTO: 96.9 FL
MICROSCOPIC-UA: NORMAL
NITRITE URINE: NEGATIVE
NONHDLC SERPL-MCNC: 325 MG/DL
PH URINE: 6
PLATELET # BLD AUTO: 231 K/UL
POTASSIUM SERPL-SCNC: 4.7 MMOL/L
PROT SERPL-MCNC: 8.7 G/DL
PROTEIN URINE: 100 MG/DL
RBC # BLD: 4.19 M/UL
RBC # FLD: 13.4 %
RED BLOOD CELLS URINE: 2 /HPF
SODIUM SERPL-SCNC: 139 MMOL/L
SPECIFIC GRAVITY URINE: 1.02
TRIGL SERPL-MCNC: 239 MG/DL
TSH SERPL-ACNC: 0.73 UIU/ML
UROBILINOGEN URINE: 0.2 MG/DL
VIABILITY: NORMAL
VIRAL LOAD INTERP: NORMAL
VIRAL LOAD LOG: NORMAL LG COP/ML
WBC # FLD AUTO: 4 K/UL
WHITE BLOOD CELLS URINE: 97 /HPF

## 2025-05-07 ENCOUNTER — NON-APPOINTMENT (OUTPATIENT)
Age: 55
End: 2025-05-07

## 2025-06-04 NOTE — H&P ADULT - ASSESSMENT
Subjective   Zeny Scott is a 70 y.o. female.    Chief Complaint:  Fatigue and weakness.  Abdominal pain.  Follow-up sleep study.    HPI    Since her last visit she had an emergency room visit for severe abdominal pain.  She had received a gel injection in her knee.  Few hours later developed severe abdominal discomfort nausea and vomiting.  Went to the emergency room where her workup was negative.  Cardiac workup was unremarkable.  From a cardiac standpoint she has been the same.  No anginal symptoms.  We did order a sleep study on her last visit.    Her chief complaint is fatigue poor energy levels and poor exercise tolerance.  No anginal symptoms.  Denies any pressure or heaviness in the chest.  Does note exertional dyspnea.  Denies palpitations.    Cardiac catheterization performed on 2022 demonstrated 30 to 40% left anterior descending coronary artery stenosis with a normal circumflex and a normal right coronary artery. Medical therapy was recommended.     Her diagnosis of coronary artery disease is also based on a positive calcium score of 676 consistent with the presence of extensive atherosclerotic coronary artery disease.     Her diagnosis of atrial fibrillation dates back to 2017.  She has had multiple cardioversions.     She has a history of cirrhosis of the liver. She's also had a cholecystectomy. Has a history of elevated liver enzymes on statin therapy.     She is . Has 5 children one of which .  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      Allergies  Medication    · oxycodone            · Percocet TABS  Family History  Mother    · Family history of Alzheimer's disease (V17.2) (Z82.0)  Father    · Family history of cardiac disorder (V17.49) (Z82.49)   · Family history of kidney disease (V18.69) (Z84.1)     Social History  Problems    · Never smoker     Review of Systems   Constitutional: Positive for malaise/fatigue.    Cardiovascular:  Positive for chest pain and dyspnea on exertion.   Musculoskeletal:  Positive for arthritis and joint pain.   All other systems reviewed and are negative.      Current Outpatient Medications   Medication Sig Dispense Refill    amLODIPine (Norvasc) 5 mg tablet Take 1 tablet (5 mg) by mouth once daily. 90 tablet 3    atorvastatin (Lipitor) 40 mg tablet Take 1 tablet (40 mg) by mouth once daily. 90 tablet 3    clotrimazole (Lotrimin) 1 % external solution Instill 4 drops into the left ear twice daily for the next 10 days. 30 mL 0    Eliquis 5 mg tablet Take 1 tablet (5 mg) by mouth 2 times a day. 60 tablet 11    Farxiga 10 mg TAKE ONE TABLET BY MOUTH EVERY DAY 90 tablet 3    fluticasone (Flonase) 50 mcg/actuation nasal spray Administer 2 sprays into each nostril, once daily. 16 g 1    FreeStyle Main 2 [place] misc use as directed to monitor blood glucose continuously      FreeStyle Main 2 Sensor kit use as directed and change every 14 days to monitor blood glucose continuously. use reader to scan at least 3 times per day      FreeStyle Precision Maninder Strips strip use as directed to monitor blood glucose three times daily      gabapentin (Neurontin) 100 mg capsule TAKE TWO CAPSULES BY MOUTH ONCE DAILY AT BEDTIME 60 capsule 0    glimepiride (Amaryl) 4 mg tablet Take 1 tablet (4 mg) by mouth once daily in the morning. Take before meals.      ketoconazole (NIZOral) 2 % cream Apply topically 2 times a day. 60 g 2    ketoconazole (NIZOral) 2 % shampoo Apply topically every other day. 120 mL 2    LORazepam (Ativan) 1 mg tablet Take 1 tablet (1 mg) by mouth every 6 hours if needed.      methIMAzole (Tapazole) 5 mg tablet Take 1 tablet (5 mg) by mouth once daily.      metoprolol tartrate (Lopressor) 25 mg tablet Take 1 tablet (25 mg) by mouth 2 times a day. 180 tablet 3    nitroglycerin (Nitrostat) 0.4 mg SL tablet Place 1 tablet (0.4 mg) under the tongue every 5 minutes if needed for chest pain.       52yo , with hx of HIV/DM2 presenting with worsening acute on chronic HSV infection with large vulvar mass. Pt admitted to GYN service for observation, with ID closely following for IV antiviral treatment.   Pt failed PO antiviral therapy with valtrex, to be started on Foscarnet.     Neuro: PO Motrin PRN, PO Tylenol PRN  CV: Hemodynamically stable, Daily CBC   Pulm: O2 sat WNL on RA, Increase ambulation, encourage incentive spirometry use  GI: Tolerating regular diet  : Voiding spontaneously  Heme: DVT ppx: HSQ, SCDs while in bed  ID: Afebrile, No signs of infection. Monitor CBC daily. Continue home medication. F/u GC/CT sent in ED.   FEN: For NS bolus 500cc prior to foscarnet infusion daily, otherwise SLIV  Dispo: Admit to GYN, ID following    - For Biopsy and Viral Cultures tomorrow      Pt seen with Dr. Nina Land d/w Dr. Morales of [facility] [place] PGY2   omeprazole (PriLOSEC) 40 mg DR capsule Take 1 capsule (40 mg) by mouth once daily. 30 capsule 1    ondansetron (Zofran) 4 mg tablet Take 1 tablet (4 mg) by mouth every 8 hours if needed for nausea or vomiting for up to 3 days. 9 tablet 0    spironolactone (Aldactone) 25 mg tablet Take 1 tablet (25 mg) by mouth once daily. 30 tablet 11    triamcinolone (Kenalog) 0.025 % ointment Apply topically 2 times a day. 80 g 1    triamcinolone (Kenalog) 0.1 % cream Apply topically 2 times a day for 14 days, then reduce frequency to twice a week.  Repeat as needed, but do not use more than 2 weeks per month. 453.6 g 0     No current facility-administered medications for this visit.        Visit Vitals  OB Status Postmenopausal   Smoking Status Never        Objective     Constitutional:       Appearance: Not in distress.   Neck:      Vascular: JVD normal.   Pulmonary:      Breath sounds: Normal breath sounds.   Cardiovascular:      Normal rate. Irregularly irregular rhythm. Normal S1. Normal S2.       Murmurs: There is no murmur.      No gallop.    Pulses:     Intact distal pulses.   Edema:     Peripheral edema present.     Pretibial: bilateral 1+ edema of the pretibial area.  Abdominal:      General: There is no distension.      Palpations: Abdomen is soft.   Neurological:      Mental Status: Alert.         Lab Review:   Lab Results   Component Value Date     04/28/2025    K 4.0 04/28/2025     04/28/2025    CO2 24 04/28/2025    BUN 17 04/28/2025    CREATININE 0.48 (L) 04/28/2025    GLUCOSE 162 (H) 04/28/2025    CALCIUM 9.1 04/28/2025       Assessment:    1.  Atrial fibrillation.   Controlled ventricular response.  Plan is rate control and anticoagulation therapy.  Not a candidate for an ablation procedure.    2.  Coronary artery disease.  No anginal symptoms.    3.  Probable obstructive sleep apnea.  Sleep study is pending.  Will call her with the results.    4.  Hypertension.  Blood pressures are excellent.    5.   Nausea and vomiting.  May have been an allergic reaction to the gel injection.  May be reasonable to retest her with a small dose as the gel injection did help her symptoms.    6.  Hyperlipidemia.  Cholesterol 143, HDL 55, LDL 74.

## 2025-07-24 ENCOUNTER — NON-APPOINTMENT (OUTPATIENT)
Age: 55
End: 2025-07-24

## 2025-07-31 NOTE — ED PROVIDER NOTE - CONSTITUTIONAL MANNER
Ambulatory Care Coordination Note     7/31/2025 11:02 AM     Patient outreach attempt by this ACM today to offer care management services. ACM was unable to reach the patient by telephone today;   voicemail full and unable to leave a message.      ACM: Ratna Gilmore RN     Care Summary Note: UTRx1    PCP/Specialist follow up:   Future Appointments         Provider Specialty Dept Phone    9/12/2025 1:45 PM Jerry Culver MD Primary Care [phone]            Follow Up:   Plan for next ACM outreach in approximately 1-2 days  to complete:  - outreach attempt to offer care management services.              appropriate for situation

## 2025-08-01 ENCOUNTER — NON-APPOINTMENT (OUTPATIENT)
Age: 55
End: 2025-08-01

## 2025-08-07 ENCOUNTER — NON-APPOINTMENT (OUTPATIENT)
Age: 55
End: 2025-08-07

## 2025-08-19 ENCOUNTER — RX RENEWAL (OUTPATIENT)
Age: 55
End: 2025-08-19

## 2025-08-29 ENCOUNTER — NON-APPOINTMENT (OUTPATIENT)
Age: 55
End: 2025-08-29

## 2025-08-29 RX ORDER — MULTIVITAMIN
TABLET ORAL DAILY
Refills: 0 | Status: ACTIVE | COMMUNITY
Start: 2025-08-29

## 2025-08-29 RX ORDER — LOSARTAN POTASSIUM 50 MG/1
50 TABLET, FILM COATED ORAL DAILY
Refills: 0 | Status: ACTIVE | COMMUNITY
Start: 2025-08-29

## (undated) DEVICE — GLV 6.5 PROTEXIS (WHITE)

## (undated) DEVICE — Device

## (undated) DEVICE — HOOD FLYTE STRYKER SURGICOOL W PEELAWAY

## (undated) DEVICE — ELCTR AQUAMANTYS BIPOLAR SEALER 6.0

## (undated) DEVICE — SUT PDO 2 1/2 CIRCLE 40MM NDL 45CM

## (undated) DEVICE — STRYKER INTERPULSE HANDPIECE W IRR SUCTION TUBE

## (undated) DEVICE — PACK DAA HIP

## (undated) DEVICE — SOL INJ NS 0.9% 500ML 1-PORT

## (undated) DEVICE — HOOD FLYTE STRYKER HELMET SHIELD

## (undated) DEVICE — SUT VICRYL 1 27" CPX UNDYED

## (undated) DEVICE — DRAPE 3/4 SHEET W REINFORCEMENT 56X77"

## (undated) DEVICE — BLADE SCALPEL SAFETYLOCK #20

## (undated) DEVICE — SOL INJ NS 0.9% 1000ML

## (undated) DEVICE — SOL BETADINE POUCH 0.75OZ STERILE

## (undated) DEVICE — DRSG DERMABOND PRINEO 22CM

## (undated) DEVICE — GLV 8.5 PROTEXIS (WHITE)

## (undated) DEVICE — SUT ETHIBOND EXCEL 2 30" OS-4

## (undated) DEVICE — POSITIONER FOAM ABDUCTION PILLOW MED (PINK)

## (undated) DEVICE — NDL HYPO SAFE 22G X 1.5" (BLACK)

## (undated) DEVICE — SOL IRR POUR H2O 1000ML

## (undated) DEVICE — GLV 7 PROTEXIS (WHITE)

## (undated) DEVICE — GLV 7.5 PROTEXIS (WHITE)

## (undated) DEVICE — DRAPE INSTRUMENT POUCH 6.75" X 11"

## (undated) DEVICE — DRAPE MAGNETIC INSTRUMENT MEDIUM

## (undated) DEVICE — SOL IRR POUR NS 0.9% 500ML

## (undated) DEVICE — DRSG AQUACEL 3.5 X 12"

## (undated) DEVICE — DRAPE IOBAN 33" X 23"

## (undated) DEVICE — SYR LUER LOK 20CC

## (undated) DEVICE — SUT STRATAFIX SYMMETRIC PDS PLUS 1 18" CTX VIOLET

## (undated) DEVICE — SPECIMEN CONTAINER 100ML

## (undated) DEVICE — POSITIONER FOAM EGG CRATE ULNAR 2PCS (PINK)

## (undated) DEVICE — SUT MONOSOF 2-0 18" C-15

## (undated) DEVICE — VENODYNE/SCD SLEEVE CALF LARGE

## (undated) DEVICE — SUT TICRON 0 30" TIES

## (undated) DEVICE — SAW BLADE STRYKER SAGITTAL 81.5X12.5X1.19MM

## (undated) DEVICE — FRAZIER SUCTION TIP 18FR

## (undated) DEVICE — WARMING BLANKET UPPER ADULT

## (undated) DEVICE — GLV 8 PROTEXIS (CREAM) NEU-THERA

## (undated) DEVICE — ELCTR BOVIE PENCIL SMOKE EVACUATION

## (undated) DEVICE — DRAPE TOWEL BLUE 17" X 24"

## (undated) DEVICE — DRAPE HIP W POUCHES 87X115X134"

## (undated) DEVICE — SUT MONOCRYL 3-0 27" PS-2 UNDYED

## (undated) DEVICE — NDL HYPO SAFE 18G X 1.5" (PINK)

## (undated) DEVICE — TUBING TUR 2 PRONG

## (undated) DEVICE — GOWN TRIMAX LG

## (undated) DEVICE — ELCTR PLASMA BLADE X 3.0S WIDE TIP